# Patient Record
Sex: FEMALE | Race: WHITE | NOT HISPANIC OR LATINO | Employment: UNEMPLOYED | URBAN - NONMETROPOLITAN AREA
[De-identification: names, ages, dates, MRNs, and addresses within clinical notes are randomized per-mention and may not be internally consistent; named-entity substitution may affect disease eponyms.]

---

## 2017-04-12 ENCOUNTER — OFFICE VISIT - GICH (OUTPATIENT)
Dept: INTERNAL MEDICINE | Facility: OTHER | Age: 42
End: 2017-04-12

## 2017-04-12 ENCOUNTER — HISTORY (OUTPATIENT)
Dept: INTERNAL MEDICINE | Facility: OTHER | Age: 42
End: 2017-04-12

## 2017-04-12 ENCOUNTER — AMBULATORY - GICH (OUTPATIENT)
Dept: LAB | Facility: OTHER | Age: 42
End: 2017-04-12

## 2017-04-12 DIAGNOSIS — R53.83 OTHER FATIGUE: ICD-10-CM

## 2017-04-12 DIAGNOSIS — E55.9 VITAMIN D DEFICIENCY: ICD-10-CM

## 2017-04-12 DIAGNOSIS — R09.81 NASAL CONGESTION: ICD-10-CM

## 2017-04-12 DIAGNOSIS — E04.1 NONTOXIC SINGLE THYROID NODULE: ICD-10-CM

## 2017-04-12 LAB
ERYTHROCYTE [DISTWIDTH] IN BLOOD BY AUTOMATED COUNT: 12.1 % (ref 11.5–15.5)
HCT VFR BLD AUTO: 38.5 % (ref 33–51)
HEMOGLOBIN: 12.9 G/DL (ref 12–16)
MCH RBC QN AUTO: 30.8 PG (ref 26–34)
MCHC RBC AUTO-ENTMCNC: 33.6 G/DL (ref 32–36)
MCV RBC AUTO: 92 FL (ref 80–100)
PLATELET # BLD AUTO: 261 THOU/CU MM (ref 140–440)
PMV BLD: 8.8 FL (ref 6.5–11)
RED BLOOD COUNT - HISTORICAL: 4.19 MIL/CU MM (ref 4–5.2)
TSH - HISTORICAL: 1.6 UIU/ML (ref 0.34–5.6)
VITAMIN D TOTAL - HISTORICAL: 58.7 NG/ML
WHITE BLOOD COUNT - HISTORICAL: 10.4 THOU/CU MM (ref 4.5–11)

## 2017-04-12 ASSESSMENT — ANXIETY QUESTIONNAIRES
5. BEING SO RESTLESS THAT IT IS HARD TO SIT STILL: NOT AT ALL
4. TROUBLE RELAXING: NOT AT ALL
2. NOT BEING ABLE TO STOP OR CONTROL WORRYING: NOT AT ALL
3. WORRYING TOO MUCH ABOUT DIFFERENT THINGS: SEVERAL DAYS
7. FEELING AFRAID AS IF SOMETHING AWFUL MIGHT HAPPEN: NOT AT ALL
GAD7 TOTAL SCORE: 5
1. FEELING NERVOUS, ANXIOUS, OR ON EDGE: MORE THAN HALF THE DAYS
6. BECOMING EASILY ANNOYED OR IRRITABLE: MORE THAN HALF THE DAYS

## 2017-04-12 ASSESSMENT — PATIENT HEALTH QUESTIONNAIRE - PHQ9: SUM OF ALL RESPONSES TO PHQ QUESTIONS 1-9: 3

## 2017-04-19 ENCOUNTER — HOSPITAL ENCOUNTER (OUTPATIENT)
Dept: RADIOLOGY | Facility: OTHER | Age: 42
End: 2017-04-19
Attending: INTERNAL MEDICINE

## 2017-04-19 ENCOUNTER — AMBULATORY - GICH (OUTPATIENT)
Dept: LAB | Facility: OTHER | Age: 42
End: 2017-04-19

## 2017-04-19 ENCOUNTER — AMBULATORY - GICH (OUTPATIENT)
Dept: FAMILY MEDICINE | Facility: OTHER | Age: 42
End: 2017-04-19

## 2017-04-19 DIAGNOSIS — E04.1 NONTOXIC SINGLE THYROID NODULE: ICD-10-CM

## 2017-04-19 DIAGNOSIS — O20.9 HEMORRHAGE IN EARLY PREGNANCY: ICD-10-CM

## 2017-04-19 LAB
HCG BETA QUANT,PREGNANCY - HISTORICAL: ABNORMAL MIU/ML
PROGESTERONE: 15.48 NG/ML

## 2017-04-21 ENCOUNTER — OFFICE VISIT - GICH (OUTPATIENT)
Dept: FAMILY MEDICINE | Facility: OTHER | Age: 42
End: 2017-04-21

## 2017-04-21 ENCOUNTER — AMBULATORY - GICH (OUTPATIENT)
Dept: FAMILY MEDICINE | Facility: OTHER | Age: 42
End: 2017-04-21

## 2017-04-21 ENCOUNTER — HOSPITAL ENCOUNTER (OUTPATIENT)
Dept: RADIOLOGY | Facility: OTHER | Age: 42
End: 2017-04-21
Attending: FAMILY MEDICINE

## 2017-04-21 DIAGNOSIS — O20.9 HEMORRHAGE IN EARLY PREGNANCY: ICD-10-CM

## 2017-04-21 LAB
HCG BETA QUANT,PREGNANCY - HISTORICAL: ABNORMAL MIU/ML
PROGESTERONE: 9.9 NG/ML

## 2017-04-24 ENCOUNTER — AMBULATORY - GICH (OUTPATIENT)
Dept: FAMILY MEDICINE | Facility: OTHER | Age: 42
End: 2017-04-24

## 2017-04-24 ENCOUNTER — AMBULATORY - GICH (OUTPATIENT)
Dept: LAB | Facility: OTHER | Age: 42
End: 2017-04-24

## 2017-04-24 DIAGNOSIS — O20.9 HEMORRHAGE IN EARLY PREGNANCY: ICD-10-CM

## 2017-04-24 LAB — HCG BETA QUANT,PREGNANCY - HISTORICAL: ABNORMAL MIU/ML

## 2017-04-26 ENCOUNTER — HISTORY (OUTPATIENT)
Dept: FAMILY MEDICINE | Facility: OTHER | Age: 42
End: 2017-04-26

## 2017-04-26 ENCOUNTER — HOSPITAL ENCOUNTER (OUTPATIENT)
Dept: RADIOLOGY | Facility: OTHER | Age: 42
End: 2017-04-26
Attending: FAMILY MEDICINE

## 2017-04-26 ENCOUNTER — OFFICE VISIT - GICH (OUTPATIENT)
Dept: FAMILY MEDICINE | Facility: OTHER | Age: 42
End: 2017-04-26

## 2017-04-26 ENCOUNTER — AMBULATORY - GICH (OUTPATIENT)
Dept: LAB | Facility: OTHER | Age: 42
End: 2017-04-26

## 2017-04-26 ENCOUNTER — AMBULATORY - GICH (OUTPATIENT)
Dept: FAMILY MEDICINE | Facility: OTHER | Age: 42
End: 2017-04-26

## 2017-04-26 DIAGNOSIS — O20.9 HEMORRHAGE IN EARLY PREGNANCY: ICD-10-CM

## 2017-04-26 LAB — HCG BETA QUANT,PREGNANCY - HISTORICAL: ABNORMAL MIU/ML

## 2017-05-02 ENCOUNTER — COMMUNICATION - GICH (OUTPATIENT)
Dept: FAMILY MEDICINE | Facility: OTHER | Age: 42
End: 2017-05-02

## 2017-05-02 DIAGNOSIS — O20.9 HEMORRHAGE IN EARLY PREGNANCY: ICD-10-CM

## 2017-05-03 ENCOUNTER — AMBULATORY - GICH (OUTPATIENT)
Dept: LAB | Facility: OTHER | Age: 42
End: 2017-05-03

## 2017-05-03 ENCOUNTER — HISTORY (OUTPATIENT)
Dept: OBGYN | Facility: OTHER | Age: 42
End: 2017-05-03

## 2017-05-03 ENCOUNTER — HOSPITAL ENCOUNTER (OUTPATIENT)
Dept: RADIOLOGY | Facility: OTHER | Age: 42
End: 2017-05-03
Attending: FAMILY MEDICINE

## 2017-05-03 ENCOUNTER — AMBULATORY - GICH (OUTPATIENT)
Dept: FAMILY MEDICINE | Facility: OTHER | Age: 42
End: 2017-05-03

## 2017-05-03 ENCOUNTER — OFFICE VISIT - GICH (OUTPATIENT)
Dept: OBGYN | Facility: OTHER | Age: 42
End: 2017-05-03

## 2017-05-03 DIAGNOSIS — O20.9 HEMORRHAGE IN EARLY PREGNANCY: ICD-10-CM

## 2017-05-03 DIAGNOSIS — O03.4 INCOMPLETE SPONTANEOUS ABORTION WITHOUT COMPLICATION: ICD-10-CM

## 2017-05-03 LAB — HCG BETA QUANT,PREGNANCY - HISTORICAL: ABNORMAL MIU/ML

## 2017-05-04 ENCOUNTER — HOSPITAL ENCOUNTER (OUTPATIENT)
Dept: SURGERY | Facility: OTHER | Age: 42
Discharge: HOME | End: 2017-05-04
Attending: OBSTETRICS & GYNECOLOGY | Admitting: OBSTETRICS & GYNECOLOGY

## 2017-05-04 ENCOUNTER — SURGERY (OUTPATIENT)
Dept: SURGERY | Facility: OTHER | Age: 42
End: 2017-05-04

## 2017-05-04 DIAGNOSIS — Z98.890 OTHER SPECIFIED POSTPROCEDURAL STATES: ICD-10-CM

## 2017-05-17 ENCOUNTER — OFFICE VISIT - GICH (OUTPATIENT)
Dept: OBGYN | Facility: OTHER | Age: 42
End: 2017-05-17

## 2017-05-17 ENCOUNTER — HISTORY (OUTPATIENT)
Dept: OBGYN | Facility: OTHER | Age: 42
End: 2017-05-17

## 2017-05-17 DIAGNOSIS — Z48.89 ENCOUNTER FOR OTHER SPECIFIED SURGICAL AFTERCARE (CODE): ICD-10-CM

## 2017-06-13 ENCOUNTER — AMBULATORY - GICH (OUTPATIENT)
Dept: INTERNAL MEDICINE | Facility: OTHER | Age: 42
End: 2017-06-13

## 2017-07-26 ENCOUNTER — HISTORY (OUTPATIENT)
Dept: INTERNAL MEDICINE | Facility: OTHER | Age: 42
End: 2017-07-26

## 2017-07-26 ENCOUNTER — OFFICE VISIT - GICH (OUTPATIENT)
Dept: INTERNAL MEDICINE | Facility: OTHER | Age: 42
End: 2017-07-26

## 2017-07-26 DIAGNOSIS — E04.1 NONTOXIC SINGLE THYROID NODULE: ICD-10-CM

## 2017-07-26 DIAGNOSIS — R41.840 ATTENTION AND CONCENTRATION DEFICIT: ICD-10-CM

## 2017-07-26 DIAGNOSIS — F43.20 ADJUSTMENT DISORDER: ICD-10-CM

## 2017-07-26 ASSESSMENT — ANXIETY QUESTIONNAIRES
2. NOT BEING ABLE TO STOP OR CONTROL WORRYING: SEVERAL DAYS
GAD7 TOTAL SCORE: 10
4. TROUBLE RELAXING: SEVERAL DAYS
7. FEELING AFRAID AS IF SOMETHING AWFUL MIGHT HAPPEN: SEVERAL DAYS
6. BECOMING EASILY ANNOYED OR IRRITABLE: MORE THAN HALF THE DAYS
5. BEING SO RESTLESS THAT IT IS HARD TO SIT STILL: SEVERAL DAYS
3. WORRYING TOO MUCH ABOUT DIFFERENT THINGS: SEVERAL DAYS
1. FEELING NERVOUS, ANXIOUS, OR ON EDGE: NEARLY EVERY DAY

## 2017-07-26 ASSESSMENT — PATIENT HEALTH QUESTIONNAIRE - PHQ9: SUM OF ALL RESPONSES TO PHQ QUESTIONS 1-9: 9

## 2017-08-16 ENCOUNTER — AMBULATORY - GICH (OUTPATIENT)
Dept: INTERNAL MEDICINE | Facility: OTHER | Age: 42
End: 2017-08-16

## 2017-08-24 ENCOUNTER — AMBULATORY - GICH (OUTPATIENT)
Dept: ORTHOPEDICS | Facility: OTHER | Age: 42
End: 2017-08-24

## 2017-08-24 DIAGNOSIS — M79.631 PAIN OF RIGHT FOREARM: ICD-10-CM

## 2017-08-24 DIAGNOSIS — M25.521 PAIN IN RIGHT ELBOW: ICD-10-CM

## 2017-08-28 ENCOUNTER — OFFICE VISIT - GICH (OUTPATIENT)
Dept: ORTHOPEDICS | Facility: OTHER | Age: 42
End: 2017-08-28

## 2017-08-28 ENCOUNTER — HOSPITAL ENCOUNTER (OUTPATIENT)
Dept: RADIOLOGY | Facility: OTHER | Age: 42
End: 2017-08-28
Attending: ORTHOPAEDIC SURGERY

## 2017-08-28 ENCOUNTER — HISTORY (OUTPATIENT)
Dept: ORTHOPEDICS | Facility: OTHER | Age: 42
End: 2017-08-28

## 2017-08-28 DIAGNOSIS — M77.01 MEDIAL EPICONDYLITIS OF RIGHT ELBOW: ICD-10-CM

## 2017-08-28 DIAGNOSIS — M79.631 PAIN OF RIGHT FOREARM: ICD-10-CM

## 2017-08-28 DIAGNOSIS — G56.23 LESIONS OF BOTH ULNAR NERVES: ICD-10-CM

## 2017-08-28 DIAGNOSIS — M25.521 PAIN IN RIGHT ELBOW: ICD-10-CM

## 2017-08-28 DIAGNOSIS — M77.8 OTHER ENTHESOPATHIES, NOT ELSEWHERE CLASSIFIED: ICD-10-CM

## 2017-08-30 ENCOUNTER — AMBULATORY - GICH (OUTPATIENT)
Dept: INTERNAL MEDICINE | Facility: OTHER | Age: 42
End: 2017-08-30

## 2017-08-30 ENCOUNTER — HOSPITAL ENCOUNTER (OUTPATIENT)
Dept: PHYSICAL THERAPY | Facility: OTHER | Age: 42
Setting detail: THERAPIES SERIES
End: 2017-08-30
Attending: ORTHOPAEDIC SURGERY

## 2017-08-30 ENCOUNTER — HOSPITAL ENCOUNTER (OUTPATIENT)
Dept: RADIOLOGY | Facility: OTHER | Age: 42
End: 2017-08-30
Attending: ORTHOPAEDIC SURGERY

## 2017-08-30 DIAGNOSIS — M77.01 MEDIAL EPICONDYLITIS OF RIGHT ELBOW: ICD-10-CM

## 2017-08-30 DIAGNOSIS — G56.23 LESIONS OF BOTH ULNAR NERVES: ICD-10-CM

## 2017-08-30 DIAGNOSIS — M77.8 OTHER ENTHESOPATHIES, NOT ELSEWHERE CLASSIFIED: ICD-10-CM

## 2017-09-06 ENCOUNTER — HOSPITAL ENCOUNTER (OUTPATIENT)
Dept: PHYSICAL THERAPY | Facility: OTHER | Age: 42
Setting detail: THERAPIES SERIES
End: 2017-09-06
Attending: ORTHOPAEDIC SURGERY

## 2017-09-07 ENCOUNTER — HOSPITAL ENCOUNTER (OUTPATIENT)
Dept: PHYSICAL THERAPY | Facility: OTHER | Age: 42
Setting detail: THERAPIES SERIES
End: 2017-09-07
Attending: ORTHOPAEDIC SURGERY

## 2017-09-12 ENCOUNTER — HOSPITAL ENCOUNTER (OUTPATIENT)
Dept: PHYSICAL THERAPY | Facility: OTHER | Age: 42
Setting detail: THERAPIES SERIES
End: 2017-09-12
Attending: ORTHOPAEDIC SURGERY

## 2017-09-14 ENCOUNTER — HOSPITAL ENCOUNTER (OUTPATIENT)
Dept: PHYSICAL THERAPY | Facility: OTHER | Age: 42
Setting detail: THERAPIES SERIES
End: 2017-09-14
Attending: ORTHOPAEDIC SURGERY

## 2017-09-19 ENCOUNTER — HOSPITAL ENCOUNTER (OUTPATIENT)
Dept: PHYSICAL THERAPY | Facility: OTHER | Age: 42
Setting detail: THERAPIES SERIES
End: 2017-09-19
Attending: ORTHOPAEDIC SURGERY

## 2017-09-21 ENCOUNTER — HOSPITAL ENCOUNTER (OUTPATIENT)
Dept: PHYSICAL THERAPY | Facility: OTHER | Age: 42
Setting detail: THERAPIES SERIES
End: 2017-09-21
Attending: ORTHOPAEDIC SURGERY

## 2017-09-26 ENCOUNTER — HOSPITAL ENCOUNTER (OUTPATIENT)
Dept: PHYSICAL THERAPY | Facility: OTHER | Age: 42
Setting detail: THERAPIES SERIES
End: 2017-09-26
Attending: ORTHOPAEDIC SURGERY

## 2017-09-28 ENCOUNTER — HOSPITAL ENCOUNTER (OUTPATIENT)
Dept: PHYSICAL THERAPY | Facility: OTHER | Age: 42
Setting detail: THERAPIES SERIES
End: 2017-09-28
Attending: ORTHOPAEDIC SURGERY

## 2017-10-03 ENCOUNTER — HOSPITAL ENCOUNTER (OUTPATIENT)
Dept: PHYSICAL THERAPY | Facility: OTHER | Age: 42
Setting detail: THERAPIES SERIES
End: 2017-10-03
Attending: ORTHOPAEDIC SURGERY

## 2017-10-05 ENCOUNTER — HOSPITAL ENCOUNTER (OUTPATIENT)
Dept: PHYSICAL THERAPY | Facility: OTHER | Age: 42
Setting detail: THERAPIES SERIES
End: 2017-10-05
Attending: ORTHOPAEDIC SURGERY

## 2017-10-10 ENCOUNTER — COMMUNICATION - GICH (OUTPATIENT)
Dept: FAMILY MEDICINE | Facility: OTHER | Age: 42
End: 2017-10-10

## 2017-10-10 ENCOUNTER — HOSPITAL ENCOUNTER (OUTPATIENT)
Dept: PHYSICAL THERAPY | Facility: OTHER | Age: 42
Setting detail: THERAPIES SERIES
End: 2017-10-10
Attending: ORTHOPAEDIC SURGERY

## 2017-10-10 ENCOUNTER — AMBULATORY - GICH (OUTPATIENT)
Dept: INTERNAL MEDICINE | Facility: OTHER | Age: 42
End: 2017-10-10

## 2017-10-10 ENCOUNTER — COMMUNICATION - GICH (OUTPATIENT)
Dept: INTERNAL MEDICINE | Facility: OTHER | Age: 42
End: 2017-10-10

## 2017-10-11 ENCOUNTER — HISTORY (OUTPATIENT)
Dept: FAMILY MEDICINE | Facility: OTHER | Age: 42
End: 2017-10-11

## 2017-10-11 ENCOUNTER — OFFICE VISIT - GICH (OUTPATIENT)
Dept: FAMILY MEDICINE | Facility: OTHER | Age: 42
End: 2017-10-11

## 2017-10-11 DIAGNOSIS — N91.2 AMENORRHEA: ICD-10-CM

## 2017-10-11 DIAGNOSIS — J30.89 OTHER ALLERGIC RHINITIS: ICD-10-CM

## 2017-10-11 DIAGNOSIS — F41.8 OTHER SPECIFIED ANXIETY DISORDERS: ICD-10-CM

## 2017-10-11 DIAGNOSIS — M77.8 OTHER ENTHESOPATHIES, NOT ELSEWHERE CLASSIFIED: ICD-10-CM

## 2017-10-11 DIAGNOSIS — M26.609 TEMPOROMANDIBULAR JOINT DISORDER: ICD-10-CM

## 2017-10-11 LAB
ESTRADIOL SERPL-MCNC: 249 PG/ML
FSH - HISTORICAL: 1.9 MIU/ML
PROLACTIN - HISTORICAL: 10.4 NG/ML
T4 FREE SERPL-MCNC: 0.9 NG/DL (ref 0.58–1.64)
TSH - HISTORICAL: 3.75 UIU/ML (ref 0.34–5.6)

## 2017-10-11 ASSESSMENT — ANXIETY QUESTIONNAIRES
2. NOT BEING ABLE TO STOP OR CONTROL WORRYING: SEVERAL DAYS
5. BEING SO RESTLESS THAT IT IS HARD TO SIT STILL: MORE THAN HALF THE DAYS
7. FEELING AFRAID AS IF SOMETHING AWFUL MIGHT HAPPEN: SEVERAL DAYS
3. WORRYING TOO MUCH ABOUT DIFFERENT THINGS: NEARLY EVERY DAY
1. FEELING NERVOUS, ANXIOUS, OR ON EDGE: NEARLY EVERY DAY
6. BECOMING EASILY ANNOYED OR IRRITABLE: MORE THAN HALF THE DAYS
4. TROUBLE RELAXING: SEVERAL DAYS
GAD7 TOTAL SCORE: 13

## 2017-10-11 ASSESSMENT — PATIENT HEALTH QUESTIONNAIRE - PHQ9: SUM OF ALL RESPONSES TO PHQ QUESTIONS 1-9: 8

## 2017-10-12 ENCOUNTER — HOSPITAL ENCOUNTER (OUTPATIENT)
Dept: PHYSICAL THERAPY | Facility: OTHER | Age: 42
Setting detail: THERAPIES SERIES
End: 2017-10-12
Attending: ORTHOPAEDIC SURGERY

## 2017-10-15 ENCOUNTER — HISTORY (OUTPATIENT)
Dept: FAMILY MEDICINE | Facility: OTHER | Age: 42
End: 2017-10-15

## 2017-10-17 ENCOUNTER — HOSPITAL ENCOUNTER (OUTPATIENT)
Dept: PHYSICAL THERAPY | Facility: OTHER | Age: 42
Setting detail: THERAPIES SERIES
End: 2017-10-17
Attending: ORTHOPAEDIC SURGERY

## 2017-10-18 ENCOUNTER — AMBULATORY - GICH (OUTPATIENT)
Dept: SCHEDULING | Facility: OTHER | Age: 42
End: 2017-10-18

## 2017-10-19 ENCOUNTER — HOSPITAL ENCOUNTER (OUTPATIENT)
Dept: PHYSICAL THERAPY | Facility: OTHER | Age: 42
Setting detail: THERAPIES SERIES
End: 2017-10-19
Attending: ORTHOPAEDIC SURGERY

## 2017-10-23 ENCOUNTER — HISTORY (OUTPATIENT)
Dept: ORTHOPEDICS | Facility: OTHER | Age: 42
End: 2017-10-23

## 2017-10-23 ENCOUNTER — AMBULATORY - GICH (OUTPATIENT)
Dept: ORTHOPEDICS | Facility: OTHER | Age: 42
End: 2017-10-23

## 2017-10-23 ENCOUNTER — COMMUNICATION - GICH (OUTPATIENT)
Dept: FAMILY MEDICINE | Facility: OTHER | Age: 42
End: 2017-10-23

## 2017-10-23 ENCOUNTER — OFFICE VISIT - GICH (OUTPATIENT)
Dept: ORTHOPEDICS | Facility: OTHER | Age: 42
End: 2017-10-23

## 2017-10-23 DIAGNOSIS — G56.23 LESIONS OF BOTH ULNAR NERVES: ICD-10-CM

## 2017-10-23 DIAGNOSIS — M77.01 MEDIAL EPICONDYLITIS OF RIGHT ELBOW: ICD-10-CM

## 2017-10-24 ENCOUNTER — HOSPITAL ENCOUNTER (OUTPATIENT)
Dept: PHYSICAL THERAPY | Facility: OTHER | Age: 42
Setting detail: THERAPIES SERIES
End: 2017-10-24
Attending: ORTHOPAEDIC SURGERY

## 2017-10-25 ENCOUNTER — HISTORY (OUTPATIENT)
Dept: INTERNAL MEDICINE | Facility: OTHER | Age: 42
End: 2017-10-25

## 2017-10-25 ENCOUNTER — OFFICE VISIT - GICH (OUTPATIENT)
Dept: INTERNAL MEDICINE | Facility: OTHER | Age: 42
End: 2017-10-25

## 2017-10-25 DIAGNOSIS — Z71.89 OTHER SPECIFIED COUNSELING: Chronic | ICD-10-CM

## 2017-10-25 DIAGNOSIS — N96 RECURRENT PREGNANCY LOSS (CODE): ICD-10-CM

## 2017-10-25 DIAGNOSIS — Z80.3 FAMILY HISTORY OF MALIGNANT NEOPLASM OF BREAST: ICD-10-CM

## 2017-10-25 DIAGNOSIS — M25.50 PAIN IN JOINT: ICD-10-CM

## 2017-10-25 DIAGNOSIS — L30.1 DYSHIDROSIS: ICD-10-CM

## 2017-10-25 DIAGNOSIS — R09.81 NASAL CONGESTION: ICD-10-CM

## 2017-10-25 DIAGNOSIS — Z82.49 FAMILY HISTORY OF ISCHEMIC HEART DISEASE AND OTHER DISEASES OF THE CIRCULATORY SYSTEM: ICD-10-CM

## 2017-10-25 LAB
C-REACTIVE PROTEIN - HISTORICAL: <1 MG/DL
ERYTHROCYTE [SEDIMENTATION RATE] IN BLOOD: 9 MM/HR

## 2017-10-27 ENCOUNTER — HOSPITAL ENCOUNTER (OUTPATIENT)
Dept: PHYSICAL THERAPY | Facility: OTHER | Age: 42
Setting detail: THERAPIES SERIES
End: 2017-10-27
Attending: ORTHOPAEDIC SURGERY

## 2017-10-27 LAB — ANA INTERPRETATION: NEGATIVE

## 2017-11-01 ENCOUNTER — HOSPITAL ENCOUNTER (OUTPATIENT)
Dept: PHYSICAL THERAPY | Facility: OTHER | Age: 42
Setting detail: THERAPIES SERIES
End: 2017-11-01
Attending: ORTHOPAEDIC SURGERY

## 2017-11-08 ENCOUNTER — HISTORY (OUTPATIENT)
Dept: FAMILY MEDICINE | Facility: OTHER | Age: 42
End: 2017-11-08

## 2017-11-08 ENCOUNTER — OFFICE VISIT - GICH (OUTPATIENT)
Dept: FAMILY MEDICINE | Facility: OTHER | Age: 42
End: 2017-11-08

## 2017-11-08 ENCOUNTER — HOSPITAL ENCOUNTER (OUTPATIENT)
Dept: PHYSICAL THERAPY | Facility: OTHER | Age: 42
Setting detail: THERAPIES SERIES
End: 2017-11-08
Attending: ORTHOPAEDIC SURGERY

## 2017-11-08 DIAGNOSIS — F41.9 ANXIETY DISORDER: ICD-10-CM

## 2017-11-08 DIAGNOSIS — N91.2 AMENORRHEA: ICD-10-CM

## 2017-11-13 ENCOUNTER — AMBULATORY - GICH (OUTPATIENT)
Dept: FAMILY MEDICINE | Facility: OTHER | Age: 42
End: 2017-11-13

## 2017-11-13 ENCOUNTER — HOSPITAL ENCOUNTER (OUTPATIENT)
Dept: PHYSICAL THERAPY | Facility: OTHER | Age: 42
End: 2017-11-13
Attending: FAMILY MEDICINE

## 2017-11-14 ENCOUNTER — COMMUNICATION - GICH (OUTPATIENT)
Dept: PHYSICAL THERAPY | Facility: OTHER | Age: 42
End: 2017-11-14

## 2017-11-14 ENCOUNTER — HOSPITAL ENCOUNTER (OUTPATIENT)
Dept: PHYSICAL THERAPY | Facility: OTHER | Age: 42
Setting detail: THERAPIES SERIES
End: 2017-11-14
Attending: ORTHOPAEDIC SURGERY

## 2017-11-14 ENCOUNTER — AMBULATORY - GICH (OUTPATIENT)
Dept: ORTHOPEDICS | Facility: OTHER | Age: 42
End: 2017-11-14

## 2017-11-14 DIAGNOSIS — M25.511 PAIN IN RIGHT SHOULDER: ICD-10-CM

## 2017-11-14 DIAGNOSIS — M79.601 PAIN OF RIGHT ARM: ICD-10-CM

## 2017-11-15 ENCOUNTER — HOSPITAL ENCOUNTER (OUTPATIENT)
Dept: PHYSICAL THERAPY | Facility: OTHER | Age: 42
Setting detail: THERAPIES SERIES
End: 2017-11-15
Attending: ORTHOPAEDIC SURGERY

## 2017-11-15 DIAGNOSIS — M79.601 PAIN OF RIGHT ARM: ICD-10-CM

## 2017-11-15 DIAGNOSIS — M25.511 PAIN IN RIGHT SHOULDER: ICD-10-CM

## 2017-11-21 ENCOUNTER — HOSPITAL ENCOUNTER (OUTPATIENT)
Dept: PHYSICAL THERAPY | Facility: OTHER | Age: 42
Setting detail: THERAPIES SERIES
End: 2017-11-21
Attending: ORTHOPAEDIC SURGERY

## 2017-11-24 ENCOUNTER — HOSPITAL ENCOUNTER (OUTPATIENT)
Dept: PHYSICAL THERAPY | Facility: OTHER | Age: 42
Setting detail: THERAPIES SERIES
End: 2017-11-24
Attending: ORTHOPAEDIC SURGERY

## 2017-12-13 ENCOUNTER — HOSPITAL ENCOUNTER (OUTPATIENT)
Dept: PHYSICAL THERAPY | Facility: OTHER | Age: 42
Setting detail: THERAPIES SERIES
End: 2017-12-13
Attending: ORTHOPAEDIC SURGERY

## 2017-12-20 ENCOUNTER — HOSPITAL ENCOUNTER (OUTPATIENT)
Dept: PHYSICAL THERAPY | Facility: OTHER | Age: 42
Setting detail: THERAPIES SERIES
End: 2017-12-20
Attending: ORTHOPAEDIC SURGERY

## 2017-12-27 NOTE — PROGRESS NOTES
Patient Information     Patient Name MRN Sex Gauri Snell [id number] Female 1975      Progress Notes by Fabiola Manriquez OT at 2017 11:33 AM     Author:  Fabiola Manriquez OT Service:  (none) Author Type:  OT- Occupational Therapist     Filed:  2017 12:26 PM Date of Service:  2017 11:33 AM Status:  Signed     :  Fabiola Manriquez OT (OT- Occupational Therapist)            [facility]  Outpatient OT - Daily Note          Date of Service:2017           Visit #:  3  Date of referral: 2017   Patient Name: Gauri Hollis  YOB: 1975   Referring MD/Provider: Dr Rivera  Diagnosis: right elbow pain  Treatment Diagnosis:pain, weakness, decline in self cares   Insurance:  [employer]  Start of Care Date: 2017   Certification periods:  From:   2017  To: 10/30/2017      Subjective    Gauri state her ar is acutely sore from the IASTM,       Pain Ratin = Severe Pain, (Miserable, Gnawing, Fierce, Piercing) / Location:  Medial side of left elbow.     Objective    Today's Intervention:    Continuous ultrasound using 5 cm sound head at 1.0 Mhz, 1.0 w/cm2 to medial elbow and distal biceps.  (location) to decrease pain and/or increase soft tissue extensibility. 8 minutes application, 5 minutes prep/clean-up/rationale. Patient was instructed to advise OT if a dull ache or uncomfortable heat were felt.     STM to local lesions, biceps, and and medial elbow.       Held today:    High volt electrical stimulation (PPR1- 300PV unit), intensity 48 (monitored and controlled by patient), negative rectangle treatment electrode over medial elbow, round positive electrode on dorsum of wrist. 20 minute treatment time, to increase circulation, decrease pain and edema.     Moist hot pack to RUE elbow distally to fingertips, during e-stim, 6 layers + hot pack cover, to prepare soft tissue for stretch. 13 minutes application with skin check at 8  minutes. Additional towel provided for patient to utilize if heat became too high.     Following heat and e-stim, PROM with prolonged stretch to RUE:     Manual Therapy to help improve tissue mobility, improve circulation and to decrease tissue tension thru the use of IASTM with the Graston Technique    GT4, GT6: sweep, fan through biceps, triceps and forearm  GT^ Brush and strum , GT 2 swivel local lesions  GtT6: frame around epicondyle   GT6, brush & strum; GT6 scoop off supracondylar ridge  GT4 sweep though pronator jerald (parallel and perpendicular to fibers  GT4 swivel interosseous membrane   GT4 [id number], fan, scoop & strum distal triceps   GT6 J stroke along muscle bellies     ** patient was informed of potential treatment responses produced by IASTM. S/he was informed to instruct provider with any discomfort or pain.     Prolong stretch to wrist extensors, flexors, supinator and pronator.     Iontophoresis using dexamethasone, 80 mA dose via Ionto  Active stat delivery method. Medication applied to media epicondyle, to decrease inflammation and/or pain. Patient was informed of normal reactions to the iontophoresis treatment, and was instructed to remove the patch after 4 hours. Total iontophoresis set-up/rationale = 8 minutes.       Home Exercise Program: phae one of medial epicondyle protocol.     Assessment    Therapist Assessment/Response to Intervention:   Gauri is still having significant   tightness  In her biceps and supinator. There are areas of dense tissue in these areas as well        Goals:  Problem list includes: pain with weight bearing  Though marilee DAWSON with external rotation     Patient s goal: to be able to work with out pain.      Functional short term goals (established in collaboration with the patient, to be met in 8 weeks):  1. Patient will report the ability to wash her hair with pain levels < 3/10  2. Patient will report the ability to utilize her mouse while using her computer with  pian levels <3/10  3. Patient will report the ability to perform keyboarding for up to one hour with no pain         Functional long terms goal to be met in 12 weeks:   A. Patient will be able to at PLOF   B. Patient will be able to perform self care tasks at PLOF  Plan  Plan:  Consider ultrasound    Student has been instructed in and demonstrates skills necessary to carry out above stated treatment plan: Yes    Thank you for your referral to [facility].  Please call with any questions, concerns or comments.  [phone]    Fabioal Manriquez OTR/L  Occupational Therapist

## 2017-12-27 NOTE — PROGRESS NOTES
"Patient Information     Patient Name MRN Gauri Nagy [id number] Female 1975      Progress Notes by Serene Perez MD at 10/11/2017  1:15 PM     Author:  Serene Perez MD Service:  (none) Author Type:  Physician     Filed:  10/15/2017  7:37 PM Encounter Date:  10/11/2017 Status:  Signed     :  Serene Perez MD (Physician)            SUBJECTIVE:    Gauri Hollis is a 42 y.o. female who presents for evaluation of chest heaviness symptoms.  Nursing Notes:   Ruby Iniguez  10/11/2017  1:30 PM  Signed  Patient is here for concerns of not feeling well. States last few week has been having heavy chest congestion and sore throat. Patient also complaining of possible acid reflux, or anxiety.  Rubybrennan [place] LPN .............10/11/2017  1:19 PM     HPI  Gauri Hollis is a 42 y.o. female in for evaluation of chest heaviness symptoms.  Onset about a month ago.  When she lays down, it is worse.  Some burning, more off to the right - this can come on at random times.  Can be with/without stress.  Can be with driving in a car, doesn't impair exercise.  Doesn't wake her up but can stop her from going to sleep. Some palpitations.  She's had stress, anxiety and depression symptoms for years.  She's treated this with exercise, diet, supplements, relaxation.  She does now feel ready to take medication for her symptoms.      For a few years she has been more congested in general - nasal and ears.  High pitched sounds hurt her ears.    Has had Nasacort in the past.  Used loratadine the past several months.      Allergies      Allergen   Reactions     Cats (Fur, Dander, Saliva)  Other - Describe In Comment Field     congestion      Chlorhexidine Towelette  Itching     No rash.  Also felt \"irritable\" after using wipes.    ,   Current Outpatient Prescriptions     Medication  Sig     Cholecalciferol, Vitamin D3, (VITAMIN D-3) 5,000 unit tab Take  by mouth once daily.     " Choline Bitartrate 100 % powd As directed.     omega-3 fatty acids-vitamin E (FISH OIL) 1,000 mg cap Take  by mouth.     PNV34-iron,carbonyl-FA-DSS-dha 30 mg iron-1 mg -50 mg-260 mg cap Take  by mouth.     Saccharomyces boulardii (PROBIOTIC, S.BOULARDII,) 250 mg capsule Take 1 capsule by mouth once daily.     No current facility-administered medications for this visit.      Medications have been reviewed by me and are current to the best of my knowledge and ability.  and   Past Medical History:     Diagnosis  Date     Bicipital tendonitis at the elbow 2017     Cubital tunnel syndrome, bilateral 2017     Elbow dislocation      Family history of breast cancer      Hx of pregnancy      - PPH, retained placenta, transfusion      POSTPARTUM HEMORRHAGE 2011     Pregnancy, supervision, high-risk      Right medial epicondylitis 2017       REVIEW OF SYSTEMS:  Review of Systems   Constitutional: Positive for malaise/fatigue. Negative for weight loss.   HENT: Positive for congestion.    Cardiovascular: Positive for palpitations.   Gastrointestinal: Positive for heartburn.   Musculoskeletal: Positive for joint pain and myalgias.   Psychiatric/Behavioral: Positive for depression. The patient is nervous/anxious.     no period since her miscarriage last spring - d&c in May.  No breast leakage, but more tender.   Her stomach hasn't felt quite normal since having taken ibuprofen.  Has taken some probiotics.      Working with occupational therapy for right elbow,     OBJECTIVE:  /80  Pulse 72  Temp 98  F (36.7  C) (Tympanic)  LMP 2017    EXAM:   Physical Exam   Constitutional: She is well-developed, well-nourished, and in no distress.   HENT:   Right Ear: External ear normal.   Left Ear: External ear normal.   Bilateral nasal congestion with clear drainage.  Jaw snapping on left side with mouth opening.   Cardiovascular: Normal rate and regular rhythm.    Pulmonary/Chest: Breath sounds  normal. She has no wheezes.   Psychiatric: Memory normal. She is not agitated. She exhibits a depressed mood. She exhibits ordered thought content.   Nursing note and vitals reviewed.      DORIAN-7 ANXIETY SCREENING 7/26/2017 10/11/2017   DORIAN date (doc flow) 7/26/2017 10/11/2017   Nervous, anxious 3 3   Cannot stop worrying 1 1   Worry about different things 1 3   Cannot relax 1 1   Feeling restless 1 2   Easily annoyed/irritated 2 2   Afraid of awful event 1 1   Score 10 13   Severity moderate anxiety moderate anxiety   Some recent data might be hidden       PHQ Depression Screening 7/26/2017 10/11/2017   Date of PHQ exam (doc flow) 7/26/2017 10/11/2017   1. Lack of interest/pleasure 2 - More than half the days 1 - Several days   2. Feeling down/depressed 1 - Several days 1 - Several days   PHQ-2 TOTAL SCORE 3 2   3. Trouble sleeping 2 - More than half the days 1 - Several days   4. Decreased energy 1 - Several days 1 - Several days   5. Appetite change 1 - Several days 1 - Several days   6. Feelings of failure 1 - Several days 1 - Several days   7. Trouble concentrating 1 - Several days 1 - Several days   8. Activity level 0 - Not at all 1 - Several days   9. Hurting yourself 0 - Not at all 0 - Not at all   PHQ-9 TOTAL SCORE 9 8   PHQ-9 Severity Level mild mild   Functional Impairment somewhat difficult very difficult   Some recent data might be hidden       Results for orders placed or performed in visit on 10/11/17      PROLACTIN      Result  Value Ref Range    PROLACTIN 10.40 ng/mL   FSH      Result  Value Ref Range    FSH 1.9 mIU/mL   ESTRADIOL      Result  Value Ref Range    ESTRADIOL  pg/mL   TSH      Result  Value Ref Range    TSH 3.75 0.34 - 5.60 uIU/mL   T4,FREE      Result  Value Ref Range    T4,FREE 0.90 0.58 - 1.64 ng/dL       ASSESSMENT/PLAN:    ICD-10-CM    1. Non-seasonal allergic rhinitis, unspecified chronicity, unspecified trigger J30.89 fluticasone (50 mcg per actuation) nasal solution  (FLONASE)   2. Amenorrhea N91.2 PROLACTIN      FSH      ESTRADIOL      TSH      T4,FREE      PROLACTIN      FSH      ESTRADIOL      TSH      T4,FREE      medroxyPROGESTERone (PROVERA) 10 mg tablet   3. Anxiety with limited-symptom attacks F41.8 escitalopram oxalate (LEXAPRO) 10 mg tablet   4. TMJ (temporomandibular joint syndrome) M26.609    5. Bicipital tendonitis at the elbow M77.8         Plan:  1.  Pt with chronic congestion symptoms.  Discussed the use of nasal steroids.  She had thought these were intended for a few days, prn - discussed longer term use for symptom control.  Long term use side effects reviewed.  2.  I have personally reviewed the labs listed above.  3.  Amenorrhea may be due to anovulation.  Asherman's syndrome is also possible, though pt does not have pelvic pain.  Discussed and will proceed with progesterone challenge - prescription for provera 10 mg a day for 10 days.  If no period following this, would recommend OBGYN consultation.  4. Pt would like to start on anti-anxiety medication.  She agrees to start on low dose lexapro, 5 mg a day.  Medication side effects discussed - if any worsening of symptoms she should stop medication and call for follow up.  Anticipate need to adjust medication dose at follow up visit.  Follow up in a month.    Total length of today's visit with more than 50% spent in counseling related to anxiety and anxiety related physical symptoms was 55 minutes.  Serene Perez MD

## 2017-12-27 NOTE — PROGRESS NOTES
Patient Information     Patient Name MRN Gauri Nagy [id number] Female 1975      Progress Notes by Fabiola Manriquez OT at 2017  8:01 AM     Author:  Fabiola Manriquez OT  Service:  (none) Author Type:  OT- Occupational Therapist     Filed:  2017 10:10 AM  Date of Service:  2017  8:01 AM Status:  Addendum     :  Fabiola Manriquez OT (OT- Occupational Therapist)        Related Notes: Original Note by Fabiola Manriquez OT (OT- Occupational Therapist) filed at 2017 10:08 AM            [facility]  Outpatient OT - Recertification           Date of Service; 2017           Visit #:  17  Date of referral: 2017   Patient Name: Gauri Hollis  YOB: 1975   Referring MD/Provider: Dr Rivera  Diagnosis: right elbow pain  Treatment Diagnosis:pain, weakness, decline in self cares   Insurance:  [employer]  Start of Care Date: 2017   Current Certification periods:  2017 to 2018  Previous certification periods: From:   2017  To: 10/30/2017      Subjective    Gauri reports the pain in the triceps insertion is less. She is able ot perform full elbow extension with mild to moderate resistance and no pain. She still has pian in the medial epicondyle when ringing a wash cloth and grasping the steering wheel. Her ergo assessment at work is set for .  Patient Specific Functional and Pain Scales (PSFS):   10/12/2017                        Clinician Instructions: Complete after the history and before the exam.                         Initial Assessment: We want to know what 3 activities in your life you are unable to perform, or are having the most difficulty performing, as a result of your chief problem. Please list and score at least 3 activities that you are unable to perform, or having the most difficulty performing, because of your chief problem.   Patient Specific Activity Scoring Scheme  (score one number for each activity):   Activity Score (0-10)  0= Unable to perform activity  10= Able to perform activity at same level as before injury or problem   1. Using mouse 5/10   2. keyboarding  5/10   3. Adjusting patients 6/10   4. Blow drying hair 5/10   Totals:  21/30 = 52.5 % ability which relates to 47.5% impairment                         Patient verbally states that they understand that the information they have provided above is current and complete to the best of their knowledge.                         Patient Specific Functional Scale Modifier Scale Conversion: (patient's modifier that correlates with pt's score on PSFS): 6-CK (40% Impaired).     G codes and Modifier taken from patient completing the PSFS:   Current Primary G Code and Modifier:                         Per the Patient's intake and/or assessment the Primary G Code is: Self Care .                        The Patient's Impairment, Limitation or Restriction Modifier would be best described as: CK - 40% - 60% Impairment.   Goal Primary G Code and Modifier:                         The Patient's G Code Goal would be: Self Care                         The Patient's Impairment, Limitation or Restriction Modifier goal would be best described as: CI - 1% - 20% Impairment.          Pain Rating:     3/10 up to 5/10      Objective    Today's Intervention:       IFC electrical stimulation ([place]berry unit), intensity 48 (monitored and controlled by therapist), round positive electrodes placed forearm , negative rectangle electrodes placed triceps insertion and biceps. 20 minute treatment time, to increase circulation, decrease pain and edema, 3 minutes setup/rationale.     Moist hot pack to RUE elbow distally to fingertips, during e-stim, 6 layers + hot pack cover, to prepare soft tissue for stretch. 13 minutes application with skin check at 8 minutes. Additional towel provided for patient to utilize if heat became too high.      Following heat and e-stim, PROM with prolonged stretch to RUE:     Manual Therapy to help improve tissue mobility, improve circulation and to decrease tissue tension thru the use of IASTM with the Graston Technique    GT4, GT6: sweep, fan through biceps, triceps and forearm  GT^ Brush and strum , GT 2 swivel local lesions  GtT6: frame around epicondyle   GT6, brush & strum; GT6 scoop off supracondylar ridge  GT4 sweep though pronator jerald (parallel and perpendicular to fibers  GT4 swivel interosseous membrane   GT4 [id number], fan, scoop & strum distal triceps   GT6 J stroke along muscle bellies   GT6 strum distal triceps tendon.       ** patient was informed of potential treatment responses produced by IASTM. S/he was informed to instruct provider with any discomfort or pain.     Prolong stretch to wrist extensors, flexors, supinator and pronator.        Change to ultrasound to medial epicondyle.   Pulsed ultrasound 20%,gel, via 2 cm sound head, 1.0 Mhz, .7 w/cm2  to right triceps insertion (location), to decrease pain, edema, inflammation,  and enhance soft tissue repair. 8 minutes application, 5 minutes prep/clean-up/rationale.      Home Exercise Program: phase one of medial epicondyle protocol. 9/28/2017 ulnar nerve glides.       Assessment    Therapist Assessment/Response to Intervention:  Less tightness in the forearm towards the elbow. Pian is slowing improving, however continued work and use slow process.         Goals:  Problem list includes: pain with weight bearing  Though RUE, pian with external rotation     Patient s goal: to be able to work with out pain.      Functional short term goals (established in collaboration with the patient, to be met in 8 weeks):  1. 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 Patient will report the ability to utilize her mouse while using her computer with pian levels  <3/10 10/12/2017 ranges for 3-5  3. Patient will report the ability to perform keyboarding for up to one hour with no pain 10/12/2017 still needs to take breaks. Case load has been lighter so not typing as much.         Functional long terms goal to be met in 12 weeks:   A. Patient will be able to at PLOF  10/12/2017 progressing  B. Patient will be able to perform self care tasks at OF 10/12/2017 progressing  Plan  Plan:  Monitor and assess current plan.     Student has been instructed in and demonstrates skills necessary to carry out above stated treatment plan: Yes    Thank you for your referral to [facility].  Please call with any questions, concerns or comments.  [phone]    Fabiola Manriquez OTR/L  Occupational Therapist

## 2017-12-27 NOTE — PROGRESS NOTES
Patient Information     Patient Name MRN Gauri Nagy [id number] Female 1975      Progress Notes by Fabiola Manriquez OT at 2017  4:14 PM     Author:  Fabiola Manriquez OT Service:  (none) Author Type:  OT- Occupational Therapist     Filed:  2017  8:59 AM Date of Service:  2017  4:14 PM Status:  Signed     :  Fabiola Manriquez OT (OT- Occupational Therapist)            [facility]  Outpatient OT - Daily Note          Date of Service:2017           Visit #:  4  Date of referral: 2017   Patient Name: Gauri Hollis  YOB: 1975   Referring MD/Provider: Dr Rivera  Diagnosis: right elbow pain  Treatment Diagnosis:pain, weakness, decline in self cares   Insurance:  [employer]  Start of Care Date: 2017   Certification periods:  From:   2017  To: 10/30/2017      Subjective    No significant changes. She feels better ad more relaxed after OT sessions however once she returns to work and begins performing manual manipulations her entire RUE becomes sore.       Pain Ratin = Severe Pain, (Miserable, Gnawing, Fierce, Piercing) / Location:  Medial side of left elbow.     Objective    Today's Intervention:     High volt electrical stimulation (PPR1- 300PV unit), intensity 48 (monitored and controlled by patient), negative rectangle treatment electrode over medial elbow, round positive electrode on dorsum of wrist. 20 minute treatment time, to increase circulation, decrease pain and edema.     Moist hot pack to RUE elbow distally to fingertips, during e-stim, 6 layers + hot pack cover, to prepare soft tissue for stretch. 13 minutes application with skin check at 8 minutes. Additional towel provided for patient to utilize if heat became too high.     Following heat and e-stim, PROM with prolonged stretch to RUE:     Manual Therapy to help improve tissue mobility, improve circulation and to decrease tissue tension thru the  use of IASTM with the Graston Technique    GT4, GT6: sweep, fan through biceps, triceps and forearm  GT^ Brush and strum , GT 2 swivel local lesions  GtT6: frame around epicondyle   GT6, brush & strum; GT6 scoop off supracondylar ridge  GT4 sweep though pronator jerald (parallel and perpendicular to fibers  GT4 swivel interosseous membrane   GT4 [id number], fan, scoop & strum distal triceps   GT6 J stroke along muscle bellies     ** patient was informed of potential treatment responses produced by IASTM. S/he was informed to instruct provider with any discomfort or pain.     Prolong stretch to wrist extensors, flexors, supinator and pronator.     Iontophoresis using dexamethasone, 80 mA dose via Ionto  Active stat delivery method. Medication applied to media epicondyle, to decrease inflammation and/or pain. Patient was informed of normal reactions to the iontophoresis treatment, and was instructed to remove the patch after 4 hours. Total iontophoresis set-up/rationale = 8 minutes.       Home Exercise Program: phase one of medial epicondyle protocol.     Assessment    Therapist Assessment/Response to Intervention: Gauri is very tight in her biceps and forearm flexors. She is point tender on the medial epicondyle.     Goals:  Problem list includes: pain with weight bearing  Though marilee DAWSON with external rotation     Patient s goal: to be able to work with out pain.      Functional short term goals (established in collaboration with the patient, to be met in 8 weeks):  1. Patient will report the ability to wash her hair with pain levels < 3/10  2. Patient will report the ability to utilize her mouse while using her computer with pian levels <3/10  3. Patient will report the ability to perform keyboarding for up to one hour with no pain         Functional long terms goal to be met in 12 weeks:   A. Patient will be able to at PLOF   B. Patient will be able to perform self care tasks at PLOF  Plan  Plan:  Consider  ultrasound    Student has been instructed in and demonstrates skills necessary to carry out above stated treatment plan: Yes    Thank you for your referral to [facility].  Please call with any questions, concerns or comments.  [phone]    Fabiola Manriquez OTR/L  Occupational Therapist

## 2017-12-27 NOTE — PROGRESS NOTES
Patient Information     Patient Name MRN Gauri Nagy [id number] Female 1975      Progress Notes by Fabiola Manriquez OT at 2017  3:25 PM     Author:  Fabiola Manriquez OT Service:  (none) Author Type:  OT- Occupational Therapist     Filed:  2017  4:46 PM Date of Service:  2017  3:25 PM Status:  Signed     :  Fabiola Manriquez OT (OT- Occupational Therapist)            [facility]  Outpatient OT - Daily Note          Date of Service:2017           Visit #:  2  Date of referral: 2017   Patient Name: Gauri Hollis  YOB: 1975   Referring MD/Provider: Dr Rivera  Diagnosis: right elbow pain  Treatment Diagnosis:pain, weakness, decline in self cares   Insurance:  [employer]  Start of Care Date: 2017   Certification periods:  From:   2017  To: 10/30/2017      Subjective    Gauri reported her arm felt less tight after last session.     Pain Ratin = Severe Pain, (Miserable, Gnawing, Fierce, Piercing) / Location:  Medial side of left elbow.     Objective    Today's Intervention:      High volt electrical stimulation (PPR1- 300PV unit), intensity 48 (monitored and controlled by patient), negative rectangle treatment electrode over medial elbow, round positive electrode on dorsum of wrist. 20 minute treatment time, to increase circulation, decrease pain and edema.     Moist hot pack to RUE elbow distally to fingertips, during e-stim, 6 layers + hot pack cover, to prepare soft tissue for stretch. 13 minutes application with skin check at 8 minutes. Additional towel provided for patient to utilize if heat became too high.     Following heat and e-stim, PROM with prolonged stretch to RUE:     Manual Therapy to help improve tissue mobility, improve circulation and to decrease tissue tension thru the use of IASTM with the Graston Technique    GT4, GT6: sweep, fan through biceps, triceps and forearm  GT^ Brush and strum ,  GT 2 swivel local lesions  GtT6: frame around epicondyle   GT6, brush & strum; GT6 scoop off supracondylar ridge  GT4 sweep though pronator jerald (parallel and perpendicular to fibers  GT4 swivel interosseous membrane   GT4 [id number], fan, scoop & strum distal triceps   GT6 J stroke along muscle bellies     ** patient was informed of potential treatment responses produced by IASTM. S/he was informed to instruct provider with any discomfort or pain.     Prolong stretch to wrist extensors, flexors, supinator and pronator.     Iontophoresis using dexamethasone, 80 mA dose via Ionto  Active stat delivery method. Medication applied to media epicondyle, to decrease inflammation and/or pain. Patient was informed of normal reactions to the iontophoresis treatment, and was instructed to remove the patch after 4 hours. Total iontophoresis set-up/rationale = 8 minutes.       Home Exercise Program: phae one of medial epicondyle protocol.     Assessment    Therapist Assessment/Response to Intervention:  signficant areas of dense tissue in the pronator. There is mild swelling in the medial side of the elbow.         Goals:  Problem list includes: pain with weight bearing  Though RUE, pian with external rotation     Patient s goal: to be able to work with out pain.      Functional short term goals (established in collaboration with the patient, to be met in 8 weeks):  1. Patient will report the ability to wash her hair with pain levels < 3/10  2. Patient will report the ability to utilize her mouse while using her computer with pian levels <3/10  3. Patient will report the ability to perform keyboarding for up to one hour with no pain         Functional long terms goal to be met in 12 weeks:   A. Patient will be able to at PLOF   B. Patient will be able to perform self care tasks at PLOF  Plan  Plan:  Consider ultrasound    Student has been instructed in and demonstrates skills necessary to carry out above stated treatment plan:  Yes    Thank you for your referral to [facility].  Please call with any questions, concerns or comments.  [phone]    Fabiola Manriquez OTR/L  Occupational Therapist

## 2017-12-27 NOTE — PROGRESS NOTES
Patient Information     Patient Name MRN Gauri Nagy [id number] Female 1975      Progress Notes by Fabiola Manriquez OT at 2017  3:05 PM     Author:  Fabiola Manriquez OT Service:  (none) Author Type:  OT- Occupational Therapist     Filed:  2017  3:08 PM Date of Service:  2017  3:05 PM Status:  Signed     :  Fabiola Manriquez OT (OT- Occupational Therapist)            [facility]  Outpatient OT - Daily Note          Date of Service:9/15/2017           Visit #:  5  Date of referral: 2017   Patient Name: Gauri Hollis  YOB: 1975   Referring MD/Provider: Dr Rievra  Diagnosis: right elbow pain  Treatment Diagnosis:pain, weakness, decline in self cares   Insurance:  [employer]  Start of Care Date: 2017   Certification periods:  From:   2017  To: 10/30/2017      Subjective     reports her arm feels less tight. Was able to participate in cycle class use her arms just to stabilize.      Pain Ratin = Severe Pain, (Miserable, Gnawing, Fierce, Piercing) / Location:  Medial side of left elbow.     Objective    Today's Intervention:     High volt electrical stimulation (PPR1- 300PV unit), intensity 48 (monitored and controlled by patient), negative rectangle treatment electrode over medial elbow, round positive electrode on dorsum of wrist. 20 minute treatment time, to increase circulation, decrease pain and edema.     Moist hot pack to RUE elbow distally to fingertips, during e-stim, 6 layers + hot pack cover, to prepare soft tissue for stretch. 13 minutes application with skin check at 8 minutes. Additional towel provided for patient to utilize if heat became too high.     Following heat and e-stim, PROM with prolonged stretch to RUE:     Manual Therapy to help improve tissue mobility, improve circulation and to decrease tissue tension thru the use of IASTM with the Graston Technique    GT4, GT6: sweep, fan through  biceps, triceps and forearm  GT^ Brush and strum , GT 2 swivel local lesions  GtT6: frame around epicondyle   GT6, brush & strum; GT6 scoop off supracondylar ridge  GT4 sweep though pronator jerald (parallel and perpendicular to fibers  GT4 swivel interosseous membrane   GT4 [id number], fan, scoop & strum distal triceps   GT6 J stroke along muscle bellies     ** patient was informed of potential treatment responses produced by IASTM. S/he was informed to instruct provider with any discomfort or pain.     Prolong stretch to wrist extensors, flexors, supinator and pronator.     Iontophoresis using dexamethasone, 80 mA dose via Ionto  Active stat delivery method. Medication applied to media epicondyle, to decrease inflammation and/or pain. Patient was informed of normal reactions to the iontophoresis treatment, and was instructed to remove the patch after 4 hours. Total iontophoresis set-up/rationale = 8 minutes.       Home Exercise Program: phase one of medial epicondyle protocol.     Assessment    Therapist Assessment/Response to Intervention:Decrease tightness in the biceps and forearm extensors. Decrease areas of dense tissue around the epicondyle and in the supinator.     Goals:  Problem list includes: pain with weight bearing  Though marilee DAWSON with external rotation     Patient s goal: to be able to work with out pain.      Functional short term goals (established in collaboration with the patient, to be met in 8 weeks):  1. Patient will report the ability to wash her hair with pain levels < 3/10  2. Patient will report the ability to utilize her mouse while using her computer with pian levels <3/10  3. Patient will report the ability to perform keyboarding for up to one hour with no pain         Functional long terms goal to be met in 12 weeks:   A. Patient will be able to at PLOF   B. Patient will be able to perform self care tasks at PLOF  Plan  Plan:  Consider ultrasound    Student has been instructed in and  demonstrates skills necessary to carry out above stated treatment plan: Yes    Thank you for your referral to [facility].  Please call with any questions, concerns or comments.  [phone]    Fabiola Manriquez OTR/L  Occupational Therapist

## 2017-12-27 NOTE — PROGRESS NOTES
Patient Information     Patient Name MRN Gauri Nagy [id number] Female 1975      Progress Notes by Fabiola Manriquez OT at 10/19/2017 12:05 PM     Author:  Fabiola Manriquez OT Service:  (none) Author Type:  OT- Occupational Therapist     Filed:  10/19/2017 12:17 PM Date of Service:  10/19/2017 12:05 PM Status:  Signed     :  Fabiola Manriquez OT (OT- Occupational Therapist)            [facility]  Outpatient OT - Daily Note          Date of Service; 10/19/2017           Visit #:  12  Date of referral: 2017   Patient Name: Gauri Hollis  YOB: 1975   Referring MD/Provider: Dr Rivera  Diagnosis: right elbow pain  Treatment Diagnosis:pain, weakness, decline in self cares   Insurance:  [employer]  Start of Care Date: 2017   Certification periods:  From:   2017  To: 10/30/2017      Subjective    Gauri  States the EMG is negative. She feels like her next step will be to work on her neck with PT. She still feels her ar is a lot better then initially, however still tight in the forearm.     Pain Rating:     3/10 up to 5/10      Objective    Today's Intervention:     High volt electrical stimulation (PPR1- 300PV unit), intensity 48 (monitored and controlled by patient), negative rectangle treatment electrode over medial elbow, round positive electrode on dorsum of wrist. 20 minute treatment time, to increase circulation, decrease pain and edema.     Moist hot pack to RUE elbow distally to fingertips, during e-stim, 6 layers + hot pack cover, to prepare soft tissue for stretch. 13 minutes application with skin check at 8 minutes. Additional towel provided for patient to utilize if heat became too high.     Following heat and e-stim, PROM with prolonged stretch to RUE:     Manual Therapy to help improve tissue mobility, improve circulation and to decrease tissue tension thru the use of IASTM with the Graston Technique    GT4, GT6: sweep,  fan through biceps, triceps and forearm  GT^ Brush and strum , GT 2 swivel local lesions  GtT6: frame around epicondyle   GT6, brush & strum; GT6 scoop off supracondylar ridge  GT4 sweep though pronator jerald (parallel and perpendicular to fibers  GT4 swivel interosseous membrane   GT4 [id number], fan, scoop & strum distal triceps   GT6 J stroke along muscle bellies   GT6 strum distal triceps tendon.       ** patient was informed of potential treatment responses produced by IAS. S/he was informed to instruct provider with any discomfort or pain.     Prolong stretch to wrist extensors, flexors, supinator and pronator.        Change to ultrasound to medial epicondyle.   Pulsed ultrasound 20%, using 10% Ketoprofen gel, via 2 cm sound head, 1.0 Mhz, .7 w/cm2  to right medial elbow (location), to decrease pain, edema, inflammation,  and enhance soft tissue repair. 8 minutes application, 5 minutes prep/clean-up/rationale.      Home Exercise Program: phase one of medial epicondyle protocol. 9/28/2017 ulnar nerve glides.       Assessment    Therapist Assessment/Response to Intervention:  Tightness present in the pronator and around the  medial epicondyle. Decrease point tenderness at the olecranon bursas.        Goals:  Problem list includes: pain with weight bearing  Though RUEmarilee with external rotation     Patient s goal: to be able to work with out pain.      Functional short term goals (established in collaboration with the patient, to be met in 8 weeks):  1. 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 Patient will report the ability to utilize her mouse while using her computer with pian levels <3/10 10/12/2017 ranges for 3-5  3. Patient will report the ability to perform keyboarding for up to one hour with no pain 10/12/2017 still needs to take breaks. Case load has been lighter so not typing as much.          Functional long terms goal to be met in 12 weeks:   A. Patient will be able to at PLOF  10/12/2017 progressing  B. Patient will be able to perform self care tasks at OF 10/12/2017 progressing  Plan  Plan:  Monitor and assess current plan.     Student has been instructed in and demonstrates skills necessary to carry out above stated treatment plan: Yes    Thank you for your referral to [facility].  Please call with any questions, concerns or comments.  [phone]    Fabiola Manriquez OTR/L  Occupational Therapist

## 2017-12-27 NOTE — PROGRESS NOTES
"Patient Information     Patient Name MRN Gauri Nagy [id number] Female 1975      Progress Notes by Serene Perez MD at 2017  1:45 PM     Author:  Serene Perez MD Service:  (none) Author Type:  Physician     Filed:  2017  6:56 AM Encounter Date:  2017 Status:  Signed     :  Serene Perez MD (Physician)            SUBJECTIVE:    Gauri Hollis is a 42 y.o. female who presents for medication follow up.  Nursing Notes:   Elizabeth Malave  2017  2:04 PM  Signed  Patient presents to the clinic today for a follow up from her last visit.    Elizabeth Frieda VASQUEZN.................. 2017 1:56 PM        HPI  Gauri Hollis is a 42 y.o. female presents for medication follow up  - treatment of anxiety.  At her last visit, she was started on Lexapro 5mg/day.  This time with taking it she hasn't had nausea or other initial side effects.  She has felt quite a bit better, less anxious, less on edge. Her able to think and focus.  Sleep is pretty good, but still has once a week when she doesn't sleep as well.    Completed progesterone challenge and she did have a withdrawal bleed.  She is wondering what the next steps are as far as managing amenorrhea about the next steps would be if she wanted to be pregnant. She is to  3 para 1021 with history of SAB x2.    Allergies      Allergen   Reactions     Cats (Fur, Dander, Saliva)  Other - Describe In Comment Field     congestion      Chlorhexidine Towelette  Itching     No rash.  Also felt \"irritable\" after using wipes.    ,   Current Outpatient Prescriptions     Medication  Sig     Cholecalciferol, Vitamin D3, (VITAMIN D-3) 5,000 unit tab Take  by mouth once daily.     Choline Bitartrate 100 % powd As directed.     escitalopram oxalate (LEXAPRO) 10 mg tablet Take 0.5 tablets by mouth once daily.     fluticasone (50 mcg per actuation) nasal solution (FLONASE) Inhale 1 Spray into both nostrils " "once daily.     omega-3 fatty acids-vitamin E (FISH OIL) 1,000 mg cap Take  by mouth.     PNV34-iron,carbonyl-FA-DSS-dha 30 mg iron-1 mg -50 mg-260 mg cap Take  by mouth.     Saccharomyces boulardii (PROBIOTIC, S.BOULARDII,) 250 mg capsule Take 1 capsule by mouth once daily.     triamcinolone (ARISTOCORT; KENALOG) 0.1 % cream Apply  topically to affected area(s) 3 times daily.     No current facility-administered medications for this visit.      Medications have been reviewed by me and are current to the best of my knowledge and ability.  and   Past Medical History:     Diagnosis  Date     Bicipital tendonitis at the elbow 2017     Cubital tunnel syndrome, bilateral 2017     Elbow dislocation     left; age 18      Family history of breast cancer      Hx of pregnancy      - PPH, retained placenta, transfusion      Postpartum hemorrhage 2011     Right medial epicondylitis 2017     Vitamin D deficiency 2015       REVIEW OF SYSTEMS:  Review of Systems   Constitutional: Negative.    Gastrointestinal: Negative.    Genitourinary: Negative.    Musculoskeletal: Positive for myalgias and neck pain.       OBJECTIVE:  /74  Pulse 68  Ht 1.676 m (5' 6\")  Wt 81.2 kg (179 lb)  LMP 10/22/2017  Breastfeeding? No  BMI 28.89 kg/m2    EXAM:   Physical Exam   Constitutional: She is well-developed, well-nourished, and in no distress.   Nursing note and vitals reviewed.    DORIAN-7 ANXIETY SCREENING 2017 10/11/2017   DORIAN date (doc flow) 2017 10/11/2017   Nervous, anxious 3 3   Cannot stop worrying 1 1   Worry about different things 1 3   Cannot relax 1 1   Feeling restless 1 2   Easily annoyed/irritated 2 2   Afraid of awful event 1 1   Score 10 13   Severity moderate anxiety moderate anxiety   Some recent data might be hidden       PHQ Depression Screening 10/25/2017 2017   Date of PHQ exam (doc flow) 10/25/2017 2017   1. Lack of interest/pleasure 0 - Not at all 0 - Not at all "   2. Feeling down/depressed 0 - Not at all 0 - Not at all   PHQ-2 TOTAL SCORE 0 0   3. Trouble sleeping - -   4. Decreased energy - -   5. Appetite change - -   6. Feelings of failure - -   7. Trouble concentrating - -   8. Activity level - -   9. Hurting yourself - -   PHQ-9 TOTAL SCORE - -   PHQ-9 Severity Level - -   Functional Impairment - -   Some recent data might be hidden       Results for orders placed or performed in visit on 10/25/17      SEDIMENTATION RATE      Result  Value Ref Range    SEDIMENTATION RATE        9 <21 mm/hr   C-REACTIVE PROTEIN      Result  Value Ref Range    C-REACTIVE PROTEIN <1.000 <1.000 mg/dL   AG TEST      Result  Value Ref Range    ANTINUCLEAR ANTIBODY (AG) Negative Negative     TSH (uIU/mL)    Date Value   10/11/2017 3.75         ASSESSMENT/PLAN:    ICD-10-CM    1. Anxiety disorder, unspecified type F41.9    2. Amenorrhea N91.2         Plan:  1.  Reviewed past anxiety testing and her symptoms from today. She's had a moderate degree of improvement. Discussion made that she will stay on this low dose of Lexapro at this time.  2. Her amenorrhea post D&C responded to progesterone challenge. Therefore, more likely to be due to anovulation then 2 other uterine cause. She and her  are not quite certain yet on attempting another pregnancy. We discussed factors related to her age and that if they're considering pregnancy, if she does not have a period through this month fertility intervention would be recommended. She's continuing her vitamins/folic acid.  She is to let me know either way but end of this month if she has had a period. Follow-up will be pending this    Serene Perez MD

## 2017-12-27 NOTE — PROGRESS NOTES
Patient Information     Patient Name MRN Gauri Nagy [id number] Female 1975      Progress Notes by Fabiola Manriquez OT at 2017  4:06 PM     Author:  Fabiola Manriquez OT Service:  (none) Author Type:  OT- Occupational Therapist     Filed:  2017  5:20 PM Date of Service:  2017  4:06 PM Status:  Signed     :  Fabiola Manriquez OT (OT- Occupational Therapist)            [facility]  Outpatient OT - Daily Note          Date of Service:2017           Visit #:  6  Date of referral: 2017   Patient Name: Gauri Hollis  YOB: 1975   Referring MD/Provider: Dr Rivera  Diagnosis: right elbow pain  Treatment Diagnosis:pain, weakness, decline in self cares   Insurance:  [employer]  Start of Care Date: 2017   Certification periods:  From:   2017  To: 10/30/2017      Subjective     reports her arm feels less tight. Was able to participate in cycle class use her arms just to stabilize.      Pain Ratin = Severe Pain, (Miserable, Gnawing, Fierce, Piercing) / Location:  Medial side of left elbow.     Objective    Today's Intervention:     High volt electrical stimulation (PPR1- 300PV unit), intensity 48 (monitored and controlled by patient), negative rectangle treatment electrode over medial elbow, round positive electrode on dorsum of wrist. 20 minute treatment time, to increase circulation, decrease pain and edema.     Moist hot pack to RUE elbow distally to fingertips, during e-stim, 6 layers + hot pack cover, to prepare soft tissue for stretch. 13 minutes application with skin check at 8 minutes. Additional towel provided for patient to utilize if heat became too high.     Following heat and e-stim, PROM with prolonged stretch to RUE:     Manual Therapy to help improve tissue mobility, improve circulation and to decrease tissue tension thru the use of IASTM with the Graston Technique    GT4, GT6: sweep, fan through  biceps, triceps and forearm  GT^ Brush and strum , GT 2 swivel local lesions  GtT6: frame around epicondyle   GT6, brush & strum; GT6 scoop off supracondylar ridge  GT4 sweep though pronator jerald (parallel and perpendicular to fibers  GT4 swivel interosseous membrane   GT4 [id number], fan, scoop & strum distal triceps   GT6 J stroke along muscle bellies     ** patient was informed of potential treatment responses produced by IASTM. S/he was informed to instruct provider with any discomfort or pain.     Prolong stretch to wrist extensors, flexors, supinator and pronator.     Iontophoresis using dexamethasone, 80 mA dose via Ionto  Active stat delivery method. Medication applied to media epicondyle, to decrease inflammation and/or pain. Patient was informed of normal reactions to the iontophoresis treatment, and was instructed to remove the patch after 4 hours. Total iontophoresis set-up/rationale = 8 minutes.       Home Exercise Program: phase one of medial epicondyle protocol.     Assessment    Therapist Assessment/Response to Intervention: muscles are less tight. Biceps still have significant lev of dense tissue. Pain in medial epicondyle is lessening. Reports improved ROM in elbow after session.       Goals:  Problem list includes: pain with weight bearing  Though marilee DAWSON with external rotation     Patient s goal: to be able to work with out pain.      Functional short term goals (established in collaboration with the patient, to be met in 8 weeks):  1. Patient will report the ability to wash her hair with pain levels < 3/10  2. Patient will report the ability to utilize her mouse while using her computer with pian levels <3/10  3. Patient will report the ability to perform keyboarding for up to one hour with no pain         Functional long terms goal to be met in 12 weeks:   A. Patient will be able to at PLOF   B. Patient will be able to perform self care tasks at PLOF  Plan  Plan:  Consider  ultrasound    Student has been instructed in and demonstrates skills necessary to carry out above stated treatment plan: Yes    Thank you for your referral to [facility].  Please call with any questions, concerns or comments.  [phone]    Fabiola Manriquez OTR/L  Occupational Therapist

## 2017-12-27 NOTE — PROGRESS NOTES
Patient Information     Patient Name MRN Gauri Nagy [id number] Female 1975      Progress Notes by Huong Kang at 2017  9:13 AM     Author:  Huong Kang Service:  (none) Author Type:  (none)     Filed:  2017  9:13 AM Date of Service:  2017  9:13 AM Status:  Signed     :  Huong Kang            Falls Risk Criteria:    Age 65 and older or under age 4        Sensory deficits    Poor vision    Use of ambulatory aides    Impaired judgment    Unable to walk independently    Meets High Risk criteria for falls:  no

## 2017-12-27 NOTE — PROGRESS NOTES
Patient Information     Patient Name MRN Gauri Nagy [id number] Female 1975      Progress Notes by Patricia Zavala DO at 2017  1:40 PM     Author:  Patricia Zavala DO Service:  (none) Author Type:  PHYS- Osteopathic     Filed:  2017  6:35 AM Encounter Date:  2017 Status:  Signed     :  Patricia Zavala DO (PHYS- Osteopathic)            Chief Complaint     Patient presents with       Follow Up      Thyroid US 17        Subjective:   Ms. Hollis is a 41 y.o. female  seen for follow-up of her thyroid ultrasound from April.  She was noted to have nodularity of the goiter.  She had normal thyroid hormones.  She returns today to discuss follow up.  We had a long discussion today about this and discussed various options including referral to endocrinology, repeat US in 6-12 months or FNA.  I suspect that she has multinodular goiter however malignancy is also possible.  We discussed the pros and cons of FNA and the difficulty of obtaining an adequate sample especially with her largest nodule at 1.2 cm.    She is also tearful today as earlier this year she has a spontaneous .  She has had this prior and her and her  were very excited but nervous about being pregnant again.  Since the loss of pregnancy she has been very tearful, irritable and sad at home.  She is struggling with her work and has noted significant depression.  Her PHQ scores have worsened significantly.  She has never been on medication for depression.      PHQ Depression Screening 5/3/2017 2017   Date of PHQ exam (doc flow) 5/3/2017 2017   1. Lack of interest/pleasure 0 - Not at all 2 - More than half the days   2. Feeling down/depressed 0 - Not at all 1 - Several days   PHQ-2 TOTAL SCORE 0 3   3. Trouble sleeping - 2 - More than half the days   4. Decreased energy - 1 - Several days   5. Appetite change - 1 - Several days   6. Feelings of failure - 1 - Several days   7. Trouble concentrating  "- 1 - Several days   8. Activity level - 0 - Not at all   9. Hurting yourself - 0 - Not at all   PHQ-9 TOTAL SCORE - 9   PHQ-9 Severity Level - mild   Functional Impairment - somewhat difficult   Some recent data might be hidden         She has also had concern about inattention.  This seems to be significantly worse over the past couple months.  She is concerned about ADHD.      She  reports that she has never smoked. She has never used smokeless tobacco.    Past medical history reviewed as below:     Past Medical History:     Diagnosis  Date     Elbow dislocation      Family history of breast cancer      Hx of pregnancy      - PPH, retained placenta, transfusion      POSTPARTUM HEMORRHAGE 2011     Pregnancy, supervision, high-risk    .      ROS:   Pertinent ROS was performed and was negative, including for fevers, chills, chest pain, lower extremity edema. No other concerns, with exception of HPI above.      Objective:    /78  Pulse 68  Temp 97.6  F (36.4  C) (Tympanic)  Resp 18  Ht 1.689 m (5' 6.5\")  Wt 79.8 kg (176 lb)  Breastfeeding? No  BMI 27.98 kg/m2  GEN: Vitals reviewed.  Patient is tearful. Cooperative with exam.  HEENT: Normocephalic atraumatic.  Pupils equally round.  No scleral icterus, no conjunctival erythema. Oropharynx with no erythema or exudates. Dentition adequate.  NECK: Supple; slight nodularity to her thyroid noted.  Minimal enlargement..  No neck, cervical LAD.  Mild tenderness to palpation along the sternocleidomastoid muscles.  SKIN: Warm and dry to touch.  No rash on face, arms and legs.  EXT: No clubbing or cyanosis.  No peripheral edema.  PSYCH: Mood is very depressed today.  Patient is tearful throughout visit.     Assessment/Plan:   1. Thyroid nodule  - at this time we will plan to follow up in 3 months and at that time consider rechecking ultrasound if she is feeling anxious regarding the nodules. We will plan for tsh every year and ultrasound every 1-2 " years until stability shown.    2. Grief reaction  - we discussed several options including setting up with a counselor, trial of medication, short medical leave from work in order to properly grieve.  She will look into getting an appointment with psychology.  She would like to wait on meds and will think about a leave.  She will drop off paperwork if short term disability or FMLA are needed.    3. Inattention  - at this time we discussed the chance of her having ADHD and the possibility of neuropsych testing however she was informed that I suspect this may be related to her grief and would be hesitant to send her for testing within 6 months of her loss.  If she continues to have symptoms in 6-12 months I would refer for testing at that time.      Return in about 3 months (around 10/26/2017).    A total of 30 minutes spent with in face-to-face consultation of this patient with greater than 50% spent in counseling and care coordination of above listed medical problems including diagnosis, treatment options with emphasis on risks and benefits of each, prognosis and importance of compliance for each.     ANGELLA AMAYA DO   7/26/2017 5:43 PM    This document was prepared using voice generated softwear. While every attempt was made for accuracy, grammatical errors may exist.

## 2017-12-27 NOTE — PROGRESS NOTES
Patient Information     Patient Name MRN Sex Gauri Snell [id number] Female 1975      Progress Notes by Fabiola Manriquez OT at 10/24/2017 11:18 AM     Author:  Fabiola Manriquez OT Service:  (none) Author Type:  OT- Occupational Therapist     Filed:  10/24/2017 12:56 PM Date of Service:  10/24/2017 11:18 AM Status:  Signed     :  Fabiola Manriquez OT (OT- Occupational Therapist)            [facility]  Outpatient OT - Daily Note          Date of Service; 10/24/2017           Visit #:  [id number]  Date of referral: 2017   Patient Name: Gauri Hollis  YOB: 1975   Referring MD/Provider: Dr Rivera  Diagnosis: right elbow pain  Treatment Diagnosis:pain, weakness, decline in self cares   Insurance:  [employer]  Start of Care Date: 2017   Certification periods:  From:   2017  To: 10/30/2017      Subjective    Gauri met with ORtho regarding the EMG. He did assess her shoulder as well and said it was fine. She bentley have some.     Pain Rating:     3/10 up to 5/10      Objective    Today's Intervention:     High volt electrical stimulation (PPR1- 300PV unit), intensity 48 (monitored and controlled by patient), negative rectangle treatment electrode over medial elbow, round positive electrode on dorsum of wrist. 20 minute treatment time, to increase circulation, decrease pain and edema.     Moist hot pack to RUE elbow distally to fingertips, during e-stim, 6 layers + hot pack cover, to prepare soft tissue for stretch. 13 minutes application with skin check at 8 minutes. Additional towel provided for patient to utilize if heat became too high.     Following heat and e-stim, PROM with prolonged stretch to RUE:     Manual Therapy to help improve tissue mobility, improve circulation and to decrease tissue tension thru the use of IASTM with the Graston Technique    GT4, GT6: sweep, fan through biceps, triceps and forearm  GT^ Brush and strum , GT 2 swivel  local lesions  GtT6: frame around epicondyle   GT6, brush & strum; GT6 scoop off supracondylar ridge  GT4 sweep though pronator jerald (parallel and perpendicular to fibers  GT4 swivel interosseous membrane   GT4 [id number], fan, scoop & strum distal triceps   GT6 J stroke along muscle bellies   GT6 strum distal triceps tendon.       ** patient was informed of potential treatment responses produced by IASTM. S/he was informed to instruct provider with any discomfort or pain.     Prolong stretch to wrist extensors, flexors, supinator and pronator.        Change to ultrasound to medial epicondyle.   Pulsed ultrasound 20%, using 10% Ketoprofen gel, via 2 cm sound head, 1.0 Mhz, .7 w/cm2  to right medial elbow (location), to decrease pain, edema, inflammation,  and enhance soft tissue repair. 8 minutes application, 5 minutes prep/clean-up/rationale.      Home Exercise Program: phase one of medial epicondyle protocol. 9/28/2017 ulnar nerve glides.       Assessment    Therapist Assessment/Response to Intervention: pian in resolving slowly. She continues to have irritation with work that is slowing progress.       Goals:  Problem list includes: pain with weight bearing  Though marilee DAWSON with external rotation     Patient s goal: to be able to work with out pain.      Functional short term goals (established in collaboration with the patient, to be met in 8 weeks):  1. 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 Patient will report the ability to utilize her mouse while using her computer with pian levels <3/10 10/12/2017 ranges for 3-5  3. Patient will report the ability to perform keyboarding for up to one hour with no pain 10/12/2017 still needs to take breaks. Case load has been lighter so not typing as much.         Functional long terms goal to be met in 12 weeks:   A. Patient will be able to at PLOF  10/12/2017  progressing  B. Patient will be able to perform self care tasks at OF 10/12/2017 progressing  Plan  Plan:  Monitor and assess current plan.     Student has been instructed in and demonstrates skills necessary to carry out above stated treatment plan: Yes    Thank you for your referral to [facility].  Please call with any questions, concerns or comments.  [phone]    Fabiola Manriquez OTR/L  Occupational Therapist

## 2017-12-28 NOTE — PROGRESS NOTES
Patient Information     Patient Name Gauri Carey [id number] Female 1975      Progress Notes by Patricia Zavala DO at 10/25/2017  1:00 PM     Author:  Patricia Zavala DO Service:  (none) Author Type:  PHYS- Osteopathic     Filed:  10/25/2017  3:00 PM Encounter Date:  10/25/2017 Status:  Signed     :  Patricia Zavala DO (PHYS- Osteopathic)            Chief Complaint    Patient presents with      [facility]     Follow Up       HPI: Ms. Hollis is a 42 y.o. female who presents today to [facility].  She overall is feeling good.      She does have several specific concerns today.  She has been having some sinus congestion.  She has been using Flonase.  She has previously used antihistamines however is not recently.  She does have some allergies off and on.  She denies any acute symptoms.    She has been having some joint pain.  It does seem to migrate.  She does have it in her hands along with her knees.  She does have significant muscle aches through her back off and on.  She has previously been checked for Lyme and it was negative a couple years back.  She is not currently on any medications and tries to avoid anti-inflammatory over-the-counter meds as much as possible.    She does have a rash on her fingers that is somewhat pruritic, vesicular and mildly erythematous.  She also has some peeling skin on her feet in between her toes bilaterally.  She has not tried any topical agents on this.    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  When I saw her last she was having significant depression.  She was started on Lexapro by her prior practitioner.  She denies any side effects from this medication.  She does find that it has helped.    She does have a family history of breast cancer.  Her mom was diagnosed with breast cancer at age 53 and  at age 55.  Patient did have a mammogram at age 38.  This was negative.  She did have her first menstrual period at age 15.  She had her  first baby at age 36.  She has never had a breast biopsy.  She drinks minimal alcohol and is a nonsmoker.    She also has a family history of coronary disease.  Her father first had a myocardial infarction at age 42.  Her lipids were last checked in  with an LDL of 121.  She has never been on medication for this.    She is due for her tetanus shot.  She is hesitant to obtain this due to concerns regarding the vaccine.  We did discuss this in depth today.  She was encouraged to get her tetanus with her pertussis.  She was also encouraged to get her flu shot which she will do with work.    History is discussed on 10/25/2017 with patient and reviewed in previous available records by myself.  It is current to the best of my knowledge as below.    Past Medical History:     Diagnosis  Date     Bicipital tendonitis at the elbow 2017     Cubital tunnel syndrome, bilateral 2017     Elbow dislocation     left; age 18      Family history of breast cancer      Hx of pregnancy      - PPH, retained placenta, transfusion      Postpartum hemorrhage 2011     Right medial epicondylitis 2017     Vitamin D deficiency 2015        Past Surgical History:      Procedure  Laterality Date     DILATION AND CURETTAGE  10/2011    for retained placenta       DILATION AND CURETTAGE  2015    for missed AB       DILATION AND CURETTAGE  2017    SAB       WISDOM TEETH EXTRACTION           Current Outpatient Prescriptions     Medication  Sig     Cholecalciferol, Vitamin D3, (VITAMIN D-3) 5,000 unit tab Take  by mouth once daily.     Choline Bitartrate 100 % powd As directed.     escitalopram oxalate (LEXAPRO) 10 mg tablet Take 0.5 tablets by mouth once daily.     fluticasone (50 mcg per actuation) nasal solution (FLONASE) Inhale 1 Spray into both nostrils once daily.     omega-3 fatty acids-vitamin E (FISH OIL) 1,000 mg cap Take  by mouth.     PNV34-iron,carbonyl-FA-DSS-dha 30 mg iron-1 mg -50 mg-260 mg cap  "Take  by mouth.     Saccharomyces boulardii (PROBIOTIC, S.BOULARDII,) 250 mg capsule Take 1 capsule by mouth once daily.     triamcinolone (ARISTOCORT; KENALOG) 0.1 % cream Apply  topically to affected area(s) 3 times daily.     No current facility-administered medications for this visit.      Medications have been reviewed by me and are current to the best of my knowledge and ability.       Allergies      Allergen   Reactions     Cats (Fur, Dander, Saliva)  Other - Describe In Comment Field     congestion      Chlorhexidine Towelette  Itching     No rash.  Also felt \"irritable\" after using wipes.         Family History       Problem   Relation Age of Onset     Cancer-breast  Mother 53     Diabetes type II  Mother      Heart Disease  Father 42     MI; history of rheumatic fever and asd       Diabetes type II  Father      Thyroid Disease  Sister      hypothyroidism       Depression  Brother        Family Status     Relation  Status     Son Alive     Mother  at age 55     Father  at age 70     Sister Alive     Brother Alive         Social History     Social History        Marital status:       Spouse name: Zan     Number of children:  1     Years of education:  18+     Occupational History       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[employer]     Social History Main Topics         Smoking status:   Never Smoker     Smokeless tobacco:   Never Used     Alcohol use   No      Comment: 3 per week      Drug use:   No     Sexual activity:   Yes     Partners:  Male     Birth control/ protection:  Rhythm      Comment: NFP      Other Topics  Concern      Service No     Blood Transfusions Yes     Stress Concern Yes     Social History Narrative     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[employer];  Zan; Son Morteza 10/2011          ROS  GEN: -fevers/-chills/-night sweats/+wt change 10 pound weight gain since last spring   NEURO: -headaches/-vision changes  EARS: -hearing changes/-tinnitus  NOSE: " "-drainage/-congestion  MOUTH/THROAT: - sore throat/-dysphagia/-sores  LUNGS: -sob/-cough  CARDIOVASCULAR: -cp/-palpitations  GI: -pain/+diarrhea alternating with constipation/-bloody stools  : -dysuria/-hematuria  ENDOCRINE: -skin or hair changes  HEMATOLOGIC/LYMPHATIC: -swollen nodes/-easy bruising  SKIN: -rashes/-lesions  MSK/RHEUM: +joint pain/-swelling  NEURO: -weakness/-parasthesias  PSYCH: -anhedonia/+depression/+anxiety       EXAM:   /82  Pulse 72  Temp 97.9  F (36.6  C) (Tympanic)  Resp 16  Ht 1.676 m (5' 6\")  Wt 81.3 kg (179 lb 3.2 oz)  LMP 02/01/2017  Breastfeeding? No  BMI 28.92 kg/m2  Estimated body mass index is 28.92 kg/(m^2) as calculated from the following:    Height as of this encounter: 1.676 m (5' 6\").    Weight as of this encounter: 81.3 kg (179 lb 3.2 oz).   GEN: Vitals reviewed.  Healthy appearing. Patient is in no acute distress. Cooperative with exam.  HEENT: Normocephalic atraumatic.  Normal external eye, conjunctiva, lids, cornea with no scleral icterus or conjunctival erythema. Pupils equally round. Oropharynx with no erythema or exudates. Dentition adequate.    NECK: Supple; no thyromegaly or masses noted.  No cervical or supraclavicular lymphadenopathy.  CV: Heart regular in rate and rhythm with no murmur.  Radial and posterior tibial pulses palpable.  LUNGS: Lungs clear to auscultation bilaterally.  Chest rise equal bilaterally.  No accessory muscle use.  ABD:  Soft, nondistended.  SKIN: Warm and dry to touch.  No rash on face, arms and legs.  Area of vesicular rash noted on her lateral first digit with mild surrounding erythema.  Skin in between the toes on her left foot are noted to have sloughing.  No abnormality on the dorsal or plantar side of the foot.  EXT: No clubbing or cyanosis.  No peripheral edema.  NEURO: Alert and oriented to person, place, and time.  Cranial nerves II-XII grossly intact with no focal or lateralizing deficits.  Muscle tone normal.  Gait " normal. No tremor. Sensation intact to light touch.  MSK: ROM of upper and lower ext symmetric and full.  PSYCH: Mood is good. Affect appropriate. Speech fluent. Answers questions appropriately and thought process normal.       ASSESSMENT AND PLAN:    1. Sinus congestion  - conservative measures with OTC antihistamine as below  - Return/call as needed for follow-up should any new symptoms develop, for worsening of current symptoms or if symptoms do not resolve with above plan.    2. Arthralgia, unspecified joint  - most likely her joint pain is secondary to osteoarthritis versus other.  We will however pursue basic body testing to be sure there is no obvious underlining autoimmune disorder.  - SEDIMENTATION RATE  - C-REACTIVE PROTEIN  - AG TEST    3. Eczema, dyshidrotic  - rash appears to be dyshidrotic eczema.  Topical steroid provided  - triamcinolone (ARISTOCORT; KENALOG) 0.1 % cream; Apply  topically to affected area(s) 3 times daily.  Dispense: 1 Tube; Refill: 0    4. Family history of breast cancer  - she was encouraged to start breast cancer screening.  We discussed the benefit of Tomosynthesis versus 2d mammogram.  She will let me know for referral is needed to have this done outside of the clinic.  Her calculated risk in the next 5 years is 1.1 percent and lifetime risk is 19.3%.  It may be reasonable to continue to monitor and consider MRI of the breast along with mammogram.    5. History of recurrent miscarriages  - we did discuss this in depth today.  She overall feels that she has improved overall in her mood.  She plans to continue to discuss further children with her .  She will call if she has any questions or concerns.  At this time I do not necessarily think there is any indication for antiphospholipid workup however we will continue to monitor symptoms for    6. Family history of heart disease  - given her family history of early cardiac disease I do think it is important for her to  continue to work on diet and exercise.  We will need to check lipids with her next fasting labs.    7. Vaccine counseling  - she was strongly encouraged to get her tetanus shot at this time.  We did discuss this in depth and she was counseled on the pros and cons of vaccination.        Return in about 1 year (around 10/25/2018) for Annual Exam.    A total of 41 minutes spent with in face-to-face consultation of this patient with greater than 50% spent in history taking, counseling and care coordination of above listed medical problems including diagnosis, treatment options with emphasis on risks and benefits of each, prognosis and importance of compliance for each.     Patient Instructions   For your seasonal allergies, please try one of the following:  - Loratadine (Claritin) 10mg daily for 3-4 weeks and then as needed (least sedating, least effective)  - Fexofenadine (Allegra) 12 hour 60mg twice daily for 3-4 weeks and then as needed   - Cetirizine (Zyrtec) 10mg daily for 3-4 weeks and then as needed (most sedating,most effective)    Please note that these can take up to 3-4 weeks to see a difference.          ANGELLA AMAYA DO   10/25/2017 2:42 PM    This document was prepared using voice generated softwear. While every attempt was made for accuracy, grammatical errors may exist.

## 2017-12-28 NOTE — TELEPHONE ENCOUNTER
Patient Information     Patient Name MRN Gauri Nagy [id number] Female 1975      Telephone Encounter by Elizabeth Malave at 10/10/2017  4:22 PM     Author:  Elizabeth Malave Service:  (none) Author Type:  (none)     Filed:  10/10/2017  4:23 PM Encounter Date:  10/10/2017 Status:  Signed     :  Elizabeth Malave            Pt offered appointment for tomorrow afternoon she accepted. Patient will come in at 1:15.    Elizabeth Malave ....................  10/10/2017   4:23 PM

## 2017-12-28 NOTE — PATIENT INSTRUCTIONS
Patient Information     Patient Name MRN Gauri Nagy [id number] Female 1975      Patient Instructions by Serene Perez MD at 10/11/2017  1:15 PM     Author:  Serene Perez MD  Service:  (none) Author Type:  Physician     Filed:  10/11/2017  1:57 PM  Encounter Date:  10/11/2017 Status:  Addendum     :  Serene Perez MD (Physician)        Related Notes: Original Note by Serene Perez MD (Physician) filed at 10/11/2017  1:42 PM            Labs for evaluation of amenorrhea.  If this is normal, then consider a progesterone challenge.        Nasal congestion - restart nasal steroid spray.

## 2017-12-28 NOTE — PROGRESS NOTES
Patient Information     Patient Name MRN Gauri Nagy [id number] Female 1975      Progress Notes by Fabiola Manriquez OT at 2017  4:14 PM     Author:  Fabiola Manriquez OT Service:  (none) Author Type:  OT- Occupational Therapist     Filed:  2017  5:23 PM Date of Service:  2017  4:14 PM Status:  Signed     :  Fabiola Manriquez OT (OT- Occupational Therapist)            [facility]  Outpatient OT - Daily Note          Date of Service:2017           Visit #:  6  Date of referral: 2017   Patient Name: Gauri Hollis  YOB: 1975   Referring MD/Provider: Dr Rivera  Diagnosis: right elbow pain  Treatment Diagnosis:pain, weakness, decline in self cares   Insurance:  [employer]  Start of Care Date: 2017   Certification periods:  From:   2017  To: 10/30/2017      Subjective    Gauri reports her arm feel less stiff and painful .   Pain Ratin = Severe Pain, (Miserable, Gnawing, Fierce, Piercing) / Location:  Medial side of left elbow.     Objective    Today's Intervention:     High volt electrical stimulation (PPR1- 300PV unit), intensity 48 (monitored and controlled by patient), negative rectangle treatment electrode over medial elbow, round positive electrode on dorsum of wrist. 20 minute treatment time, to increase circulation, decrease pain and edema.     Moist hot pack to RUE elbow distally to fingertips, during e-stim, 6 layers + hot pack cover, to prepare soft tissue for stretch. 13 minutes application with skin check at 8 minutes. Additional towel provided for patient to utilize if heat became too high.     Following heat and e-stim, PROM with prolonged stretch to RUE:     Manual Therapy to help improve tissue mobility, improve circulation and to decrease tissue tension thru the use of IASTM with the Graston Technique    GT4, GT6: sweep, fan through biceps, triceps and forearm  GT^ Brush and strum , GT 2  swivel local lesions  GtT6: frame around epicondyle   GT6, brush & strum; GT6 scoop off supracondylar ridge  GT4 sweep though pronator jerald (parallel and perpendicular to fibers  GT4 swivel interosseous membrane   GT4 [id number], fan, scoop & strum distal triceps   GT6 J stroke along muscle bellies   GT6 strum distal triceps tendon.       ** patient was informed of potential treatment responses produced by IASTM. S/he was informed to instruct provider with any discomfort or pain.     Prolong stretch to wrist extensors, flexors, supinator and pronator.     Iontophoresis using dexamethasone, 80 mA dose via Ionto  Active stat delivery method. Medication applied to medial epicondyl and distal tricesp tendon.  to decrease inflammation and/or pain. Patient was informed of normal reactions to the iontophoresis treatment, and was instructed to remove the patch after 4 hours. Total iontophoresis set-up/rationale = 8 minutes.       Home Exercise Program: phase one of medial epicondyle protocol.     Assessment    Therapist Assessment/Response to Intervention:  Soft tissues pain seems to be subsiding.  There daniele still areas of dense tissue and tightness in the forearm and upper arm. Pain is isolating to the olecranon bursae and medial epicondyle.      Goals:  Problem list includes: pain with weight bearing  Though marilee DAWSON with external rotation     Patient s goal: to be able to work with out pain.      Functional short term goals (established in collaboration with the patient, to be met in 8 weeks):  1. Patient will report the ability to wash her hair with pain levels < 3/10  2. Patient will report the ability to utilize her mouse while using her computer with pian levels <3/10  3. Patient will report the ability to perform keyboarding for up to one hour with no pain         Functional long terms goal to be met in 12 weeks:   A. Patient will be able to at PLOF   B. Patient will be able to perform self care tasks at  PLOF  Plan  Plan:  Consider ultrasound    Student has been instructed in and demonstrates skills necessary to carry out above stated treatment plan: Yes    Thank you for your referral to [facility].  Please call with any questions, concerns or comments.  [phone]    Fabiola Manriquez OTR/L  Occupational Therapist

## 2017-12-28 NOTE — PROGRESS NOTES
Patient Information     Patient Name MRGauri Pittman [id number] Female 1975      Progress Notes by Huong Kang at 2017  9:13 AM     Author:  Huong Kang Service:  (none) Author Type:  (none)     Filed:  2017  9:13 AM Date of Service:  2017  9:13 AM Status:  Signed     :  Huong Kang            RECOVERY TIME  You may experience numbness and/or relief of your pain for up to 4-6 hours after the injection.  Your usual symptoms may return the night of the procedure and may possible be more severe than usual a day or two following.  Please keep track of your pain over the next several days and report how long the relief lasts to the doctor who referred you for this procedure.    The beneficial effects of the steroids usually require 2 to 3 days to take effect, buy may take as long as 5 to 7 days.  If there is no change in the pain, then investigation can be focused on other possible sources of your pain.  In either case, the information is useful to the doctor who referred you for this procedure.    POSSIBLE SIDE EFFECTS  Facial flushing (redness), occasional low grade fevers of 99.5F or less, hiccups, insomnia, headaches, increased heart rate, abdominal cramping, and/or a bloating feeling are side effects of the steroid medications and will go away 3 to 4 days after the injection.    Diabetic Patients  The steroids you have received may significantly increase your blood sugar levels.  Monitor your blood sugar level closely (4-6 times per day) for a period of 4 days or until your blood sugar level normalizes.  If your blood sugar level elevates significantly or you experience confusion, dizziness, sweating, please notify our primary physician and make him/her aware that you have received steroids.

## 2017-12-28 NOTE — PROGRESS NOTES
Patient Information     Patient Name MRN Gauri Nagy [id number] Female 1975      Progress Notes by Fabiola Manriquez OT at 10/27/2017 11:22 AM     Author:  Fabiola Manriquez OT Service:  (none) Author Type:  OT- Occupational Therapist     Filed:  10/27/2017 12:45 PM Date of Service:  10/27/2017 11:22 AM Status:  Signed     :  Fabiola Manriquez OT (OT- Occupational Therapist)            [facility]  Outpatient OT - Daily Note          Date of Service; 10/27/2017           Visit #:  14  Date of referral: 2017   Patient Name: Gauri Hollis  YOB: 1975   Referring MD/Provider: Dr Rivera  Diagnosis: right elbow pain  Treatment Diagnosis:pain, weakness, decline in self cares   Insurance:  [employer]  Start of Care Date: 2017   Certification periods:  From:   2017  To: 10/30/2017      Subjective    Gauri reports improvement in her arm though slow. The more patients she needs to adjust with the more irritated and painful her arm becomes. She is dictating more to decrease the repetition of keyboarding. Her egro has not been scheduled yet.     Pain Rating:     3/10 up to 5/10      Objective    Today's Intervention:     High volt electrical stimulation (PPR1- 300PV unit), intensity 48 (monitored and controlled by patient), negative rectangle treatment electrode over medial elbow, round positive electrode on dorsum of wrist. 20 minute treatment time, to increase circulation, decrease pain and edema.     Moist hot pack to RUE elbow distally to fingertips, during e-stim, 6 layers + hot pack cover, to prepare soft tissue for stretch. 13 minutes application with skin check at 8 minutes. Additional towel provided for patient to utilize if heat became too high.     Following heat and e-stim, PROM with prolonged stretch to RUE:     Manual Therapy to help improve tissue mobility, improve circulation and to decrease tissue tension thru the use of  IASTM with the Graston Technique    GT4, GT6: sweep, fan through biceps, triceps and forearm  GT^ Brush and strum , GT 2 swivel local lesions  GtT6: frame around epicondyle   GT6, brush & strum; GT6 scoop off supracondylar ridge  GT4 sweep though pronator jerald (parallel and perpendicular to fibers  GT4 swivel interosseous membrane   GT4 [id number], fan, scoop & strum distal triceps   GT6 J stroke along muscle bellies   GT6 strum distal triceps tendon.       ** patient was informed of potential treatment responses produced by IASTM. S/he was informed to instruct provider with any discomfort or pain.     Prolong stretch to wrist extensors, flexors, supinator and pronator.        Change to ultrasound to medial epicondyle.   Pulsed ultrasound 20%, using 10% Ketoprofen gel, via 2 cm sound head, 1.0 Mhz, .7 w/cm2  to right medial elbow (location), to decrease pain, edema, inflammation,  and enhance soft tissue repair. 8 minutes application, 5 minutes prep/clean-up/rationale.      Home Exercise Program: phase one of medial epicondyle protocol. 9/28/2017 ulnar nerve glides.       Assessment    Therapist Assessment/Response to Intervention:  Increase tightness and areas of dense tissue in the forearm about 6 Cm proximal to the wrist.     Goals:  Problem list includes: pain with weight bearing  Though marilee DAWSON with external rotation     Patient s goal: to be able to work with out pain.      Functional short term goals (established in collaboration with the patient, to be met in 8 weeks):  1. 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 Patient will report the ability to utilize her mouse while using her computer with pian levels <3/10 10/12/2017 ranges for 3-5  3. Patient will report the ability to perform keyboarding for up to one hour with no pain 10/12/2017 still needs to take breaks. Case load has been lighter so not typing  as much.         Functional long terms goal to be met in 12 weeks:   A. Patient will be able to at PLOF  10/12/2017 progressing  B. Patient will be able to perform self care tasks at OF 10/12/2017 progressing  Plan  Plan:  Monitor and assess current plan.     Student has been instructed in and demonstrates skills necessary to carry out above stated treatment plan: Yes    Thank you for your referral to [facility].  Please call with any questions, concerns or comments.  [phone]    Fabiola Manriquez OTR/L  Occupational Therapist

## 2017-12-28 NOTE — PROGRESS NOTES
Patient Information     Patient Name MRN Sex Gauri Snell [id number] Female 1975      Progress Notes by Chucky Rivera DO at 2017  7:45 AM     Author:  Chucky Rivera DO Service:  (none) Author Type:  Physician     Filed:  2017  8:36 AM Encounter Date:  2017 Status:  Signed     :  Chucky Rivera DO (Physician)            Gauri Hollis was seen in consultation for a chief complaint of pain into the right elbow and forearm.    CHIEF COMPLAINT: Gauri Hollis is a 41 y.o.  female  Chief Complaint     Patient presents with       Consult      right elbow forearm         HISTORY OF PRESENTING INJURY   History of presenting injury, patient comes in today with an ongoing complaint of right elbow and forearm discomfort. It seems have been worsening and now she is getting some ulnar and median nerve irritation into her hand. 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 The positioning for doing her manipulations and also for charting seems to have flared this up. It hasn't gotten any better and it has only worsened. She is tried ibuprofen and acupuncture and it does give her some relief. Pain does radiate into the bicipital insertion and then radiates down the arm. It does seem to be worse as I noted above with patient care and for charting.  Description of pain:  mild aching, cramping, discomfort, increased pain with activity and numbness   Radiation of pain: yes  Pain course: gradually worsening  Worse with: bending or flexing affected area, extending affected area, turning or rotating affected area and supinating affected area  Improved by: OTC meds, rest and ice  History of injection: No  Any PT: No    PAST MEDICAL HISTORY:  Past Medical History:     Diagnosis  Date     Elbow dislocation      Family history of breast cancer      Hx of pregnancy      - PPH, retained placenta,  "transfusion      POSTPARTUM HEMORRHAGE 11/16/2011     Pregnancy, supervision, high-risk        PAST SURGICAL HISTORY:  Past Surgical History:      Procedure  Laterality Date     DILATION AND CURETTAGE  10/2011    for retained placenta       DILATION AND CURETTAGE  4/1/2015    for missed AB       WISDOM TEETH EXTRACTION         ORTHOPEDIC FRACTURES AND BROKEN BONES:  As above.    ALLERGIES:  Allergies      Allergen   Reactions     Cats (Fur, Dander, Saliva)  Other - Describe In Comment Field     congestion      Chlorhexidine Towelette  Itching     No rash.  Also felt \"irritable\" after using wipes.        CURRENT MEDICATIONS:  Current Outpatient Prescriptions       Medication  Sig Dispense Refill     Cholecalciferol, Vitamin D3, (VITAMIN D-3) 5,000 unit tab Take  by mouth once daily.  0     Choline Bitartrate 100 % powd As directed.  0     omega-3 fatty acids-vitamin E (FISH OIL) 1,000 mg cap Take  by mouth.  0     PNV34-iron,carbonyl-FA-DSS-dha 30 mg iron-1 mg -50 mg-260 mg cap Take  by mouth.  0     Saccharomyces boulardii (PROBIOTIC, S.BOULARDII,) 250 mg capsule Take 1 capsule by mouth once daily.  0     triamcinolone, 55 mcg each actuation, nasal (NASACORT AQ) 55 mcg nasal spray Inhale 2 Sprays into both nostrils each time if needed for Rhinitis. 16.5 g 0     No current facility-administered medications for this visit.      Medications have been reviewed by me and are current to the best of my knowledge and ability.      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  Alcohol use:  Yes  Tobacco use: Smoker: no  Are you or have you used illicit drugs:  no    FAMILY HISTORY:  Family History       Problem   Relation Age of Onset     Cancer-breast  Mother 53     Diabetes  Mother      Heart Disease  Father      MI       Diabetes  Father      Thyroid Disease  Sister      hypothyroidism         REVIEW OF SYSTEMS:  The review of systems as documented in the HPI " "and on the intake questionnaire, completed by the patient on 8/28/2017 have been reviewed by myself and the pertinent positives and negatives addressed.  The remainder of the complete review of systems was non-contributory.    PHYSICAL EXAM:   /94  Pulse 60  Ht 1.689 m (5' 6.5\")  Wt 79.8 kg (176 lb)  BMI 27.98 kg/m2 Body mass index is 27.98 kg/(m^2).    General Appearance: Pleasant female in good appearance, mood and affect.  Alert and orientated times three ( time, date and location).    Skin: Intact  Sensation is Abnormal, there is some decreased sensation into the ulnar nerve distribution it does seem to affect both sides right greater than left. With minimal changes into the median nerve on the right.    Elbow:  Effusion: no  Motion: full  Tinel's test positive  positive tenderness at the medial epicondyle  Provacative testing positive    Shoulder:  Motion: Normal    Hand:  Thenar wasting: no  Hypothenar wasting: no  Sensation: Abnormal, into the ulnar nerve more than the median nerve. Right is worse than the left.  Radial and ulnar blood flow:  Normal    Eyes: Pupils are round.    Ears: Hearing: Intact    Heart: Good capillary refill and her hands radial pulses are regular.    Lungs: Clear.     Radiographic images from 9/28 where independently reviewed and discussed with the patient.      Xray:     X-rays demonstrate very mild degenerative changes, there does appear to be a fluid collection about the elbow. No fractures noted.    IMPRESSION:  Right medial epicondylitis.  Right ulnar neuropathy possible cubital tunnel versus a higher lesion with very mild left symptoms.  Mild right carpal tunnel syndrome.  Mild degenerative changes about the right elbow with fluid collection.  Right distal bicipital tendinitis.    PLAN:  Risks, benefits, conservative, surgical and alternatives to treatment where discussed and the patient would like to proceed with further workup.  In light of her work this does seem to " have flared it up and could be causing her more difficulties is important that she does file a report.  I will order an MR arthrogram of her elbow.  I will order an EMG of bilateral upper extremities.  I will order occupational therapy for the right elbow and forearm.  She will follow-up after above.  Questions and concerns were answered.    Chucky Rivera D.O.  Orthopaedic Surgeon    [address] [address]  Phone [phone] (KNEE)  Fax [phone]    This document was created using computer generated templates and voice activated software.    8:26 AM 8/28/2017

## 2017-12-28 NOTE — PROGRESS NOTES
Patient Information     Patient Name MRN Gauri Nagy [id number] Female 1975      Progress Notes by Fabiola Manriquez OT at 2017 10:48 AM     Author:  Fabiola Manriquez OT Service:  (none) Author Type:  OT- Occupational Therapist     Filed:  2017  1:21 PM Date of Service:  2017 10:48 AM Status:  Signed     :  Fabiola Manriquez OT (OT- Occupational Therapist)            [facility]  Outpatient OT - Daily Note          Date of Service; 2017           Visit #:  15  Date of referral: 2017   Patient Name: Gauri Hollis  YOB: 1975   Referring MD/Provider: Dr Rivera  Diagnosis: right elbow pain  Treatment Diagnosis:pain, weakness, decline in self cares   Insurance:  [employer]  Start of Care Date: 2017   Certification periods:  From:   2017  To: 10/30/2017      Subjective    Gauri reports increase tightness in the flexor muscle in the forearm.   Pain Rating:     3/10 up to 5/10      Objective    Today's Intervention:     High volt electrical stimulation (PPR1- 300PV unit), intensity 48 (monitored and controlled by patient), negative rectangle treatment electrode over medial elbow, round positive electrode on dorsum of wrist. 20 minute treatment time, to increase circulation, decrease pain and edema.     Moist hot pack to RUE elbow distally to fingertips, during e-stim, 6 layers + hot pack cover, to prepare soft tissue for stretch. 13 minutes application with skin check at 8 minutes. Additional towel provided for patient to utilize if heat became too high.     Following heat and e-stim, PROM with prolonged stretch to RUE:     Manual Therapy to help improve tissue mobility, improve circulation and to decrease tissue tension thru the use of IASTM with the Graston Technique    GT4, GT6: sweep, fan through biceps, triceps and forearm  GT^ Brush and strum , GT 2 swivel local lesions  GtT6: frame around epicondyle   GT6,  brush & strum; GT6 scoop off supracondylar ridge  GT4 sweep though pronator jerald (parallel and perpendicular to fibers  GT4 swivel interosseous membrane   GT4 [id number], fan, scoop & strum distal triceps   GT6 J stroke along muscle bellies   GT6 strum distal triceps tendon.       ** patient was informed of potential treatment responses produced by IASTM. S/he was informed to instruct provider with any discomfort or pain.     Prolong stretch to wrist extensors, flexors, supinator and pronator.        Change to ultrasound to medial epicondyle.   Pulsed ultrasound 20%,gel, via 2 cm sound head, 1.0 Mhz, .7 w/cm2  to right medial elbow (location), to decrease pain, edema, inflammation,  and enhance soft tissue repair. 8 minutes application, 5 minutes prep/clean-up/rationale.      Home Exercise Program: phase one of medial epicondyle protocol. 9/28/2017 ulnar nerve glides.       Assessment    Therapist Assessment/Response to Intervention:  Increase tightness in the biceps today as well as the forearm flexors.   Goals:  Problem list includes: pain with weight bearing  Though RUEjuan luisan with external rotation     Patient s goal: to be able to work with out pain.      Functional short term goals (established in collaboration with the patient, to be met in 8 weeks):  1. 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 Patient will report the ability to utilize her mouse while using her computer with pian levels <3/10 10/12/2017 ranges for 3-5  3. Patient will report the ability to perform keyboarding for up to one hour with no pain 10/12/2017 still needs to take breaks. Case load has been lighter so not typing as much.         Functional long terms goal to be met in 12 weeks:   A. Patient will be able to at PLOF  10/12/2017 progressing  B. Patient will be able to perform self care tasks at PLOF 10/12/2017 progressing  Plan  Plan:   Monitor and assess current plan.     Student has been instructed in and demonstrates skills necessary to carry out above stated treatment plan: Yes    Thank you for your referral to [facility].  Please call with any questions, concerns or comments.  [phone]    Fabiola Manriquez OTR/L  Occupational Therapist

## 2017-12-28 NOTE — TELEPHONE ENCOUNTER
Patient Information     Patient Name MRN Gauri Nagy [id number] Female 1975      Telephone Encounter by Stefanie Johnson at 10/10/2017  2:33 PM     Author:  Stefanie Johnson Service:  (none) Author Type:  (none)     Filed:  10/10/2017  2:34 PM Encounter Date:  10/10/2017 Status:  Signed     :  Stefanie Johnson            Called patient and opened phone note, in regards to patients concerns.  Stefanie Johnson LPN............................ 10/10/2017 2:34 PM

## 2017-12-28 NOTE — PROGRESS NOTES
Patient Information     Patient Name MRN Sex Gauri Snell [id number] Female 1975      Progress Notes by Fabiola Manriquez OT at 2017  1:07 PM     Author:  Fabiola Manriquez OT Service:  (none) Author Type:  OT- Occupational Therapist     Filed:  2017  1:12 PM Date of Service:  2017  1:07 PM Status:  Signed     :  Fabiola Manriqeuz OT (OT- Occupational Therapist)            [facility]  Outpatient OT - Daily Note          Date of Service:2017           Visit #:  7  Date of referral: 2017   Patient Name: Gauri Hollis  YOB: 1975   Referring MD/Provider: Dr Rivera  Diagnosis: right elbow pain  Treatment Diagnosis:pain, weakness, decline in self cares   Insurance:  [employer]  Start of Care Date: 2017   Certification periods:  From:   2017  To: 10/30/2017      Subjective    Gauri reports ulnar nerve pain when bending her elbow. She feels the soft tissue is feeling better.       Pain Rating:     3/10 up to 5/10      Objective    Today's Intervention:     High volt electrical stimulation (PPR1- 300PV unit), intensity 48 (monitored and controlled by patient), negative rectangle treatment electrode over medial elbow, round positive electrode on dorsum of wrist. 20 minute treatment time, to increase circulation, decrease pain and edema.     Moist hot pack to RUE elbow distally to fingertips, during e-stim, 6 layers + hot pack cover, to prepare soft tissue for stretch. 13 minutes application with skin check at 8 minutes. Additional towel provided for patient to utilize if heat became too high.     Following heat and e-stim, PROM with prolonged stretch to RUE:     Manual Therapy to help improve tissue mobility, improve circulation and to decrease tissue tension thru the use of IASTM with the Graston Technique    GT4, GT6: sweep, fan through biceps, triceps and forearm  GT^ Brush and strum , GT 2 swivel local lesions  GtT6:  frame around epicondyle   GT6, brush & strum; GT6 scoop off supracondylar ridge  GT4 sweep though pronator jerald (parallel and perpendicular to fibers  GT4 swivel interosseous membrane   GT4 [id number], fan, scoop & strum distal triceps   GT6 J stroke along muscle bellies   GT6 strum distal triceps tendon.       ** patient was informed of potential treatment responses produced by IASTM. S/he was informed to instruct provider with any discomfort or pain.     Prolong stretch to wrist extensors, flexors, supinator and pronator.     Iontophoresis using dexamethasone, 80 mA dose via Ionto  Active stat delivery method. Medication applied to medial epicondyl and distal tricesp tendon.  to decrease inflammation and/or pain. Patient was informed of normal reactions to the iontophoresis treatment, and was instructed to remove the patch after 4 hours. Total iontophoresis set-up/rationale = 8 minutes.     Educated in ulnar nerve glides with handouts provided.     Home Exercise Program: phase one of medial epicondyle protocol. 9/28/2017 ulnar nerve glides.       Assessment    Therapist Assessment/Response to Intervention: soft tissue pain resolving. ulnar nerve pain continues to be problematic.       Goals:  Problem list includes: pain with weight bearing  Though RUE pian with external rotation     Patient s goal: to be able to work with out pain.      Functional short term goals (established in collaboration with the patient, to be met in 8 weeks):  1. Patient will report the ability to wash her hair with pain levels < 3/10  2. Patient will report the ability to utilize her mouse while using her computer with pian levels <3/10  3. Patient will report the ability to perform keyboarding for up to one hour with no pain         Functional long terms goal to be met in 12 weeks:   A. Patient will be able to at PLOF   B. Patient will be able to perform self care tasks at PLOF  Plan  Plan:  Consider ultrasound    Student has been  instructed in and demonstrates skills necessary to carry out above stated treatment plan: Yes    Thank you for your referral to [facility].  Please call with any questions, concerns or comments.  [phone]    Fabiola Manriquez OTR/L  Occupational Therapist

## 2017-12-28 NOTE — PROGRESS NOTES
Patient Information     Patient Name MRN Sex Gauri Snell [id number] Female 1975      Progress Notes by Huong Kang at 2017  9:13 AM     Author:  Huong Kang Service:  (none) Author Type:  (none)     Filed:  2017  9:13 AM Date of Service:  2017  9:13 AM Status:  Signed     :  Huong Kang            [place] Protocol    A. Pre-procedure verification complete yes  1-relevant information / documentation available, reviewed and properly matched to the patient; 2-consent accurate and complete, 3-equipment and supplies available    B. Site marking complete Yes  Site marked if not in continuous attendance with patient    C. TIME OUT completed yes  Time Out was conducted just prior to starting procedure to verify the eight required elements: 1-patient identity, 2-consent accurate and complete, 3-position, 4-correct side/site marked (if applicable), 5-procedure, 6-relevant images / results properly labeled and displayed (if applicable), 7-antibiotics / irrigation fluids (if applicable), 8-safety precautions.

## 2017-12-28 NOTE — PROGRESS NOTES
Patient Information     Patient Name MRN Gauri Nagy [id number] Female 1975      Progress Notes by Fabiola Manriquez OT at 10/3/2017  5:18 PM     Author:  Fabiola Manriquez OT  Service:  (none) Author Type:  OT- Occupational Therapist     Filed:  10/4/2017  3:01 PM  Date of Service:  10/3/2017  5:18 PM Status:  Addendum     :  Fabiola Manriquez OT (OT- Occupational Therapist)        Related Notes: Original Note by Fabiola Manriquez OT (OT- Occupational Therapist) filed at 10/3/2017  5:19 PM            [facility]  Outpatient OT - Daily Note          Date of Service; 10/3/2017           Visit #:  8  Date of referral: 2017   Patient Name: Gauri Hollis  YOB: 1975   Referring MD/Provider: Dr Rivera  Diagnosis: right elbow pain  Treatment Diagnosis:pain, weakness, decline in self cares   Insurance:  [employer]  Start of Care Date: 2017   Certification periods:  From:   2017  To: 10/30/2017      Subjective    Gauri reports her arm is feeling a little better. She had been given a form to request an ergonomic assessment at work along with a handout on proper work station set up. She stated she made some changes to her work station following the handout. She reports she identified some other areas that need to be changed as well.     Pain Rating:     3/10 up to 5/10      Objective    Today's Intervention:     High volt electrical stimulation (PPR1- 300PV unit), intensity 48 (monitored and controlled by patient), negative rectangle treatment electrode over medial elbow, round positive electrode on dorsum of wrist. 20 minute treatment time, to increase circulation, decrease pain and edema.     Moist hot pack to RUE elbow distally to fingertips, during e-stim, 6 layers + hot pack cover, to prepare soft tissue for stretch. 13 minutes application with skin check at 8 minutes. Additional towel provided for patient to utilize if heat  became too high.     Following heat and e-stim, PROM with prolonged stretch to RUE:     Manual Therapy to help improve tissue mobility, improve circulation and to decrease tissue tension thru the use of IASTM with the Graston Technique    GT4, GT6: sweep, fan through biceps, triceps and forearm  GT^ Brush and strum , GT 2 swivel local lesions  GtT6: frame around epicondyle   GT6, brush & strum; GT6 scoop off supracondylar ridge  GT4 sweep though pronator jerald (parallel and perpendicular to fibers  GT4 swivel interosseous membrane   GT4 [id number], fan, scoop & strum distal triceps   GT6 J stroke along muscle bellies   GT6 strum distal triceps tendon.       ** patient was informed of potential treatment responses produced by IASTM. S/he was informed to instruct provider with any discomfort or pain.     Prolong stretch to wrist extensors, flexors, supinator and pronator.     Iontophoresis using dexamethasone, 80 mA dose via Ionto  Active stat delivery method. Medication applied to medial epicondyl and distal tricesp tendon.  to decrease inflammation and/or pain. Patient was informed of normal reactions to the iontophoresis treatment, and was instructed to remove the patch after 4 hours. Total iontophoresis set-up/rationale = 8 minutes.         Home Exercise Program: phase one of medial epicondyle protocol. 9/28/2017 ulnar nerve glides.       Assessment    Therapist Assessment/Response to Intervention: soft tissue pain resolving. ulnar nerve pain continues to be problematic.       Goals:  Problem list includes: pain with weight bearing  Though RUE, pian with external rotation     Patient s goal: to be able to work with out pain.      Functional short term goals (established in collaboration with the patient, to be met in 8 weeks):  1. Patient will report the ability to wash her hair with pain levels < 3/10  2. Patient will report the ability to utilize her mouse while using her computer with pian levels <3/10  3.  Patient will report the ability to perform keyboarding for up to one hour with no pain         Functional long terms goal to be met in 12 weeks:   A. Patient will be able to at PLOF   B. Patient will be able to perform self care tasks at PLOF  Plan  Plan:  Consider ultrasound    Student has been instructed in and demonstrates skills necessary to carry out above stated treatment plan: Yes    Thank you for your referral to [facility].  Please call with any questions, concerns or comments.  [phone]    Fabiola Manriquez OTR/L  Occupational Therapist

## 2017-12-28 NOTE — PROGRESS NOTES
Patient Information     Patient Name MRN Gauri Nagy [id number] Female 1975      Progress Notes by Cee Oliva, PT at 2017 10:17 AM     Author:  Cee Oliav, PT Service:  (none) Author Type:  PT- Physical Therapist     Filed:  2017  1:19 PM Date of Service:  2017 10:17 AM Status:  Signed     :  Cee Oliva PT (PT- Physical Therapist)            [facility]  Outpatient PT - Daily Note        Date of Service: 2017     Number of Visits: 2/    Patient Name: Gauri Hollis   YOB: 1975   Referring MD/Provider: Chucky Rivera DO  Medical and Treatment Diagnosis:   Pain in shoulder region, right [M25.511]       Arm pain, right [M79.601]             PT Treatment Diagnosis: right shoulder and neck pain, right UE pain, right elbow dysfunction (epicondylitis, cubital tunnel syndrome, olecranon bursitis), postural dysfunction, facet restrictions, rib restrictions, myofascial pain/restrictions  Insurance: Work Comp  Start of Service: 11/15/17  Certification Dates: Start of Service: 11/15/17                     Medicare/[place] Re-Cert Due: 18 NOTE: authorization received for evaluation plus 5 additional PT sessions for a total of 6 with emphasis on HEP        Subjective    Notes that she is doing better. Notes that what we did the first day was really helpful. Notes that she has had 2 days off of work so that helps as well. States that she noticed in her exercise class that she was able to do more things without discomfort such as side planks and downward dog stretching.    Pain Ratin = Mild Pain, (Bothersome, Annoying, Irritating, Nagging) and  3 = Mild Pain, (Bothersome, Annoying, Irritating, Nagging) / Location:  Right neck, UE pain.        Objective  Items left blank indicate that the test was inappropriate or not meaningful at the time of visit and therefore not performed.      Postural  "loading:  General Listening: right anterior shoulder  Head:  Restricted right to right and left to right  Shoulders: restricted right  Pelvis:  Restricted right to right   Lower Cervical Joint Mobility Testing:    Flex/Rot Right Flex/Rot Left Ext/Rot Right Ext/Rot Left Sidebend Right Sidebend Left Extension   C2-3                 C3-4                 C4-5 x   x           C5-6 x   x           C6-7 x   x           C7-T1             x      Upper Cervical Joint Mobility Testing:                        Neg with screening     THORACIC FACET RESTRICTIONS:    ERS right  ERS left Neutral (SB right/Rot left) Neutral (SB  left/Rot right) FRS right FRS left   C7-T1         x     T1-2               T2-3               T3-4         x     T4-5           x   T5-6               T6-7               T7-8               T8-9               T9-10               T10-11               T11-12               T12-L1               (FRS: Flexed, rotated and sidebent. ERS: Extended, rotated and sidebent.)     Structural Rib Dysfunctions:                        Superior Subluxed 1st Rib: right                        Laterally flexed 2nd Rib: right    Myofascial tightness noted in the Clavipectoral Fascia fascia in particular with tightness as well in the middle and deep anterior cervical fascia as well as the scalenes. Tightness at the CT junction with referred pain to a \"frontal headache\" with palpation here. Decreased inferior loading of the right thorax. Decreased compressive loading of the right shoulder/upper arm with referred pain to the face as well as down the right UE noted.         Today's Intervention:    MFR  Thoracic inlet, sternal release  MFR Clavipectoral Fascia   Viscoelastic release right shoulder  MFR middle and deep anterior cervical fascia, scalenes  MFR CT junction supine  Supine extension mobs C7T1  MFR arm pull    Sidelying trunk rotation <, at and > 90 degrees hip flexion  sidelying shoulder circles  Supine QL stretch with " ipsilateral osbaldo braden  Scalene self stretch  Clavipectoral Fascia stretch door frame    Home Exercise Program:  Snow angels  Anterior cervical fascia self stretch  Scapular retraction  Sidelying trunk rotation <, at and > 90 degrees hip flexion  sidelying shoulder circles  Supine QL stretch with ipsilateral snow sampson  Scalene self stretch  Clavipectoral Fascia stretch door frame  Assessment    Therapist Assessment / Clinical Impression:  Gauri Hollis is referred to PT due to right shoulder and neck pain. She has had difficulty with more distal right UE complaints including epicondylitis, olecranon bursitis and cubital tunnel issues. Neck and shoulder issues have been underlying and continue to cause limits in functional activities such as computer work and manipulating while at work and outside activities such as driving, sleep and meal prep. Complaints are not unexpected due to regional interdependence of the neck and upper thoracic/brachial plexus region with her more distal issues. She has not been able to manage her complaints with exercise, chiropractic work, OT. Clinically she presents with right shoulder and neck pain, right UE pain, right elbow dysfunction (epicondylitis, cubital tunnel syndrome, olecranon bursitis), postural dysfunction, facet restrictions, rib restrictions, myofascial pain/restrictions. Primary restrictions today noted to be the right anterior cervical and shoulder region with significant myofascial restriction as well as joint and rib dysfunctions.        Clinical Impression:                         Cervical and right shoulder pain with hx of epicondylitis, olecranon bursitis, cubital tunnel restrictions                        Facet Dysfunction:                        Postural Dysfunction:                        Myofascial:                        Neural restriction     Direct PT services are indicated to address the above noted impairments and to promote self management.        G  codes and Modifier taken from patient completing the PSFS: 11/15/2017  Initial Primary G Code and Modifier:                         Per the Patient's intake and/or assessment the Primary G Code is: Handling Objects .                        The Patient's Impairment, Limitation or Restriction Modifier would be best described as: CL - 60% - 80% Impairment.   Goal Primary G Code and Modifier:                         The Patient's G Code Goal would be: Handling Objects                         The Patient's Impairment, Limitation or Restriction Modifier goal would be best described as: CJ - 20% - 40% Impairment.      Goals:  Patient goal (time reference required): to be able to do those daily activities at home and at work without nagging pain (2 months)   .     Long term goal: Patient is to self-manage symptoms and return to prior function in 8 weeks.       Functional goals:   Patient is to be independent in their Home Exercise Program in 4 weeks.  Patient is to tolerate walking with improved arm swing and pelvic rotation right with gait in 6 weeks.  Patient is report the ability to sleep 6 hours without awakening due to pain in 8 weeks.  Patient is to display and maintain normal joint mobility/symmetry in the cervicothoracic spine and ribs to allow to perform cervical assessments and manipulation techniques with minimal increase in discomfort in 8 weeks.  Patient is to demonstrate ability to work at her computer with good posture and body mechanics for 30 minute intervals without symptoms in shoulder and neck in 8 weeks.  Patient is to report ability to drive 3 hours with rest break without increased symptoms  in 8 weeks.  Patient to display improved postural loading to allow for symmetrical weight bearing to promote more normal use of right shoulder and scapula in 8 weeks.         Response to Intervention:  Very tight initially in the right anterior chest region. Improved with MFR. Occasional need for verbal  and manual cues with exercises for correct technique. Has to be careful to avoid substitutions. Noted improvement after first session with carryover into her regular activities.       Plan    Original plan of care as below modified to 6 visits total over 2 months per work comp authorization.    Treatment Plan / Targeted Outcomes:     Frequency:   16 visits     Duration of Treatment: 2 months    Planned Interventions:  May include any of the following:  Home Exercise Program development  Therapeutic Exercise (ROM & Strengthening)  Manual Therapy  Neuromuscular Re-education  Ultrasound  Electrical Stimulation  Therapeutic Activities  Gait Training  Posture and Body Mechanics Education         Plan for next visit:  Manual therapy, MET, assess right ribcage, exercise    Student or PTA has been instructed in and demonstrates skills necessary to carry out above stated treatment plan: Yes    Thank you for your referral to [facility].  Please call with any questions, concerns or comments.  [phone]

## 2017-12-28 NOTE — TELEPHONE ENCOUNTER
Patient Information     Patient Name MRN Gauri Nagy [id number] Female 1975      Telephone Encounter by Fabiola Manriquez OT at 2017 11:48 AM     Author:  Fabiola Manriquez OT Service:  (none) Author Type:  OT- Occupational Therapist     Filed:  2017 11:56 AM Encounter Date:  2017 Status:  Signed     :  Fabiola Manriquez OT (OT- Occupational Therapist)            Gauri has improved in her arm pain. She is no longer numbness and tingling in the ulnar nerve pathway and hr olecranon bursitis and  Medial epicondylitis has improved. She is still having problems more proximally into the shoulder and neck which I think is affecting her arm. I think she would benefit form seeing a manual physical therapist to address these issues. If you are in agree ment with her seeing a physical therapist for her neck and shoulder region, would you please send an order for physical therapist, specifically, Cee RAYMOND. Thank you.

## 2017-12-28 NOTE — PROGRESS NOTES
Patient Information     Patient Name MRN Gauri Nagy [id number] Female 1975      Progress Notes by Fabiola Manriquez OT at 10/5/2017  1:09 PM     Author:  Fabiola Manriquez OT Service:  (none) Author Type:  OT- Occupational Therapist     Filed:  10/5/2017  1:14 PM Date of Service:  10/5/2017  1:09 PM Status:  Signed     :  Fabiola Manriquez OT (OT- Occupational Therapist)            [facility]  Outpatient OT - Daily Note          Date of Service; 10/5/2017           Visit #:  8  Date of referral: 2017   Patient Name: Gauri Hollis  YOB: 1975   Referring MD/Provider: Dr Rivera  Diagnosis: right elbow pain  Treatment Diagnosis:pain, weakness, decline in self cares   Insurance:  [employer]  Start of Care Date: 2017   Certification periods:  From:   2017  To: 10/30/2017      Subjective    Gauri reports her forearm flexors are tight. She reported a tense pain that lasted into the afternoon after last session. She has not done the nerve glides since.  She reports the olecranon bursitis is feeling a little better. She is still getting nerve pain into her little finger      Pain Rating:     3/10 up to 5/10      Objective    Today's Intervention:     High volt electrical stimulation (PPR1- 300PV unit), intensity 48 (monitored and controlled by patient), negative rectangle treatment electrode over medial elbow, round positive electrode on dorsum of wrist. 20 minute treatment time, to increase circulation, decrease pain and edema.     Moist hot pack to RUE elbow distally to fingertips, during e-stim, 6 layers + hot pack cover, to prepare soft tissue for stretch. 13 minutes application with skin check at 8 minutes. Additional towel provided for patient to utilize if heat became too high.     Following heat and e-stim, PROM with prolonged stretch to RUE:     Manual Therapy to help improve tissue mobility, improve circulation and to decrease  tissue tension thru the use of IASTM with the Graston Technique    GT4, GT6: sweep, fan through biceps, triceps and forearm  GT^ Brush and strum , GT 2 swivel local lesions  GtT6: frame around epicondyle   GT6, brush & strum; GT6 scoop off supracondylar ridge  GT4 sweep though pronator jerald (parallel and perpendicular to fibers  GT4 swivel interosseous membrane   GT4 [id number], fan, scoop & strum distal triceps   GT6 J stroke along muscle bellies   GT6 strum distal triceps tendon.       ** patient was informed of potential treatment responses produced by IASTM. S/he was informed to instruct provider with any discomfort or pain.     Prolong stretch to wrist extensors, flexors, supinator and pronator.     Iontophoresis using dexamethasone, 80 mA dose via Ionto  Active stat delivery method. Medication applied to medial epicondyl and distal tricesp tendon.  to decrease inflammation and/or pain. Patient was informed of normal reactions to the iontophoresis treatment, and was instructed to remove the patch after 4 hours. Total iontophoresis set-up/rationale = 8 minutes.       Change to ultrasound to medial epicondyle.   Pulsed ultrasound 20%, using 2 cm sound head, 1.0 Mhz, .7 w/cm2  to medial epicondyle (location), to decrease pain, edema, and enhance soft tissue repair. 8 minutes application, 5 minutes prep/clean-up/rationale.      Home Exercise Program: phase one of medial epicondyle protocol. 9/28/2017 ulnar nerve glides.       Assessment    Therapist Assessment/Response to Intervention:     Goals:  Problem list includes: pain with weight bearing  Though marilee DAWSON with external rotation     Patient s goal: to be able to work with out pain.      Functional short term goals (established in collaboration with the patient, to be met in 8 weeks):  1. Patient will report the ability to wash her hair with pain levels < 3/10  2. Patient will report the ability to utilize her mouse while using her computer with pian levels  <3/10  3. Patient will report the ability to perform keyboarding for up to one hour with no pain         Functional long terms goal to be met in 12 weeks:   A. Patient will be able to at PLOF   B. Patient will be able to perform self care tasks at PLOF  Plan  Plan:  Consider ultrasound    Student has been instructed in and demonstrates skills necessary to carry out above stated treatment plan: Yes    Thank you for your referral to [facility].  Please call with any questions, concerns or comments.  [phone]    Fabiola Manriquez OTR/L  Occupational Therapist

## 2017-12-28 NOTE — TELEPHONE ENCOUNTER
Patient Information     Patient Name MRN Gauri Nagy [id number] Female 1975      Telephone Encounter by Serene Perez MD at 10/23/2017  5:05 PM     Author:  Serene Perez MD Service:  (none) Author Type:  Physician     Filed:  10/23/2017  5:06 PM Encounter Date:  10/23/2017 Status:  Signed     :  Serene Perez MD (Physician)            Pt was sent [address] message with EMG results.  Serene Perez MD

## 2017-12-28 NOTE — TELEPHONE ENCOUNTER
Patient Information     Patient Name MRN Gauri Nagy [id number] Female 1975      Telephone Encounter by Serene Macario at 10/10/2017  2:33 PM     Author:  Serene Macario Service:  (none) Author Type:  (none)     Filed:  10/10/2017  2:36 PM Encounter Date:  10/10/2017 Status:  Signed     :  Serene Macario            Please call patient for work in with Dr. Perez, for throat pain and chest congestion. Please call [phone] if not reached at cell.

## 2017-12-28 NOTE — PROGRESS NOTES
Patient Information     Patient Name MRN Sex Gauri Snell [id number] Female 1975      Progress Notes by Chucky Rivera DO at 10/23/2017  8:15 AM     Author:  Chucky Rivera DO Service:  (none) Author Type:  Physician     Filed:  10/23/2017  9:10 AM Encounter Date:  10/23/2017 Status:  Signed     :  Chucky Rivera DO (Physician)            PROGRESS NOTE    SUBJECTIVE:  Gauri Hollis is here for evaluation in regards to her right shoulder, right elbow and right wrist. She states that with the occupational therapy she is making some very good gains. She is having a lot less discomfort. She did undergo her MRI and also her EMG. She feels with better positioning utilizing a different keyboard and she is now looking at different mouses as well as getting the best table at appropriate height that this will also make a difference for her. 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 She does work on her own therapy as well as that described through OT.    OBJECTIVE:  /76  Pulse 72  LMP 2017 Comment: has not had since miscarage in may There is no height or weight on file to calculate BMI.    General Appearance: Pleasant female in good appearance, mood and affect.  Alert and orientated times three ( time, date and location).    Skin: Intact    Elbow:  Effusion: no  Motion: full  Tinel's test negative  Less tenderness at the medial epicondyle.  Provacative testing positive, but has improved dramatically since I last saw her.  Pain into the forearm has decreased since I last saw her.    Shoulder:  Motion: Normal    Hand:  Thenar wasting: no  Hypothenar wasting: no  Sensation: This has normalized since I last saw her. Slight irritation ulnar nerve right elbow.  Radial and ulnar blood flow:  Normal    Eyes: Pupils are round.    Ears: Hearing: Intact    Heart: Good capillary refill and  her hands radial pulses are regular.    Lungs: Clear.     OT notes:    Notes reviewed with the patient showing that she is making good progress.    EMG:    EMG was independent reviewed with the patient showing that the nerves are functioning appropriately. Please see report for full details.    Radiographic images from 8/28, 8/30 where independently reviewed and discussed with the patient.      Xray:     X-rays demonstrate very mild degenerative changes, there does appear to be a fluid collection about the elbow. No fractures noted.    PROCEDURE: XR ELBOW 3 VIEWS RIGHT  HISTORY: Right elbow pain.  COMPARISON: None.  TECHNIQUE: 3 views of the right elbow were obtained.  FINDINGS: No fracture or dislocation is identified. The joint spaces are preserved.  IMPRESSION: No acute fracture.  Electronically Signed By: Stefany Taylor M.D. on 8/28/2017 9:10 AM    MRA:    MR ARTHROGRAM ELBOW RIGHT  HISTORY: 41 yearsFemale patient reports medial and anterior elbow pain extending into the forearm with numbness in the fourth and fifth digits. Patient has had similar symptoms on and off for 2 years duration but has worsened over the past 6 months. Right elbow tendonitis  TECHNIQUE: After menstruation of intra-articular contrast, axial T1 fat sac, axial T2 fat sac, coronal T1 fat-sat, coronal STIR, coronal gradient echo, sagittal T2 fat-sat and sagittal T1 fat-sat imaging of the right elbow was performed.  COMPARISON: None  FINDINGS: There is fluid superficial to the triceps tendon at its insertion on the ulna. The fluid does not contain contrast.  Contrast containing fluid is present within the joint. There is no bone marrow edema to suggest contusion or fracture. Joint spaces are congruent. Articular surfaces are smooth.  The biceps and brachialis tendons are intact. The common flexor and extensor tendons are intact. The triceps is intact.  No cartilage defects are evident.  IMPRESSION: Olecranon bursal fluid collections  suggestive of olecranon bursitis. Exams otherwise unremarkable.  Electronically Signed By: Lalo Iraheta M.D. on 8/30/2017 3:00 PM    IMPRESSION:  Right medial epicondylitis.  Right ulnar nerve irritation with negative EMG.  Mild right carpal tunnel syndrome-EMG negative  Mild degenerative changes about the right elbow with fluid collection.  Right distal bicipital tendinitis.  Right olecranon bursitis.    PLAN:  Risks, benefits, conservative, surgical and alternatives to treatment where discussed and the patient would like to continuation of occupational therapy.  It is important that she has appropriately-ergonomic workstation to help protect her area  continue with occupational therapy.  She will follow-up as needed.  Questions and concerns were answered.    Chucky Rivera D.O.  Orthopaedic Surgeon    [facility] [address] [address]  Phone [phone] (KNEE)  Fax [phone]    This document was created using computer generated templates and voice activated software.    9:05 AM 10/23/2017

## 2017-12-28 NOTE — TELEPHONE ENCOUNTER
Patient Information     Patient Name MRN Gauri Nagy [id number] Female 1975      Telephone Encounter by Stefanie Johnson at 10/10/2017  2:34 PM     Author:  Stefanie Johnson Service:  (none) Author Type:  (none)     Filed:  10/10/2017  2:38 PM Encounter Date:  10/10/2017 Status:  Signed     :  Stefanie Johnson            Spoke with patient in regards to my chart message, about seeking a sooner appointment with . Patient has an appointment scheduled for 10/25/17, I nformed patient that Dr. Zavala will be out of the office until 10/23/17 and asked patient about seeing a different physician. Transferred patient to scheduling to attempt to make a sooner appointment with a different provider.  Stefanie Johnson LPN............................ 10/10/2017 2:36 PM

## 2017-12-28 NOTE — PROGRESS NOTES
Patient Information     Patient Name MRN Gauri Nagy [id number] Female 1975      Progress Notes by Fabiola Manriquez OT at 10/10/2017 10:39 AM     Author:  Fabiola Manriquez OT Service:  (none) Author Type:  OT- Occupational Therapist     Filed:  10/11/2017 10:43 AM Date of Service:  10/10/2017 10:39 AM Status:  Signed     :  Fabiola Manriquez OT (OT- Occupational Therapist)            [facility]  Outpatient OT - Daily Note          Date of Service; 10/10/2017           Visit #:  9  Date of referral: 2017   Patient Name: Gauri Hollis  YOB: 1975   Referring MD/Provider: Dr Rivera  Diagnosis: right elbow pain  Treatment Diagnosis:pain, weakness, decline in self cares   Insurance:  [employer]  Start of Care Date: 2017   Certification periods:  From:   2017  To: 10/30/2017      Subjective    Gauri reports the olecranon bursitis is feeling better. Her forearm muscle are tight again. She is having a hard time tolerating the nerve glides. She feels intense pull and then aching later. She reports it feel the same way on the non-affected left side also.       Pain Rating:     3/10 up to 5/10      Objective    Today's Intervention:     High volt electrical stimulation (PPR1- 300PV unit), intensity 48 (monitored and controlled by patient), negative rectangle treatment electrode over medial elbow, round positive electrode on dorsum of wrist. 20 minute treatment time, to increase circulation, decrease pain and edema.     Moist hot pack to RUE elbow distally to fingertips, during e-stim, 6 layers + hot pack cover, to prepare soft tissue for stretch. 13 minutes application with skin check at 8 minutes. Additional towel provided for patient to utilize if heat became too high.     Following heat and e-stim, PROM with prolonged stretch to RUE:     Manual Therapy to help improve tissue mobility, improve circulation and to decrease tissue tension  thru the use of IASTM with the Graston Technique    GT4, GT6: sweep, fan through biceps, triceps and forearm  GT^ Brush and strum , GT 2 swivel local lesions  GtT6: frame around epicondyle   GT6, brush & strum; GT6 scoop off supracondylar ridge  GT4 sweep though pronator jerald (parallel and perpendicular to fibers  GT4 swivel interosseous membrane   GT4 [id number], fan, scoop & strum distal triceps   GT6 J stroke along muscle bellies   GT6 strum distal triceps tendon.       ** patient was informed of potential treatment responses produced by IASTM. S/he was informed to instruct provider with any discomfort or pain.     Prolong stretch to wrist extensors, flexors, supinator and pronator.        Change to ultrasound to medial epicondyle.   Pulsed ultrasound 20%, using 10% Ketoprofen gel, via 2 cm sound head, 1.0 Mhz, .7 w/cm2  to right medial elbow (location), to decrease pain, edema, inflammation,  and enhance soft tissue repair. 8 minutes application, 5 minutes prep/clean-up/rationale.      Home Exercise Program: phase one of medial epicondyle protocol. 9/28/2017 ulnar nerve glides.       Assessment    Therapist Assessment/Response to Intervention: overall forearm tightness. No significant areas of dense tissue notes.     Goals:  Problem list includes: pain with weight bearing  Though marilee DAWSON with external rotation     Patient s goal: to be able to work with out pain.      Functional short term goals (established in collaboration with the patient, to be met in 8 weeks):  1. Patient will report the ability to wash her hair with pain levels < 3/10  2. Patient will report the ability to utilize her mouse while using her computer with pian levels <3/10  3. Patient will report the ability to perform keyboarding for up to one hour with no pain         Functional long terms goal to be met in 12 weeks:   A. Patient will be able to at PLOF   B. Patient will be able to perform self care tasks at PLOF  Plan  Plan:  Consider  ultrasound    Student has been instructed in and demonstrates skills necessary to carry out above stated treatment plan: Yes    Thank you for your referral to [facility] & [facility].  Please call with any questions, concerns or comments.  [phone]    Fabiola Manriquez OTR/L  Occupational Therapist

## 2017-12-28 NOTE — TELEPHONE ENCOUNTER
Patient Information     Patient Name MRN Gauri Nagy [id number] Female 1975      Telephone Encounter by Elizabeth Malave at 10/10/2017  2:38 PM     Author:  Elizabeth Malave  Service:  (none) Author Type:  (none)     Filed:  10/10/2017  4:21 PM  Encounter Date:  10/10/2017 Status:  Addendum     :  Elizabeth Malave        Related Notes: Original Note by Elizabeth Malave filed at 10/10/2017  2:39 PM            No opening until 10/13. Should patient wait until then, or can she be worked in sooner?    Elizabeth Malave ....................  10/10/2017   2:39 PM

## 2017-12-28 NOTE — TELEPHONE ENCOUNTER
Patient Information     Patient Name MRN Gauri Nagy [id number] Female 1975      Telephone Encounter by Serene Perez MD at 10/10/2017  4:16 PM     Author:  Serene Perez MD Service:  (none) Author Type:  Physician     Filed:  10/10/2017  4:16 PM Encounter Date:  10/10/2017 Status:  Signed     :  Serene Perez MD (Physician)            Can she come tomorrow afternoon?  Serene Perez MD

## 2017-12-28 NOTE — PROGRESS NOTES
Patient Information     Patient Name MRN Sex Gauri Snell [id number] Female 1975      Progress Notes by Fabiola Manriquez OT at 10/17/2017 11:15 AM     Author:  Fabiola Manriquez OT Service:  (none) Author Type:  OT- Occupational Therapist     Filed:  10/17/2017 12:28 PM Date of Service:  10/17/2017 11:15 AM Status:  Signed     :  Fabiola Manriquez OT (OT- Occupational Therapist)            [facility]  Outpatient OT - Daily Note          Date of Service; 10/17/2017           Visit #:  11  Date of referral: 2017   Patient Name: Gauri Hollis  YOB: 1975   Referring MD/Provider: Dr Rivera  Diagnosis: right elbow pain  Treatment Diagnosis:pain, weakness, decline in self cares   Insurance:  [employer]  Start of Care Date: 2017   Certification periods:  From:   2017  To: 10/30/2017      Subjective    Gauri      Pain Rating:     3/10 up to 5/10      Objective    Today's Intervention:     High volt electrical stimulation (PPR1- 300PV unit), intensity 48 (monitored and controlled by patient), negative rectangle treatment electrode over medial elbow, round positive electrode on dorsum of wrist. 20 minute treatment time, to increase circulation, decrease pain and edema.     Moist hot pack to RUE elbow distally to fingertips, during e-stim, 6 layers + hot pack cover, to prepare soft tissue for stretch. 13 minutes application with skin check at 8 minutes. Additional towel provided for patient to utilize if heat became too high.     Following heat and e-stim, PROM with prolonged stretch to RUE:     Manual Therapy to help improve tissue mobility, improve circulation and to decrease tissue tension thru the use of IASTM with the Graston Technique    GT4, GT6: sweep, fan through biceps, triceps and forearm  GT^ Brush and strum , GT 2 swivel local lesions  GtT6: frame around epicondyle   GT6, brush & strum; GT6 scoop off supracondylar ridge  GT4 sweep  though pronator jerald (parallel and perpendicular to fibers  GT4 swivel interosseous membrane   GT4 [id number], fan, scoop & strum distal triceps   GT6 J stroke along muscle bellies   GT6 strum distal triceps tendon.       ** patient was informed of potential treatment responses produced by IAS. S/he was informed to instruct provider with any discomfort or pain.     Prolong stretch to wrist extensors, flexors, supinator and pronator.        Change to ultrasound to medial epicondyle.   Pulsed ultrasound 20%, using 10% Ketoprofen gel, via 2 cm sound head, 1.0 Mhz, .7 w/cm2  to right medial elbow (location), to decrease pain, edema, inflammation,  and enhance soft tissue repair. 8 minutes application, 5 minutes prep/clean-up/rationale.      Home Exercise Program: phase one of medial epicondyle protocol. 9/28/2017 ulnar nerve glides.       Assessment    Therapist Assessment/Response to Intervention: Gauri has less soft tissue pain and tightness in the forearm. The olocranon bursitis is less painful. She still feels a heaviness in her arm and tingling into fingers 4-5.      Goals:  Problem list includes: pain with weight bearing  Though RUE, pian with external rotation     Patient s goal: to be able to work with out pain.      Functional short term goals (established in collaboration with the patient, to be met in 8 weeks):  1. 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 Patient will report the ability to utilize her mouse while using her computer with pian levels <3/10 10/12/2017 ranges for 3-5  3. Patient will report the ability to perform keyboarding for up to one hour with no pain 10/12/2017 still needs to take breaks. Case load has been lighter so not typing as much.         Functional long terms goal to be met in 12 weeks:   A. Patient will be able to at PLOF  10/12/2017 progressing  B. Patient will be able to  perform self care tasks at [facility] 10/12/2017 progressing  Plan  Plan:  Monitor and assess current plan.     Student has been instructed in and demonstrates skills necessary to carry out above stated treatment plan: Yes    Thank you for your referral to [facility] & [facility].  Please call with any questions, concerns or comments.  [phone]    Fabiola Manriquez OTR/L  Occupational Therapist

## 2017-12-28 NOTE — PROGRESS NOTES
Patient Information     Patient Name MRN Gauri Nagy [id number] Female 1975      Progress Notes by Fabiola Manriquez OT at 2017  9:49 AM     Author:  Fabiola Manriquez OT Service:  (none) Author Type:  OT- Occupational Therapist     Filed:  2017 11:45 AM Date of Service:  2017  9:49 AM Status:  Signed     :  Fabiola Manriquez OT (OT- Occupational Therapist)            [facility]  Outpatient OT - Daily Note          Date of Service; 2017           Visit #:  18  Date of referral: 2017   Patient Name: Gauri Hollis  YOB: 1975   Referring MD/Provider: Dr Rivera  Diagnosis: right elbow pain  Treatment Diagnosis:pain, weakness, decline in self cares   Insurance:  [employer]  Start of Care Date: 2017   Current Certification periods:  2017 to 2018  Previous certification periods: From:   2017  To: 10/30/2017      Subjective     Gauri had her ergo assessment done yesterday. She is using a smaller keyboard an is having her chair and work station changed to a more ergonomic position. She is feeling like the distal arm pain and discomfort is improving and is thinking that she may have some myofascial restrictions in the shoulder and neck region.       Pain Rating:     3/10 up to 5/10      Objective    Today's Intervention:       IFC electrical stimulation (Prospect unit), intensity 48 (monitored and controlled by therapist), round positive electrodes placed forearm , negative rectangle electrodes placed triceps insertion and medial epicondyle. 20 minute treatment time, to increase circulation, decrease pain and edema, 3 minutes setup/rationale.     Moist hot pack to RUE elbow distally to fingertips, during e-stim, 6 layers + hot pack cover, to prepare soft tissue for stretch. 13 minutes application with skin check at 8 minutes. Additional towel provided for patient to utilize if heat became too high.      Following heat and e-stim, PROM with prolonged stretch to RUE:     Manual Therapy to help improve tissue mobility, improve circulation and to decrease tissue tension thru the use of IASTM with the Graston Technique    GT4, GT6: sweep, fan through biceps, triceps and forearm  GT^ Brush and strum , GT 2 swivel local lesions  GtT6: frame around epicondyle   GT6, brush & strum; GT6 scoop off supracondylar ridge  GT4 sweep though pronator jerald (parallel and perpendicular to fibers  GT4 swivel interosseous membrane   GT4 [id number], fan, scoop & strum distal triceps   GT6 J stroke along muscle bellies   GT6 strum distal triceps tendon.       ** patient was informed of potential treatment responses produced by IASTM. S/he was informed to instruct provider with any discomfort or pain.     Prolong stretch to wrist extensors, flexors, supinator and pronator.        Change to ultrasound to medial epicondyle.   Pulsed ultrasound 20%,gel, via 2 cm sound head, 1.0 Mhz, .7 w/cm2  to right triceps insertion (location), to decrease pain, edema, inflammation,  and enhance soft tissue repair. 8 minutes application, 5 minutes prep/clean-up/rationale.    Applied Rock tape to medial epicondyle to off load tissue.     Home Exercise Program: phase one of medial epicondyle protocol. 9/28/2017 ulnar nerve glides.       Assessment    Therapist Assessment/Response to Intervention:  Numbness and tingling on ulnar side of the hand has improved. Her triceps area is less painful. The media epicondyle is still tight and irritable. I think an evaluation by a physical therapist to look at her shoulder and neck region for myofacial issues would be a good idea.         Goals:  Problem list includes: pain with weight bearing  Though marilee DAWSON with external rotation     Patient s goal: to be able to work with out pain.      Functional short term goals (established in collaboration with the patient, to be met in 8 weeks):  1. Patient will report the  ability to wash her hair with pain levels < 3/10 10/12/2017 is easier to do with pain levels < 3/10 most of the time  2. Patient will report the ability to utilize her mouse while using her computer with pian levels <3/10 10/12/2017 ranges for 3-5  3. Patient will report the ability to perform keyboarding for up to one hour with no pain 10/12/2017 still needs to take breaks. Case load has been lighter so not typing as much.         Functional long terms goal to be met in 12 weeks:   A. Patient will be able to at PLOF  10/12/2017 progressing  B. Patient will be able to perform self care tasks at [facility] 10/12/2017 progressing  Plan  Plan:  Monitor and assess current plan.     Student has been instructed in and demonstrates skills necessary to carry out above stated treatment plan: Yes    Thank you for your referral to [facility].  Please call with any questions, concerns or comments.  [phone]    Fabiola Manriquez OTR/L  Occupational Therapist

## 2017-12-28 NOTE — PROGRESS NOTES
Patient Information     Patient Name MRN Gauri Nagy [id number] Female 1975      Progress Notes by Fabiola Manriquez OT at 10/12/2017 11:23 AM     Author:  Fabiola Manriquez OT Service:  (none) Author Type:  OT- Occupational Therapist     Filed:  10/12/2017 12:56 PM Date of Service:  10/12/2017 11:23 AM Status:  Signed     :  Fabiola Manriquez OT (OT- Occupational Therapist)            [facility]  Outpatient OT - Progress Note          Date of Service; 10/12/2017           Visit #:  10  Date of referral: 2017   Patient Name: Gauri Hollis  YOB: 1975   Referring MD/Provider: Dr Rivera  Diagnosis: right elbow pain  Treatment Diagnosis:pain, weakness, decline in self cares   Insurance:  [employer]  Start of Care Date: 2017   Certification periods:  From:   2017  To: 10/30/2017      Subjective    Gauri states her triceps and olecranon bursitis is feeling better. hse is able to extend her elbow fully with less difficulty and pian. She is still having difficulty performing the ulnar nerve glides. she is filling out the  [employer] assessment request form for work.     Pain Rating:     3/10 up to 5/10        Patient Specific Functional and Pain Scales (PSFS):   10/12/2017   Clinician Instructions: Complete after the history and before the exam.    Initial Assessment: We want to know what 3 activities in your life you are unable to perform, or are having the most difficulty performing, as a result of your chief problem. Please list and score at least 3 activities that you are unable to perform, or having the most difficulty performing, because of your chief problem.   Patient Specific Activity Scoring Scheme (score one number for each activity):   Activity Score (0-10)  0= Unable to perform activity  10= Able to perform activity at same level as before injury or problem   1. Using mouse 5/10   2. keyboarding  5/10   3. Adjusting patients  6/10   4. Blow drying hair 5/10   Totals:  21/30 = 52.5 % ability which relates to 47.5% impairment    Patient verbally states that they understand that the information they have provided above is current and complete to the best of their knowledge.    Patient Specific Functional Scale Modifier Scale Conversion: (patient's modifier that correlates with pt's score on PSFS): 6-CK (40% Impaired).    G codes and Modifier taken from patient completing the PSFS:   Current Primary G Code and Modifier:    Per the Patient's intake and/or assessment the Primary G Code is: Self Care .   The Patient's Impairment, Limitation or Restriction Modifier would be best described as: CK - 40% - 60% Impairment.   Goal Primary G Code and Modifier:    The Patient's G Code Goal would be: Self Care    The Patient's Impairment, Limitation or Restriction Modifier goal would be best described as: CI - 1% - 20% Impairment.        Objective    Today's Intervention:     High volt electrical stimulation (PPR1- 300PV unit), intensity 48 (monitored and controlled by patient), negative rectangle treatment electrode over medial elbow, round positive electrode on dorsum of wrist. 20 minute treatment time, to increase circulation, decrease pain and edema.     Moist hot pack to RUE elbow distally to fingertips, during e-stim, 6 layers + hot pack cover, to prepare soft tissue for stretch. 13 minutes application with skin check at 8 minutes. Additional towel provided for patient to utilize if heat became too high.     Following heat and e-stim, PROM with prolonged stretch to RUE:     Manual Therapy to help improve tissue mobility, improve circulation and to decrease tissue tension thru the use of IASTM with the Graston Technique    GT4, GT6: sweep, fan through biceps, triceps and forearm  GT^ Brush and strum , GT 2 swivel local lesions  GtT6: frame around epicondyle   GT6, brush & strum; GT6 scoop off supracondylar ridge  GT4 sweep though pronator  jerald (parallel and perpendicular to fibers  GT4 swivel interosseous membrane   GT4 [id number], fan, scoop & strum distal triceps   GT6 J stroke along muscle bellies   GT6 strum distal triceps tendon.       ** patient was informed of potential treatment responses produced by IASTM. S/he was informed to instruct provider with any discomfort or pain.     Prolong stretch to wrist extensors, flexors, supinator and pronator.        Change to ultrasound to medial epicondyle.   Pulsed ultrasound 20%, using 10% Ketoprofen gel, via 2 cm sound head, 1.0 Mhz, .7 w/cm2  to right medial elbow (location), to decrease pain, edema, inflammation,  and enhance soft tissue repair. 8 minutes application, 5 minutes prep/clean-up/rationale.      Home Exercise Program: phase one of medial epicondyle protocol. 9/28/2017 ulnar nerve glides.       Assessment    Therapist Assessment/Response to Intervention: overall forearm tightness. No significant areas of dense tissue notes.     Goals:  Problem list includes: pain with weight bearing  Though marilee DAWSON with external rotation     Patient s goal: to be able to work with out pain.      Functional short term goals (established in collaboration with the patient, to be met in 8 weeks):  1. 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 Patient will report the ability to utilize her mouse while using her computer with pian levels <3/10 10/12/2017 ranges for 3-5  3. Patient will report the ability to perform keyboarding for up to one hour with no pain 10/12/2017 still needs to take breaks. Case load has been lighter so not typing as much.         Functional long terms goal to be met in 12 weeks:   A. Patient will be able to at PLOF  10/12/2017 progressing  B. Patient will be able to perform self care tasks at PLOF 10/12/2017 progressing  Plan  Plan:  Monitor and assess current plan.     Student has been  instructed in and demonstrates skills necessary to carry out above stated treatment plan: Yes    Thank you for your referral to [facility].  Please call with any questions, concerns or comments.  [phone]    Fabiola Marniquez OTR/L  Occupational Therapist

## 2017-12-28 NOTE — PROGRESS NOTES
Patient Information     Patient Name MRN Gauri Nagy [id number] Female 1975      Progress Notes by Fabiola Manriquez OT at 2017 12:44 PM     Author:  Fabiola Manriquez OT Service:  (none) Author Type:  OT- Occupational Therapist     Filed:  2017  2:19 PM Date of Service:  2017 12:44 PM Status:  Signed     :  Fabiola Manriquez OT (OT- Occupational Therapist)            [facility]  Outpatient OT - Daily Note          Date of Service:2017           Visit #:  6  Date of referral: 2017   Patient Name: Gauri Hollis  YOB: 1975   Referring MD/Provider: Dr Rivera  Diagnosis: right elbow pain  Treatment Diagnosis:pain, weakness, decline in self cares   Insurance:  [employer]  Start of Care Date: 2017   Certification periods:  From:   2017  To: 10/30/2017      Subjective    Gauri reports her arm feel less stiff and painful .   Pain Ratin = Severe Pain, (Miserable, Gnawing, Fierce, Piercing) / Location:  Medial side of left elbow.     Objective    Today's Intervention:     High volt electrical stimulation (PPR1- 300PV unit), intensity 48 (monitored and controlled by patient), negative rectangle treatment electrode over medial elbow, round positive electrode on dorsum of wrist. 20 minute treatment time, to increase circulation, decrease pain and edema.     Moist hot pack to RUE elbow distally to fingertips, during e-stim, 6 layers + hot pack cover, to prepare soft tissue for stretch. 13 minutes application with skin check at 8 minutes. Additional towel provided for patient to utilize if heat became too high.     Following heat and e-stim, PROM with prolonged stretch to RUE:     Manual Therapy to help improve tissue mobility, improve circulation and to decrease tissue tension thru the use of IASTM with the Graston Technique    GT4, GT6: sweep, fan through biceps, triceps and forearm  GT^ Brush and strum , GT 2  swivel local lesions  GtT6: frame around epicondyle   GT6, brush & strum; GT6 scoop off supracondylar ridge  GT4 sweep though pronator jerald (parallel and perpendicular to fibers  GT4 swivel interosseous membrane   GT4 [id number], fan, scoop & strum distal triceps   GT6 J stroke along muscle bellies   GT6 strum distal triceps tendon.       ** patient was informed of potential treatment responses produced by IASTM. S/he was informed to instruct provider with any discomfort or pain.     Prolong stretch to wrist extensors, flexors, supinator and pronator.     Iontophoresis using dexamethasone, 80 mA dose via Ionto  Active stat delivery method. Medication applied to media epicondyle, to decrease inflammation and/or pain. Patient was informed of normal reactions to the iontophoresis treatment, and was instructed to remove the patch after 4 hours. Total iontophoresis set-up/rationale = 8 minutes.       Home Exercise Program: phase one of medial epicondyle protocol.     Assessment    Therapist Assessment/Response to Intervention: muscles are less tight. Biceps still have significant lev of dense tissue. Pain in medial epicondyle is lessening. Reports improved ROM in elbow after session.       Goals:  Problem list includes: pain with weight bearing  Though RUmarilee CROW with external rotation     Patient s goal: to be able to work with out pain.      Functional short term goals (established in collaboration with the patient, to be met in 8 weeks):  1. Patient will report the ability to wash her hair with pain levels < 3/10  2. Patient will report the ability to utilize her mouse while using her computer with pian levels <3/10  3. Patient will report the ability to perform keyboarding for up to one hour with no pain         Functional long terms goal to be met in 12 weeks:   A. Patient will be able to at PLOF   B. Patient will be able to perform self care tasks at PLOF  Plan  Plan:  Consider ultrasound    Student has been  instructed in and demonstrates skills necessary to carry out above stated treatment plan: Yes    Thank you for your referral to [facility].  Please call with any questions, concerns or comments.  [phone]    Fabiola Manriquez OTR/L  Occupational Therapist

## 2017-12-29 NOTE — PATIENT INSTRUCTIONS
Patient Information     Patient Name MRN Gauri Nagy [id number] Female 1975      Patient Instructions by Patricia Zavala DO at 10/25/2017  1:00 PM     Author:  Patricia Zavala DO Service:  (none) Author Type:  PHYS- Osteopathic     Filed:  10/25/2017  1:33 PM Encounter Date:  10/25/2017 Status:  Signed     :  Patricia Zavala DO (PHYS- Osteopathic)            For your seasonal allergies, please try one of the following:  - Loratadine (Claritin) 10mg daily for 3-4 weeks and then as needed (least sedating, least effective)  - Fexofenadine (Allegra) 12 hour 60mg twice daily for 3-4 weeks and then as needed   - Cetirizine (Zyrtec) 10mg daily for 3-4 weeks and then as needed (most sedating,most effective)    Please note that these can take up to 3-4 weeks to see a difference.

## 2017-12-30 NOTE — NURSING NOTE
Patient Information     Patient Name MRN Gauri Nagy [id number] Female 1975      Nursing Note by Elizabeth Malave at 2017  7:45 AM     Author:  Elizabeth Malave Service:  (none) Author Type:  (none)     Filed:  2017  7:49 AM Encounter Date:  2017 Status:  Signed     :  Elizabeth Malave            Patient presents to the clinic today for a consult on her right elbow forearm.    Elizabeth Malave ....................  2017   7:47 AM

## 2017-12-30 NOTE — NURSING NOTE
Patient Information     Patient Name MRN Gauri Nagy [id number] Female 1975      Nursing Note by Callie Kay at 10/25/2017  1:00 PM     Author:  Callie Kay Service:  (none) Author Type:  (none)     Filed:  10/25/2017  1:12 PM Encounter Date:  10/25/2017 Status:  Signed     :  Callie Kay            Patient states is here to establish care, and following up. Also would like to discuss rash on finger, and possible athlete's foot. Declines flu shot and tetanus booster at this time.  Callie Kay LPN...................10/25/2017   1:12 PM

## 2017-12-30 NOTE — NURSING NOTE
Patient Information     Patient Name MRN Sex Gauri Snell [id number] Female 1975      Nursing Note by Gosselin, Norma J at 10/23/2017  8:15 AM     Author:  Gosselin, Norma J  Service:  (none) Author Type:  (none)     Filed:  10/23/2017  8:28 AM  Encounter Date:  10/23/2017 Status:  Addendum     :  Gosselin, Norma J        Related Notes: Original Note by Gosselin, Norma J filed at 10/23/2017  8:28 AM            Follow up MRI/EMG Right Elbow.    Norma J Gosselin LPN....................  10/23/2017   8:20 AM

## 2017-12-30 NOTE — INITIAL ASSESSMENTS
Patient Information     Patient Name MRN Gauri Nagy [id number] Female 1975      Initial Assessments by Fabiola Manriquez OT at 2017 12:30 PM     Author:  Fabiola Manriquez OT Service:  (none) Author Type:  OT- Occupational Therapist     Filed:  2017 12:47 PM Date of Service:  2017 12:30 PM Status:  Signed     :  Fabiola Manriquez OT (OT- Occupational Therapist)              [facility]  Outpatient OT   Upper Extremity Initial Evaluation      Date of Service:  2017 Visit #:  1  Date of referral: 2017   Patient Name: Gauri Hollis  YOB: 1975   Referring MD/Provider: Dr Rivera  Diagnosis: right elbow pain  Treatment Diagnosis:pain, weakness, decline in self cares   Insurance:  [employer]  Start of Care Date: 2017   Certification periods:  From:   2017  To: 10/30/2017      Subjective:        Summary of injury/illnes/exacerbation:  Gauri is a 41 year old female who has been having elbow pain off and on for a year or so. She works as a chiropractor and currently her elbowvis limitting her ability to use the computer mouse for charting, perform certain mobilizations, and typing on the keyboard. She also has pain with washing her hair. The pain radiates into the forearm moving along the flexor carpi radialis.  She has tried acupuncture and ibuprofen with minimal relief.       Pain Ratin = Severe Pain, (Miserable, Gnawing, Fierce, Piercing) / Location:  Medial side of left elbow.   Level of Functional Ability prior to above: independent in self cares and home management tasks.   Current functional limitations due to above:  Precautions:  NA  Cognition:  Oriented to Person, Place, and Time.     Were cultural / age or other special adaptations needed? No      Patient is a vulnerable adult: No      Patient is aware of diagnosis: Yes      Risks and benefits explained: Yes  PMH:   Past Medical History:      Diagnosis  Date     Bicipital tendonitis at the elbow 2017     Cubital tunnel syndrome, bilateral 2017     Elbow dislocation      Family history of breast cancer      Hx of pregnancy      - PPH, retained placenta, transfusion      POSTPARTUM HEMORRHAGE 2011     Pregnancy, supervision, high-risk      Right medial epicondylitis 2017     Imaging: FINDINGS: There is fluid superficial to the triceps tendon at its insertion on the ulna. The fluid does not contain contrast.     Contrast containing fluid is present within the joint. There is no bone marrow edema to suggest contusion or fracture. Joint spaces are congruent. Articular surfaces are smooth.     The biceps and brachialis tendons are intact. The common flexor and extensor tendons are intact. The triceps is intact.     No cartilage defects are evident.     IMPRESSION: Olecranon bursal fluid collections suggestive of olecranon bursitis. Exams otherwise unremarkable.     Electronically Signed By: Lalo Iraheta M.D. on 2017 3:    Prior level of function: Fall Risk Screening:  No risk factors identified        Patient Specific Functional and Pain Scales (PSFS):   2017   Clinician Instructions: Complete after the history and before the exam.    Initial Assessment: We want to know what 3 activities in your life you are unable to perform, or are having the most difficulty performing, as a result of your chief problem. Please list and score at least 3 activities that you are unable to perform, or having the most difficulty performing, because of your chief problem.   Patient Specific Activity Scoring Scheme (score one number for each activity):   Activity Score (0-10)  0= Unable to perform activity  10= Able to perform activity at same level as before injury or problem   1. Using mouse 3/10   2. keyboarding 3/10   3. adjusting 5/10   4. Blow drying hair 3/10   Totals:  1440 = 35 % ability which relates to 65% impairment     Patient verbally states that they understand that the information they have provided above is current and complete to the best of their knowledge.    Patient Specific Functional Scale Modifier Scale Conversion: (patient's modifier that correlates with pt's score on PSFS): 4-CL (60% Impaired).    G codes and Modifier taken from patient completing the PSFS:   Initial Primary G Code and Modifier:    Per the Patient's intake and/or assessment the Primary G Code is: Self Care .   The Patient's Impairment, Limitation or Restriction Modifier would be best described as: CL - 79% - 60% Impairment.   Goal Primary G Code and Modifier:    The Patient's G Code Goal would be: Self Care    The Patient's Impairment, Limitation or Restriction Modifier goal would be best described as: CI - 1% - 20% Impairment.       bjective:    Hand Dominance:  Right    ROM Norms Right Left Strength Right Norms Left Norms   Elbow Extension 0    65 74.1 59 66.3   Elbow Flexion 140   Lateral pinch 13 16.6 14 16.0   Supination 90   3 point pinch  11 17.5 10.5 17.1   Pronation 90   2 point pinch 9 11.6 6 11.9   Wrist Flexion 80          Wrist Extension 70          Radial Deviation 20           Ulnar Deviation 30          P = pain with testing  (Therapist use only: dynamometer #   , pinch gauge #    )  **measurements in degrees                  Special Tests:  Medial Epicondylitis: positive  Lateral Epicondylitis: + / - / NT  Finkelstein s: + / - / NT  Cubital tunnel: Elbow flexion test: negotive  resistive middle finger test: flexion: negative   Extension: negative  Resistive pronation: negative   -supination: negative.                   Circumferential measurements of edema:  Description of location measured:   Results of measurements: Right: 6 1/2 cm_ Left: 5 7/10cm       Other findings:  Reports numbness in ulnar nerve distribution.     Assessment:    Signs and symptoms are consistent with: medial epicondylitis, olecranon bursitis.      Problem list includes: pain with weight bearing  Though marilee DAWSON with external rotation    Patient s goal: to be able to work with out pain.     Functional short term goals (established in collaboration with the patient, to be met in 8 weeks):  1. Patient will report the ability to wash her hair with pain levels < 3/10  2. Patient will report the ability to utilize her mouse while using her computer with pian levels <3/10  3. Patient will report the ability to perform keyboarding for up to one hour with no pain       Functional long terms goal to be met in 12 weeks:   A. Patient will be able to at PLOF   B. Patient will be able to perform self care tasks at OF.     Rehab potential: Good for stated goals.    Risk, benefits, and alternatives were discussed with patient. Patient agrees with the plan of care.    Today s treatment included: evaluation, Manual Therapy to help improve tissue mobility, improve circulation and to decrease tissue tension thru the use of IASTM with the Graston Technique    GT4, GT6: sweep, fan through biceps, triceps and forearm  GT^ Brush and strum , GT 2 swivel local lesions  GtT6: frame around epicondyle   GT6, brush & strum; GT6 scoop off supracondylar ridge  GT4 sweep though pronator jerald (parallel and perpendicular to fibers  GT4 swivel interosseous membrane   GT4 [id number], fan, scoop & strum distal triceps   GT6 J stroke along muscle bellies     ** patient was informed of potential treatment responses produced by IASTM. S/he was informed to instruct provider with any discomfort or pain.     Iontophoresis using dexamethasone, 80 mA dose via Ionto activepatch method. Medication applied to medial epicondyle, to decrease inflammation and/or pain. Patient was informed of normal reactions to the iontophoresis treatment, and was instructed to remove the patch after 4 hours. Total iontophoresis set-up/rationale = 8 minutes.     Response to intervention: less tender to touch at medial  epicondyle.     Plan:    Treatment Plan:    Home programming  Therapeutic exercise   Self care/ home management   Manual therapy   Therapeutic activities   NMR   Ultrasound   Phonophoresis (10% Ketoprofen)   Iontophoresis (.2% Dexamethasone)   Superficial modalities prn   Electrical stimulation, including NMES   Orthotic management   Cognitive skills development   Standardized testing   ROM hand measurements  Other    Frequency/Duration: up to 16 visits in 8 weeks as needed    Plan: assess current treatment.     Student has been instructed in and demonstrates skills necessary to carry out above stated treatment plan.     Thank you for your referral to [facility]. Please call with any questions, concerns or comments [phone].      Fabiola Manriquez OTR/L  Occupational Therapist

## 2017-12-30 NOTE — NURSING NOTE
Patient Information     Patient Name MRN Gauri Nagy [id number] Female 1975      Nursing Note by Ruby Iniguez at 10/11/2017  1:15 PM     Author:  Ruby Iniguez Service:  (none) Author Type:  (none)     Filed:  10/11/2017  1:30 PM Encounter Date:  10/11/2017 Status:  Signed     :  Ruby Iniguez            Patient is here for concerns of not feeling well. States last few week has been having heavy chest congestion and sore throat. Patient also complaining of possible acid reflux, or anxiety.  Ruby Iniguez LPN .............10/11/2017  1:19 PM

## 2017-12-30 NOTE — NURSING NOTE
Patient Information     Patient Name MRN Gauri Nagy [id number] Female 1975      Nursing Note by Elizabeth Malave at 2017  1:45 PM     Author:  Elizabeth Malave Service:  (none) Author Type:  (none)     Filed:  2017  2:04 PM Encounter Date:  2017 Status:  Signed     :  Elizabeth Malave            Patient presents to the clinic today for a follow up from her last visit.    Elizabeth Malave LPN.................. 2017 1:56 PM

## 2017-12-30 NOTE — INITIAL ASSESSMENTS
Patient Information     Patient Name MRN Gauri Nagy [id number] Female 1975      Initial Assessments by Cee Oliva, PT at 11/15/2017 10:06 AM     Author:  Cee Oliva PT Service:  (none) Author Type:  PT- Physical Therapist     Filed:  11/15/2017  3:21 PM Date of Service:  11/15/2017 10:06 AM Status:  Signed     :  Cee Oliva PT (PT- Physical Therapist)            [facility]  Outpatient PT - Initial Evaluation      Date of Service: 11/15/2017     Patient Name: Gauri Hollis   YOB: 1975   Referring MD/Provider: Chucky Rivera DO  Medical and Treatment Diagnosis:   Pain in shoulder region, right [M25.511]       Arm pain, right [M79.601]           PT Treatment Diagnosis: right shoulder and neck pain, right UE pain, right elbow dysfunction (epicondylitis, cubital tunnel syndrome, olecranon bursitis), postural dysfunction, facet restrictions, rib restrictions, myofascial pain/restrictions  Insurance: Work Comp  Start of Service: 11/15/17  Certification Dates: Start of Service: 11/15/17   Medicare/MA Re-Cert Due: 18      Preferred Name: Gauri    Living Situations:  Independent in Living Situation     Preadmission Functional Mobility: independent  Precautions:    Cognition:  Oriented to Person, Place, and Time.     Were cultural / age or other special adaptations needed? No      Patient is a vulnerable adult: No      Patient is aware of diagnosis: Yes      Risks and benefits explained: Yes    Subjective    History of Injury: Notes that she has been having a gradual worsening problem with her right UE. Notes that she started with medial epicondylitis 2 summer ago. Notes this resolved. Notes that she started to golf the next summer and had problems with pain and swelling from the mid arm down down to the hand. Notes that for the last 4-6 months she has had a lot of pain and strain from the right shoulder front and back, anterior chest,  and in the front of the chest which accompanies the elbow problem. Notes the elbow really flared and this is where she started her intervention as it seemed to be most significant. Notes this has calmed down with OT: ES, US, graston, stretching and nerve glides. Now she is noticing more and more problems with vague upper back, neck, chest and shoulder issues. These are really aggravated by her work tasks. Notes that she is still having a lot of bicep strain distally and then pulling up into the shoulder.     Notes that she is just not able to manage these complaints and felt that some physical therapy for manual work was the next step. Notes she gets adjusted regularly and always has CT junction tightness.  She continues with OT treatments but feels she is about ready to wrap this up. Notes she is also working out regularly with pilates and yoga and also stretching. Notes that she is careful at work. Notes that she has had PT in the past with MFR and positional release and this was very helpful so she is hopeful that it will again be helpful.    Notes her issues at work are as follows: computer and mouse use (in process of modifying her work station and also uses dictation as much as she is able), examining someone's neck supine and adjusting/manipulating with the push and pull forces. At home she notes difficulty with the following: gripping to squeeze washcloths (forearm pain and dull pain in the shoulder), driving to the [place] and back, chopping vegetables for a meal, sleep habits.       Current Symptoms:  ?   Decreased Motion no but it hurts to look over my shoulder  Weakness  Due to pain  Instability no  Swelling no  Tingling/Numbness no  Pain Ratin = Moderate Pain, (Aggravating, Grueling, Upsetting, Frustrating) / Location:  Neck and shoulder. Ranges from a 2 to 5/10        Aggravation Factors: manipulating at work, computer work, chopping vegetables, driving long distances       Easing Factors:  yoga, stretching, OT, chiropractic       Subjective rating of current functional limitations:    Patient Specific Functional and Pain Scales (PSFS):    Clinician Instructions: Complete after the history and before the exam.    Initial Assessment: We want to know what 3 activities in your life you are unable to perform, or are having the most difficulty performing, as a result of your chief problem. Please list and score at least 3 activities that you are unable to perform, or having the most difficulty performing, because of your chief problem.   Patient Specific Activity Scoring Scheme (score one number for each activity):   Activity Score (0-10)  0= Unable to perform activity  10= Able to perform activity at same level as before injury or problem   1. Chopping vegetables for meal prep 5/10   2. Gripping steering wheel 4/10   3. Work: pushing/pulling and computer 3/10   Totals:  12/30 = 40 % ability which relates to 60% impairment    Patient verbally states that they understand that the information they have provided above is current and complete to the best of their knowledge.    Patient Specific Functional Scale Modifier Scale Conversion: (patient's modifier that correlates with pt's score on PSFS): 4-CL (60% Impaired).          Functional Activity No Difficulty Mild Difficulty Mod. 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    Previous Injury / Surgery:     No specific injury. No past injury to right side    Previous Treatment:    Pain Meds / Anti-inflammatory Meds    ? occasional  ibuprofen  Chiropractic  OT    Diagnostics:       EMG:  Normal with no nerve damage found--refer to scanned report   MRI arthrogram  FINDINGS: There is fluid superficial to the triceps tendon at its insertion on the ulna. The fluid does not contain contrast.     Contrast containing fluid is present within the joint. There is no bone marrow edema to suggest contusion or fracture. Joint spaces are congruent. Articular surfaces are smooth.     The biceps and brachialis tendons are intact. The common flexor and extensor tendons are intact. The triceps is intact.     No cartilage defects are evident.     IMPRESSION: Olecranon bursal fluid collections suggestive of olecranon bursitis. Exams otherwise unremarkable.     Electronically Signed By: Lalo Iraheta M.D. on 2017 3:00 PM    Current Medications:   ? Reviewed (see chart)    Drug Allergies:  ? Reviewed (see chart)  ?   Latex Allergy:    Significant PMHX:    Past Medical History:     Diagnosis  Date     Bicipital tendonitis at the elbow 2017     Cubital tunnel syndrome, bilateral 2017     Elbow dislocation     left; age 18      Family history of breast cancer      Hx of pregnancy      - PPH, retained placenta, transfusion      Postpartum hemorrhage 2011     Right medial epicondylitis 2017     Vitamin D deficiency 2015     Past Surgical History:      Procedure  Laterality Date     DILATION AND CURETTAGE  10/2011    for retained placenta       DILATION AND CURETTAGE  2015    for missed AB       DILATION AND CURETTAGE  2017    SAB       WISDOM TEETH EXTRACTION         Patient Goal: to be able to do those daily activities at home and at work without nagging pain    Other:     Objective    Items left blank indicate that the test was inappropriate or not meaningful at the time of evaluation and therefore not performed.    Fall Risk Screening:  No risk factors identified       Posture/Structural:   Hand Dominance:    _____Right   Head and Neck Position: flattening of the cervical spine. Slight lateral flexion right   Shoulders/scapulae: forward and protracted. Elevated right with increased hypertonicity of the right upper trap   Thoracic spine: CT kyphosis with flattening below   Scoliosis:    Lumbar lordosis: increased   [place]:  PSIS:  Weight bearing: decreased right      Gait: decreased right armswing, decreased weight right--mild    Postural loading:  General Listening: right anterior shoulder  Head:  Restricted right to right and left to right  Shoulders: restricted right  Pelvis:  Restricted right to right        Range of Motion: cervical and right shoulder ROM WNL. Pain at end range rotation of the cervical spine.       Special tests:  Neg active vertebral artery test  Special tests for cervical/thoracic inlet:  Median Nerve : tightness right >left  Prydeinig: restricted bilateral    Thoracic special tests:  Shoulder abduction/hip hike: limited right lengthening  Ribcage mobility:see below  Supine ribcage translation: limited right to left  Latissimus Dorsi:  right        Lower Cervical Joint Mobility Testing:   Flex/Rot Right Flex/Rot Left Ext/Rot Right Ext/Rot Left Sidebend Right Sidebend Left Extension   C2-3          C3-4          C4-5 x  x       C5-6 x  x       C6-7 x  x       C7-T1       x     Upper Cervical Joint Mobility Testing:   Neg with screening    THORACIC FACET RESTRICTIONS:   ERS right  ERS left Neutral (SB right/Rot left) Neutral (SB  left/Rot right) FRS right FRS left   C7-T1     x    T1-2         T2-3         T3-4     x    T4-5      x   T5-6         T6-7         T7-8         T8-9         T9-10         T10-11         T11-12         T12-L1         (FRS: Flexed, rotated and sidebent. ERS: Extended, rotated and sidebent.)   Ribcage Screen:   Breaths from the upper ribcage, excessive use of cervical musculature     Inhalation Key Rib: 2nd right     Exhalation Key Rib: 4th right    Structural Rib  Dysfunctions:   Superior Subluxed 1st Rib: right   Laterally flexed 2nd Rib: right          Palpation: limited sternal mobility in all planes of clock but particularly restricted inferior and left. Noted to have significant tightness in the Clavipectoral Fascia right more than left. Restriction in the middle and deep anterior cervical fascias right more than left with limited hyoid mobility as well. Tightness in the scalenes bilaterally was well as the SCMs. Noted to have right sided hypertonicity in the levator scapula with restriction at the dorsal scapular nerve. Noted to have right sided hypertonicity and tenderness in the upper trap. Limited arm pull right noted with fascial tension in the right shoulder and arm to the elbow. Noted to have pain into the right lateral rib cage with arm pull as well. Restricted depression of the right coracoid with manual shoulder abduction; pt notes distal UE complaints with this as well. Suspect restriction in the brachial plexus but not able to assess until scalene, CT junction and rib restrictions are cleared.  Limited scapular mobility right noted, particularly retraction/depression. Elevated arm test positive right.    Today's Intervention:    Completed evaluation and discussed findings  PSFS  MFR Clavipectoral Fascia, sternal release  MFR arm pull right  Inferior mobilization of the coracoid with passive shoulder ROM/stretch  Initiated HEP as below    Home Exercise Program:   Snow angels  Anterior cervical fascia self stretch  Scapular retraction    Increase water intake    Assessment    Therapist Assessment / Clinical Impression:  Gauri Hollis is referred to PT due to right shoulder and neck pain. She has had difficulty with more distal right UE complaints including epicondylitis, olecranon bursitis and cubital tunnel issues. Neck and shoulder issues have been underlying and continue to cause limits in functional activities such as computer work and manipulating while  at work and outside activities such as driving, sleep and meal prep. Complaints are not unexpected due to regional interdependence of the neck and upper thoracic/brachial plexus region with her more distal issues. She has not been able to manage her complaints with exercise, chiropractic work, OT. Clinically she presents with right shoulder and neck pain, right UE pain, right elbow dysfunction (epicondylitis, cubital tunnel syndrome, olecranon bursitis), postural dysfunction, facet restrictions, rib restrictions, myofascial pain/restrictions. Primary restrictions today noted to be the right anterior cervical and shoulder region with significant myofascial restriction as well as joint and rib dysfunctions.      Clinical Impression:    Cervical and right shoulder pain with hx of epicondylitis, olecranon bursitis, cubital tunnel restrictions   Facet Dysfunction:   Postural Dysfunction:   Myofascial:   Neural restriction    Direct PT services are indicated to address the above noted impairments and to promote self management.      Functional Impairment(s): Decreased Activity Tolerance:  work: computer and mouse work, manipulation, assessing cervical spine     Prior Function:   Limited becoming slowly worse in last 6 months  Impaired ADL's:  meal prep, driving long distances     Prior Function:   Limited becoming worse in last 6 months  Sleeping Tolerance:  wakes up due to pain     Prior Function:  Limited becoming slowly worse in last 6 months    Physical Impairment(s):       MUSCULOSKELETAL:  Loss of Motion, Muscle Tightness, Pain, Postural Problems, Weakness and facet, rib, myofascial dysfunction    G codes and Modifier taken from patient completing the PSFS: 11/15/2017  Initial Primary G Code and Modifier:    Per the Patient's intake and/or assessment the Primary G Code is: Handling Objects .   The Patient's Impairment, Limitation or Restriction Modifier would be best described as: CL - 60% - 80% Impairment.    Goal Primary G Code and Modifier:    The Patient's G Code Goal would be: Handling Objects    The Patient's Impairment, Limitation or Restriction Modifier goal would be best described as: CJ - 20% - 40% Impairment.     Goals:  Patient goal (time reference required): to be able to do those daily activities at home and at work without nagging pain (2 months)   .    Long term goal: Patient is to self-manage symptoms and return to prior function in 8 weeks.      Functional goals:   Patient is to be independent in their Home Exercise Program in 4 weeks.  Patient is to tolerate walking with improved arm swing and pelvic rotation right with gait in 6 weeks.  Patient is report the ability to sleep 6 hours without awakening due to pain in 8 weeks.  Patient is to display and maintain normal joint mobility/symmetry in the cervicothoracic spine and ribs to allow to perform cervical assessments and manipulation techniques with minimal increase in discomfort in 8 weeks.  Patient is to demonstrate ability to work at her computer with good posture and body mechanics for 30 minute intervals without symptoms in shoulder and neck in 8 weeks.  Patient is to report ability to drive 3 hours with rest break without increased symptoms  in 8 weeks.  Patient to display improved postural loading to allow for symmetrical weight bearing to promote more normal use of right shoulder and scapula in 8 weeks.    Patient participated in goal selection and understand(s) the plan of care: Yes   Patient Potential for Achieving Desired Outcome:  Good    Response to Intervention:  Improved mobility at the coracoid. Good understanding of HEP       Plan    Treatment Plan / Targeted Outcomes:     Frequency:   16 visits     Duration of Treatment: 2 months (pending  authorization)    Planned Interventions:  May include any of the following:  Home Exercise Program development  Therapeutic Exercise (ROM & Strengthening)  Manual Therapy  Neuromuscular  Re-education  Ultrasound  Electrical Stimulation  Therapeutic Activities  Gait Training  Posture and Body Mechanics Education    Plan for next visit:  Manual therapy, MET, exercise    Student or PTA has been instructed in and demonstrates skills necessary to carry out above stated treatment plan: Yes    Thank you for your referral to [facility].  Please call with any questions, concerns or comments.  [phone]    The signature, of the referring medical provider, on this document indicates certification of the above prescribed plan of care and is medically necessary.

## 2017-12-30 NOTE — DISCHARGE SUMMARY
Patient Information     Patient Name MRN Gauri Nagy [id number] Female 1975      Discharge Summaries by Fabiola Manriquez OT at 2017 10:20 AM     Author:  Fabiola Manriquez OT Service:  (none) Author Type:  OT- Occupational Therapist     Filed:  2017  2:33 PM Date of Service:  2017 10:20 AM Status:  Signed     :  Fabiola Manriquez OT (OT- Occupational Therapist)            [facility]  Outpatient OT - Discharge Note:             Date of Service; 2017           Visit #:  [id number]  Date of referral: 2017   Patient Name: Gauri Hollis  YOB: 1975   Referring MD/Provider: Dr Rivera  Diagnosis: right elbow pain  Treatment Diagnosis:pain, weakness, decline in self cares   Insurance:  [employer]  Start of Care Date: 2017   Current Certification periods:  2017 to 2018  Previous certification periods: From:   2017  To: 10/30/2017      Subjective     Gauri reports she she still feels pain at the medial epicondyle with prolong gripping like driving the car for three hours and hanging onto the steering wheel or cutting up vegetables. She also has pain when squeezing out a wash rag and perform ing manipulation. She states the pian is better by being less intense. The pian does not go away and getting annoying.       Pain Rating:     3/10 up to 5/10      Patient Specific Functional and Pain Scales (PSFS):   2017   Clinician Instructions: Complete after the history and before the exam.    Initial Assessment: We want to know what 3 activities in your life you are unable to perform, or are having the most difficulty performing, as a result of your chief problem. Please list and score at least 3 activities that you are unable to perform, or having the most difficulty performing, because of your chief problem.   Patient Specific Activity Scoring Scheme (score one number for each activity):   Activity Score  (0-10)  0= Unable to perform activity  10= Able to perform activity at same level as before injury or problem   1. Using mouse 7/10   2. keyboarding 7/10   3. adjusting 7/10   4. Blow drying hair 10/10   Totals:  31/40 = 77.5 % ability which relates to 22.5% impairment    Patient verbally states that they understand that the information they have provided above is current and complete to the best of their knowledge.    Patient Specific Functional Scale Modifier Scale Conversion: (patient's modifier that correlates with pt's score on PSFS): 8-CJ (20% Impaired).    G codes and Modifier taken from patient completing the PSFS:    Goal Primary G Code and Modifier:    The Patient's G Code Goal would be: Self Care    The Patient's Impairment, Limitation or Restriction Modifier goal would be best described as: CI - 1% - 20% Impairment.   Discharge Primary G Code and Modifier:    The Patient's status upon Discharge is Self Care    The Patient's [id number], Limitation or Restriction Modifier would be best described as CJ - 20% - 40% Impairment.       Objective    Strength  Right  Norms  Left  Norms       75 70.4 60 62.3   Lateral pinch  13 16.1 15 15.8   3 point pinch  10 17.0 10.5 16.6   2 point pinch  9 11.5 6 11.1   **ROM measurements in degrees, strength measurements pounds   **items left blank not tested today    ** all measurements have increased.       Today's Intervention:       IFC electrical stimulation ([place] unit), intensity 48 (monitored and controlled by therapist), round positive electrodes placed forearm , negative rectangle electrodes placed triceps insertion and medial epicondyle. 20 minute treatment time, to increase circulation, decrease pain and edema, 3 minutes setup/rationale.     Moist hot pack to RUE elbow distally to fingertips, during e-stim, 6 layers + hot pack cover, to prepare soft tissue for stretch. 13 minutes application with skin check at 8 minutes. Additional towel  provided for patient to utilize if heat became too high.     Following heat and e-stim, PROM with prolonged stretch to RUE:     Manual Therapy to help improve tissue mobility, improve circulation and to decrease tissue tension thru the use of IASTM with the Graston Technique    GT4, GT6: sweep, fan through biceps, triceps and forearm  GT^ Brush and strum , GT 2 swivel local lesions  GtT6: frame around epicondyle   GT6, brush & strum; GT6 scoop off supracondylar ridge  GT4 sweep though pronator jerald (parallel and perpendicular to fibers  GT4 swivel interosseous membrane   GT4 [id number], fan, scoop & strum distal triceps   GT6 J stroke along muscle bellies   GT6 strum distal triceps tendon.       ** patient was informed of potential treatment responses produced by IASTM. S/he was informed to instruct provider with any discomfort or pain.     Prolong stretch to wrist extensors, flexors, supinator and pronator.          Pulsed ultrasound 20%,gel, via 2 cm sound head, 1.0 Mhz, .7 w/cm2  to right triceps insertion (location), to decrease pain, edema, inflammation,  and enhance soft tissue repair. 8 minutes application, 5 minutes prep/clean-up/rationale.      Home Exercise Program: phase one of medial epicondyle protocol. 9/28/2017 ulnar nerve glides.       Assessment    Therapist Assessment/Response to Intervention: Overall, Gauri is having less pain in her forearm and wrist the numbness I the ulnar nerve pattern has diminished. The olecranon bursitis has resolves. She still experiences pain in the medial epicondyle with gripping. She also has tightness in the flexor compartment of her forearm. She continues to have up into there anterior shoulder and neck follow the myofascial patterns.  She would benefit for Physical Therapy to address the tissue tightness and possible thoracic outlet or first rib syndrome.  Gauri is compliant in performing her HEP and is perusing additional strengthening to facilitate decrease and/or  return of symptoms.        Goals:  Problem list includes: pain with weight bearing  Though marilee DAWSON with external rotation     Patient s goal: to be able to work with out pain. 11/21/2017 improving.      Functional short term goals (established in collaboration with the patient, to be met in 8 weeks):  1. Patient will report the ability to wash her hair with pain levels < 3/10 10/12/2017 is easier to do with pain levels < 3/10 most of the time 11/21/2017   2. Patient will report the ability to utilize her mouse while using her computer with pian levels <3/10 10/12/2017 ranges for 3-5 11/21/2017 she is reporting decrease pain overall. She   3. Patient will report the ability to perform keyboarding for up to one hour with no pain 10/12/2017 still needs to take breaks. Case load has been lighter so not typing as much. 11/21/2017 she is now using a shorter keyboard with is helping to relief stress on the medial epicondyle. Progress made        Functional long terms goal to be met in 12 weeks:   A. Patient will be able to at PLOF  10/12/2017 progressing 11/21/2017 improved.   B. Patient will be able to perform self care tasks at PLOF 10/12/2017 progressing 11/21/2017 improved   Plan  Plan:   Patient will be D/C for skilled OT services. Recommend continued Physical therapy to address the further symptoms.     Student has been instructed in and demonstrates skills necessary to carry out above stated treatment plan: Yes    Thank you for your referral to [facility].  Please call with any questions, concerns or comments.  [phone]    Fabiola Manriquez OTR/L  Occupational Therapist

## 2017-12-30 NOTE — NURSING NOTE
Patient Information     Patient Name MRN Sex Gauri Snell [id number] Female 1975      Nursing Note by Stefany Huddleston at 2017  1:40 PM     Author:  Stefany Huddleston Service:  (none) Author Type:  (none)     Filed:  2017  1:51 PM Encounter Date:  2017 Status:  Signed     :  Stefany Huddleston            Patient presents to clinic for follow up with Thyroid [facility] 17.    Stefany Huddleston LPN..................2017  1:42 PM

## 2018-01-04 NOTE — H&P
"Patient Information     Patient Name MRN Sex Gauri Snell [id number] Female 1975      H&P by Vance Ward MD at 5/3/2017  3:45 PM     Author:  Vance Ward MD Service:  (none) Author Type:  Physician     Filed:  5/3/2017  4:58 PM Encounter Date:  5/3/2017 Status:  Signed     :  Vance Ward MD (Physician)            ---------------- PREOPERATIVE EXAM ------------------  5/3/2017    SUBJECTIVE:  Gauri Hollis is a 41 y.o. female here for a fetal demise and requests a D&C..    Date of Surgery: 17  Type of Surgery: D&C  Surgeon: Dr Ward  Hospital:  [facility]    HPI:  Gauri is a 40 y/o  who has undergone sequential pelvic u/s which show a fetal demise at 6 weeks.  She admits some cramping & spotting but nothing heavy.  We discussed options and she elects to undergo a D&C as soon as possible.  We reviewed the risks, benefits and alternatives and all her questions were answered.  A signed consent was obtained.      Fever/Chills or other infectious symptoms in past month:  (NO)   >10lb weight loss in past two months:  (NO)     Health Care Directive/Code status: No  Hx of blood transfusions:   (YES)   History of VRE/MRSA:  (NO) Date: n/a    Preoperative Evaluation: Obstructive Sleep Apnea screening    S: Snore -  Do you snore loudly? (louder than talking or loud enough to be heard through closed doors)(YES)  T: Tired - Do you often feel tired, fatigued, or sleepy during the daytime?(NO)  O: Observed - Has anyone ever observed you stop breathing during your sleep?(NO)  P: Pressure - Do you have or are you being treated for high blood pressure?(NO)  B: BMI - BMI greater than 35kg/m2?(NO)  A: Age - Age over 50 years old?(NO)  N: Neck - Neck circumference greater than 40 cm?(NO)  G: Gender - Gender: Male?(NO)    Total number of \"YES\" responses:  1    Scoring: Low risk of RADHA 0-2  At Risk of RADHA: >3 High Risk of RADHA: 5-8        Patient Active Problem List      Diagnosis Date " Noted     Bleeding in early pregnancy 2017     Chronic lumbosacral pain 2015     Vitamin D deficiency 2015     Family history of breast cancer 2013     TMJ (temporomandibular joint syndrome) 2012       Past Medical History:     Diagnosis  Date     Elbow dislocation      Family history of breast cancer      Hx of pregnancy      - PPH, retained placenta, transfusion      POSTPARTUM HEMORRHAGE 2011     Pregnancy, supervision, high-risk        Past Surgical History:      Procedure  Laterality Date     DILATION AND CURETTAGE  10/2011    for retained placenta       DILATION AND CURETTAGE  2015    for missed AB       WISDOM TEETH EXTRACTION         Current Outpatient Prescriptions       Medication  Sig Dispense Refill     Cholecalciferol, Vitamin D3, (VITAMIN D-3) 5,000 unit tab Take  by mouth once daily.  0     Choline Bitartrate 100 % powd As directed.  0     omega-3 fatty acids-vitamin E (FISH OIL) 1,000 mg cap Take  by mouth.  0     PNV34-iron,carbonyl-FA-DSS-dha 30 mg iron-1 mg -50 mg-260 mg cap Take  by mouth.  0     Saccharomyces boulardii (PROBIOTIC, S.BOULARDII,) 250 mg capsule Take 1 capsule by mouth once daily.  0     triamcinolone 0.5% (ARISTOCORT) 0.5 % cream Apply  topically to affected area(s) 3 times daily. 15 g 0     triamcinolone, 55 mcg each actuation, nasal (NASACORT AQ) 55 mcg nasal spray Inhale 2 Sprays into both nostrils each time if needed for Rhinitis. 16.5 g 0     No current facility-administered medications for this visit.      Medications have been reviewed by me and are current to the best of my knowledge and ability.    Recent use of: no recent use of aspirin (ASA), NSAIDS or steroids    Allergies:  Allergies      Allergen   Reactions     Cats (Fur, Dander, Saliva)  Other - Describe In Comment Field     congestion    Latex allergy  no    Family History       Problem   Relation Age of Onset     Cancer-breast  Mother 53     Diabetes  Mother       Heart Disease  Father      MI       Diabetes  Father      Thyroid Disease  Sister      hypothyroidism         Denies family hx of bleeding tendencies, anesthesia complications, or other problems with surgery.    Social History       Substance Use Topics         Smoking status:   Never Smoker     Smokeless tobacco:   Never Used     Alcohol use   0.0 oz/week     0 Standard drinks or equivalent per week        Comment: 3 per week        ROS:    Surgical:  patient denies previous complications from prior surgeries including but not limited to prolonged bleeding, anesthesia complications, dysrhythmias, surgical wound infections, or prolonged hospital stay.       -------------------------------------------------------------    PHYSICAL EXAM:  /70  Pulse 72    EXAM:  General Appearance: Pleasant, alert, appropriate appearance for age. No acute distress  Head Exam: Normal. Normocephalic, atraumatic.  Eyes: PERRL, EOMI  Ears: Normal TM's bilaterally. Normal auditory canals and external ears.   OroPharynx: Dental hygiene adequate. Normal buccal mucosa. Normal pharynx.  Neck: Supple, no masses or nodes, no lymphadenopathy.  No thyromegaly.  Lungs: Normal chest wall and respirations. Clear to auscultation, no wheezes or crackles.  Cardiovascular: Regular rate and rhythm. S1, S2, no murmurs.  Gastrointestinal: Soft, nontender, no abnormal masses or organomegaly. BS normal   Musculoskeletal: No edema.  Skin: no concerning or new rashes.  Neurologic Exam: CN 2-12 grossly intact.  Normal gait.  Symmetric DTRs, normal gross motor movement, tone, and coordination. No tremor.  Psychiatric Exam: Alert and oriented, appropriate affect.      EKG:  not indicated  ---------------------------------------------------------------  Results for orders placed or performed in visit on 05/03/17      HCG BETA QUANT,PREGNANCY      Result  Value Ref Range    HCG BETA QUANT,PREGNANCY GIH 76816.0 (H) <0.6 mIU/mL       ASSESSEMENT AND  PLAN:    Gauri was seen today for miscarriage.    Diagnoses and all orders for this visit:    Bleeding in early pregnancy  -     AMB CONSULT TO OB-GYN        PRE OP RECOMMENDATIONS:  Patient is on chronic pain medications (NO);   Patient is on antiplatlet/anticoagulation (NO)  Other medications that need adjustment perioperatively (NO)    Other:  Patient was advised to call our office and the surgical services with any change in condition or new symptoms if they were to develop between today and their surgical date.  Especially any cardiopulmonary symptoms or symptoms concerning for an infection.    Proceed with D&C as planned.

## 2018-01-04 NOTE — H&P
"Patient Information     Patient Name MRN Sex Gauri Snell [id number] Female 1975      H&P (View-Only) by Vance Ward MD at 5/3/2017  3:45 PM     Author:  Vance Ward MD Service:  (none) Author Type:  Physician     Filed:  5/3/2017  4:58 PM Date of Service:  5/3/2017  3:45 PM Status:  Signed     :  Vance Ward MD (Physician)            ---------------- PREOPERATIVE EXAM ------------------  5/3/2017    SUBJECTIVE:  Gauri Hollis is a 41 y.o. female here for a fetal demise and requests a D&C..    Date of Surgery: 17  Type of Surgery: D&C  Surgeon: Dr Ward  Hospital:  [facility]    HPI:  Gauri is a 42 y/o  who has undergone sequential pelvic u/s which show a fetal demise at 6 weeks.  She admits some cramping & spotting but nothing heavy.  We discussed options and she elects to undergo a D&C as soon as possible.  We reviewed the risks, benefits and alternatives and all her questions were answered.  A signed consent was obtained.      Fever/Chills or other infectious symptoms in past month:  (NO)   >10lb weight loss in past two months:  (NO)     Health Care Directive/Code status: No  Hx of blood transfusions:   (YES)   History of VRE/MRSA:  (NO) Date: n/a    Preoperative Evaluation: Obstructive Sleep Apnea screening    S: Snore -  Do you snore loudly? (louder than talking or loud enough to be heard through closed doors)(YES)  T: Tired - Do you often feel tired, fatigued, or sleepy during the daytime?(NO)  O: Observed - Has anyone ever observed you stop breathing during your sleep?(NO)  P: Pressure - Do you have or are you being treated for high blood pressure?(NO)  B: BMI - BMI greater than 35kg/m2?(NO)  A: Age - Age over 50 years old?(NO)  N: Neck - Neck circumference greater than 40 cm?(NO)  G: Gender - Gender: Male?(NO)    Total number of \"YES\" responses:  1    Scoring: Low risk of RADHA 0-2  At Risk of RADHA: >3 High Risk of RADHA: 5-8        Patient Active Problem List   "    Diagnosis Date Noted     Bleeding in early pregnancy 2017     Chronic lumbosacral pain 2015     Vitamin D deficiency 2015     Family history of breast cancer 2013     TMJ (temporomandibular joint syndrome) 2012       Past Medical History:     Diagnosis  Date     Elbow dislocation      Family history of breast cancer      Hx of pregnancy      - PPH, retained placenta, transfusion      POSTPARTUM HEMORRHAGE 2011     Pregnancy, supervision, high-risk        Past Surgical History:      Procedure  Laterality Date     DILATION AND CURETTAGE  10/2011    for retained placenta       DILATION AND CURETTAGE  2015    for missed AB       WISDOM TEETH EXTRACTION         Current Outpatient Prescriptions       Medication  Sig Dispense Refill     Cholecalciferol, Vitamin D3, (VITAMIN D-3) 5,000 unit tab Take  by mouth once daily.  0     Choline Bitartrate 100 % powd As directed.  0     omega-3 fatty acids-vitamin E (FISH OIL) 1,000 mg cap Take  by mouth.  0     PNV34-iron,carbonyl-FA-DSS-dha 30 mg iron-1 mg -50 mg-260 mg cap Take  by mouth.  0     Saccharomyces boulardii (PROBIOTIC, S.BOULARDII,) 250 mg capsule Take 1 capsule by mouth once daily.  0     triamcinolone 0.5% (ARISTOCORT) 0.5 % cream Apply  topically to affected area(s) 3 times daily. 15 g 0     triamcinolone, 55 mcg each actuation, nasal (NASACORT AQ) 55 mcg nasal spray Inhale 2 Sprays into both nostrils each time if needed for Rhinitis. 16.5 g 0     No current facility-administered medications for this visit.      Medications have been reviewed by me and are current to the best of my knowledge and ability.    Recent use of: no recent use of aspirin (ASA), NSAIDS or steroids    Allergies:  Allergies      Allergen   Reactions     Cats (Fur, Dander, Saliva)  Other - Describe In Comment Field     congestion    Latex allergy  no    Family History       Problem   Relation Age of Onset     Cancer-breast  Mother 53      Diabetes  Mother      Heart Disease  Father      MI       Diabetes  Father      Thyroid Disease  Sister      hypothyroidism         Denies family hx of bleeding tendencies, anesthesia complications, or other problems with surgery.    Social History       Substance Use Topics         Smoking status:   Never Smoker     Smokeless tobacco:   Never Used     Alcohol use   0.0 oz/week     0 Standard drinks or equivalent per week        Comment: 3 per week        ROS:    Surgical:  patient denies previous complications from prior surgeries including but not limited to prolonged bleeding, anesthesia complications, dysrhythmias, surgical wound infections, or prolonged hospital stay.       -------------------------------------------------------------    PHYSICAL EXAM:  /70  Pulse 72    EXAM:  General Appearance: Pleasant, alert, appropriate appearance for age. No acute distress  Head Exam: Normal. Normocephalic, atraumatic.  Eyes: PERRL, EOMI  Ears: Normal TM's bilaterally. Normal auditory canals and external ears.   OroPharynx: Dental hygiene adequate. Normal buccal mucosa. Normal pharynx.  Neck: Supple, no masses or nodes, no lymphadenopathy.  No thyromegaly.  Lungs: Normal chest wall and respirations. Clear to auscultation, no wheezes or crackles.  Cardiovascular: Regular rate and rhythm. S1, S2, no murmurs.  Gastrointestinal: Soft, nontender, no abnormal masses or organomegaly. BS normal   Musculoskeletal: No edema.  Skin: no concerning or new rashes.  Neurologic Exam: CN 2-12 grossly intact.  Normal gait.  Symmetric DTRs, normal gross motor movement, tone, and coordination. No tremor.  Psychiatric Exam: Alert and oriented, appropriate affect.      EKG:  not indicated  ---------------------------------------------------------------  Results for orders placed or performed in visit on 05/03/17      HCG BETA QUANT,PREGNANCY      Result  Value Ref Range    HCG BETA QUANT,PREGNANCY [facility] 21049.0 (H) <0.6 mIU/mL        ASSESSEMENT AND PLAN:    Gauri was seen today for miscarriage.    Diagnoses and all orders for this visit:    Bleeding in early pregnancy  -     AMB CONSULT TO OB-GYN        PRE OP RECOMMENDATIONS:  Patient is on chronic pain medications (NO);   Patient is on antiplatlet/anticoagulation (NO)  Other medications that need adjustment perioperatively (NO)    Other:  Patient was advised to call our office and the surgical services with any change in condition or new symptoms if they were to develop between today and their surgical date.  Especially any cardiopulmonary symptoms or symptoms concerning for an infection.    Proceed with D&C as planned.

## 2018-01-04 NOTE — TELEPHONE ENCOUNTER
Patient Information     Patient Name MRN Sex Gauri Snell [id number] Female 1975      Telephone Encounter by eSrene Perez MD at 5/3/2017  9:26 AM     Author:  Serene Perez MD Service:  (none) Author Type:  Physician     Filed:  5/3/2017  9:26 AM Encounter Date:  2017 Status:  Signed     :  Serene Perez MD (Physician)            Orders placed for lab, ultrasound and OBGYN consultation.  Pt is aware.  Serene Perez MD

## 2018-01-04 NOTE — PROCEDURES
Patient Information     Patient Name MRN Gauri Nagy [id number] Female 1975      Procedures by Vance Ward MD at 2017 12:29 PM     Author:  Vance Ward MD Service:  (none) Author Type:  Physician     Filed:  2017 12:30 PM Date of Service:  2017 12:29 PM Status:  Signed     :  Vance Ward MD (Physician)            OPERATIVE NOTE     PREOPERATIVE DIAGNOSIS:     Missed      POSTOPERATIVE DIAGNOSIS:   Same    PROCEDURE PERFORMED:  Dilatation and curettage.     SURGEON:  Vance Ward MD    Circulator: Batsheva Dolan RN  Pre Op Nurse: Gudelia Marrero RN  Phase II Nurse: Gudelia Marrero RN  Scrub: Jyaleen Freed  Scrub Set Up: Irena Hinojosa    ANESTHESIA:  IV Sedation    ESTIMATED BLOOD LOSS:    <20 ml    COMPLICATIONS:  None.    FINDINGS:    Os closed.  Uterus sounded to 9 cm    INDICATIONS:  The patient presented with a history of a first trimester pregnancy with no cardiac activity.  Repeat u/s confirmed a non-viable pregnancy.  Discussion was held regarding management options.  After reviewing risks, benefits, and alternatives, she elected to proceed with the above procedure.    PROCEDURE:  The patient was taken to the operating room where anesthesia was administered.  She was placed in dorsal lithotomy and prepped and draped in the usual fashion.  A speculum was placed in the vagina and a single-toothed tenaculum placed on the anterior lip of the cervix.  The uterus sound to 9 cm after the cervix was serially dilated.   An 8 mm curved suction curette was placed and the cavity evacuated.  Sharp curettage was then carried out.  A gritty texture was appreciated in all quadrants.  Tissue was submitted to pathology.    All instruments were removed and hemostasis confirmed.  The patient was returned to supine, recalled from anesthesia and transferred to recovery in stable condition.  The patient will be contacted with the pathology report once it is  available.  A prescription for pain was given to the patient prior to discharge.  All counts were correct at the conclusion of the case.  A routine postoperative course is anticipated.    Vance Ward MD  5/4/2017 12:29 PM

## 2018-01-04 NOTE — NURSING NOTE
Patient Information     Patient Name MRN Gauri Nagy [id number] Female 1975      Nursing Note by Dereck Mccartney LPN at 5/3/2017  3:45 PM     Author:  Dereck Mccartney LPN Service:  (none) Author Type:  NURS- Licensed Practical Nurse     Filed:  5/3/2017  4:58 PM Encounter Date:  5/3/2017 Status:  Signed     :  Dereck Mccartney LPN (NURS- Licensed Practical Nurse)            Patient presents to the clinic for a miscarriage. Patient would like to discuss a possible D&C tomorrow.  Dereck Mccartney LPN ..............5/3/2017 3:36 PM

## 2018-01-04 NOTE — PROGRESS NOTES
Patient Information     Patient Name MRN Gauri Nagy [id number] Female 1975      Progress Notes by Patricia Zavala DO at 2017  1:30 PM     Author:  Patricia Zavala DO Service:  (none) Author Type:  PHYS- Osteopathic     Filed:  2017  2:25 PM Encounter Date:  2017 Status:  Signed     :  Patricia Zavala DO (PHYS- Osteopathic)            Chief Complaint     Patient presents with       Establish Care      with Dr. Zavala        Subjective:   Ms. Hollis is a 41 y.o. female  seen for the acute concern today of increased fatigue.  She does report that this is been going on for last several months.  She does have a history of vitamin D deficiency.  She also has a family history of thyroid disease.  She does report today that she is pregnant and believes she has approximately 3 weeks along.    She is concerned about her fatigue and she did have a miscarriage prior to this pregnancy and she wants to be sure there is no underlying cause of this.  She has been taking a prenatal vitamin and also a probiotic blend.  We discussed these in depth today.  She is currently taking 5000 international units of vitamin D3 along with 500 in her prenatal vitamin.    She does continue to have sinus congestion.  We did discuss in depth the options for this including allergy or ENT.  At this time I would not recommend aggressive treatment given her pregnancy.  The probiotic she has been taking is reportedly good for allergies and sinuses.  She has taken some Zyrtec and Allegra off and on and does feel like it helps occasionally.  She does have Nasacort however has not been using this more recently.    She  reports that she has never smoked. She has never used smokeless tobacco.    Past medical history reviewed as below:     Past Medical History:     Diagnosis  Date     Elbow dislocation      Family history of breast cancer      Hx of pregnancy      - PPH, retained placenta, transfusion       "POSTPARTUM HEMORRHAGE 11/16/2011     Pregnancy, supervision, high-risk    .      ROS:   Pertinent ROS was performed and was negative, including for fevers, chills, chest pain, shortness of breath, lower extremity edema, changes in bowel or bladder.  She has been having some vague symptoms of myalgias. No other concerns, with exception of HPI above.      Objective:    /72  Pulse 72  Temp 98.3  F (36.8  C) (Tympanic)  Resp 18  Ht 1.683 m (5' 6.25\")  Wt 76.8 kg (169 lb 4 oz)  Breastfeeding? No  BMI 27.11 kg/m2  GEN: Vitals reviewed.  Patient is in no acute distress. Cooperative with exam.  HEENT: Normocephalic atraumatic.  EACs clear bilat, TM gray with normal landmarks and however slight more fluid noted on the right, no erythema. Pupils equally round.  No scleral icterus, no conjunctival erythema. Oropharynx with no erythema or exudates. Dentition adequate.  NECK: Supple; no thyromegaly, a right-sided possible left thyroid nodule is noted.  No neck, cervical LAD.  Submandibular LAD not detected.  SKIN: Warm and dry to touch.  No rash on face, arms and legs.  EXT: No clubbing or cyanosis.  No peripheral edema.     Assessment/Plan:   1. Fatigue, unspecified type  - we did discuss today that the most likely etiology of her fatigue is her new pregnancy.  We will check thyroid given the possible nodule found on exam; we will also check blood counts as these have not been done in a couple years.  She is to call she has any worsening of her symptoms overall.  - TSH; Future  - CBC W PLT NO DIFF; Future    2. Vitamin D deficiency  - recheck today given her vague symptoms and history of deficiency  - VITAMIN D 25 (DEFICIENCY); Future    3. Thyroid nodule  - we will order ultrasound of the thyroid at this time to assess whether an actual nodules present.  We will obtain hormones today.  - US THYROID/PARATHYROID; Future    4. Sinus congestion  - she is to continue with antihistamine at this time.  She will call me " if at any point she wants referral to ENT or allergy.      Return if symptoms worsen or fail to improve.    ANGELLA AMAYA DO   4/12/2017 2:16 PM    This document was prepared using voice generated softwear. While every attempt was made for accuracy, grammatical errors may exist.

## 2018-01-04 NOTE — PROGRESS NOTES
Patient Information     Patient Name MRN Gauri Nagy [id number] Female 1975      Progress Notes by Ronda Grey R.T. ([facility]) at 2017 12:20 PM     Author:  R[facility]T. [facility] Service:  (none) Author Type:  [employer] - Registered Radiologic Technologist     Filed:  2017 12:20 PM Date of Service:  2017 12:20 PM Status:  Signed     :  Ronda Grey R.T. ([facility]) ([employer] - Registered Radiologic Technologist)            Falls Risk Criteria:    Age 65 and older or under age 4        Sensory deficits    Poor vision    Use of ambulatory aides    Impaired judgment    Unable to walk independently    Meets High Risk criteria for falls:  no

## 2018-01-04 NOTE — NURSING NOTE
Patient Information     Patient Name MRN Sex Gauri Snell [id number] Female 1975      Nursing Note by Stefany Huddleston at 2017  1:30 PM     Author:  Stefany Huddleston Service:  (none) Author Type:  (none)     Filed:  2017  1:39 PM Encounter Date:  2017 Status:  Signed     :  Stefany Huddleston            Patient presents to clinic for establish care appointment with Patricia Zavala DO.  Also, she is experiencing ongoing sinus and ear pressure with congestion.    Stefany Huddleston LPN..................2017  1:32 PM

## 2018-01-04 NOTE — OR ANESTHESIA
Patient Information     Patient Name MRN Sex Gauri Snell [id number] Female 1975      OR Anesthesia by Chun Ro DO at 2017 11:36 AM     Author:  Chun Ro DO Service:  (none) Author Type:  PHYS- Anesthesiologist     Filed:  2017 11:36 AM Date of Service:  2017 11:36 AM Status:  Signed     :  Chun Ro DO (PHYS- Anesthesiologist)                                                           ANESTHESIA ASSESSMENT    Date: 17 Time: 11:36 AM      Patient:  Gauri Hollis    Procedure(s) (LRB):  DILATION AND CURETTAGE (N/A)    Past Medical History:     Diagnosis  Date     Elbow dislocation      Family history of breast cancer      Hx of pregnancy      - PPH, retained placenta, transfusion      POSTPARTUM HEMORRHAGE 2011     Pregnancy, supervision, high-risk        Past Surgical History:      Procedure  Laterality Date     DILATION AND CURETTAGE  10/2011    for retained placenta       DILATION AND CURETTAGE  2015    for missed AB       WISDOM TEETH EXTRACTION         Family History       Problem   Relation Age of Onset     Cancer-breast  Mother 53     Diabetes  Mother      Heart Disease  Father      MI       Diabetes  Father      Thyroid Disease  Sister      hypothyroidism         Patient Active Problem List     Diagnosis  Code     TMJ (temporomandibular joint syndrome) M26.609     Family history of breast cancer Z80.3     Vitamin D deficiency E55.9     Chronic lumbosacral pain M54.5, G89.29     Bleeding in early pregnancy O20.9       Prescriptions Prior to Admission       Medication  Sig Dispense Refill     Cholecalciferol, Vitamin D3, (VITAMIN D-3) 5,000 unit tab Take  by mouth once daily.  0     Choline Bitartrate 100 % powd As directed.  0     omega-3 fatty acids-vitamin E (FISH OIL) 1,000 mg cap Take  by mouth.  0     PNV34-iron,carbonyl-FA-DSS-dha 30 mg iron-1 mg -50 mg-260 mg cap Take  by mouth.  0     Saccharomyces boulardii (PROBIOTIC,  CHRISTI,) 250 mg capsule Take 1 capsule by mouth once daily.  0     triamcinolone 0.5% (ARISTOCORT) 0.5 % cream Apply  topically to affected area(s) 3 times daily. 15 g 0     triamcinolone, 55 mcg each actuation, nasal (NASACORT AQ) 55 mcg nasal spray Inhale 2 Sprays into both nostrils each time if needed for Rhinitis. 16.5 g 0       Allergies:  Allergies      Allergen   Reactions     Cats (Fur, Dander, Saliva)  Other - Describe In Comment Field     congestion      Chlorhexidine Towelette  Itching       Review of Systems:  GERD: No  Chest pain: No  Shortness of breath: No  Recent fever: No  Poor exercise tolerance: No  Bleeding tendency: No  Pregnant: No  Anesthesia Complications: None      History    Smoking Status      Never Smoker   Smokeless Tobacco      Never Used     Social History     Social History        Marital status:       Spouse name: Zan     Number of children:  1     Years of education:  18+     Occupational History       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 No There is no height or weight on file to calculate BMI. There is no height or weight on file to calculate BSA.  Dental Condition: Excellent     Mallampati Score (Airway): II  Cardiovascular: Normal  Pulmonary: Normal  Other: N/A    Recent Labs in [facility]:    Recent Labs       05/03/17   1440   HCGBETAQUANT  23232.0 H             Assessment/Plan:  ASA Class: I  Risk of dental injury discussed: Yes  NPO  status confirmed: Yes  Anesthetic Plan: MAC  Risk/Benefit/Alt discussed: Yes  Questions answered: Yes  Emergency Case?: No  Labs/ECG/Radiology Reviewed?: Yes      H&P Reviewed.  Patient Examined.      Provider Electronic Signature:  Chun Ro, DO

## 2018-01-04 NOTE — PROGRESS NOTES
Patient Information     Patient Name MRN Gauri Nagy [id number] Female 1975      Progress Notes by Serene Perez MD at 2017  5:07 PM     Author:  Serene Perez MD Service:  (none) Author Type:  Physician     Filed:  2017  5:07 PM Date of Service:  2017  5:07 PM Status:  Signed     :  Serene Perez MD (Physician)            Results given during clinic visit.  Serene Perez MD ....................  2017   5:07 PM

## 2018-01-04 NOTE — PROGRESS NOTES
Patient Information     Patient Name MRN Sex Gauri Snell [id number] Female 1975      Progress Notes by Serene Perez MD at 2017  9:24 AM     Author:  Serene Perez MD Service:  (none) Author Type:  Physician     Filed:  2017  9:26 AM Encounter Date:  2017 Status:  Signed     :  Serene Perez MD (Physician)            Pt with early pregnancy, about 5 weeks.  Bleeding and cramping this weekend.  Labs ordered with follow up on Friday.  O Rh Positive  Serene Perez MD

## 2018-01-04 NOTE — NURSING NOTE
Patient Information     Patient Name MRN Gauri Nagy [id number] Female 1975      Nursing Note by Juliane Sams at 2017  9:45 AM     Author:  Juliane Sams Service:  (none) Author Type:  (none)     Filed:  2017  8:19 AM Encounter Date:  2017 Status:  Signed     :  Juliane Sams            Patient here today for a follow up  Juliane Sams LPN..............................2017  8:13 AM

## 2018-01-04 NOTE — PROGRESS NOTES
Patient Information     Patient Name MRN Gauri Nagy [id number] Female 1975      Progress Notes by Serene Perez MD at 2017  9:45 AM     Author:  Serene Perez MD Service:  (none) Author Type:  Physician     Filed:  2017  5:50 PM Encounter Date:  2017 Status:  Signed     :  Serene Perez MD (Physician)            SUBJECTIVE:    Gauri Hollis is a 41 y.o. female who presents for evaluation of first trimester bleeding.  Nursing Notes:   Juliane Sams  2017  8:19 AM  Signed  Patient here today for a follow up  Juliane Sams LPN..............................2017  8:13 AM       HPI  Gauri Hollis is a 41 y.o. female in for follow up of first trimester bleeding. She is about 5 weeks along.  Over this past weekend, cramping and bleeding started.  With bleeding, this is like a lighter period, but with some clots.  2 days ago had labs done:  Results for orders placed or performed in visit on 17      HCG BETA QUANT,PREGNANCY      Result  Value Ref Range    HCG BETA QUANT,PREGNANCY GIH 61042.3 (H) <0.6 mIU/mL   PROGESTERONE      Result  Value Ref Range    PROGESTERONE 15.48 ng/mL     On  she vomited which seemed different from morning sickness and she had diarrhea this past weekend.  Her nausea is better now.    Her cramping kept her up two nights ago. Continues to have cramping that ranges from mild to pretty intense.   Took some naproxen then, this helped quite a bit.    Her blood type is O Rh Positive.  She is , history of first trimester miscarriage with D&C.      Allergies      Allergen   Reactions     Cats (Fur, Dander, Saliva)  Other - Describe In Comment Field     congestion    ,   Current Outpatient Prescriptions     Medication  Sig     Cholecalciferol, Vitamin D3, (VITAMIN D-3) 5,000 unit tab Take  by mouth once daily.     Choline Bitartrate 100 % powd As directed.     omega-3 fatty acids-vitamin E (FISH OIL) 1,000  mg cap Take  by mouth.     PNV34-iron,carbonyl-FA-DSS-dha 30 mg iron-1 mg -50 mg-260 mg cap Take  by mouth.     Saccharomyces boulardii (PROBIOTIC, S.BOULARDII,) 250 mg capsule Take 1 capsule by mouth once daily.     triamcinolone 0.5% (ARISTOCORT) 0.5 % cream Apply  topically to affected area(s) 3 times daily.     triamcinolone, 55 mcg each actuation, nasal (NASACORT AQ) 55 mcg nasal spray Inhale 2 Sprays into both nostrils each time if needed for Rhinitis.     No current facility-administered medications for this visit.      Medications have been reviewed by me and are current to the best of my knowledge and ability.  and   Past Medical History:     Diagnosis  Date     Elbow dislocation      Family history of breast cancer      Hx of pregnancy      - PPH, retained placenta, transfusion      POSTPARTUM HEMORRHAGE 2011     Pregnancy, supervision, high-risk        REVIEW OF SYSTEMS:  ROS    OBJECTIVE:  /60  Pulse 80  Wt 76.8 kg (169 lb 5 oz)  BMI 27.12 kg/m2    EXAM:   Physical Exam   Nursing note and vitals reviewed.    Results for orders placed or performed in visit on 17      HCG BETA QUANT,PREGNANCY      Result  Value Ref Range    HCG BETA QUANT,PREGNANCY GIH 69322.6 (H) <0.6 mIU/mL   PROGESTERONE      Result  Value Ref Range    PROGESTERONE 9.90 ng/mL         ASSESSMENT/PLAN:    ICD-10-CM    1. Bleeding in early pregnancy O20.9 HCG BETA QUANT,PREGNANCY      PROGESTERONE      HCG BETA QUANT,PREGNANCY      PROGESTERONE        Plan:  1.  With slowly increasing HCG, falling progesterone and bleeding, differential diagnosis includes threatened miscarriage, ectopic pregnancy.  Pt is scheduled to have and ultrasound later tonight and follow up will be pending these results.  Serene Perez MD

## 2018-01-04 NOTE — OR POSTOP
Patient Information     Patient Name MRN Gauri Nagy [id number] Female 1975      OR PostOp by Gudelia Marrero RN at 2017  2:17 PM     Author:  Gudelia Marrero RN Service:  (none) Author Type:  NURS- Registered Nurse     Filed:  2017  2:18 PM Date of Service:  2017  2:17 PM Status:  Signed     :  Gudelia Marrero RN (NURS- Registered Nurse)            Discharge Note    Data:  Gauri Hollis has been discharged home at 1415 via ambulatory accompanied by Registered Nurse.      Action:  Written discharge/follow-up instructions were provided to patient. Prescriptions : None.  Belongings sent with patient. Medications from home sent with patient/family: Not Applicable  Equipment none .     Response:  Patient verbalized understanding of discharge instructions, reason for discharge, and necessary follow-up appointments.

## 2018-01-04 NOTE — NURSING NOTE
Patient Information     Patient Name MRN Gauri Nagy [id number] Female 1975      Nursing Note by Juliane Sams at 2017  2:45 PM     Author:  Juliane Sams Service:  (none) Author Type:  (none)     Filed:  2017  4:55 PM Encounter Date:  2017 Status:  Signed     :  Juliane Sams            Patient here for follow up lab/ultrasound  Juliane Sams LPN..............................2017  4:01 PM

## 2018-01-04 NOTE — PROGRESS NOTES
Patient Information     Patient Name MRN Gauri Nagy [id number] Female 1975      Progress Notes by Vance Ward MD at 5/3/2017  3:45 PM     Author:  Vance Ward MD Service:  (none) Author Type:  Physician     Filed:  5/3/2017  4:58 PM Encounter Date:  5/3/2017 Status:  Signed     :  Vance Ward MD (Physician)            See H&P

## 2018-01-04 NOTE — OR ANESTHESIA
Patient Information     Patient Name MRN Sex Gauri Fraga [id number] Female 1975      OR Anesthesia by Chun Ro DO at 2017 12:56 PM     Author:  Chun Ro DO Service:  (none) Author Type:  PHYS- Anesthesiologist     Filed:  2017 12:57 PM Date of Service:  2017 12:56 PM Status:  Signed     :  Chun Ro DO (PHYS- Anesthesiologist)            Anesthesia Post Operative Care Note    Name: Gauri Hollis  MRN:   [id number]  :    1975       Procedure Done:  See Surgeon Note        Anesthesia Technique  Anesthetic Type:  MAC     Oral Trauma:  No    Intraoperative Course   Hemodynamics:  Stable    Ventilation Normal:  Yes Lung Sounds:  Normal      PACU Course    Airway Status:  Extubated     Nondepolarizer Used:       Reversed: N/A   Hemodynamics:  Stable      Hydration: Euvolemic   Temperature:  36.1 - 38.3      Mental Status:  Awake, alert, follows commands   Pain Management:  Adequate   Regional Block:  No   Anesthesia Complications:  None      Vital Signs:  Temp: 98.8  F (37.1  C)  Pulse: 71  BP: 120/80  Resp: 18  SpO2: 97 %                       Active Lines:  Patient Lines/Drains/Airways Status    Active Line     Name: Placement date: Placement time: Site: Days:    PERIPHERAL VAD Right Forearm 22 17   1150   Forearm   less than 1                Intake & Output:  Date  17 07 - 17 0659(Not Admitted)    17 07 - 17 0659      Shift  1824-7589 7660-3287 3208-1051 24 Hour Total 1572-4957 4032-0707 5546-8807 24 Hour Total   I  N  T  A  K  E   Shift Total           O  U  T  P  U  T   Urine     50   50      + I/O +   Straight Cath Urine     50   50    Shift Total     50   50   NET      -50   -50   Weight (kg)                  Labs:  No results for input(s): EJ1JWRBNHWD, LJO0GWXJCPPX, PHARTERIAL, EVO0VNGPDYSS, C7VXSXVUPFNL in the last 24 hours.    No results for input(s): MAGNESIUM in the last 24 hours.    No results for input(s):  GLUCOSEMETER in the last 720 hours.        Chun Ro DO ....................  5/4/2017   12:56 PM

## 2018-01-04 NOTE — OR NURSING
Patient Information     Patient Name MRN Gauri Nagy [id number] Female 1975      OR Nursing by Batsheva Dolan RN at 2017 12:21 PM     Author:  Batsheva Dolan RN Service:  (none) Author Type:  NURS- Registered Nurse     Filed:  2017 12:21 PM Date of Service:  2017 12:21 PM Status:  Signed     :  Batsheva Dolan RN (NURS- Registered Nurse)            Hands off report received from Gudelia Santana RN before transfer to Operating Room.

## 2018-01-05 NOTE — PROGRESS NOTES
"Patient Information     Patient Name MRN Gauri Nagy [id number] Female 1975      Progress Notes by Vance Ward MD at 2017  3:15 PM     Author:  Vance Ward MD Service:  (none) Author Type:  Physician     Filed:  2017  4:01 PM Encounter Date:  2017 Status:  Signed     :  Vance Ward MD (Physician)            Gauri Hollis a post-operative check after a D&C.  She is 2 week(s) status postop.  She reports doing well and denies fever, pain, or significant bleeding.     OBJECTIVE: /68  Pulse 64  Temp 99  F (37.2  C) (Tympanic)   Ht 1.689 m (5' 6.5\")  Wt 79.4 kg (175 lb)  Breastfeeding? Unknown  BMI 27.82 kg/m2        HEMOGLOBIN                (g/dL)    Date Value   2017 12.9        ASSESSMENT :  No postop issues.  Discussed possible next pregnancy & vitamin use.  Doing well.  Restrictions reviewed.  Will advance to normal activity.    Plan:  Follow up prn.    Vance Ward MD  3:59 PM 2017         "

## 2018-01-05 NOTE — NURSING NOTE
Patient Information     Patient Name MRN Sex Gauri Snell [id number] Female 1975      Nursing Note by Dereck Mccartney LPN at 2017  3:15 PM     Author:  Dereck Mccartney LPN Service:  (none) Author Type:  NURS- Licensed Practical Nurse     Filed:  2017  3:26 PM Encounter Date:  2017 Status:  Signed     :  Dereck Mccartney LPN (NURS- Licensed Practical Nurse)            Patient presents to the clinic for 2 week post op D&C.  Dereck Mccartney LPN ..............2017 3:26 PM

## 2018-01-05 NOTE — NURSING NOTE
Patient Information     Patient Name MRN Gauri Nagy [id number] Female 1975      Nursing Note by Nancy Sams RN at 2017  3:15 PM     Author:  Nancy Sams RN Service:  (none) Author Type:  (none)     Filed:  2017  3:50 PM Encounter Date:  2017 Status:  Signed     :  Nancy Sams RN (NURS- Registered Nurse)            Patient is here for routine 2 week post op follow up. Denies concerns at this time.  Nancy Sams RN............. 2017 3:31 PM

## 2018-01-12 ENCOUNTER — OFFICE VISIT - GICH (OUTPATIENT)
Dept: ORTHOPEDICS | Facility: OTHER | Age: 43
End: 2018-01-12

## 2018-01-12 ENCOUNTER — HISTORY (OUTPATIENT)
Dept: ORTHOPEDICS | Facility: OTHER | Age: 43
End: 2018-01-12

## 2018-01-12 DIAGNOSIS — M77.01 MEDIAL EPICONDYLITIS OF RIGHT ELBOW: ICD-10-CM

## 2018-01-12 DIAGNOSIS — M79.631 PAIN OF RIGHT FOREARM: ICD-10-CM

## 2018-01-12 DIAGNOSIS — M77.8 OTHER ENTHESOPATHIES, NOT ELSEWHERE CLASSIFIED: ICD-10-CM

## 2018-01-24 ENCOUNTER — HOSPITAL ENCOUNTER (OUTPATIENT)
Dept: PHYSICAL THERAPY | Facility: OTHER | Age: 43
Setting detail: THERAPIES SERIES
End: 2018-01-24
Attending: ORTHOPAEDIC SURGERY

## 2018-01-24 DIAGNOSIS — M79.631 PAIN OF RIGHT FOREARM: ICD-10-CM

## 2018-01-24 DIAGNOSIS — M77.8 OTHER ENTHESOPATHIES, NOT ELSEWHERE CLASSIFIED: ICD-10-CM

## 2018-01-24 DIAGNOSIS — M77.01 MEDIAL EPICONDYLITIS OF RIGHT ELBOW: ICD-10-CM

## 2018-01-26 ENCOUNTER — DOCUMENTATION ONLY (OUTPATIENT)
Dept: FAMILY MEDICINE | Facility: OTHER | Age: 43
End: 2018-01-26

## 2018-01-26 PROBLEM — F41.9 ANXIETY DISORDER: Status: ACTIVE | Noted: 2017-11-08

## 2018-01-26 RX ORDER — CHLORAL HYDRATE 500 MG
1 CAPSULE ORAL DAILY
COMMUNITY
Start: 2016-04-05 | End: 2020-08-25

## 2018-01-26 RX ORDER — FLUTICASONE PROPIONATE 50 MCG
1 SPRAY, SUSPENSION (ML) NASAL DAILY
COMMUNITY
Start: 2017-10-11 | End: 2018-12-12

## 2018-01-26 RX ORDER — ESCITALOPRAM OXALATE 10 MG/1
10 TABLET ORAL DAILY
COMMUNITY
Start: 2017-10-11 | End: 2018-11-23

## 2018-01-26 RX ORDER — TRIAMCINOLONE ACETONIDE 1 MG/G
CREAM TOPICAL
COMMUNITY
Start: 2017-10-25 | End: 2018-06-14

## 2018-01-26 RX ORDER — SACCHAROMYCES BOULARDII 250 MG
1 CAPSULE ORAL DAILY
COMMUNITY
Start: 2017-04-12 | End: 2020-08-25

## 2018-01-27 VITALS
BODY MASS INDEX: 27.62 KG/M2 | WEIGHT: 176 LBS | SYSTOLIC BLOOD PRESSURE: 130 MMHG | DIASTOLIC BLOOD PRESSURE: 94 MMHG | HEART RATE: 60 BPM | HEIGHT: 67 IN

## 2018-01-27 VITALS
WEIGHT: 169.25 LBS | BODY MASS INDEX: 27.2 KG/M2 | HEIGHT: 66 IN | SYSTOLIC BLOOD PRESSURE: 100 MMHG | TEMPERATURE: 98.3 F | HEART RATE: 72 BPM | RESPIRATION RATE: 18 BRPM | DIASTOLIC BLOOD PRESSURE: 60 MMHG | DIASTOLIC BLOOD PRESSURE: 70 MMHG | HEART RATE: 80 BPM | SYSTOLIC BLOOD PRESSURE: 110 MMHG | DIASTOLIC BLOOD PRESSURE: 72 MMHG | SYSTOLIC BLOOD PRESSURE: 120 MMHG | WEIGHT: 169.31 LBS | HEART RATE: 72 BPM | BODY MASS INDEX: 27.12 KG/M2

## 2018-01-27 VITALS
TEMPERATURE: 97.9 F | DIASTOLIC BLOOD PRESSURE: 82 MMHG | HEART RATE: 72 BPM | HEART RATE: 72 BPM | DIASTOLIC BLOOD PRESSURE: 76 MMHG | BODY MASS INDEX: 28.8 KG/M2 | RESPIRATION RATE: 16 BRPM | SYSTOLIC BLOOD PRESSURE: 132 MMHG | WEIGHT: 179.2 LBS | SYSTOLIC BLOOD PRESSURE: 108 MMHG | HEIGHT: 66 IN

## 2018-01-27 VITALS
DIASTOLIC BLOOD PRESSURE: 74 MMHG | HEART RATE: 68 BPM | BODY MASS INDEX: 28.77 KG/M2 | SYSTOLIC BLOOD PRESSURE: 108 MMHG | WEIGHT: 169.31 LBS | BODY MASS INDEX: 27.12 KG/M2 | DIASTOLIC BLOOD PRESSURE: 60 MMHG | WEIGHT: 179 LBS | SYSTOLIC BLOOD PRESSURE: 110 MMHG | HEIGHT: 66 IN | HEART RATE: 66 BPM

## 2018-01-27 VITALS
BODY MASS INDEX: 27.62 KG/M2 | DIASTOLIC BLOOD PRESSURE: 78 MMHG | HEART RATE: 68 BPM | SYSTOLIC BLOOD PRESSURE: 114 MMHG | WEIGHT: 176 LBS | RESPIRATION RATE: 18 BRPM | TEMPERATURE: 97.6 F | HEIGHT: 67 IN

## 2018-01-27 VITALS
TEMPERATURE: 99 F | HEART RATE: 64 BPM | BODY MASS INDEX: 27.47 KG/M2 | HEART RATE: 72 BPM | SYSTOLIC BLOOD PRESSURE: 122 MMHG | DIASTOLIC BLOOD PRESSURE: 68 MMHG | WEIGHT: 175 LBS | HEIGHT: 67 IN | DIASTOLIC BLOOD PRESSURE: 80 MMHG | SYSTOLIC BLOOD PRESSURE: 106 MMHG | TEMPERATURE: 98 F

## 2018-01-31 ENCOUNTER — HOSPITAL ENCOUNTER (OUTPATIENT)
Dept: PHYSICAL THERAPY | Facility: OTHER | Age: 43
Setting detail: THERAPIES SERIES
End: 2018-01-31
Attending: ORTHOPAEDIC SURGERY

## 2018-01-31 ASSESSMENT — ANXIETY QUESTIONNAIRES
GAD7 TOTAL SCORE: 10
GAD7 TOTAL SCORE: 5
GAD7 TOTAL SCORE: 13

## 2018-01-31 ASSESSMENT — PATIENT HEALTH QUESTIONNAIRE - PHQ9
SUM OF ALL RESPONSES TO PHQ QUESTIONS 1-9: 8
SUM OF ALL RESPONSES TO PHQ QUESTIONS 1-9: 3
SUM OF ALL RESPONSES TO PHQ QUESTIONS 1-9: 9

## 2018-02-03 ENCOUNTER — HEALTH MAINTENANCE LETTER (OUTPATIENT)
Age: 43
End: 2018-02-03

## 2018-02-07 ENCOUNTER — HOSPITAL ENCOUNTER (OUTPATIENT)
Dept: PHYSICAL THERAPY | Facility: OTHER | Age: 43
Setting detail: THERAPIES SERIES
End: 2018-02-07
Attending: ORTHOPAEDIC SURGERY

## 2018-02-07 PROCEDURE — 97140 MANUAL THERAPY 1/> REGIONS: CPT | Mod: GO | Performed by: OCCUPATIONAL THERAPIST

## 2018-02-07 PROCEDURE — 97032 APPL MODALITY 1+ESTIM EA 15: CPT | Mod: GO | Performed by: OCCUPATIONAL THERAPIST

## 2018-02-07 PROCEDURE — 97110 THERAPEUTIC EXERCISES: CPT | Mod: GO | Performed by: OCCUPATIONAL THERAPIST

## 2018-02-08 ENCOUNTER — DOCUMENTATION ONLY (OUTPATIENT)
Dept: FAMILY MEDICINE | Facility: OTHER | Age: 43
End: 2018-02-08

## 2018-02-09 ENCOUNTER — HOSPITAL ENCOUNTER (OUTPATIENT)
Dept: PHYSICAL THERAPY | Facility: OTHER | Age: 43
Setting detail: THERAPIES SERIES
End: 2018-02-09
Attending: ORTHOPAEDIC SURGERY

## 2018-02-09 VITALS
WEIGHT: 179 LBS | DIASTOLIC BLOOD PRESSURE: 78 MMHG | HEART RATE: 64 BPM | SYSTOLIC BLOOD PRESSURE: 110 MMHG | BODY MASS INDEX: 28.89 KG/M2

## 2018-02-09 PROCEDURE — 97032 APPL MODALITY 1+ESTIM EA 15: CPT | Mod: GO | Performed by: OCCUPATIONAL THERAPIST

## 2018-02-09 PROCEDURE — 97140 MANUAL THERAPY 1/> REGIONS: CPT | Mod: GO | Performed by: OCCUPATIONAL THERAPIST

## 2018-02-09 PROCEDURE — 97110 THERAPEUTIC EXERCISES: CPT | Mod: GO | Performed by: OCCUPATIONAL THERAPIST

## 2018-02-12 NOTE — PROGRESS NOTES
Patient Information     Patient Name MRN Gauri Nagy [id number] Female 1975      Progress Notes by Cee Oliva, PT at 2017 11:22 AM     Author:  Cee Oliva, PT Service:  (none) Author Type:  PT- Physical Therapist     Filed:  2017 11:23 AM Date of Service:  2017 11:22 AM Status:  Signed     :  Cee Oliva PT (PT- Physical Therapist)            [facility]  Outpatient PT - No Show        Date : 2017     Patient Name: Gauri Hollis   YOB: 1975   Referring MD/Provider: Chucky Rivera DO  Medical and Treatment Diagnosis:   Pain in shoulder region, right [M25.511]       Arm pain, right [M79.601]               PT Treatment Diagnosis: right shoulder and neck pain, right UE pain, right elbow dysfunction (epicondylitis, cubital tunnel syndrome, olecranon bursitis), postural dysfunction, facet restrictions, rib restrictions, myofascial pain/restrictions  Insurance: Work Comp  Start of Service: 11/15/17  Certification Dates: Start of Service: 11/15/17                     Medicare/[place] Re-Cert Due: 18        Patient failed to attend PT appt today.

## 2018-02-12 NOTE — PROGRESS NOTES
Patient Information     Patient Name MRN Gauri Nagy [id number] Female 1975      Progress Notes by Cee Oliva, PT at 2017 11:25 AM     Author:  Cee Oliva PT Service:  (none) Author Type:  PT- Physical Therapist     Filed:  2017  1:53 PM Date of Service:  2017 11:25 AM Status:  Signed     :  Cee Oliva PT (PT- Physical Therapist)            [facility]  Outpatient PT - Daily Note        Date of Service: 2017     Number of Visits: 4/    Patient Name: Gauri Hollis   YOB: 1975   Referring MD/Provider: Chucky Rivera DO  Medical and Treatment Diagnosis:   Pain in shoulder region, right [M25.511]       Arm pain, right [M79.601]             PT Treatment Diagnosis: right shoulder and neck pain, right UE pain, right elbow dysfunction (epicondylitis, cubital tunnel syndrome, olecranon bursitis), postural dysfunction, facet restrictions, rib restrictions, myofascial pain/restrictions  Insurance: Work Comp  Start of Service: 11/15/17  Certification Dates: Start of Service: 11/15/17                     Medicare/[place] Re-Cert Due: 18 NOTE: authorization received for evaluation plus 5 additional PT sessions for a total of 6 with emphasis on HEP        Subjective    Notes that she has been doing pretty good. Notes that overall, the work we are doing has been helpful. Notes that the sidelying trunk rotation is really helpful. Front of the neck seems to be getting her attention more than anything. Some forearm complaints have been noted in her exercise class.    Pain Ratin = Mild Pain, (Bothersome, Annoying, Irritating, Nagging) / Location:  Right neck, UE pain.        Objective  Items left blank indicate that the test was inappropriate or not meaningful at the time of visit and therefore not performed.      Postural loading:  General Listening: right anterior shoulder/cervical  Head:  Restricted  right to right and left to right  Shoulders: restricted right  Pelvis:  Restricted right to right   Lower Cervical Joint Mobility Testing:    Flex/Rot Right Flex/Rot Left Ext/Rot Right Ext/Rot Left Sidebend Right Sidebend Left Extension   C2-3                 C3-4                 C4-5 x   x           C5-6 x   x           C6-7 x   x           C7-T1             x      Upper Cervical Joint Mobility Testing:                        Neg with screening     THORACIC FACET RESTRICTIONS:    ERS right  ERS left Neutral (SB right/Rot left) Neutral (SB  left/Rot right) FRS right FRS left   C7-T1         x     T1-2               T2-3               T3-4         x     T4-5           x   T5-6               T6-7               T7-8               T8-9               T9-10               T10-11               T11-12               T12-L1               (FRS: Flexed, rotated and sidebent. ERS: Extended, rotated and sidebent.)     Structural Rib Dysfunctions:                        Superior Subluxed 1st Rib: right                        Laterally flexed 2nd Rib: right  Less myofascial tightness noted in the Clavipectoral Fascia fascia. Continued tightness in the middle and deep anterior cervical fascia as well as the scalenes and SCMs right more than left. Noted to have restriction at the hyoid right with decreased lateral loading but less than last session. Tightness in the infra hyoid fascia as well as the median raphe noted.  Improved  inferior loading of the right thorax. Decreased compressive loading of the right shoulder/upper arm slight increase in myofascial tightness noted.       Today's Intervention:    MFR  Thoracic inlet, sternal release  MFR Clavipectoral Fascia   Viscoelastic release right shoulder  MFR middle and deep anterior cervical fascia, scalenes  N-M stacking to hyoid  MFR hyoid<>middle anterior cervical fascia, hyoid<>median raphe  MFR CT junction supine  MFR arm pull    sidelying trunk rotation  Sidelying shoulder  circles  Sidelying spiral fascia stretch    Home Exercise Program:  Snow angels  Anterior cervical fascia self stretch  Scapular retraction  Sidelying trunk rotation <, at and > 90 degrees hip flexion  sidelying shoulder circles  Supine QL stretch with ipsilateral snow sampson  Scalene self stretch  Clavipectoral Fascia stretch door frame  Supine neck extension stretch over edge of mat table, adding rotation and side bend to isolate stretch  Sidelying spiral fascia stretch  Assessment    Therapist Assessment / Clinical Impression:  Gauri Hollis is referred to PT due to right shoulder and neck pain. She has had difficulty with more distal right UE complaints including epicondylitis, olecranon bursitis and cubital tunnel issues. Neck and shoulder issues have been underlying and continue to cause limits in functional activities such as computer work and manipulating while at work and outside activities such as driving, sleep and meal prep. Complaints are not unexpected due to regional interdependence of the neck and upper thoracic/brachial plexus region with her more distal issues. She has not been able to manage her complaints with exercise, chiropractic work, OT. Clinically she presents with right shoulder and neck pain, right UE pain, right elbow dysfunction (epicondylitis, cubital tunnel syndrome, olecranon bursitis), postural dysfunction, facet restrictions, rib restrictions, myofascial pain/restrictions. Primary restrictions today noted to be the right anterior cervical and shoulder region with significant myofascial restriction as well as joint and rib dysfunctions.        Clinical Impression:                         Cervical and right shoulder pain with hx of epicondylitis, olecranon bursitis, cubital tunnel restrictions                        Facet Dysfunction:                        Postural Dysfunction:                        Myofascial:                        Neural restriction     Direct PT services are  indicated to address the above noted impairments and to promote self management.        G codes and Modifier taken from patient completing the PSFS: 11/15/2017  Initial Primary G Code and Modifier:                         Per the Patient's intake and/or assessment the Primary G Code is: Handling Objects .                        The Patient's Impairment, Limitation or Restriction Modifier would be best described as: CL - 60% - 80% Impairment.   Goal Primary G Code and Modifier:                         The Patient's G Code Goal would be: Handling Objects                         The Patient's Impairment, Limitation or Restriction Modifier goal would be best described as: CJ - 20% - 40% Impairment.      Goals:  Patient goal (time reference required): to be able to do those daily activities at home and at work without nagging pain (2 months)   .     Long term goal: Patient is to self-manage symptoms and return to prior function in 8 weeks.       Functional goals:   Patient is to be independent in their Home Exercise Program in 4 weeks.  Patient is to tolerate walking with improved arm swing and pelvic rotation right with gait in 6 weeks.  Patient is report the ability to sleep 6 hours without awakening due to pain in 8 weeks.  Patient is to display and maintain normal joint mobility/symmetry in the cervicothoracic spine and ribs to allow to perform cervical assessments and manipulation techniques with minimal increase in discomfort in 8 weeks.  Patient is to demonstrate ability to work at her computer with good posture and body mechanics for 30 minute intervals without symptoms in shoulder and neck in 8 weeks.  Patient is to report ability to drive 3 hours with rest break without increased symptoms  in 8 weeks.  Patient to display improved postural loading to allow for symmetrical weight bearing to promote more normal use of right shoulder and scapula in 8 weeks.         Response to Intervention:  Multiple  right sided upper thoracic, UE and neck complaints. Less fascial tightness in the chest wall  Noted but still very tight in the cervical region.. Working regularly at exercise.       Plan    Original plan of care as below modified to 6 visits total over 2 months per work comp authorization.    Treatment Plan / Targeted Outcomes:     Frequency:   16 visits     Duration of Treatment: 2 months    Planned Interventions:  May include any of the following:  Home Exercise Program development  Therapeutic Exercise (ROM & Strengthening)  Manual Therapy  Neuromuscular Re-education  Ultrasound  Electrical Stimulation  Therapeutic Activities  Gait Training  Posture and Body Mechanics Education         Plan for next visit:  Manual therapy, MET, assess right ribcage, exercise    Student or PTA has been instructed in and demonstrates skills necessary to carry out above stated treatment plan: Yes    Thank you for your referral to [facility].  Please call with any questions, concerns or comments.  [phone]

## 2018-02-12 NOTE — PROGRESS NOTES
Patient Information     Patient Name MRN Gauri Nagy [id number] Female 1975      Progress Notes by Cee Oliva, PT at 2017 10:47 AM     Author:  Cee Oliva, PT Service:  (none) Author Type:  PT- Physical Therapist     Filed:  2017  1:38 PM Date of Service:  2017 10:47 AM Status:  Signed     :  Cee Oliva PT (PT- Physical Therapist)            [facility]  Outpatient PT - Daily Note        Date of Service: 2017     Number of Visits: 3    Patient Name: Gauri Hollis   YOB: 1975   Referring MD/Provider: Chucky Rivera DO  Medical and Treatment Diagnosis:   Pain in shoulder region, right [M25.511]       Arm pain, right [M79.601]             PT Treatment Diagnosis: right shoulder and neck pain, right UE pain, right elbow dysfunction (epicondylitis, cubital tunnel syndrome, olecranon bursitis), postural dysfunction, facet restrictions, rib restrictions, myofascial pain/restrictions  Insurance: Work Comp  Start of Service: 11/15/17  Certification Dates: Start of Service: 11/15/17                     Medicare/MA Re-Cert Due: 18 NOTE: authorization received for evaluation plus 5 additional PT sessions for a total of 6 with emphasis on HEP        Subjective    Notes that she is doing okay but states that as she missed her appt last week, she feels more tight all over. Notes that she received a new desk chair at work about a week ago but they have not raised her desk height yet. Notes that the stretches are going well. Notes that her left AC area is aggravated from stretching. Notes that this week, she is getting tighter through the forearm, ulnar nerve area and noticed some discomfort in the triceps area again. States that she is no longer receiving OT services so this may be part of the increased complaints.    Pain Rating:     3 = Mild Pain, (Bothersome, Annoying, Irritating, Nagging) / Location:   Right neck, UE pain.        Objective  Items left blank indicate that the test was inappropriate or not meaningful at the time of visit and therefore not performed.      Postural loading:  General Listening: right anterior shoulder  Head:  Restricted right to right and left to right  Shoulders: restricted right  Pelvis:  Restricted right to right   Lower Cervical Joint Mobility Testing:    Flex/Rot Right Flex/Rot Left Ext/Rot Right Ext/Rot Left Sidebend Right Sidebend Left Extension   C2-3                 C3-4                 C4-5 x   x           C5-6 x   x           C6-7 x   x           C7-T1             x      Upper Cervical Joint Mobility Testing:                        Neg with screening     THORACIC FACET RESTRICTIONS:    ERS right  ERS left Neutral (SB right/Rot left) Neutral (SB  left/Rot right) FRS right FRS left   C7-T1         x     T1-2               T2-3               T3-4         x     T4-5           x   T5-6               T6-7               T7-8               T8-9               T9-10               T10-11               T11-12               T12-L1               (FRS: Flexed, rotated and sidebent. ERS: Extended, rotated and sidebent.)     Structural Rib Dysfunctions:                        Superior Subluxed 1st Rib: right                        Laterally flexed 2nd Rib: right    Myofascial tightness noted in the Clavipectoral Fascia fascia in particular with tightness as well in the middle and deep anterior cervical fascia as well as the scalenes and SCMs right more than left. Noted to have restriction at the hyoid right with decreased lateral loading. Tightness in the infra hyoid fascia as well as the median raphe noted.  Decreased inferior loading of the right thorax. Decreased compressive loading of the right shoulder/upper arm slight increase in myofascial tightness noted.       Today's Intervention:      Patient brings in her list of exercises she is working on. These were reviewed with pt and are  appropriate. List is as follows in addition to her current PT program and her current OT program:   Spin class 2 days per week   Functional interval training class 1-2 days per week   Pilates/yoga class 1 day per week   Home/work stretching 2-3 X daily    Chest: doorway, foam roller, snow angels    Trunk: lateral bend, extension over ball, foam roller release to back, gluts, cat/cow, child's pose, neck ROM, LS,UT/SCM and scalene stretching    LE: standing quad/hip flexor stretch, piriformis sitting,  Supine hamstring/calf      MFR  Thoracic inlet, sternal release  MFR Clavipectoral Fascia   Viscoelastic release right shoulder  MFR middle and deep anterior cervical fascia, scalenes  N-M stacking to hyoid  MFR hyoid<>middle anterior cervical fascia, hyoid<>median raphe  MFR CT junction supine  MFR arm pull    Supine neck extension stretch over edge of mat table, adding rotation and side bend to isolate stretch    Home Exercise Program:  Snow angels  Anterior cervical fascia self stretch  Scapular retraction  Sidelying trunk rotation <, at and > 90 degrees hip flexion  sidelying shoulder circles  Supine QL stretch with ipsilateral snow sampson  Scalene self stretch  Clavipectoral Fascia stretch door frame  Supine neck extension stretch over edge of mat table, adding rotation and side bend to isolate stretch  Assessment    Therapist Assessment / Clinical Impression:  Gauri Hollis is referred to PT due to right shoulder and neck pain. She has had difficulty with more distal right UE complaints including epicondylitis, olecranon bursitis and cubital tunnel issues. Neck and shoulder issues have been underlying and continue to cause limits in functional activities such as computer work and manipulating while at work and outside activities such as driving, sleep and meal prep. Complaints are not unexpected due to regional interdependence of the neck and upper thoracic/brachial plexus region with her more distal issues. She  has not been able to manage her complaints with exercise, chiropractic work, OT. Clinically she presents with right shoulder and neck pain, right UE pain, right elbow dysfunction (epicondylitis, cubital tunnel syndrome, olecranon bursitis), postural dysfunction, facet restrictions, rib restrictions, myofascial pain/restrictions. Primary restrictions today noted to be the right anterior cervical and shoulder region with significant myofascial restriction as well as joint and rib dysfunctions.        Clinical Impression:                         Cervical and right shoulder pain with hx of epicondylitis, olecranon bursitis, cubital tunnel restrictions                        Facet Dysfunction:                        Postural Dysfunction:                        Myofascial:                        Neural restriction     Direct PT services are indicated to address the above noted impairments and to promote self management.        G codes and Modifier taken from patient completing the PSFS: 11/15/2017  Initial Primary G Code and Modifier:                         Per the Patient's intake and/or assessment the Primary G Code is: Handling Objects .                        The Patient's Impairment, Limitation or Restriction Modifier would be best described as: CL - 60% - 80% Impairment.   Goal Primary G Code and Modifier:                         The Patient's G Code Goal would be: Handling Objects                         The Patient's Impairment, Limitation or Restriction Modifier goal would be best described as: CJ - 20% - 40% Impairment.      Goals:  Patient goal (time reference required): to be able to do those daily activities at home and at work without nagging pain (2 months)   .     Long term goal: Patient is to self-manage symptoms and return to prior function in 8 weeks.       Functional goals:   Patient is to be independent in their Home Exercise Program in 4 weeks.  Patient is to tolerate walking with improved  arm swing and pelvic rotation right with gait in 6 weeks.  Patient is report the ability to sleep 6 hours without awakening due to pain in 8 weeks.  Patient is to display and maintain normal joint mobility/symmetry in the cervicothoracic spine and ribs to allow to perform cervical assessments and manipulation techniques with minimal increase in discomfort in 8 weeks.  Patient is to demonstrate ability to work at her computer with good posture and body mechanics for 30 minute intervals without symptoms in shoulder and neck in 8 weeks.  Patient is to report ability to drive 3 hours with rest break without increased symptoms  in 8 weeks.  Patient to display improved postural loading to allow for symmetrical weight bearing to promote more normal use of right shoulder and scapula in 8 weeks.         Response to Intervention:  Multiple right sided upper thoracic, UE and neck complaints. Mildly increased fascial tightness noted. Working regularly at exercise with review of full home program today..       Plan    Original plan of care as below modified to 6 visits total over 2 months per work comp authorization.    Treatment Plan / Targeted Outcomes:     Frequency:   16 visits     Duration of Treatment: 2 months    Planned Interventions:  May include any of the following:  Home Exercise Program development  Therapeutic Exercise (ROM & Strengthening)  Manual Therapy  Neuromuscular Re-education  Ultrasound  Electrical Stimulation  Therapeutic Activities  Gait Training  Posture and Body Mechanics Education         Plan for next visit:  Manual therapy, MET, assess right ribcage, exercise    Student or PTA has been instructed in and demonstrates skills necessary to carry out above stated treatment plan: Yes    Thank you for your referral to [facility].  Please call with any questions, concerns or comments.  [phone]

## 2018-02-12 NOTE — NURSING NOTE
Patient Information     Patient Name MRN Gauri Nagy [id number] Female 1975      Nursing Note by Tereza Kendrick at 2018  9:15 AM     Author:  Tereza Kendrick Service:  (none) Author Type:  (none)     Filed:  2018  9:21 AM Encounter Date:  2018 Status:  Signed     :  Tereza Kendrick            Patient is here for a follow up on her work comp right elbow and right wrist pain.  Tereza Kendrick LPN .......2018 9:19 AM

## 2018-02-12 NOTE — PROGRESS NOTES
Patient Information     Patient Name MRN Sex Gauri Snell [id number] Female 1975      Progress Notes by Chucky Rivera DO at 2018  9:15 AM     Author:  Chucky Rivera DO Service:  (none) Author Type:  Physician     Filed:  2018 10:32 AM Encounter Date:  2018 Status:  Signed     :  Chucky Rivera DO (Physician)            PROGRESS NOTE    SUBJECTIVE:  Gauri Hollis is here for recheck in regards to patient is had increasing discomfort over the past couple weeks. She had been on for a while as well as she hasn't been able to get the therapy. She has had an illness over the  break and that may have added into part of this. She has pain into the pectoral region shoulder region and forearm. The previous PT and OT had made a difference for her. And she was making progress but she had a downturn.    OBJECTIVE:  /78 (Cuff Site: Right Arm, Position: Sitting, Cuff Size: Adult Regular)  Pulse 64  Wt 81.2 kg (179 lb)  BMI 28.89 kg/m2 Body mass index is 28.89 kg/(m^2).    General Appearance: Pleasant female in good appearance, mood and affect.  Alert and orientated times three ( time, date and location).    Skin: Intact    Elbow:  Effusion: no  Motion: full  Tinel's test negative  Less tenderness at the medial epicondyle.  Provacative testing positive, but has improved dramatically since I last saw her.  Pain into the forearm has decreased since I last saw her.    Shoulder:  Motion: Normal    Hand:  Thenar wasting: no  Hypothenar wasting: no  Sensation: This has normalized since I last saw her. Slight irritation ulnar nerve right elbow.  Radial and ulnar blood flow:  Normal    Eyes: Pupils are round.    Ears: Hearing: Intact    Heart: Good capillary refill and her hands radial pulses are regular.    Lungs: Clear.     OT notes:    Notes reviewed with the patient showing that she is making good progress.    EMG:    EMG was independent reviewed with  the patient showing that the nerves are functioning appropriately. Please see report for full details.    Radiographic images from 8/28, 8/30 where independently reviewed and discussed with the patient.      Xray:     X-rays demonstrate very mild degenerative changes, there does appear to be a fluid collection about the elbow. No fractures noted.    PROCEDURE: XR ELBOW 3 VIEWS RIGHT  HISTORY: Right elbow pain.  COMPARISON: None.  TECHNIQUE: 3 views of the right elbow were obtained.  FINDINGS: No fracture or dislocation is identified. The joint spaces are preserved.  IMPRESSION: No acute fracture.  Electronically Signed By: Stefany Taylor M.D. on 8/28/2017 9:10 AM    MRA:    MR ARTHROGRAM ELBOW RIGHT  HISTORY: 41 yearsFemale patient reports medial and anterior elbow pain extending into the forearm with numbness in the fourth and fifth digits. Patient has had similar symptoms on and off for 2 years duration but has worsened over the past 6 months. Right elbow tendonitis  TECHNIQUE: After menstruation of intra-articular contrast, axial T1 fat sac, axial T2 fat sac, coronal T1 fat-sat, coronal STIR, coronal gradient echo, sagittal T2 fat-sat and sagittal T1 fat-sat imaging of the right elbow was performed.  COMPARISON: None  FINDINGS: There is fluid superficial to the triceps tendon at its insertion on the ulna. The fluid does not contain contrast.  Contrast containing fluid is present within the joint. There is no bone marrow edema to suggest contusion or fracture. Joint spaces are congruent. Articular surfaces are smooth.  The biceps and brachialis tendons are intact. The common flexor and extensor tendons are intact. The triceps is intact.  No cartilage defects are evident.  IMPRESSION: Olecranon bursal fluid collections suggestive of olecranon bursitis. Exams otherwise unremarkable.  Electronically Signed By: Lalo Iraheta M.D. on 8/30/2017 3:00 PM    IMPRESSION:  Right medial epicondylitis.  Right ulnar  nerve irritation with negative EMG.  Mild right carpal tunnel syndrome-EMG negative  Mild degenerative changes about the right elbow with fluid collection.  Right distal bicipital tendinitis.  Right olecranon bursitis.    PLAN:  Risks, benefits, conservative, surgical and alternatives to treatment where discussed and the patient would like to continuation of occupational therapy and add physical therapy. I feel that 6 weeks of physical therapy would be of benefit as well as 2 weeks of continued occupational therapy and this is related to the fact that she's had a flare.  It is important that she has appropriately-ergonomic workstation to help protect her.  Continue with therapy  She will follow-up as needed.  Questions and concerns were answered.  Work comp papers filled out.    Chucky Rivera D.O.  Orthopaedic Surgeon    [facility]  [address] [place] [id number]  Phone [phone] (KNEE)  Fax [phone]    This document was created using computer generated templates and voice activated software.    10:29 AM 1/12/2018

## 2018-02-13 ENCOUNTER — OFFICE VISIT (OUTPATIENT)
Dept: FAMILY MEDICINE | Facility: OTHER | Age: 43
End: 2018-02-13
Attending: FAMILY MEDICINE
Payer: COMMERCIAL

## 2018-02-13 VITALS
BODY MASS INDEX: 28.45 KG/M2 | DIASTOLIC BLOOD PRESSURE: 80 MMHG | WEIGHT: 177 LBS | TEMPERATURE: 99.1 F | HEIGHT: 66 IN | SYSTOLIC BLOOD PRESSURE: 104 MMHG | HEART RATE: 76 BPM

## 2018-02-13 DIAGNOSIS — J11.1 INFLUENZA-LIKE ILLNESS: Primary | ICD-10-CM

## 2018-02-13 DIAGNOSIS — R07.0 THROAT PAIN: ICD-10-CM

## 2018-02-13 LAB
DEPRECATED S PYO AG THROAT QL EIA: NORMAL
SPECIMEN SOURCE: NORMAL

## 2018-02-13 PROCEDURE — 99213 OFFICE O/P EST LOW 20 MIN: CPT | Performed by: FAMILY MEDICINE

## 2018-02-13 PROCEDURE — 87880 STREP A ASSAY W/OPTIC: CPT | Performed by: FAMILY MEDICINE

## 2018-02-13 ASSESSMENT — PAIN SCALES - GENERAL: PAINLEVEL: SEVERE PAIN (6)

## 2018-02-13 NOTE — ADDENDUM NOTE
Patient Information     Patient Name MRN Sex Gauri Snell [id number] Female 1975      Addendum Note by Chucky Patino DO at 2018 10:40 AM     Author:  Chucky Patino DO Service:  (none) Author Type:  Physician     Filed:  2018 10:40 AM Encounter Date:  2018 Status:  Signed     :  Chucky Patino DO (Physician)       Addended by: CHUCKY PATINO on: 2018 10:40 AM        Modules accepted: Orders

## 2018-02-13 NOTE — PROGRESS NOTES
Patient Information     Patient Name MRN Sex Gauri Snell [id number] Female 1975      Progress Notes by Fabiola Manriquez OT at 2018 10:38 AM     Author:  Fabiola Manriquez OT Service:  (none) Author Type:  OT- Occupational Therapist     Filed:  2018 11:42 AM Date of Service:  2018 10:38 AM Status:  Signed     :  Fabiola Manriquez OT (OT- Occupational Therapist)            [facility]  Outpatient OT - Daily Note    Date of Service:  2018             Visit #:  3  Approved for eval plus 6 visits  Date of referral: 2018   Patient Name: Gauri Hollis  YOB: 1975   Referring MD/Provider: Dr. Rivera  Diagnosis: bicipital tendonitis, pian in right forearm, Rt. Medial epicondyle  Treatment Diagnosis:pian, weakness,   Insurance:  Work Comp  Start of Care Date: 2018   Certification periods:  From:   2018  To: 2018    Subjective    Reports feeling more achiness deep in the elbow joint.  Biceps felt better after last session more median nerve symptoms.       Pain Ratin-5 / Location: Medial elbow an forearm flexors.       Objective    Today's Intervention:    High volt electrical stimulation (PPR1- 300PV unit), intensity 48 (monitored and controlled by patient), negative rectangle treatment electrode over medial epicondyle, round positive electrode on forearm 20 minute treatment time, to increase circulation, decrease pain and edema.      Moist hot pack to RUE elbow distally to fingertips, during e-stim, 6 layers + hot pack cover, to prepare soft tissue for stretch. 13 minutes application with skin check at 8 minutes. Additional towel provided for patient to utilize if heat became too high.      Manual Therapy to help improve tissue mobility, improve circulation and to decrease tissue tension thru the use of IASTM with the Graston Technique     GT4, GT6: sweep, fan through biceps, triceps and wrist flexors  GT^ Brush  and strum , GT 2 swivel local lesions  GtT6: frame around epicondyle   GT6, brush & strum; GT6 scoop off supracondylar ridge  GT4 sweep though pronator jerald (parallel and perpendicular to fibers  GT4 swivel interosseous membrane   GT4 [id number], fan, scoop & strum distal triceps   GT6 J stroke along muscle bellies      ** patient was informed of potential treatment responses produced by IASTM. S/he was informed to instruct provider with any discomfort or pain.         Following heat and e-stim, PROM with prolonged stretch to RUE: prolong stretch to wrist flexors,          Home Exercise Program: discuss at each visit.     Assessment    Therapist Assessment/Response to Intervention:  Areas of dense tissue    around the medial epicondyle and along the medial side of the of the biceps.     Goals:  Patient s goal: to be able to work pain free     Functional short term goals (established in collaboration with the patient, to be met in 8 weeks):  1. patient will report the ability to squeeze out a sponge in her dominate right hand with pain levels , 3/10  2. Patient will report the ability to hold a plank position for up to 30 seconds with minimal discomfort.   3. Patient will report the ability to use keyboard for up to 30 min with minimal discomfort.   4. Patient will have reduction of median nerve symptoms in 4/7 mornings     Functional long terms goal to be met in 12 weeks:   1. Patient will have minimal discomfort with work and home management tasks.      Plan  Plan:  Continue with current treatment.     Student has been instructed in and demonstrates skills necessary to carry out above stated treatment plan: Yes    Thank you for your referral to [facility].  Please call with any questions, concerns or comments.  [phone]    Fabiola Manriquez OTR/L  Occupational Therapist

## 2018-02-13 NOTE — PROGRESS NOTES
Patient Information     Patient Name MRN Sex Gauri Snell [id number] Female 1975      Progress Notes by Fabiola Manriquez OT at 2018  1:33 PM     Author:  Fabiola Manriquez OT Service:  (none) Author Type:  OT- Occupational Therapist     Filed:  2018  2:11 PM Date of Service:  2018  1:33 PM Status:  Signed     :  Fabiola Manriquez OT (OT- Occupational Therapist)            [facility]  Outpatient OT - Daily Note    Date of Service:  2018            Visit #:  2  Approved for eval plus 6 visits  Date of referral: 2018   Patient Name: Gauri Hollis  YOB: 1975   Referring MD/Provider: Dr. Rivera  Diagnosis: bicipital tendonitis, pian in right forearm, Rt. Medial epicondyle  Treatment Diagnosis:pian, weakness,   Insurance:  Work Comp  Start of Care Date: 2018   Certification periods:  From:   2018  To: 2018      Subjective    Gauri states her biceps felt less tense after last session. she reports pain in the elbow joint and medial epicondyle area      Pain Ratin-3 / Location:  Right elbow, triceps and biceps       Objective    Today's Intervention:    High volt electrical stimulation (PPR1- 300PV unit), intensity 48 (monitored and controlled by patient), negative rectangle treatment electrode over medial epicondyle, round positive electrode on forearm 20 minute treatment time, to increase circulation, decrease pain and edema.      Moist hot pack to RUE elbow distally to fingertips, during e-stim, 6 layers + hot pack cover, to prepare soft tissue for stretch. 13 minutes application with skin check at 8 minutes. Additional towel provided for patient to utilize if heat became too high.      Manual Therapy to help improve tissue mobility, improve circulation and to decrease tissue tension thru the use of IASTM with the Graston Technique     GT4, GT6: sweep, fan through biceps, triceps and wrist flexors  GT^  Brush and strum , GT 2 swivel local lesions  GtT6: frame around epicondyle   GT6, brush & strum; GT6 scoop off supracondylar ridge  GT4 sweep though pronator jerald (parallel and perpendicular to fibers  GT4 swivel interosseous membrane   GT4 GT56 Sweep, fan, scoop & strum distal triceps   GT6 J stroke along muscle bellies      ** patient was informed of potential treatment responses produced by IASTM. S/he was informed to instruct provider with any discomfort or pain.         Following heat and e-stim, PROM with prolonged stretch to RUE: prolong stretch to wrist flexors,          Home Exercise Program: discussed at each visit    Assessment    Therapist Assessment/Response to Intervention:  Areas of dense tissue are less in the biceps though still significant.        Goals:  Patient s goal: to be able to work pain free     Functional short term goals (established in collaboration with the patient, to be met in 8 weeks):  1. patient will report the ability to squeeze out a sponge in her dominate right hand with pain levels , 3/10  2. Patient will report the ability to hold a plank position for up to 30 seconds with minimal discomfort.   3. Patient will report the ability to use keyboard for up to 30 min with minimal discomfort.   4. Patient will have reduction of median nerve symptoms in 4/7 mornings     Functional long terms goal to be met in 12 weeks:   1. Patient will have minimal discomfort with work and home management tasks.      Plan  Plan:  Continue with current treatment.     Student has been instructed in and demonstrates skills necessary to carry out above stated treatment plan: Yes    Thank you for your referral to [facility].  Please call with any questions, concerns or comments.  [phone]    Fabiola Manriquez OTR/L  Occupational Therapist

## 2018-02-13 NOTE — PROGRESS NOTES
"Patient Information     Patient Name MRN Gauri Nagy [id number] Female 1975      Progress Notes by Fabiola Manriquez OT at 2018  8:17 AM     Author:  Fabiola Manriquez OT Service:  (none) Author Type:  OT- Occupational Therapist     Filed:  2018  8:54 AM Date of Service:  2018  8:17 AM Status:  Signed     :  Fabiola Manriquez OT (OT- Occupational Therapist)            [facility]  Outpatient OT - Daily Note    Date of Service:  2018             Visit #:  4  Approved for eval plus 6 visits  Date of referral: 2018   Patient Name: Gauri Hollis  YOB: 1975   Referring MD/Provider: Dr. Rivera  Diagnosis: bicipital tendonitis, pian in right forearm, Rt. Medial epicondyle  Treatment Diagnosis:pian, weakness,   Insurance:  Work Comp  Start of Care Date: 2018   Certification periods:  From:   2018  To: 2018    Subjective    Reports \"feels pretty good with only one day in between and no work.\"        Pain Ratin-5 / Location: Medial elbow an forearm flexors.       Objective    Today's Intervention:    High volt electrical stimulation (PPR1- 300PV unit), intensity 48 (monitored and controlled by patient), negative rectangle treatment electrode over medial epicondyle, round positive electrode on forearm 20 minute treatment time, to increase circulation, decrease pain and edema.      Moist hot pack to RUE elbow distally to fingertips, during e-stim, 6 layers + hot pack cover, to prepare soft tissue for stretch. 13 minutes application with skin check at 8 minutes. Additional towel provided for patient to utilize if heat became too high.      Manual Therapy to help improve tissue mobility, improve circulation and to decrease tissue tension thru the use of IASTM with the Graston Technique     GT4, GT6: sweep, fan through biceps, triceps and wrist flexors  GT^ Brush and strum , GT 2 swivel local lesions  GtT6: frame " around epicondyle   GT6, brush & strum; GT6 scoop off supracondylar ridge  GT4 sweep though pronator jerald (parallel and perpendicular to fibers  GT4 swivel interosseous membrane   GT4 [id number], fan, scoop & strum distal triceps   GT6 J stroke along muscle bellies      ** patient was informed of potential treatment responses produced by IASTM. S/he was informed to instruct provider with any discomfort or pain.         Following heat and e-stim, PROM with prolonged stretch to RUE: prolong stretch to wrist flexors,          Home Exercise Program: discuss at each visit.     Assessment    Therapist Assessment/Response to Intervention:   Significantly less dense tissue in the medial elbow and forearm.   Goals:  Patient s goal: to be able to work pain free     Functional short term goals (established in collaboration with the patient, to be met in 8 weeks):  1. patient will report the ability to squeeze out a sponge in her dominate right hand with pain levels , 3/10  2. Patient will report the ability to hold a plank position for up to 30 seconds with minimal discomfort.   3. Patient will report the ability to use keyboard for up to 30 min with minimal discomfort.   4. Patient will have reduction of median nerve symptoms in 4/7 mornings     Functional long terms goal to be met in 12 weeks:   1. Patient will have minimal discomfort with work and home management tasks.      Plan  Plan:  Continue with current treatment.     Student has been instructed in and demonstrates skills necessary to carry out above stated treatment plan: Yes    Thank you for your referral to [facility].  Please call with any questions, concerns or comments.  [phone]    Fabiola Manriquez OTR/L  Occupational Therapist

## 2018-02-13 NOTE — PROGRESS NOTES
Patient Information     Patient Name MRN Sex Gauri Snell [id number] Female 1975      Progress Notes by Cee Oliva, PT at 2018 12:24 PM     Author:  Cee Oliva, PT Service:  (none) Author Type:  PT- Physical Therapist     Filed:  2018  3:07 PM Date of Service:  2018 12:24 PM Status:  Signed     :  Cee Oliva PT (PT- Physical Therapist)            [facility]  Outpatient PT - Re-certification        Date of Service: 2018     Number of Visits: 5/6    Patient Name: Gauri Hollis   YOB: 1975   Referring MD/Provider: Chucky Rivera DO  Medical and Treatment Diagnosis:   Pain in shoulder region, right [M25.511]       Arm pain, right [M79.601]             PT Treatment Diagnosis: right shoulder and neck pain, right UE pain, right elbow dysfunction (epicondylitis, cubital tunnel syndrome, olecranon bursitis), postural dysfunction, facet restrictions, rib restrictions, myofascial pain/restrictions  Insurance: Work Comp  Start of Service: 11/15/17  Certification Dates: Start of Service: 11/15/17                     Medicare/[place] Re-Cert Due: 18  Re-certification Dates: 2018 to 2017 NOTE: authorization received for evaluation plus 5 additional PT sessions for a total of 6 with emphasis on HEP  2018 orders received and noted from Dr. Rivera for continued PT for 2X/week for 14 additional sessions. Will submit for further authorization. It is noted, however that there are scheduling difficulties with patient and therapist schedules.       Subjective    Gauri Hollis has been seen for 5 PT sessions to date for right shoulder and neck pain, right UE pain. She was last seen on 17. She since has seen Dr. Rivera with new orders as noted. She notes that with the holidays and then a vacation, she was unable to come in. Notes that she has been doing okay but feels like she has regressed some. She is  not as bad as at her eval, however. Notes that she had a week off from work last week and did better then. Came back to work yesterday and already feels more tightness in the arm. Exercises are helpful. Notes that with her arm [place], the most tightness is below 90 to 45 degrees. Notes pulling in through the lateral neck. Has more pain in the forearm with things like wringing out a washcloth. Wakes up with median nerve symptoms again which had resolved in December.     Pain Ratin = Mild Pain, (Bothersome, Annoying, Irritating, Nagging) / Location:  Right neck, UE pain.    Subjective rating of current functional limitations:     Patient Specific Functional and Pain Scales (PSFS):                         Clinician Instructions: Complete after the history and before the exam.                         Initial Assessment: We want to know what 3 activities in your life you are unable to perform, or are having the most difficulty performing, as a result of your chief problem. Please list and score at least 3 activities that you are unable to perform, or having the most difficulty performing, because of your chief problem.   Patient Specific Activity Scoring Scheme (score one number for each activity):   Activity  Initial Score (0-10)  0= Unable to perform activity  10= Able to perform activity at same level as before injury or problem 2018    1. Chopping vegetables for meal prep 5/10 9/10   2. Gripping steering wheel 4/10 10/10   3. Work: pushing/pulling and computer 3/10 6/10   Totals:  1230 = 40 % ability which relates to 60% impairment 25/30 =83% ability which relates to 17% impairment                         Patient verbally states that they understand that the information they have provided above is current and complete to the best of their knowledge.                         Patient Specific Functional Scale Modifier Scale Conversion: (patient's modifier that correlates with pt's score on PSFS): (10%  Impaired).              Functional Activity No Difficulty Mild Difficulty Mod. Difficulty Severe Difficulty   Sleeping  x X more median nerve numbness when I wake up. This had gone away       Walking x         Lifting/Carrying   x      Looking over her shoulder  x       Sitting/Driving    1 Hour x x       Work Activities   x x            Objective  Items left blank indicate that the test was inappropriate or not meaningful at the time of visit and therefore not performed.      Postural loading:  General Listening: right anterior shoulder/cervical  Head:  Restricted right to right and left to right  Shoulders: restricted right  Pelvis:  Restricted right to right       Lower Cervical Joint Mobility Testing:    Flex/Rot Right Flex/Rot Left Ext/Rot Right Ext/Rot Left Sidebend Right Sidebend Left Extension   C2-3                 C3-4                 C4-5 x   x           C5-6 x   x           C6-7 x   x           C7-T1             x      Upper Cervical Joint Mobility Testing:                        Neg with screening     THORACIC FACET RESTRICTIONS:    ERS right  ERS left Neutral (SB right/Rot left) Neutral (SB  left/Rot right) FRS right FRS left   C7-T1         x     T1-2               T2-3               T3-4         x     T4-5           x   T5-6               T6-7               T7-8               T8-9               T9-10               T10-11               T11-12               T12-L1               (FRS: Flexed, rotated and sidebent. ERS: Extended, rotated and sidebent.)     Structural Rib Dysfunctions:                        Superior Subluxed 1st Rib: right mild                        Laterally flexed 2nd Rib: right mild       Less myofascial tightness noted in the Clavipectoral Fascia fascia. Continued tightness in the middle and deep anterior cervical fascia as well as the scalenes and SCMs right more than left. Noted to have restriction at the hyoid right with decreased lateral loading. Tightness in the infra hyoid  fascia as well as the median raphe noted.  Improved  inferior loading of the right thorax. Decreased compressive loading of the right shoulder/upper arm slight increase in myofascial tightness in an inferior direction noted.   mild tightness noted with loading/compression of the right ribcage. Restriction noted in the brachial plexus, superior, inferior and retro to the clavicle but mild. Positive upper limb tension signs for median nerve.     Today's Intervention:    Reassessment  PSFS  MFR  Thoracic inlet, sternal release  MFR Clavipectoral Fascia   Viscoelastic release right shoulder  MFR middle and deep anterior cervical fascia, scalenes  MFR CT junction supine  MFR arm pull  MFR right ribcage    Continue current HEP. Will update at next session.    Home Exercise Program:  Snow angels  Anterior cervical fascia self stretch  Scapular retraction  Sidelying trunk rotation <, at and > 90 degrees hip flexion  sidelying shoulder circles  Supine QL stretch with ipsilateral snow sampson  Scalene self stretch  Clavipectoral Fascia stretch door frame  Supine neck extension stretch over edge of mat table, adding rotation and side bend to isolate stretch  Sidelying spiral fascia stretch    Assessment    Therapist Assessment / Clinical Impression:  Gauri Hollis has been seen 5 times in PT due to right shoulder and neck pain. She has had difficulty with more distal right UE complaints including epicondylitis, olecranon bursitis and cubital tunnel issues and has once again started OT services. She was doing well earlier in her episode of care but has now returned after 4 weeks without PT with some increased complaints noted. Neck and shoulder issues have been underlying and continue to cause limits in functional activities such as computer work and manipulating while at work while outside activities such as driving, sleep and meal prep are improved. Complaints are not unexpected due to regional interdependence of the neck and  upper thoracic/brachial plexus region with her more distal issues. She has been better able to manage her complaints with stretching. Clinically she presents with right shoulder and neck pain, right UE pain, right elbow dysfunction (epicondylitis, cubital tunnel syndrome, olecranon bursitis), postural dysfunction, facet restrictions, rib restrictions, myofascial pain/restrictions. Primary restrictions remains the right anterior cervical and shoulder region with  myofascial restriction and joint and rib dysfunctions.        Clinical Impression:                         Cervical and right shoulder pain with hx of epicondylitis, olecranon bursitis, cubital tunnel restrictions                        Facet Dysfunction:                        Postural Dysfunction:                        Myofascial:                        Neural restriction     Direct PT services are indicated to address the above noted impairments and to promote self management.        G codes and Modifier taken from patient completing the PSFS: 1/24/2018   Current Primary G Code and Modifier:                         Per the Patient's intake and/or assessment the Primary G Code is: Handling Objects .                        The Patient's Impairment, Limitation or Restriction Modifier would be best described as: CI 1% - 19% Impairment.   Goal Primary G Code and Modifier:                         The Patient's G Code Goal would be: Handling Objects                         The Patient's Impairment, Limitation or Restriction Modifier goal would be best described as: CI 1% - 19% Impairment. Would expect patient to improve within this impairment range.     Goals: 1/24/2018 All goals in progress and remain appropriate at this time unless noted below.   Patient goal (time reference required): to be able to do those daily activities at home and at work without nagging pain (2 months). 1/24/2018 improved at home but still difficulties at work   .     Long  term goal: Patient is to self-manage symptoms and return to prior function in 8 weeks.       Functional goals:   Patient is to be independent in their Home Exercise Program in 4 weeks. 1/24/2018 Goal met.   Patient is to tolerate walking with improved arm swing and pelvic rotation right with gait in 6 weeks.  Patient is report the ability to sleep 6 hours without awakening due to pain in 8 weeks. 1/24/2018 Goal met.   Patient is to display and maintain normal joint mobility/symmetry in the cervicothoracic spine and ribs to allow to perform cervical assessments and manipulation techniques with minimal increase in discomfort in 8 weeks.  Patient is to demonstrate ability to work at her computer with good posture and body mechanics for 30 minute intervals without symptoms in shoulder and neck in 8 weeks.  Patient is to report ability to drive 3 hours with rest break without increased symptoms  in 8 weeks.  Patient to display improved postural loading to allow for symmetrical weight bearing to promote more normal use of right shoulder and scapula in 8 weeks.         Response to Intervention: slightly more complaints after last being seen 4 weeks ago. Myofascial tightness, brachial plexus restrictions and rib/facet dysfunction contributing. Home exercise helpful.    Plan    Updated plan of care as below per physician recommendation. Due to continued functional limitations and work and the above noted clinical impairments, continued PT is indicated, tapering frequency as appropriate.    Treatment Plan / Targeted Outcomes:     Frequency:   14 additional visits     Duration of Treatment: 2 to 3 months (duration extended due to scheduling difficulties related to patient and therapist work schedules)    Planned Interventions:  May include any of the following:  Home Exercise Program development  Therapeutic Exercise (ROM & Strengthening)  Manual Therapy  Neuromuscular Re-education  Ultrasound  Electrical  Stimulation  Therapeutic Activities  Gait Training  Posture and Body Mechanics Education         Plan for next visit:  Manual therapy, MET, assess right ribcage, exercise    Student or PTA has been instructed in and demonstrates skills necessary to carry out above stated treatment plan: Yes    Thank you for your referral to [facility].  Please call with any questions, concerns or comments.  [phone]

## 2018-02-13 NOTE — MR AVS SNAPSHOT
After Visit Summary   2/13/2018    Gauri Hollis    MRN: [id number]           Patient Information     Date Of Birth          1975        Visit Information        Provider Department      2/13/2018 9:30 AM Serene Vogel MD [facility]        Today's Diagnoses     Influenza-like illness    -  1    Throat pain           Follow-ups after your visit        Your next 10 appointments already scheduled     Feb 14, 2018 10:45 AM CST   Treatment with Fabiola Manriquez, OT   [facility] ([facility])    [address] [address]   [phone]            Feb 14, 2018 11:30 AM CST   Treatment with Cee D Kinnunen, PT   [facility] ([facility])    [address] [address]   [phone]            Feb 21, 2018 11:00 AM CST   Treatment with Cee D Kinnunen, PT   [facility] ([facility])    [address] [address]   [phone]            Feb 27, 2018 11:30 AM CST   Treatment with Cee D Kinnunen, PT   [facility] ([facility])    [address] [address]   [phone]            Feb 28, 2018 11:15 AM CST   Treatment with Fabiola Manriquez, OT   [facility] ([facility])    [address] [address]   [phone]            Mar 01, 2018 11:30 AM CST   Treatment with Cee D Kinnunen, PT   [facility] ([facility])    [address] [address]   [phone]            Mar 06, 2018 11:30 AM CST   Treatment with Cee D Kinnunen, PT   [facility] ([facility])    [address] [address]   [phone]            Mar 14, 2018 11:00 AM CDT   Treatment  "with Cee Oliva, PT   [facility] ([facility])    [address] [facility] [address] [phone]              Who to contact     If you have questions or need follow up information about today's clinic visit or your schedule please contact [facility] directly at [phone].  Normal or non-critical lab and imaging results will be communicated to you by MyRefershart, letter or phone within 4 business days after the clinic has received the results. If you do not hear from us within 7 days, please contact the clinic through MyRefershart or phone. If you have a critical or abnormal lab result, we will notify you by phone as soon as possible.  Submit refill requests through [employer] or call your pharmacy and they will forward the refill request to us. Please allow 3 business days for your refill to be completed.          Additional Information About Your Visit        MyRefersharWhisper Information     [employer] lets you send messages to your doctor, view your test results, renew your prescriptions, schedule appointments and more. To sign up, go to [email or web address] . Click on \"Log in\" on the left side of the screen, which will take you to the Welcome page. Then click on \"Sign up Now\" on the right side of the page.     You will be asked to enter the access code listed below, as well as some personal information. Please follow the directions to create your username and password.     Your access code is: [email or web address]  Expires: 2018  4:00 PM     Your access code will  in 90 days. If you need help or a new code, please call your [facility] clinic or [phone].        Care EveryWhere ID     This is your Care EveryWhere ID. This could be used by other organizations to access your [facility] medical records  [id number]        Your Vitals Were     Pulse Temperature Height Last Period Breastfeeding? BMI (Body Mass Index)    76 99.1  F (37.3  C) (Tympanic) " "5' 6\" (1.676 m) 01/22/2018 No 28.57 kg/m2       Blood Pressure from Last 3 Encounters:   02/13/18 104/80   01/12/18 110/78   11/08/17 108/74    Weight from Last 3 Encounters:   02/13/18 177 lb (80.3 kg)   01/12/18 179 lb (81.2 kg)   11/08/17 179 lb (81.2 kg)              We Performed the Following     Strep, Rapid Screen        Primary Care Provider Office Phone # Fax #    Patricia Zavala [phone]       [address]  [place] [address]        Equal Access to Services     [facility] : Hadii aad matthew hadasho Soyassineali, waaxda luqadaha, qaybta kaalmada adedavidyada, good whitaker . So [facility] [phone].    ATENCIÓN: Si habla español, tiene a woody disposición servicios gratuitos de asistencia lingüística. Llame al [phone].    We comply with applicable federal civil rights laws and [place] laws. We do not discriminate on the basis of race, color, national origin, age, disability, sex, sexual orientation, or gender identity.            Thank you!     Thank you for choosing [facility] AND [facility]  for your care. Our goal is always to provide you with excellent care. Hearing back from our patients is one way we can continue to improve our services. Please take a few minutes to complete the written survey that you may receive in the mail after your visit with us. Thank you!             Your Updated Medication List - Protect others around you: Learn how to safely use, store and throw away your medicines at www.disposemymeds.org.          This list is accurate as of 2/13/18  4:00 PM.  Always use your most recent med list.                   Brand Name Dispense Instructions for use Diagnosis    CHOLINE BITARTRATE PO      As directed.        D 5000 5000 UNITS Tabs   Generic drug:  Cholecalciferol      Take by mouth daily        DOCOSAHEXAENOIC ACID PO           escitalopram 10 MG tablet    LEXAPRO     Take 5 mg by mouth daily        fish oil-omega-3 fatty acids " 1000 MG capsule           fluticasone 50 MCG/ACT spray    FLONASE     Spray 1 spray into both nostrils daily        saccharomyces boulardii 250 MG capsule    FLORASTOR     Take 1 capsule by mouth daily        triamcinolone 0.1 % cream    KENALOG

## 2018-02-13 NOTE — INITIAL ASSESSMENTS
Patient Information     Patient Name MRN Sex Gauri Snell [id number] Female 1975      Initial Assessments by Fabiola Manriquez OT at 2018 12:28 PM     Author:  Fabiola Manriquez OT Service:  (none) Author Type:  OT- Occupational Therapist     Filed:  2018  8:59 AM Date of Service:  2018 12:28 PM Status:  Signed     :  Fabiola Manriquez OT (OT- Occupational Therapist)            [facility]  Outpatient OT   Upper Extremity Initial Evaluation      Date of Service:  2018 Visit #:  1  Approved for eval plus 6 visits  Date of referral: 2018   Patient Name: Gauri Hollis  YOB: 1975   Referring MD/Provider: Dr. Rivera  Diagnosis: bicipital tendonitis, pian in right forearm, Rt. 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 Several months ago she began having increase pain in the right elbow and forearm area. She received skilled OT and PT and had good improvement of symptoms. She than was on vacation and sick around the holidays and had a set back in pian and function. She reports pian with squeezing out a sponge, performing a plank position, and using keyboard. She has been using a more ergonomical keyboard with reduction in symptoms.     Pain Ratin/10 / Location:  Medial elbow and forearm flexors  Level of Functional Ability prior to above: independent in self cares and home management tasks.   Current functional limitations due to above:  Precautions:  na  Cognition:  Oriented to Person, Place, and Time.     Were cultural / age or other special adaptations needed? No      Patient is a vulnerable adult: No      Patient is aware of diagnosis: Yes      Risks and benefits  explained: Yes  PMH:       Past Medical History:     Diagnosis  Date     Bicipital tendonitis at the elbow 2017     Cubital tunnel syndrome, bilateral 2017     Elbow dislocation     left; age 18      Family history of breast cancer      Hx of pregnancy      - PPH, retained placenta, transfusion      Postpartum hemorrhage 2011     Right medial epicondylitis 2017     Vitamin D deficiency 2015       Fall Risk Screening:  Have you fallen 2 more more times in the past year? no  Have you fallen and had an injury in the past year? no  If the patient answers yes to either of the above questions, a Timed Up and Go (TUG) test will be performed. A referral to physical therapy will be initiated if: 1) TUG test of greater than 13.5 seconds, or 2) inability to participate in the TUG test due to needing assistance by another person for ambulation.          Patient Specific Functional and Pain Scales (PSFS):   2018   Clinician Instructions: Complete after the history and before the exam.    Initial Assessment: We want to know what 3 activities in your life you are unable to perform, or are having the most difficulty performing, as a result of your chief problem. Please list and score at least 3 activities that you are unable to perform, or having the most difficulty performing, because of your chief problem.   Patient Specific Activity Scoring Scheme (score one number for each activity):   Activity Score (0-10)  0= Unable to perform activity  10= Able to perform activity at same level as before injury or problem      1. squeezing out a sponge 5/10   2.median nerve Sx in AM 6/10   3. keybaording 4/10   Totals:  15/30 = 50 % ability which relates to 50% impairment    Patient verbally states that they understand that the information they have provided above is current and complete to the best of their knowledge.    Patient Specific Functional Scale Modifier Scale Conversion: (patient's modifier  that correlates with pt's score on PSFS): 5-CK (50% Impaired).    G codes and Modifier taken from patient completing the PSFS:   Initial Primary G Code and Modifier:    Per the Patient's intake and/or assessment the Primary G Code is: Self Care .   The Patient's Impairment, Limitation or Restriction Modifier would be best described as: CK - 40% - 60% Impairment.   Goal Primary G Code and Modifier:    The Patient's G Code Goal would be: Self Care    The Patient's Impairment, Limitation or Restriction Modifier goal would be best described as: CI - 1% - 20% Impairment.         Objective:    Hand Dominance:  right    ROM Norms Right Left Strength Right Norms Left Norms   Elbow Extension 0    69P 70.4 73 66.3   Elbow Flexion 140   Lateral pinch 13 16.1 13 15.8   Supination 90   3 point pinch  10 17.0 10 16.6   Pronation 90 90 90 2 point pinch 9 11.5 7 11.1   Wrist Flexion 80 87 90        Wrist Extension 70 70 85        Radial Deviation 20 20 21         Ulnar Deviation 30 27 36        P = pain with testing  (Therapist use only: dynamometer #   , pinch gauge #    )  **measurements in degrees          Special Tests:  Medial Epicondylitis: +   Lateral Epicondylitis: -      Assessment:    Signs and symptoms are consistent with:  Flexor stiffness     Problem list includes:  Pain stiffness    Patient s goal: to be able to work pain free    Functional short term goals (established in collaboration with the patient, to be met in 8 weeks):  1. patient will report the ability to squeeze out a sponge in her dominate right hand with pain levels , 3/10  2. Patient will report the ability to hold a plank position for up to 30 seconds with minimal discomfort.   3. Patient will report the ability to use keyboard for up to 30 min with minimal discomfort.   4. Patient will have reduction of median nerve symptoms in 4/7 mornings    Functional long terms goal to be met in 12 weeks:   1. Patient will have minimal discomfort with  work and home management tasks.       Rehab potential:  Fair  for stated goals.    Risk, benefits, and alternatives were discussed with patient. Patient agrees with the plan of care.    Today s treatment included:      Response to intervention: evaluation, High volt electrical stimulation (PPR1- 300PV unit), intensity 48 (monitored and controlled by patient), negative rectangle treatment electrode over medial epicondyle, round positive electrode on forearm 20 minute treatment time, to increase circulation, decrease pain and edema.     Moist hot pack to RUE elbow distally to fingertips, during e-stim, 6 layers + hot pack cover, to prepare soft tissue for stretch. 13 minutes application with skin check at 8 minutes. Additional towel provided for patient to utilize if heat became too high.     Manual Therapy to help improve tissue mobility, improve circulation and to decrease tissue tension thru the use of IASTM with the Graston Technique    GT4, GT6: sweep, fan through biceps, triceps and wrist flexors  GT^ Brush and strum , GT 2 swivel local lesions  GtT6: frame around epicondyle   GT6, brush & strum; GT6 scoop off supracondylar ridge  GT4 sweep though pronator jerald (parallel and perpendicular to fibers  GT4 swivel interosseous membrane   GT4 FO36Tvixt, fan, scoop & strum distal triceps   GT6 J stroke along muscle bellies     ** patient was informed of potential treatment responses produced by IASTM. S/he was informed to instruct provider with any discomfort or pain.       Following heat and e-stim, PROM with prolonged stretch to RUE: prolong stretch to wrist flexors,     Plan:    Treatment Plan:    Home programming  Therapeutic exercise   Self care/ home management   Manual therapy   Therapeutic activities   NMR   Ultrasound   Phonophoresis (10% Ketoprofen)   Iontophoresis (.2% Dexamethasone)   Superficial modalities prn   Electrical stimulation, including NMES   Orthotic management   Cognitive skills  development   Standardized testing   ROM hand measurements  Other    Frequency/Duration:  eval plus 6 visits as needed    Plan: continue with e-stim, warm pack, stretch, and  IASTM.     Student has been instructed in and demonstrates skills necessary to carry out above stated treatment plan.     Thank you for your referral to [facility]. Please call with any questions, concerns or comments [phone].      Fabiola Manriquez OTR/L  Occupational Therapist

## 2018-02-13 NOTE — PROGRESS NOTES
"  SUBJECTIVE:   Gauri Hollis is a 42 year old female who presents to clinic today for the following health issues: cough and sore throat     HPI Gauri Hollis is a 42 year old female in for eval of sore throat, right thoracic pain, cough and chest congestion.  Onset of upper back pain 8 days ago - went to chiro and her usually techniques did not help.  Chest more congested over the past 4 days.  Has been having increasing sore throat.  Feverish, chilled.  + nausea.  No diarrhea or vomiting.    She is tolerating regular food, fluids    RESPIRATORY SYMPTOMS      Duration: 4 days     Description  sore throat, cough, chills, headache, fatigue/malaise, myalgias and nausea    Severity: moderate    Accompanying signs and symptoms: thoracic pain    History (predisposing factors):  Possible work exposure    Precipitating or alleviating factors: None    Therapies tried and outcome:  Tylenol PM     Problem list and histories reviewed & adjusted, as indicated.  Additional history: none       Allergies   Allergen Reactions     Cats Other (See Comments)     congestion     Chlorhexidine Itching     No rash.  Also felt \"irritable\" after using wipes.     Current Outpatient Prescriptions   Medication     Cholecalciferol (D 5000) 5000 UNITS TABS     CHOLINE BITARTRATE PO     escitalopram (LEXAPRO) 10 MG tablet     fluticasone (FLONASE) 50 MCG/ACT spray     DOCOSAHEXAENOIC ACID PO     triamcinolone (KENALOG) 0.1 % cream     saccharomyces boulardii (FLORASTOR) 250 MG capsule     fish oil-omega-3 fatty acids 1000 MG capsule     No current facility-administered medications for this visit.          Patient Active Problem List   Diagnosis     Anxiety disorder     Past Surgical History:   Procedure Laterality Date     DILATION AND CURETTAGE      10/2011,for retained placenta     EXTRACTION(S) DENTAL      No Comments Provided     OTHER SURGICAL HISTORY      4/1/2015,205138,DILATION AND CURETTAGE,for missed AB     OTHER SURGICAL HISTORY   " "   05/2017,747416,DILATION AND CURETTAGE,SAB       Social History   Substance Use Topics     Smoking status: Never Smoker     Smokeless tobacco: Never Used     Alcohol use No      Comment: Alcoholic Drinks/day: 3 per week     Family History   Problem Relation Age of Onset     Breast Cancer Mother 53     Cancer-breast     Type 2 Diabetes Mother      Diabetes type II     HEART DISEASE Father 42     Heart Disease,MI; history of rheumatic fever and asd     Type 2 Diabetes Father      Diabetes type II     Thyroid Disease Sister      Thyroid Disease,hypothyroidism     Depression Brother      Depression           ROS:  CONSTITUTIONAL:chills and fatigue  ENT/MOUTH: hoarse voice and sore throat  RESP:cough-non productive  CV: NEGATIVE for chest pain, palpitations or peripheral edema    OBJECTIVE:     /80 (BP Location: Right arm, Patient Position: Sitting, Cuff Size: Adult Large)  Pulse 76  Temp 99.1  F (37.3  C) (Tympanic)  Ht 5' 6\" (1.676 m)  Wt 177 lb (80.3 kg)  LMP 01/22/2018  Breastfeeding? No  BMI 28.57 kg/m2  Body mass index is 28.57 kg/(m^2).  GENERAL: healthy, alert and no distress  NECK: mild tenderness.  HEENT:  Exudate on the rt  RESP: lungs clear to auscultation - no rales, rhonchi or wheezes  CV: regular rate and rhythm, normal S1 S2, no S3 or S4, no murmur, click or rub, no peripheral edema and peripheral pulses strong  MS: no gross musculoskeletal defects noted, no edema    Diagnostic Test Results:  Results for orders placed or performed in visit on 02/13/18 (from the past 24 hour(s))   Strep, Rapid Screen   Result Value Ref Range    Specimen Description Throat     Rapid Strep A Screen       Negative presumptive for Group A Beta Streptococcus       ASSESSMENT/PLAN:     1. Influenza-like illness    2. Throat pain          1.  Patient was contacted with strep test results.  She was offered prescription for Tamiflu, declined at this time.  Discussed ongoing fluids, symptomatic care.  Follow-up if " symptoms fail to improve    GAIL SUE MD  [facility]

## 2018-02-14 ENCOUNTER — HOSPITAL ENCOUNTER (OUTPATIENT)
Dept: OCCUPATIONAL THERAPY | Facility: OTHER | Age: 43
Setting detail: THERAPIES SERIES
End: 2018-02-14
Attending: OBSTETRICS & GYNECOLOGY
Payer: OTHER MISCELLANEOUS

## 2018-02-14 ENCOUNTER — HOSPITAL ENCOUNTER (OUTPATIENT)
Dept: PHYSICAL THERAPY | Facility: OTHER | Age: 43
Setting detail: THERAPIES SERIES
End: 2018-02-14
Attending: OBSTETRICS & GYNECOLOGY
Payer: OTHER MISCELLANEOUS

## 2018-02-14 PROCEDURE — 97110 THERAPEUTIC EXERCISES: CPT | Mod: GP

## 2018-02-14 PROCEDURE — 97112 NEUROMUSCULAR REEDUCATION: CPT | Mod: GP

## 2018-02-14 PROCEDURE — 97140 MANUAL THERAPY 1/> REGIONS: CPT | Mod: GP

## 2018-02-14 PROCEDURE — 97140 MANUAL THERAPY 1/> REGIONS: CPT | Mod: GO | Performed by: OCCUPATIONAL THERAPIST

## 2018-02-14 PROCEDURE — 97032 APPL MODALITY 1+ESTIM EA 15: CPT | Mod: GO | Performed by: OCCUPATIONAL THERAPIST

## 2018-02-14 PROCEDURE — 40000185 ZZHC STATISTIC PT OUTPT VISIT

## 2018-02-14 PROCEDURE — 97110 THERAPEUTIC EXERCISES: CPT | Mod: GO | Performed by: OCCUPATIONAL THERAPIST

## 2018-02-19 ENCOUNTER — OFFICE VISIT (OUTPATIENT)
Dept: FAMILY MEDICINE | Facility: OTHER | Age: 43
End: 2018-02-19
Attending: FAMILY MEDICINE
Payer: COMMERCIAL

## 2018-02-19 VITALS
SYSTOLIC BLOOD PRESSURE: 102 MMHG | HEART RATE: 86 BPM | BODY MASS INDEX: 28.08 KG/M2 | DIASTOLIC BLOOD PRESSURE: 70 MMHG | WEIGHT: 174 LBS | TEMPERATURE: 97.6 F

## 2018-02-19 DIAGNOSIS — J01.01 ACUTE RECURRENT MAXILLARY SINUSITIS: Primary | ICD-10-CM

## 2018-02-19 DIAGNOSIS — E55.9 VITAMIN D INSUFFICIENCY: ICD-10-CM

## 2018-02-19 LAB — DEPRECATED CALCIDIOL+CALCIFEROL SERPL-MC: 29.9 NG/ML

## 2018-02-19 PROCEDURE — 82306 VITAMIN D 25 HYDROXY: CPT | Performed by: FAMILY MEDICINE

## 2018-02-19 PROCEDURE — 36415 COLL VENOUS BLD VENIPUNCTURE: CPT | Performed by: FAMILY MEDICINE

## 2018-02-19 PROCEDURE — 99213 OFFICE O/P EST LOW 20 MIN: CPT | Performed by: FAMILY MEDICINE

## 2018-02-19 RX ORDER — AMOXICILLIN 875 MG
875 TABLET ORAL 2 TIMES DAILY
Qty: 20 TABLET | Refills: 0 | Status: SHIPPED | OUTPATIENT
Start: 2018-02-19 | End: 2018-06-14

## 2018-02-19 ASSESSMENT — ENCOUNTER SYMPTOMS
NAUSEA: 1
COUGH: 1
SINUS PRESSURE: 1
APPETITE CHANGE: 1
SINUS PAIN: 1
DIARRHEA: 1
CHILLS: 1
RHINORRHEA: 1
FATIGUE: 1
SORE THROAT: 1
WHEEZING: 0

## 2018-02-19 NOTE — NURSING NOTE
Patient presenting with increasing sinus congestion over the last week along with chills and body aches.  Swati Ball LPN 2/19/2018 10:13 AM

## 2018-02-19 NOTE — PROGRESS NOTES
"Nursing Notes:   Swati Ball LPN  2/19/2018 10:13 AM  Unsigned  Patient presenting with increasing sinus congestion over the last week along with chills and body aches.  Swati Ball LPN 2/19/2018 10:13 AM       SUBJECTIVE:   CC:  Gauri Hollis is a 42 year old female who presents to clinic today for the following health issues:  Nursing Notes:   Swati Ball LPN  2/19/2018 10:13 AM  Unsigned  Patient presenting with increasing sinus congestion over the last week along with chills and body aches.  Swati Ball LPN 2/19/2018 10:13 AM      HPI  Gauri Hollis is a 42 year old female in for follow up of ongoing respiratory and MARCELINA symptom.  She was seen in clinic about 6 days ago.  4 days ago has had increased sinus headache and pressure, colored drainage.  + cough - worse at night.  Taking over the counter medications without much relief.  Looser stools, vomited this morning.  + chills, myalgias - no fever back.  Has felt feverish however.    Has been able to eat/drink - just decreased appetite.     Allergies   Allergen Reactions     Cats Other (See Comments)     congestion     Chlorhexidine Itching     No rash.  Also felt \"irritable\" after using wipes.     Current Outpatient Prescriptions   Medication     amoxicillin (AMOXIL) 875 MG tablet     escitalopram (LEXAPRO) 10 MG tablet     fluticasone (FLONASE) 50 MCG/ACT spray     Cholecalciferol (D 5000) 5000 UNITS TABS     CHOLINE BITARTRATE PO     DOCOSAHEXAENOIC ACID PO     triamcinolone (KENALOG) 0.1 % cream     saccharomyces boulardii (FLORASTOR) 250 MG capsule     fish oil-omega-3 fatty acids 1000 MG capsule     No current facility-administered medications for this visit.       Patient Active Problem List    Diagnosis Date Noted     Anxiety disorder 11/08/2017     Priority: Medium       Review of Systems   Constitutional: Positive for appetite change, chills and fatigue.        Would like vitamin D level rechecked - not taking her " supplement for some time.   HENT: Positive for congestion, nosebleeds, postnasal drip, rhinorrhea, sinus pain, sinus pressure and sore throat. Negative for ear pain.    Respiratory: Positive for cough. Negative for wheezing.    Gastrointestinal: Positive for diarrhea and nausea.        OBJECTIVE:     /70 (BP Location: Right arm, Patient Position: Sitting, Cuff Size: Adult Regular)  Pulse 86  Temp 97.6  F (36.4  C) (Oral)  Wt 174 lb (78.9 kg)  LMP 01/22/2018  Breastfeeding? No  BMI 28.08 kg/m2  Body mass index is 28.08 kg/(m^2).  Physical Exam   Constitutional:   Mildly hoarse voice   HENT:   Purulent nasal drainage.  TMs normal.  Tender sinuses - maxillary.  Throat with drainage   Cardiovascular: Normal rate and regular rhythm.    Pulmonary/Chest: Breath sounds normal. She has no wheezes.   Lymphadenopathy:     She has cervical adenopathy.   Nursing note and vitals reviewed.      Diagnostic Test Results:  none     ASSESSMENT/PLAN:       1. Acute recurrent maxillary sinusitis    2. Vitamin D insufficiency          PLAN:  1.  Given symptom as complication of MARCELINA - will treat with amoxicillin 875 mg bid for 10 days.  Discussed ongoing symptomatic cares.  Follow up if not improving.  2.  Vitamin D level to be rechecked today.        GAIL SUE MD  [facility]

## 2018-02-19 NOTE — MR AVS SNAPSHOT
After Visit Summary   2/19/2018    Gauri Hollis    MRN: [id number]           Patient Information     Date Of Birth          1975        Visit Information        Provider Department      2/19/2018 10:00 AM Serene Vogel MD [facility]        Today's Diagnoses     Acute recurrent maxillary sinusitis    -  1    Vitamin D insufficiency           Follow-ups after your visit        Your next 10 appointments already scheduled     Feb 21, 2018 11:00 AM CST   Treatment with Cee D Kinnunen, PT   [facility] ([facility])    [address] [address]   [phone]            Feb 27, 2018 11:30 AM CST   Treatment with Cee D Kinnunen, PT   [facility] ([facility])    [address] [address]   [phone]            Feb 28, 2018 11:15 AM CST   Treatment with Fabiola Manriquez, OT   [facility] ([facility])    [address] [address]   [phone]            Mar 01, 2018 11:30 AM CST   Treatment with Cee D Kinnunen, PT   [facility] ([facility])    [address] [address]   [phone]            Mar 06, 2018 11:30 AM CST   Treatment with Cee D Kinnunen, PT   [facility] ([facility])    [address] [address]   [phone]            Mar 14, 2018 11:00 AM CDT   Treatment with Cee D Kinnunen, PT   [facility] ([facility])    [address] [address]   [phone]              Who to contact     If you have questions or need follow up information about today's clinic visit or your schedule please contact [facility] directly at [phone].  Normal or non-critical lab and  imaging results will be communicated to you by [employer], letter or phone within 4 business days after the clinic has received the results. If you do not hear from us within 7 days, please contact the clinic through [employer] or phone. If you have a critical or abnormal lab result, we will notify you by phone as soon as possible.  Submit refill requests through [employer] or call your pharmacy and they will forward the refill request to us. Please allow 3 business days for your refill to be completed.          Additional Information About Your Visit        [employer] Information     [employer] gives you secure access to your electronic health record. If you see a primary care provider, you can also send messages to your care team and make appointments. If you have questions, please call your primary care clinic.  If you do not have a primary care provider, please call [phone] and they will assist you.        Care EveryWhere ID     This is your Care EveryWhere ID. This could be used by other organizations to access your [place] medical records  [id number]        Your Vitals Were     Pulse Temperature Last Period Breastfeeding? BMI (Body Mass Index)       86 97.6  F (36.4  C) (Oral) 01/22/2018 No 28.08 kg/m2        Blood Pressure from Last 3 Encounters:   02/19/18 102/70   02/13/18 104/80   01/12/18 110/78    Weight from Last 3 Encounters:   02/19/18 174 lb (78.9 kg)   02/13/18 177 lb (80.3 kg)   01/12/18 179 lb (81.2 kg)              We Performed the Following     Vitamin D Total GH          Today's Medication Changes          These changes are accurate as of 2/19/18 10:52 AM.  If you have any questions, ask your nurse or doctor.               Start taking these medicines.        Dose/Directions    amoxicillin 875 MG tablet   Commonly known as:  AMOXIL   Used for:  Acute recurrent maxillary sinusitis   Started by:  Serene Vogel MD        Dose:  875 mg   Take 1 tablet (875 mg) by mouth 2 times daily    Quantity:  20 tablet   Refills:  0            Where to get your medicines      These medications were sent to [facility], - [place] - [address]  [address], [place] [address]     Phone:  [phone]     amoxicillin 875 MG tablet                Primary Care Provider Office Phone # Fax #    Patricia Zavala [phone]       [address]  [place] [address]        Equal Access to Services     [facility]: Hadii aad ku hadasho Soomaali, waaxda luqadaha, qaybta kaalmada adeegyada, waxay idiin hayaan adedavid whitaker . So [facility] [phone].    ATENCIÓN: Si habla español, tiene a woody disposición servicios gratuitos de asistencia lingüística. [facility] [phone].    We comply with applicable federal civil rights laws and [place] laws. We do not discriminate on the basis of race, color, national origin, age, disability, sex, sexual orientation, or gender identity.            Thank you!     Thank you for choosing [facility] AND [facility]  for your care. Our goal is always to provide you with excellent care. Hearing back from our patients is one way we can continue to improve our services. Please take a few minutes to complete the written survey that you may receive in the mail after your visit with us. Thank you!             Your Updated Medication List - Protect others around you: Learn how to safely use, store and throw away your medicines at www.disposemymeds.org.          This list is accurate as of 2/19/18 10:52 AM.  Always use your most recent med list.                   Brand Name Dispense Instructions for use Diagnosis    amoxicillin 875 MG tablet    AMOXIL    20 tablet    Take 1 tablet (875 mg) by mouth 2 times daily    Acute recurrent maxillary sinusitis       CHOLINE BITARTRATE PO      As directed.        D 5000 5000 UNITS Tabs   Generic drug:  Cholecalciferol      Take by mouth daily        DOCOSAHEXAENOIC ACID  PO           escitalopram 10 MG tablet    LEXAPRO     Take 5 mg by mouth daily        fish oil-omega-3 fatty acids 1000 MG capsule           fluticasone 50 MCG/ACT spray    FLONASE     Spray 1 spray into both nostrils daily        saccharomyces boulardii 250 MG capsule    FLORASTOR     Take 1 capsule by mouth daily        triamcinolone 0.1 % cream    KENALOG

## 2018-02-21 ENCOUNTER — DOCUMENTATION ONLY (OUTPATIENT)
Dept: FAMILY MEDICINE | Facility: OTHER | Age: 43
End: 2018-02-21

## 2018-02-21 ENCOUNTER — HOSPITAL ENCOUNTER (OUTPATIENT)
Dept: PHYSICAL THERAPY | Facility: OTHER | Age: 43
Setting detail: THERAPIES SERIES
End: 2018-02-21
Attending: OBSTETRICS & GYNECOLOGY
Payer: OTHER MISCELLANEOUS

## 2018-02-21 PROCEDURE — 97110 THERAPEUTIC EXERCISES: CPT | Mod: GP

## 2018-02-21 PROCEDURE — 97140 MANUAL THERAPY 1/> REGIONS: CPT | Mod: GP

## 2018-02-21 PROCEDURE — 97112 NEUROMUSCULAR REEDUCATION: CPT | Mod: GP

## 2018-02-21 PROCEDURE — 40000185 ZZHC STATISTIC PT OUTPT VISIT

## 2018-02-28 ENCOUNTER — HOSPITAL ENCOUNTER (OUTPATIENT)
Dept: OCCUPATIONAL THERAPY | Facility: OTHER | Age: 43
Setting detail: THERAPIES SERIES
End: 2018-02-28
Attending: OBSTETRICS & GYNECOLOGY
Payer: OTHER MISCELLANEOUS

## 2018-02-28 PROCEDURE — 97110 THERAPEUTIC EXERCISES: CPT | Mod: GO | Performed by: OCCUPATIONAL THERAPIST

## 2018-02-28 PROCEDURE — 97140 MANUAL THERAPY 1/> REGIONS: CPT | Mod: GO | Performed by: OCCUPATIONAL THERAPIST

## 2018-02-28 PROCEDURE — 97032 APPL MODALITY 1+ESTIM EA 15: CPT | Mod: GO | Performed by: OCCUPATIONAL THERAPIST

## 2018-03-01 ENCOUNTER — HOSPITAL ENCOUNTER (OUTPATIENT)
Dept: PHYSICAL THERAPY | Facility: OTHER | Age: 43
Setting detail: THERAPIES SERIES
End: 2018-03-01
Attending: OBSTETRICS & GYNECOLOGY
Payer: COMMERCIAL

## 2018-03-01 PROCEDURE — 97112 NEUROMUSCULAR REEDUCATION: CPT | Mod: GP

## 2018-03-01 PROCEDURE — 40000185 ZZHC STATISTIC PT OUTPT VISIT

## 2018-03-01 PROCEDURE — 97140 MANUAL THERAPY 1/> REGIONS: CPT | Mod: GP

## 2018-03-06 ENCOUNTER — HOSPITAL ENCOUNTER (OUTPATIENT)
Dept: PHYSICAL THERAPY | Facility: OTHER | Age: 43
Setting detail: THERAPIES SERIES
End: 2018-03-06
Attending: OBSTETRICS & GYNECOLOGY
Payer: COMMERCIAL

## 2018-03-06 PROCEDURE — 97140 MANUAL THERAPY 1/> REGIONS: CPT | Mod: GP

## 2018-03-06 PROCEDURE — 40000185 ZZHC STATISTIC PT OUTPT VISIT

## 2018-03-06 PROCEDURE — 97112 NEUROMUSCULAR REEDUCATION: CPT | Mod: GP

## 2018-03-16 NOTE — ADDENDUM NOTE
Encounter addended by: Cee Oliva, PT on: 3/16/2018  3:13 PM<BR>     Actions taken: Flowsheet accepted

## 2018-03-16 NOTE — ADDENDUM NOTE
Encounter addended by: Cee Oliva, PT on: 3/16/2018  3:14 PM<BR>     Actions taken: Flowsheet accepted

## 2018-04-04 ENCOUNTER — HOSPITAL ENCOUNTER (OUTPATIENT)
Dept: PHYSICAL THERAPY | Facility: OTHER | Age: 43
Setting detail: THERAPIES SERIES
End: 2018-04-04
Attending: OBSTETRICS & GYNECOLOGY
Payer: OTHER MISCELLANEOUS

## 2018-04-04 PROCEDURE — 97112 NEUROMUSCULAR REEDUCATION: CPT | Mod: GP

## 2018-04-04 PROCEDURE — 97140 MANUAL THERAPY 1/> REGIONS: CPT | Mod: GP

## 2018-04-04 PROCEDURE — 40000185 ZZHC STATISTIC PT OUTPT VISIT

## 2018-04-04 NOTE — PROGRESS NOTES
Outpatient Physical Therapy Discharge Note     Patient: Gauri Hollis  : 1975    Beginning/End Dates of Reporting Period:  18 to 2018    Referring Provider: DO Camille Carrillo Diagnosis: right shoulder and neck pain, right UE pain, right elbow dysfunction (epicondylitis, cubital tunnel syndrome, olecranon bursitis), postural dysfunction, facet restrictions, rib restrictions, myofascial pain/restrictions     Client Self Report: Notes that today will be her last PT visit as she had a letter from her doctor stating she had met maximum medical improvement. Notes that she was on vacation and notes that she did very well while gone. Since she is back at work this week, she notes that she has been having some tension headaches. Notes that with coming back to work, she has had some increased tension and soreness in her R UE, neck and anterior thoracis region. Nothing  keeps her from her job duties, however.     Objective Measurements:  Objective Measure: postural loading  Details: general listening to right  anterolateral thoracic, head, mild R/R, shoulders mild R/R , pelvis R/R proximal mild  Objective Measure: Joint mobility  Details: Minimal findings with restrictions at end ranges: FRS right T1-2, T23, FRS left C7T1  Objective Measure: myofascial  Details: Much improved overall. Restriction in the right clavipectoral fascia. Right superior thorax with inferior loading noted to be mildly restricted,. Improved arm pull and improved cervical mobiltiy.          Goals:  Goal Identifier self management   Goal Description Patient is to self-manage symptoms and return to prior function   Target Date 18   Date Met  18   Progress:     Goal Identifier gait   Goal Description Patient is to tolerate walking with improved arm swing and pelvic rotation right with gait in 6 weeks.   Target Date 18   Date Met  18   Progress:     Goal Identifier joint mobility   Goal Description  Patient is to display and maintain normal joint mobility/symmetry in the cervicothoracic spine and ribs to allow to perform cervical assessments and manipulation techniques with minimal increase in discomfort in 8 weeks.   Target Date 03/21/18   Date Met  04/04/18   Progress:     Goal Identifier posture   Goal Description Patient is to demonstrate ability to work at her computer with good posture and body mechanics for 30 minute intervals without symptoms in shoulder and neck in 8 weeks   Target Date 03/21/18   Date Met  04/04/18   Progress:     Goal Identifier driving   Goal Description Patient is to report ability to drive 3 hours with rest break without increased symptoms  in 8 weeks.   Target Date 03/21/18   Date Met      Progress: notes this is much better--has to make less stops along the way but still not able to travel the full way without a rest break.     Goal Identifier loading   Goal Description Patient to display improved postural loading to allow for symmetrical weight bearing to promote more normal use of right shoulder and scapula in 8 weeks.   Target Date 03/21/18   Date Met      Progress: Has not been consistently addressed but pt is aware of postural recommendation       Progress Toward Goals:   Progress this reporting period: much improved with much less myofascial and joint restrictions. Good compliance with stretching.           Plan:  Discharge from therapy.    Discharge:    Reason for Discharge: Patient has met most goals and should continue to improve with HEP.    Equipment Issued: none    Discharge Plan: Patient to continue home program.      Cee Oliva, PT on 4/4/2018 at 2:32 PM

## 2018-04-13 NOTE — PROGRESS NOTES
"Palliative Care Daily Progress Note     Palliative care f/u visit with family. Patient lying in bed. No distress noted. Asia (daughter) at bedside. Family still holding for father to eat again. Palliative care discussed father's current condition and options of PEG vs. Non PEG, hospice care. Daughter stated, "Dad's group home can do tube feedings should we go that route and they take hospice there." Palliative care informed family that case management would work with them to find hospice companies that come into that particular facility, should their option be hospice. Asia states, "Dad is in pain with his right hip and I know he is given morphine. He is doing good today and maybe he will eat again. "    Palliative care went over patients current lab values and prognosis. Asia wanted another Barium swallow study. It was explained to her that speech would do a bedside evaluation to see if patient is still  aspirating. Asia is very hopeful that her dad will come around and eat again. Palliative care discussed advance dementia.     12:15 pm. Palliative care spoke with Speech Therapy and stated patient failed bedside study. Speech Therapy and Palliative care discussed results with daughters Asia and Ghazala. Patient not able to perform  a dry swallow according to speech therapy. Daughters stated they will discuss this weekend about feeding tube placement. Emotional support provided. Future discharge option back to group home.                Recommendation  Continue Current medical treatment  Code status: DNR   Family will let team know  if they have decided on PEG or to go with comfort care/hospice.           Palliative Care Team will continue to follow patient to assist family with goals of care.         Thank you for the consult the opportunity to participate in Mr. Woodard's  care        Time Spent:> 50% of 90 min. in visit spent in chart review, phone discussion of goals of care with family, symptom " Patient Information     Patient Name MRN Sex Gauri Snell [id number] Female 1975      Progress Notes by Serene Perez MD at 2017  2:45 PM     Author:  Serene Perez MD Service:  (none) Author Type:  Physician     Filed:  2017  6:20 PM Encounter Date:  2017 Status:  Signed     :  Serene Perez MD (Physician)            SUBJECTIVE:    Gauri Hollis is a 41 y.o. female who presents for follow up after first trimester ultrasound  Nursing Notes:   Juliane Sams  2017  4:55 PM  Signed  Patient here for follow up lab/ultrasound  Juliane Sams LPN..............................2017  4:01 PM      HPI  Gauri Hollis is a 41 y.o. female presents with her  for follow-up after her ultrasound. This is done due to first trimester bleeding. She has continued to have some spotting and intermittent cramping. Initially, her symptoms started about 10 days ago with the heaviest bleeding being one week ago. Her ultrasound done last week showed a subchorionic hemorrhage, 6 week 2 day fetus with visualized cardiac activity. She has not had fevers, has not been lightheaded or dizzy. She's had quantitative hCGs done over the past week.  O Rh Positive    Allergies      Allergen   Reactions     Cats (Fur, Dander, Saliva)  Other - Describe In Comment Field     congestion    ,   Current Outpatient Prescriptions     Medication  Sig     Cholecalciferol, Vitamin D3, (VITAMIN D-3) 5,000 unit tab Take  by mouth once daily.     Choline Bitartrate 100 % powd As directed.     omega-3 fatty acids-vitamin E (FISH OIL) 1,000 mg cap Take  by mouth.     PNV34-iron,carbonyl-FA-DSS-dha 30 mg iron-1 mg -50 mg-260 mg cap Take  by mouth.     Saccharomyces boulardii (PROBIOTIC, S.BOULARDII,) 250 mg capsule Take 1 capsule by mouth once daily.     triamcinolone 0.5% (ARISTOCORT) 0.5 % cream Apply  topically to affected area(s) 3 times daily.     triamcinolone, 55 mcg each  actuation, nasal (NASACORT AQ) 55 mcg nasal spray Inhale 2 Sprays into both nostrils each time if needed for Rhinitis.     No current facility-administered medications for this visit.      Medications have been reviewed by me and are current to the best of my knowledge and ability.  and   Past Medical History:     Diagnosis  Date     Elbow dislocation      Family history of breast cancer      Hx of pregnancy      - PPH, retained placenta, transfusion      POSTPARTUM HEMORRHAGE 2011     Pregnancy, supervision, high-risk        REVIEW OF SYSTEMS:  ROS    OBJECTIVE:  /60  Pulse 66  Wt 76.8 kg (169 lb 5 oz)  BMI 27.12 kg/m2    EXAM:   Physical Exam   Constitutional: She is well-developed, well-nourished, and in no distress.   Nursing note and vitals reviewed.    Results for orders placed or performed in visit on 17      HCG BETA QUANT,PREGNANCY      Result  Value Ref Range    HCG BETA QUANT,PREGNANCY H 95718.1 (H) <0.6 mIU/mL     I had received a call from Dr Taylor with the patient's ultrasound results. This shows a 6 week fetus, no cardiac activity is seen.  ASSESSMENT/PLAN:    ICD-10-CM    1. Bleeding in early pregnancy O20.9         Plan:  1.  Concern is for pregnancy no longer being viable. This most likely be due to her subchorionic hemorrhage. However, her hCG level continues to rise. We discussed options including OB/GYN consultation for possible D&C, watchful waiting. She and her  will go home and talk over their options. Because of today's findings, before anything definitive is done, I would recommend a repeat ultrasound or at least a repeat quantitative hCG level. In particular, these are recommended as the hCG level had gone up and previously cardiac activity was seen. Patient will contact me with her follow-up plans.    Serene Perez MD            assessment, coordination of care and emotional support           Catalina Irvin, MSN, APRN, NP-C  Palliative  Medicine  [phone]

## 2018-05-11 NOTE — PROGRESS NOTES
Outpatient Occupational Therapy Discharge Note     Patient: Gauri Hollis  : 1975    Beginning/End Dates of Reporting Period:  2018 to 2018    Referring Provider: Dr. Rivera    Therapy Diagnosis: right elbow and forearm pain.     Client Self Report: Gauri report she has not been working much due to a sick son and vacation time so her pain is less. she is mostly achy in the elbow joint.     Objective Measurements:     Objective Measure: pain   Details: achy in elbow joint            Goals:     Goal Identifier pain   Goal Description sqeeze out sponge wiht right hand adn pain < 3/10   Target Date 18   Date Met      Progress:  Improved to a 3/10      Goal Identifier     Goal Description hold plank position  for 30 seconds with minimal discomfort   Target Date 18   Date Met      Progress: moderate discomfort     Goal Identifier     Goal Description use keyboard for 30 min or more with minimal discomfort   Target Date 18   Date Met      Progress: improved with new keyboard and ergonomic adjustments     Goal Identifier reduction in medium nerve symtoms in 4/7 mornings   Goal Description     Target Date 18   Date Met      Progress: improved with decrease in symptoms to 2-3 /7 mornings.      Progress Toward Goals:   Not assessed this period.    Plan:  Discharge from therapy.    Discharge:    Reason for Discharge: Gauri has reached her appropred number of visits. She her symptoms have decreased to be tolerable. She was not seen for a final discharge appointment.        Discharge Plan: Patient to continue home program.

## 2018-05-11 NOTE — ADDENDUM NOTE
Encounter addended by: Fabiola Manriquez OT on: 5/11/2018 11:36 AM<BR>     Actions taken: Pend clinical note, Flowsheet accepted, Sign clinical note

## 2018-06-06 DIAGNOSIS — M25.522 ELBOW PAIN, LEFT: Primary | ICD-10-CM

## 2018-06-11 ENCOUNTER — TELEPHONE (OUTPATIENT)
Dept: INTERNAL MEDICINE | Facility: OTHER | Age: 43
End: 2018-06-11

## 2018-06-11 ENCOUNTER — OFFICE VISIT (OUTPATIENT)
Dept: ORTHOPEDICS | Facility: OTHER | Age: 43
End: 2018-06-11
Attending: ORTHOPAEDIC SURGERY
Payer: OTHER MISCELLANEOUS

## 2018-06-11 ENCOUNTER — HOSPITAL ENCOUNTER (OUTPATIENT)
Dept: GENERAL RADIOLOGY | Facility: OTHER | Age: 43
Discharge: HOME | End: 2018-06-11
Attending: ORTHOPAEDIC SURGERY | Admitting: ORTHOPAEDIC SURGERY
Payer: OTHER MISCELLANEOUS

## 2018-06-11 VITALS
HEIGHT: 66 IN | BODY MASS INDEX: 28.45 KG/M2 | HEART RATE: 64 BPM | DIASTOLIC BLOOD PRESSURE: 88 MMHG | SYSTOLIC BLOOD PRESSURE: 122 MMHG | WEIGHT: 177 LBS

## 2018-06-11 DIAGNOSIS — M25.522 ELBOW PAIN, LEFT: ICD-10-CM

## 2018-06-11 DIAGNOSIS — M77.12 LATERAL EPICONDYLITIS OF LEFT ELBOW: Primary | ICD-10-CM

## 2018-06-11 PROBLEM — M77.10 EPICONDYLITIS, LATERAL (TENNIS ELBOW): Status: ACTIVE | Noted: 2018-06-11

## 2018-06-11 PROCEDURE — 99213 OFFICE O/P EST LOW 20 MIN: CPT | Performed by: ORTHOPAEDIC SURGERY

## 2018-06-11 PROCEDURE — 73080 X-RAY EXAM OF ELBOW: CPT | Mod: LT

## 2018-06-11 ASSESSMENT — PAIN SCALES - GENERAL: PAINLEVEL: MODERATE PAIN (4)

## 2018-06-11 NOTE — TELEPHONE ENCOUNTER
Unfortunately my next availability is Thursday at 420.  If she feels she needs to be seen sooner I would recommend any available provider.

## 2018-06-11 NOTE — MR AVS SNAPSHOT
"              After Visit Summary   6/11/2018    Gauri Hollis    MRN: [id number]           Patient Information     Date Of Birth          1975        Visit Information        Provider Department      6/11/2018 7:45 AM Chucky Rivera DO [facility]        Today's Diagnoses     Lateral epicondylitis of left elbow    -  1       Follow-ups after your visit        Follow-up notes from your care team     Return if symptoms worsen or fail to improve.      Who to contact     If you have questions or need follow up information about today's clinic visit or your schedule please contact [facility] AND [facility] directly at [phone].  Normal or non-critical lab and imaging results will be communicated to you by [employer], letter or phone within 4 business days after the clinic has received the results. If you do not hear from us within 7 days, please contact the clinic through [employer] or phone. If you have a critical or abnormal lab result, we will notify you by phone as soon as possible.  Submit refill requests through [employer] or call your pharmacy and they will forward the refill request to us. Please allow 3 business days for your refill to be completed.          Additional Information About Your Visit        MyChart Information     [employer] gives you secure access to your electronic health record. If you see a primary care provider, you can also send messages to your care team and make appointments. If you have questions, please call your primary care clinic.  If you do not have a primary care provider, please call [phone] and they will assist you.        Care EveryWhere ID     This is your Care EveryWhere ID. This could be used by other organizations to access your [place] medical records  [id number]        Your Vitals Were     Pulse Height BMI (Body Mass Index)             64 1.676 m (5' 6\") 28.57 kg/m2          Blood Pressure from Last 3 Encounters:   06/11/18 122/88 "   02/19/18 102/70   02/13/18 104/80    Weight from Last 3 Encounters:   06/11/18 80.3 kg (177 lb)   02/19/18 78.9 kg (174 lb)   02/13/18 80.3 kg (177 lb)              Today, you had the following     No orders found for display       Primary Care Provider Office Phone # Fax #    Patricia Zavala [phone] [address] [facility] [address]        Equal Access to Services     [facility] : Hadii aad ku hadasho Soomaali, waaxda luqadaha, qaybta kaalmada adeegyada, waxay idiin hayshannann anastasia whitaker . So [facility] [phone].    ATENCIÓN: Si habla español, tiene a woody disposición servicios gratuitos de asistencia lingüística. Llame al [phone].    We comply with applicable federal civil rights laws and [place] laws. We do not discriminate on the basis of race, color, national origin, age, disability, sex, sexual orientation, or gender identity.            Thank you!     Thank you for choosing [facility] AND [facility]  for your care. Our goal is always to provide you with excellent care. Hearing back from our patients is one way we can continue to improve our services. Please take a few minutes to complete the written survey that you may receive in the mail after your visit with us. Thank you!             Your Updated Medication List - Protect others around you: Learn how to safely use, store and throw away your medicines at www.disposemymeds.org.          This list is accurate as of 6/11/18  8:25 AM.  Always use your most recent med list.                   Brand Name Dispense Instructions for use Diagnosis    amoxicillin 875 MG tablet    AMOXIL    20 tablet    Take 1 tablet (875 mg) by mouth 2 times daily    Acute recurrent maxillary sinusitis       CHOLINE BITARTRATE PO      As directed.        D 5000 5000 units Tabs   Generic drug:  Cholecalciferol      Take by mouth daily        DOCOSAHEXAENOIC ACID PO           escitalopram 10 MG tablet    LEXAPRO     Take 5 mg by  mouth daily        fish oil-omega-3 fatty acids 1000 MG capsule           fluticasone 50 MCG/ACT spray    FLONASE     Spray 1 spray into both nostrils daily        saccharomyces boulardii 250 MG capsule    FLORASTOR     Take 1 capsule by mouth daily        triamcinolone 0.1 % cream    KENALOG

## 2018-06-11 NOTE — PROGRESS NOTES
"PROGRESS NOTE    SUBJECTIVE:  Gauri Hollis is here for evaluation in regards to pain about her left elbow.  On 5/14/2018 patient started having some increased discomfort with work.  It is in the lateral epicondylar region.  She denies any other activities at home that may have flared it up.  She has been utilizing anti-inflammatories and does go over to Occupational Therapy to do things whenever she could work it in to her schedule.  She feels like it has worsened and last night she had a very hard time sleeping at night.  She has been utilizing anti-inflammatories as well.  She previously had this happen on the right elbow and it slowly resolved over time.    REVIEW OF SYSTEMS:  The review of systems as documented in the HPI and on the intake questionnaire completed by the patient on 6/11/2018 it has been reviewed by myself and the pertinent positives and negatives addressed.  The remainder of the complete review of systems was non-contributory.    OBJECTIVE:  /88  Pulse 64  Ht 1.676 m (5' 6\")  Wt 80.3 kg (177 lb)  BMI 28.57 kg/m2 Body mass index is 28.57 kg/(m^2).    General Appearance: Pleasant 42 year old female in good appearance, mood and affect.  Alert and orientated times three ( time, date and location).    Skin: Intact but she does have multiple bumps over both her elbows no breakdown of the skin and no erythema around these bumps.    Elbow:  Effusion: no.  Motion: She hyperextends on the left elbow.  Tinel's test negative.  POSITIVE tenderness at the lateral epicondyle on the left  Provacative testing positive.    Shoulder:  Motion: Full motion.    Hand:  Sensation: Intact.  Radial and ulnar blood flow:  normal.    Eyes: Pupils are round.    Ears: Hearing: Intact.    Heart: Good capillary refill into her hands pulses are regular.    Lungs: Clear.    Radiographic images from 6/11/2018 where independently reviewed and discussed with the patient.      Xray:     X-rays demonstrate maintained " joint spaces about the left elbow.  There is no fractures or dislocations noted.  No sign of increased fluid.  Articular cartilage is congruent.  There is some increased calcifications along the medial epicondylar region.    PROCEDURE: XR ELBOW LT G/E 3 VW    HISTORY: ; Elbow pain, left.    COMPARISON: None.    TECHNIQUE: 4 views left elbow.    FINDINGS: No fracture or dislocation is identified. The joint spaces  are preserved. There is a 5 mm calcific body projecting adjacent to  the medial epicondyle.    IMPRESSION: 5 mm calcific body adjacent to the medial epicondyle  suggests hydroxyapatite deposition versus intra-articular body.    SERGIO BRUCE MD      IMPRESSION:    Left lateral epicondylitis inflamed due to work (DOI 5/14/2018).  Small calcific loose bodies medial aspect left elbow not work-related.    PLAN:  Risks, benefits, conservative, surgical and alternatives to treatment where discussed and the patient would like to proceed with conservative measures.  I would refer her to occupational therapy to begin working on treatment.  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  She would see occupational medicine for her next visits or to figure out better work restrictions and she could see Dr. Lyndon Pimentel.  Questions and concerns answered.    Chucky Rivera D.O.  Orthopaedic Surgeon    [facility]  [address] [place] [address]  Phone [phone] (KNEE)  Fax [phone]    Disclaimer:  This note consists of words and symbols derived from keyboarding, dictation, or using voice recognition software. As a result, there may be errors in the script that have gone undetected. Please consider this when interpreting information found in this note.    8:21 AM 6/11/2018

## 2018-06-11 NOTE — TELEPHONE ENCOUNTER
Patient would like to be seen this Thursday at 4:20.  Assisted in scheduling appointment and she will call with any questions or concerns.    Stefany Huddleston LPN............6/11/2018 3:30 PM

## 2018-06-11 NOTE — TELEPHONE ENCOUNTER
Contacted the patient and gave her the information below. She stated she may go to rapid clinic today to be seen.  Anabel Rivas LPN on 6/11/2018 at 11:56 AM

## 2018-06-14 ENCOUNTER — OFFICE VISIT (OUTPATIENT)
Dept: INTERNAL MEDICINE | Facility: OTHER | Age: 43
End: 2018-06-14
Attending: INTERNAL MEDICINE
Payer: COMMERCIAL

## 2018-06-14 VITALS
BODY MASS INDEX: 29.39 KG/M2 | SYSTOLIC BLOOD PRESSURE: 106 MMHG | HEART RATE: 72 BPM | DIASTOLIC BLOOD PRESSURE: 72 MMHG | WEIGHT: 182.1 LBS | TEMPERATURE: 98.4 F

## 2018-06-14 DIAGNOSIS — E04.1 THYROID NODULE: ICD-10-CM

## 2018-06-14 DIAGNOSIS — R41.89 COGNITIVE CHANGE: ICD-10-CM

## 2018-06-14 DIAGNOSIS — M25.50 MULTIPLE JOINT PAIN: ICD-10-CM

## 2018-06-14 DIAGNOSIS — R21 SKIN RASH: Primary | ICD-10-CM

## 2018-06-14 LAB
CRP SERPL-MCNC: 0 MG/L
ERYTHROCYTE [SEDIMENTATION RATE] IN BLOOD BY WESTERGREN METHOD: 7 MM/H (ref 1–15)

## 2018-06-14 PROCEDURE — 82784 ASSAY IGA/IGD/IGG/IGM EACH: CPT | Performed by: INTERNAL MEDICINE

## 2018-06-14 PROCEDURE — 83516 IMMUNOASSAY NONANTIBODY: CPT | Mod: XU | Performed by: INTERNAL MEDICINE

## 2018-06-14 PROCEDURE — 86038 ANTINUCLEAR ANTIBODIES: CPT | Performed by: INTERNAL MEDICINE

## 2018-06-14 PROCEDURE — 85652 RBC SED RATE AUTOMATED: CPT | Performed by: INTERNAL MEDICINE

## 2018-06-14 PROCEDURE — 86618 LYME DISEASE ANTIBODY: CPT | Performed by: INTERNAL MEDICINE

## 2018-06-14 PROCEDURE — 36415 COLL VENOUS BLD VENIPUNCTURE: CPT | Performed by: INTERNAL MEDICINE

## 2018-06-14 PROCEDURE — 86140 C-REACTIVE PROTEIN: CPT | Performed by: INTERNAL MEDICINE

## 2018-06-14 PROCEDURE — 99214 OFFICE O/P EST MOD 30 MIN: CPT | Performed by: INTERNAL MEDICINE

## 2018-06-14 PROCEDURE — 83516 IMMUNOASSAY NONANTIBODY: CPT | Performed by: INTERNAL MEDICINE

## 2018-06-14 RX ORDER — TRIAMCINOLONE ACETONIDE 5 MG/G
CREAM TOPICAL
Qty: 15 G | Refills: 1 | Status: SHIPPED | OUTPATIENT
Start: 2018-06-14

## 2018-06-14 ASSESSMENT — ANXIETY QUESTIONNAIRES
5. BEING SO RESTLESS THAT IT IS HARD TO SIT STILL: MORE THAN HALF THE DAYS
IF YOU CHECKED OFF ANY PROBLEMS ON THIS QUESTIONNAIRE, HOW DIFFICULT HAVE THESE PROBLEMS MADE IT FOR YOU TO DO YOUR WORK, TAKE CARE OF THINGS AT HOME, OR GET ALONG WITH OTHER PEOPLE: VERY DIFFICULT
6. BECOMING EASILY ANNOYED OR IRRITABLE: MORE THAN HALF THE DAYS
7. FEELING AFRAID AS IF SOMETHING AWFUL MIGHT HAPPEN: SEVERAL DAYS
1. FEELING NERVOUS, ANXIOUS, OR ON EDGE: MORE THAN HALF THE DAYS
2. NOT BEING ABLE TO STOP OR CONTROL WORRYING: MORE THAN HALF THE DAYS
3. WORRYING TOO MUCH ABOUT DIFFERENT THINGS: NEARLY EVERY DAY
GAD7 TOTAL SCORE: 14

## 2018-06-14 ASSESSMENT — PAIN SCALES - GENERAL: PAINLEVEL: MILD PAIN (3)

## 2018-06-14 ASSESSMENT — PATIENT HEALTH QUESTIONNAIRE - PHQ9: 5. POOR APPETITE OR OVEREATING: MORE THAN HALF THE DAYS

## 2018-06-14 NOTE — MR AVS SNAPSHOT
After Visit Summary   6/14/2018    Gauri Hollis    MRN: [id number]           Patient Information     Date Of Birth          1975        Visit Information        Provider Department      6/14/2018 4:20 PM Patricia Zavala DO [facility]        Today's Diagnoses     Skin rash    -  1    Multiple joint pain           Follow-ups after your visit        Your next 10 appointments already scheduled     Jun 20, 2018  9:00 AM CDT   Evaluation with Fabiola Manriquez OT   [facility] ([facility])    [address] [facility] [id number] [phone]              Who to contact     If you have questions or need follow up information about today's clinic visit or your schedule please contact [facility] directly at [phone].  Normal or non-critical lab and imaging results will be communicated to you by PrintToPeerhart, letter or phone within 4 business days after the clinic has received the results. If you do not hear from us within 7 days, please contact the clinic through PrintToPeerhart or phone. If you have a critical or abnormal lab result, we will notify you by phone as soon as possible.  Submit refill requests through [employer] or call your pharmacy and they will forward the refill request to us. Please allow 3 business days for your refill to be completed.          Additional Information About Your Visit        MyChart Information     [employer] gives you secure access to your electronic health record. If you see a primary care provider, you can also send messages to your care team and make appointments. If you have questions, please call your primary care clinic.  If you do not have a primary care provider, please call [phone] and they will assist you.        Care EveryWhere ID     This is your Care EveryWhere ID. This could be used by other organizations to access your [facility] medical records  [id number]         Your Vitals Were     Pulse Temperature Last Period Breastfeeding? BMI (Body Mass Index)       72 98.4  F (36.9  C) (Tympanic) 06/05/2018 (Exact Date) No 29.39 kg/m2        Blood Pressure from Last 3 Encounters:   06/14/18 106/72   06/11/18 122/88   02/19/18 102/70    Weight from Last 3 Encounters:   06/14/18 182 lb 1.6 oz (82.6 kg)   06/11/18 177 lb (80.3 kg)   02/19/18 174 lb (78.9 kg)              We Performed the Following     Anti Nuclear Louis IgG by IFA with Reflex     CRP inflammation     IgA     Lyme Disease Louis with reflex to WB Serum     Sedimentation Rate (ESR)     Tissue transglutaminase louis IgA and IgG          Today's Medication Changes          These changes are accurate as of 6/14/18  5:08 PM.  If you have any questions, ask your nurse or doctor.               Start taking these medicines.        Dose/Directions    triamcinolone 0.5 % cream   Commonly known as:  KENALOG   Used for:  Skin rash   Replaces:  triamcinolone 0.1 % cream   Started by:  Patricia Zavala DO        Apply sparingly to affected area three times daily.   Quantity:  15 g   Refills:  1         Stop taking these medicines if you haven't already. Please contact your care team if you have questions.     triamcinolone 0.1 % cream   Commonly known as:  KENALOG   Replaced by:  triamcinolone 0.5 % cream   Stopped by:  Patricia Zavala DO                Where to get your medicines      These medications were sent to [facility], - [place] - [address]  [address], [place] [address]     Phone:  [phone]     triamcinolone 0.5 % cream                Primary Care Provider Office Phone # Fax #    Patricia Zavala [phone]       [address] [facility] [address]        Equal Access to Services     [place] : Wesly gale Sopauline, waaxda luqadaha, qaybta kaalmada adeegyada, good guadarrama. [facility] [phone].    ATENCIÓN: Si  cathy wilkinson, tiene a woody disposición servicios gratuitos de asistencia lingüística. Yo morgan [phone].    We comply with applicable federal civil rights laws and [place] laws. We do not discriminate on the basis of race, color, national origin, age, disability, sex, sexual orientation, or gender identity.            Thank you!     Thank you for choosing [facility]  for your care. Our goal is always to provide you with excellent care. Hearing back from our patients is one way we can continue to improve our services. Please take a few minutes to complete the written survey that you may receive in the mail after your visit with us. Thank you!             Your Updated Medication List - Protect others around you: Learn how to safely use, store and throw away your medicines at www.disposemymeds.org.          This list is accurate as of 6/14/18  5:08 PM.  Always use your most recent med list.                   Brand Name Dispense Instructions for use Diagnosis    D 5000 5000 units Tabs   Generic drug:  Cholecalciferol      Take by mouth daily        DOCOSAHEXAENOIC ACID PO           escitalopram 10 MG tablet    LEXAPRO     Take 5 mg by mouth daily        fish oil-omega-3 fatty acids 1000 MG capsule           fluticasone 50 MCG/ACT spray    FLONASE     Spray 1 spray into both nostrils daily        saccharomyces boulardii 250 MG capsule    FLORASTOR     Take 1 capsule by mouth daily        triamcinolone 0.5 % cream    KENALOG    15 g    Apply sparingly to affected area three times daily.    Skin rash

## 2018-06-14 NOTE — PROGRESS NOTES
Chief Complaint   Patient presents with     Derm Problem     Rash on elbows bilateral with generalized joint pain          Subjective:   Ms. Hollis is a 42 year old female  seen for the acute concern today of ongoing issues with rash.  She reports that the rash started a couple months ago.  It is nonpruritic.  It is localized around the bilateral elbows.  She denies any obvious exposures.  She has tried some low strength corticosteroid topical with no improvement.  She does not necessarily feel that it has worsened.  She denies any vesicles.    She has noted over the last several months that she has had some worsening joint pain overall.  She has had recurrent episodes of medial and lateral epicondylitis.  She does feel achy overall.  She is also had some increased gassiness and bloating.  She denies any distinct change in her bowel or bladder.  She has not had any blood in the stool.  She has had decreased appetite.  She was sick with a GI illness as well as her son in February.  They then went to [facility] at the end of March but does not recall getting sick at the time.  She does not feel she has had any change in her diet overall.  She has never had any issues with gluten specifically however does have a sister who is gluten-free.    She does feel over the last several months even longer than her other symptoms that she has had decreased focus.  Her memory recall has decreased and she does feel she has had worse problems with word recall.  She denies any new medications.  She is currently on Lexapro on a daily basis.  She does take some over-the-counter vitamins.    She has had extensive autoimmune testing in the past.  This is all been negative prior.  She has never been tested for celiac disease.  She does have a history of thyroid nodules and is due for a recheck ultrasound.  She did have a TSH done recently 6 months ago that was normal.    She  reports that she has never smoked. She has never  used smokeless tobacco.    Past medical history reviewed as below:     Past Medical History:   Diagnosis Date     Anxiety disorder 2017     Bleeding in early pregnancy      Bronchitis 2012    Acute      Chronic lumbosacral pain 2015     Dislocation of ulnohumeral joint 1993    left; age 18     Epicondylitis, lateral (tennis elbow)-left 2018     Family history of malignant neoplasm of breast 2013     History of dysthymic disorder 2010    Resolved 15     Lesions of both ulnar nerves 2017     Medial epicondylitis of right elbow 2017     Medial epicondylitis, right 2017     Other enthesopathies, not elsewhere classified 2017     Other immediate postpartum hemorrhage 2011     Personal history of other medical treatment (CODE)      - PPH, retained placenta, transfusion     Vitamin D deficiency 2015   .      ROS:   Pertinent  ROS was performed and was negative, including for fevers, chills, chest pain, shortness of breath, increased lower extremity edema, changes in bowel or bladder, blood in the stool, difficulty swallowing, sores in the mouth. No other concerns, with exception of HPI above.      Objective:    /72 (BP Location: Right arm, Patient Position: Sitting, Cuff Size: Adult Regular)  Pulse 72  Temp 98.4  F (36.9  C) (Tympanic)  Wt 182 lb 1.6 oz (82.6 kg)  LMP 2018 (Exact Date)  Breastfeeding? No  BMI 29.39 kg/m2  GEN: Vitals reviewed.  Patient is in no acute distress. Cooperative with exam.  HEENT: Normocephalic atraumatic.  Pupils equally round.  No scleral icterus, no conjunctival erythema. Oropharynx with no erythema or exudates. Dentition adequate.  NECK: Supple; no thyromegaly with no obvious nodule.  No neck, cervical LAD.  Submandibular LAD not noted  CV: Heart regular in rate and rhythm with no murmur.   LUNGS: Lungs clear to auscultation bilaterally.  Chest rise equal bilaterally.  No accessory muscle  use.  SKIN: Warm and dry to touch.  No rash on face, arms and legs.  Papular rash noted on the bilateral extensor surface of the elbows.  Satellite lesions are noted along the periphery.  Lesions do appear to have a slightly vesicular appearance although no distinct blistering is noted.  No skin sloughing or excoriation is present.  Lesions are nontender to palpation.  EXT: No clubbing or cyanosis.  No peripheral edema.     Assessment/Plan:   Skin rash  -Etiology is unknown at this time.  Possibilities include psoriasis although it is not plaque psoriasis, eczema is less likely given extensor surface involvement.  Dermatitis herpetiformis is possible however her rash is nonpruritic which makes it less likely.  Contact dermatitis is also possible.  We did discuss using topical steroids to see if this helps.  We will check for celiac disease given her constitution of symptoms.  If she does continue to have issues with the rash we will plan for punch biopsy versus referral to dermatology.  - triamcinolone (KENALOG) 0.5 % cream  Dispense: 15 g; Refill: 1  - Anti Nuclear Louis IgG by IFA with Reflex  - IgA  - Tissue transglutaminase louis IgA and IgG    Multiple joint pain  -Etiology unknown.  Possibly secondary to osteoarthritis versus autoimmune disease.  Lyme is checked.  Celiac disease is possible given her family history, skin rash and bowel issues.  We will contact her when results are available.  - Anti Nuclear Louis IgG by IFA with Reflex  - Sedimentation Rate (ESR)  - CRP inflammation  - Lyme Disease Louis with reflex to WB Serum  - IgA  - Tissue transglutaminase louis IgA and IgG    Cognitive change  -Etiology is unknown at this time.  Possible causes include her Lexapro, thyroid, autoimmune illness, increased stress.  At this time we will check above labs and if negative consider additional testing with thyroid, B12.  She may benefit at some point for neurocognitive testing.  She also may benefit from decreasing or  stopping her Lexapro.  We will continue to follow.    Thyroid nodule  -Ultrasound placed for recheck.  She is to call with any questions or concerns.  - US Thyroid      - Return/call as needed for follow-up should any new symptoms develop, for worsening of current symptoms or if symptoms do not resolve with above plan.       ANGELLA AMAYA DO   6/14/2018 5:46 PM    This document was prepared using voice generated softwear. While every attempt was made for accuracy, grammatical errors may exist.

## 2018-06-14 NOTE — NURSING NOTE
Gauri presents to the clinic today for concerns of a rash bilaterally on her elbows. States that the rash has been going on for two months. Also, states that she has had achy joints. Patient tried cortisone cream with no relief.        Chano Lowry LPN 06/14/18 4:34 PM

## 2018-06-15 ASSESSMENT — ANXIETY QUESTIONNAIRES: GAD7 TOTAL SCORE: 14

## 2018-06-15 ASSESSMENT — PATIENT HEALTH QUESTIONNAIRE - PHQ9: SUM OF ALL RESPONSES TO PHQ QUESTIONS 1-9: 9

## 2018-06-18 LAB
B BURGDOR IGG+IGM SER QL: 0.06 (ref 0–0.89)
IGA SERPL-MCNC: 223 MG/DL (ref 70–380)
TTG IGA SER-ACNC: <1 U/ML
TTG IGG SER-ACNC: <1 U/ML

## 2018-06-19 LAB — ANA SER QL IF: NEGATIVE

## 2018-06-20 ENCOUNTER — HOSPITAL ENCOUNTER (OUTPATIENT)
Dept: OCCUPATIONAL THERAPY | Facility: OTHER | Age: 43
Setting detail: THERAPIES SERIES
End: 2018-06-20
Attending: ORTHOPAEDIC SURGERY
Payer: OTHER MISCELLANEOUS

## 2018-06-20 DIAGNOSIS — M77.12 LEFT LATERAL EPICONDYLITIS: Primary | ICD-10-CM

## 2018-06-20 PROCEDURE — 97165 OT EVAL LOW COMPLEX 30 MIN: CPT | Mod: GO | Performed by: OCCUPATIONAL THERAPIST

## 2018-06-20 PROCEDURE — 97033 APP MDLTY 1+IONTPHRSIS EA 15: CPT | Mod: GO | Performed by: OCCUPATIONAL THERAPIST

## 2018-06-20 PROCEDURE — 97140 MANUAL THERAPY 1/> REGIONS: CPT | Mod: GO | Performed by: OCCUPATIONAL THERAPIST

## 2018-06-20 PROCEDURE — 97035 APP MDLTY 1+ULTRASOUND EA 15: CPT | Mod: GO | Performed by: OCCUPATIONAL THERAPIST

## 2018-06-20 NOTE — ADDENDUM NOTE
Encounter addended by: Fabiola Manriquez OT on: 6/20/2018  8:13 AM<BR>     Actions taken: Episode resolved

## 2018-06-22 ENCOUNTER — HOSPITAL ENCOUNTER (OUTPATIENT)
Dept: OCCUPATIONAL THERAPY | Facility: OTHER | Age: 43
Setting detail: THERAPIES SERIES
End: 2018-06-22
Attending: ORTHOPAEDIC SURGERY
Payer: OTHER MISCELLANEOUS

## 2018-06-22 PROCEDURE — 97110 THERAPEUTIC EXERCISES: CPT | Mod: GO | Performed by: OCCUPATIONAL THERAPIST

## 2018-06-22 PROCEDURE — 97033 APP MDLTY 1+IONTPHRSIS EA 15: CPT | Mod: GO | Performed by: OCCUPATIONAL THERAPIST

## 2018-06-22 PROCEDURE — 97035 APP MDLTY 1+ULTRASOUND EA 15: CPT | Mod: GO | Performed by: OCCUPATIONAL THERAPIST

## 2018-06-22 PROCEDURE — 97140 MANUAL THERAPY 1/> REGIONS: CPT | Mod: GO | Performed by: OCCUPATIONAL THERAPIST

## 2018-06-27 ENCOUNTER — HOSPITAL ENCOUNTER (OUTPATIENT)
Dept: OCCUPATIONAL THERAPY | Facility: OTHER | Age: 43
Setting detail: THERAPIES SERIES
End: 2018-06-27
Attending: ORTHOPAEDIC SURGERY
Payer: OTHER MISCELLANEOUS

## 2018-06-27 PROCEDURE — 97033 APP MDLTY 1+IONTPHRSIS EA 15: CPT | Mod: GO | Performed by: OCCUPATIONAL THERAPIST

## 2018-06-27 PROCEDURE — 97035 APP MDLTY 1+ULTRASOUND EA 15: CPT | Mod: GO | Performed by: OCCUPATIONAL THERAPIST

## 2018-06-27 PROCEDURE — 97140 MANUAL THERAPY 1/> REGIONS: CPT | Mod: GO | Performed by: OCCUPATIONAL THERAPIST

## 2018-06-27 PROCEDURE — 97110 THERAPEUTIC EXERCISES: CPT | Mod: GO | Performed by: OCCUPATIONAL THERAPIST

## 2018-06-27 NOTE — ADDENDUM NOTE
Encounter addended by: Fabiola Manriquez OT on: 6/27/2018 11:19 AM<BR>     Actions taken: Flowsheet accepted, Charge Capture section accepted

## 2018-06-29 NOTE — PROGRESS NOTES
06/20/18 0900   General Information   Start Of Care Date 06/20/18   Referring Physician Dr. Rivera   Orders Evaluate and treat as indicated   Other Orders Keto and ionto signed and returned   Orders Date 06/11/18   Medical Diagnosis Left lateral epicondyle   Onset of Illness/Injury or Date of Surgery 05/14/18   Special Instructions work 3/4 time document 1/4 time   Surgical/Medical History Reviewed Yes   Pain   Patient currently in pain Yes   Pain location latera side of left elbow   Pain rating 3-4   Pain description Sharp;Dull;Discomfort;Pressure   Fall Risk Screen   Fall screen completed by OT   Have you fallen and had an injury in the past year? No   Is patient a fall risk? No   Cognitive Status Examination   Orientation Orientation to person, place and time   Range of Motion (ROM)   ROM Comments wrist: left: flexion 90 extension: 90  UD 34  RD 25  Right: flex: 90 ext: 89  UL: 29   RD: 29   Hand Strength   Hand Dominance Right   Planned Therapy Interventions   Planned Therapy Interventions Joint mobilization;Manual therapy;ROM;Strengthening;Stretching;Therapeutic activities   Planned Modalities Electrical stimulation;Iontophoresis;Paraffin bath;Ultrasound;Hot/cold packs   Intervention Comments Ionto at 4%  Dex: at 10%   OT Goal 1   Goal Identifier self cares   Goal Description patient will report the ability to drink with her left hand at PLOF   Target Date 07/18/18   OT Goal 2   Goal Identifier ADLs   Goal Description patient will report the ability to extend elbow after sleep with minimal to no pain. discomfort.    Target Date 07/25/18   OT Goal 3   Goal Identifier IADLs   Goal Description patient will report the ability to extend her elbow/ wrist during chiropractice procedures with pain <3/10   Target Date 08/01/18   Clinical Impression   Criteria for Skilled Therapeutic Interventions Met Yes, treatment indicated   OT Diagnosis Left lateral epicondyle   Assessment of Occupational Performance 1-3  Performance Deficits   Identified Performance Deficits work, ADLs   Clinical Decision Making (Complexity) Low complexity   Therapy Frequency 2x/week    Predicted Duration of Therapy Intervention (days/wks) 6 weeks   Risks and Benefits of Treatment have been explained. Yes   Patient, Family & other staff in agreement with plan of care Yes   Education Assessment   Barriers To Learning No Barriers   Total Evaluation Time   Total Evaluation Time 20

## 2018-06-29 NOTE — ADDENDUM NOTE
Encounter addended by: Fabiola Manriquez OT on: 6/29/2018  8:03 AM<BR>     Actions taken: Sign clinical note, Flowsheet accepted

## 2018-07-10 ENCOUNTER — HOSPITAL ENCOUNTER (OUTPATIENT)
Dept: OCCUPATIONAL THERAPY | Facility: OTHER | Age: 43
Setting detail: THERAPIES SERIES
End: 2018-07-10
Attending: ORTHOPAEDIC SURGERY
Payer: OTHER MISCELLANEOUS

## 2018-07-10 PROCEDURE — 97140 MANUAL THERAPY 1/> REGIONS: CPT | Mod: GO | Performed by: OCCUPATIONAL THERAPIST

## 2018-07-10 PROCEDURE — 97035 APP MDLTY 1+ULTRASOUND EA 15: CPT | Mod: GO | Performed by: OCCUPATIONAL THERAPIST

## 2018-07-10 PROCEDURE — 97110 THERAPEUTIC EXERCISES: CPT | Mod: GO | Performed by: OCCUPATIONAL THERAPIST

## 2018-07-17 ENCOUNTER — HOSPITAL ENCOUNTER (OUTPATIENT)
Dept: OCCUPATIONAL THERAPY | Facility: OTHER | Age: 43
Setting detail: THERAPIES SERIES
End: 2018-07-17
Attending: ORTHOPAEDIC SURGERY
Payer: OTHER MISCELLANEOUS

## 2018-07-17 PROCEDURE — 97110 THERAPEUTIC EXERCISES: CPT | Mod: GO | Performed by: OCCUPATIONAL THERAPIST

## 2018-07-17 PROCEDURE — 97035 APP MDLTY 1+ULTRASOUND EA 15: CPT | Mod: GO | Performed by: OCCUPATIONAL THERAPIST

## 2018-07-17 PROCEDURE — 97140 MANUAL THERAPY 1/> REGIONS: CPT | Mod: GO | Performed by: OCCUPATIONAL THERAPIST

## 2018-07-20 ENCOUNTER — HOSPITAL ENCOUNTER (OUTPATIENT)
Dept: OCCUPATIONAL THERAPY | Facility: OTHER | Age: 43
Setting detail: THERAPIES SERIES
End: 2018-07-20
Attending: ORTHOPAEDIC SURGERY
Payer: OTHER MISCELLANEOUS

## 2018-07-20 PROCEDURE — 97035 APP MDLTY 1+ULTRASOUND EA 15: CPT | Mod: GO | Performed by: OCCUPATIONAL THERAPIST

## 2018-07-20 PROCEDURE — 97140 MANUAL THERAPY 1/> REGIONS: CPT | Mod: GO | Performed by: OCCUPATIONAL THERAPIST

## 2018-07-20 PROCEDURE — 97033 APP MDLTY 1+IONTPHRSIS EA 15: CPT | Mod: GO | Performed by: OCCUPATIONAL THERAPIST

## 2018-07-22 ENCOUNTER — TELEPHONE (OUTPATIENT)
Dept: FAMILY MEDICINE | Facility: OTHER | Age: 43
End: 2018-07-22

## 2018-07-22 DIAGNOSIS — J01.90 ACUTE SINUSITIS WITH SYMPTOMS > 10 DAYS: Primary | ICD-10-CM

## 2018-07-22 NOTE — TELEPHONE ENCOUNTER
Having 12 days of facial pain, post nasal drip, cough and body aches.  Using nasal saline lavage, decongestants, ibuprofen without any relief.  Will send in augmentin.  To be seen if not improving.

## 2018-07-25 ENCOUNTER — HOSPITAL ENCOUNTER (OUTPATIENT)
Dept: OCCUPATIONAL THERAPY | Facility: OTHER | Age: 43
Setting detail: THERAPIES SERIES
End: 2018-07-25
Attending: ORTHOPAEDIC SURGERY
Payer: OTHER MISCELLANEOUS

## 2018-07-25 PROCEDURE — 97140 MANUAL THERAPY 1/> REGIONS: CPT | Mod: GO | Performed by: OCCUPATIONAL THERAPIST

## 2018-07-25 PROCEDURE — 97035 APP MDLTY 1+ULTRASOUND EA 15: CPT | Mod: GO | Performed by: OCCUPATIONAL THERAPIST

## 2018-07-25 PROCEDURE — 97033 APP MDLTY 1+IONTPHRSIS EA 15: CPT | Mod: GO | Performed by: OCCUPATIONAL THERAPIST

## 2018-07-25 PROCEDURE — 97110 THERAPEUTIC EXERCISES: CPT | Mod: GO | Performed by: OCCUPATIONAL THERAPIST

## 2018-07-27 ENCOUNTER — HOSPITAL ENCOUNTER (OUTPATIENT)
Dept: OCCUPATIONAL THERAPY | Facility: OTHER | Age: 43
Setting detail: THERAPIES SERIES
End: 2018-07-27
Attending: ORTHOPAEDIC SURGERY
Payer: OTHER MISCELLANEOUS

## 2018-07-27 PROCEDURE — 97033 APP MDLTY 1+IONTPHRSIS EA 15: CPT | Mod: GO | Performed by: OCCUPATIONAL THERAPIST

## 2018-07-27 PROCEDURE — 97035 APP MDLTY 1+ULTRASOUND EA 15: CPT | Mod: GO | Performed by: OCCUPATIONAL THERAPIST

## 2018-07-27 PROCEDURE — 97110 THERAPEUTIC EXERCISES: CPT | Mod: GO | Performed by: OCCUPATIONAL THERAPIST

## 2018-07-27 PROCEDURE — 97140 MANUAL THERAPY 1/> REGIONS: CPT | Mod: GO | Performed by: OCCUPATIONAL THERAPIST

## 2018-07-31 ENCOUNTER — HOSPITAL ENCOUNTER (OUTPATIENT)
Dept: OCCUPATIONAL THERAPY | Facility: OTHER | Age: 43
Setting detail: THERAPIES SERIES
End: 2018-07-31
Attending: ORTHOPAEDIC SURGERY
Payer: OTHER MISCELLANEOUS

## 2018-07-31 PROCEDURE — 97033 APP MDLTY 1+IONTPHRSIS EA 15: CPT | Mod: GO | Performed by: OCCUPATIONAL THERAPIST

## 2018-07-31 PROCEDURE — 97035 APP MDLTY 1+ULTRASOUND EA 15: CPT | Mod: GO | Performed by: OCCUPATIONAL THERAPIST

## 2018-07-31 PROCEDURE — 97140 MANUAL THERAPY 1/> REGIONS: CPT | Mod: GO | Performed by: OCCUPATIONAL THERAPIST

## 2018-07-31 PROCEDURE — 97110 THERAPEUTIC EXERCISES: CPT | Mod: GO | Performed by: OCCUPATIONAL THERAPIST

## 2018-08-02 ENCOUNTER — HOSPITAL ENCOUNTER (OUTPATIENT)
Dept: OCCUPATIONAL THERAPY | Facility: OTHER | Age: 43
Setting detail: THERAPIES SERIES
End: 2018-08-02
Attending: ORTHOPAEDIC SURGERY
Payer: OTHER MISCELLANEOUS

## 2018-08-02 PROCEDURE — 97110 THERAPEUTIC EXERCISES: CPT | Mod: GO | Performed by: OCCUPATIONAL THERAPIST

## 2018-08-02 PROCEDURE — 97035 APP MDLTY 1+ULTRASOUND EA 15: CPT | Mod: GO | Performed by: OCCUPATIONAL THERAPIST

## 2018-08-02 PROCEDURE — 97140 MANUAL THERAPY 1/> REGIONS: CPT | Mod: GO | Performed by: OCCUPATIONAL THERAPIST

## 2018-08-02 PROCEDURE — 97033 APP MDLTY 1+IONTPHRSIS EA 15: CPT | Mod: GO | Performed by: OCCUPATIONAL THERAPIST

## 2018-08-03 ENCOUNTER — OFFICE VISIT (OUTPATIENT)
Dept: FAMILY MEDICINE | Facility: OTHER | Age: 43
End: 2018-08-03
Attending: FAMILY MEDICINE
Payer: COMMERCIAL

## 2018-08-03 ENCOUNTER — HOSPITAL ENCOUNTER (OUTPATIENT)
Dept: GENERAL RADIOLOGY | Facility: OTHER | Age: 43
Discharge: HOME | End: 2018-08-03
Attending: FAMILY MEDICINE | Admitting: FAMILY MEDICINE
Payer: COMMERCIAL

## 2018-08-03 VITALS
HEIGHT: 66 IN | DIASTOLIC BLOOD PRESSURE: 80 MMHG | HEART RATE: 76 BPM | WEIGHT: 180 LBS | SYSTOLIC BLOOD PRESSURE: 120 MMHG | TEMPERATURE: 98.6 F | BODY MASS INDEX: 28.93 KG/M2

## 2018-08-03 DIAGNOSIS — R05.9 COUGH: Primary | ICD-10-CM

## 2018-08-03 DIAGNOSIS — R53.83 FATIGUE, UNSPECIFIED TYPE: ICD-10-CM

## 2018-08-03 DIAGNOSIS — J01.00 ACUTE NON-RECURRENT MAXILLARY SINUSITIS: ICD-10-CM

## 2018-08-03 DIAGNOSIS — R21 RASH: ICD-10-CM

## 2018-08-03 DIAGNOSIS — R05.9 COUGH: ICD-10-CM

## 2018-08-03 LAB
BASOPHILS # BLD AUTO: 0 10E9/L (ref 0–0.2)
BASOPHILS NFR BLD AUTO: 0.4 %
DIFFERENTIAL METHOD BLD: NORMAL
EOSINOPHIL # BLD AUTO: 0.1 10E9/L (ref 0–0.7)
EOSINOPHIL NFR BLD AUTO: 0.9 %
ERYTHROCYTE [DISTWIDTH] IN BLOOD BY AUTOMATED COUNT: 12.5 % (ref 10–15)
HCT VFR BLD AUTO: 39 % (ref 35–47)
HGB BLD-MCNC: 13 G/DL (ref 11.7–15.7)
IMM GRANULOCYTES # BLD: 0 10E9/L (ref 0–0.4)
IMM GRANULOCYTES NFR BLD: 0.3 %
LYMPHOCYTES # BLD AUTO: 1.9 10E9/L (ref 0.8–5.3)
LYMPHOCYTES NFR BLD AUTO: 27.1 %
MCH RBC QN AUTO: 30.4 PG (ref 26.5–33)
MCHC RBC AUTO-ENTMCNC: 33.3 G/DL (ref 31.5–36.5)
MCV RBC AUTO: 91 FL (ref 78–100)
MONOCYTES # BLD AUTO: 0.6 10E9/L (ref 0–1.3)
MONOCYTES NFR BLD AUTO: 8.5 %
NEUTROPHILS # BLD AUTO: 4.4 10E9/L (ref 1.6–8.3)
NEUTROPHILS NFR BLD AUTO: 62.8 %
PLATELET # BLD AUTO: 297 10E9/L (ref 150–450)
RBC # BLD AUTO: 4.28 10E12/L (ref 3.8–5.2)
TSH SERPL DL<=0.05 MIU/L-ACNC: 2.05 IU/ML (ref 0.34–5.6)
WBC # BLD AUTO: 7 10E9/L (ref 4–11)

## 2018-08-03 PROCEDURE — 86666 EHRLICHIA ANTIBODY: CPT | Performed by: FAMILY MEDICINE

## 2018-08-03 PROCEDURE — 99213 OFFICE O/P EST LOW 20 MIN: CPT | Performed by: FAMILY MEDICINE

## 2018-08-03 PROCEDURE — 86618 LYME DISEASE ANTIBODY: CPT | Performed by: FAMILY MEDICINE

## 2018-08-03 PROCEDURE — 87801 DETECT AGNT MULT DNA AMPLI: CPT | Performed by: FAMILY MEDICINE

## 2018-08-03 PROCEDURE — 36415 COLL VENOUS BLD VENIPUNCTURE: CPT | Performed by: FAMILY MEDICINE

## 2018-08-03 PROCEDURE — 85025 COMPLETE CBC W/AUTO DIFF WBC: CPT | Performed by: FAMILY MEDICINE

## 2018-08-03 PROCEDURE — 87798 DETECT AGENT NOS DNA AMP: CPT | Mod: 91 | Performed by: FAMILY MEDICINE

## 2018-08-03 PROCEDURE — 71046 X-RAY EXAM CHEST 2 VIEWS: CPT

## 2018-08-03 PROCEDURE — 84443 ASSAY THYROID STIM HORMONE: CPT | Performed by: FAMILY MEDICINE

## 2018-08-03 RX ORDER — AZITHROMYCIN 250 MG/1
TABLET, FILM COATED ORAL
Qty: 6 TABLET | Refills: 0 | Status: SHIPPED | OUTPATIENT
Start: 2018-08-03 | End: 2018-08-03

## 2018-08-03 RX ORDER — AZITHROMYCIN 250 MG/1
TABLET, FILM COATED ORAL
Qty: 6 TABLET | Refills: 0 | Status: SHIPPED | OUTPATIENT
Start: 2018-08-03 | End: 2018-10-04

## 2018-08-03 ASSESSMENT — ANXIETY QUESTIONNAIRES
1. FEELING NERVOUS, ANXIOUS, OR ON EDGE: NOT AT ALL
6. BECOMING EASILY ANNOYED OR IRRITABLE: NOT AT ALL
5. BEING SO RESTLESS THAT IT IS HARD TO SIT STILL: NOT AT ALL
GAD7 TOTAL SCORE: 0
2. NOT BEING ABLE TO STOP OR CONTROL WORRYING: NOT AT ALL
3. WORRYING TOO MUCH ABOUT DIFFERENT THINGS: NOT AT ALL
IF YOU CHECKED OFF ANY PROBLEMS ON THIS QUESTIONNAIRE, HOW DIFFICULT HAVE THESE PROBLEMS MADE IT FOR YOU TO DO YOUR WORK, TAKE CARE OF THINGS AT HOME, OR GET ALONG WITH OTHER PEOPLE: NOT DIFFICULT AT ALL
7. FEELING AFRAID AS IF SOMETHING AWFUL MIGHT HAPPEN: NOT AT ALL

## 2018-08-03 ASSESSMENT — PATIENT HEALTH QUESTIONNAIRE - PHQ9: 5. POOR APPETITE OR OVEREATING: NOT AT ALL

## 2018-08-03 NOTE — NURSING NOTE
Patient has had sinus pressure and cough for 5 weeks  . Maral Still LPN ....................8/3/2018  2:37 PM

## 2018-08-03 NOTE — MR AVS SNAPSHOT
After Visit Summary   8/3/2018    Gauri Hollis    MRN: [id number]           Patient Information     Date Of Birth          1975        Visit Information        Provider Department      8/3/2018 2:45 PM Merly Shah MD [facility]        Today's Diagnoses     Cough    -  1    Acute non-recurrent maxillary sinusitis        Fatigue, unspecified type           Follow-ups after your visit        Your next 10 appointments already scheduled     Aug 07, 2018  9:00 AM CDT   Treatment with Fabiola Manriquez OT   [facility] ([facility])    [address] [facility] [address]   [phone]            Aug 09, 2018  9:00 AM CDT   Treatment with Fabiola Manriquez OT   [email or web address]    [address] [facility] [address]   [phone]              Future tests that were ordered for you today     Open Future Orders        Priority Expected Expires Ordered    XR Chest 2 Views Routine 8/3/2018 8/3/2019 8/3/2018            Who to contact     If you have questions or need follow up information about today's clinic visit or your schedule please contact [facility] directly at [phone].  Normal or non-critical lab and imaging results will be communicated to you by Lootsiehart, letter or phone within 4 business days after the clinic has received the results. If you do not hear from us within 7 days, please contact the clinic through Lootsiehart or phone. If you have a critical or abnormal lab result, we will notify you by phone as soon as possible.  Submit refill requests through [employer] or call your pharmacy and they will forward the refill request to us. Please allow 3 business days for your refill to be completed.          Additional Information About Your Visit        LootsieharERN Information     [employer] gives you secure access to your  "electronic health record. If you see a primary care provider, you can also send messages to your care team and make appointments. If you have questions, please call your primary care clinic.  If you do not have a primary care provider, please call [phone] and they will assist you.        Care EveryWhere ID     This is your Care EveryWhere ID. This could be used by other organizations to access your [facility] medical records  [id number]        Your Vitals Were     Pulse Temperature Height Breastfeeding? BMI (Body Mass Index)       76 98.6  F (37  C) (Tympanic) 5' 6\" (1.676 m) No 29.05 kg/m2        Blood Pressure from Last 3 Encounters:   08/03/18 120/80   06/14/18 106/72   06/11/18 122/88    Weight from Last 3 Encounters:   08/03/18 180 lb (81.6 kg)   06/14/18 182 lb 1.6 oz (82.6 kg)   06/11/18 177 lb (80.3 kg)              We Performed the Following     Bordetella pert parapert DNA PCR     CBC and Differential     Thyrotropin GH          Today's Medication Changes          These changes are accurate as of 8/3/18  4:14 PM.  If you have any questions, ask your nurse or doctor.               Start taking these medicines.        Dose/Directions    azithromycin 250 MG tablet   Commonly known as:  ZITHROMAX   Used for:  Cough   Started by:  Merly Shah MD        Two tablets first day, then one tablet daily for four days.   Quantity:  6 tablet   Refills:  0         Stop taking these medicines if you haven't already. Please contact your care team if you have questions.     amoxicillin-clavulanate 875-125 MG per tablet   Commonly known as:  AUGMENTIN   Stopped by:  Merly Shah MD                Where to get your medicines      These medications were sent to [facility], - [place], [place] - [address]  [address], [place] [address]     Phone:  [phone]     azithromycin 250 MG tablet                Primary Care Provider Office Phone # Fax #    " Patricia Zavala [phone]       [address]  [facility] [address]        Equal Access to Services     [place] : Wesly castro shahla Shaffer, lenchoda joannaarnoldo, kaitta kakwesi medley, good powers karlenedavid kumar lasvetlanakrystina guaadrrama. So [facility] [phone].    ATENCIÓN: Si habla español, tiene a woody disposición servicios gratuitos de asistencia lingüística. Llame al [phone].    We comply with applicable federal civil rights laws and [place] laws. We do not discriminate on the basis of race, color, national origin, age, disability, sex, sexual orientation, or gender identity.            Thank you!     Thank you for choosing [facility]  for your care. Our goal is always to provide you with excellent care. Hearing back from our patients is one way we can continue to improve our services. Please take a few minutes to complete the written survey that you may receive in the mail after your visit with us. Thank you!             Your Updated Medication List - Protect others around you: Learn how to safely use, store and throw away your medicines at www.disposemymeds.org.          This list is accurate as of 8/3/18  4:14 PM.  Always use your most recent med list.                   Brand Name Dispense Instructions for use Diagnosis    azithromycin 250 MG tablet    ZITHROMAX    6 tablet    Two tablets first day, then one tablet daily for four days.    Cough       D 5000 5000 units Tabs   Generic drug:  Cholecalciferol      Take by mouth daily        DOCOSAHEXAENOIC ACID PO           escitalopram 10 MG tablet    LEXAPRO     Take 5 mg by mouth daily        fish oil-omega-3 fatty acids 1000 MG capsule           fluticasone 50 MCG/ACT spray    FLONASE     Spray 1 spray into both nostrils daily        saccharomyces boulardii 250 MG capsule    FLORASTOR     Take 1 capsule by mouth daily        triamcinolone 0.5 % cream    KENALOG    15 g    Apply sparingly to affected area three  times daily.    Skin rash

## 2018-08-03 NOTE — PROGRESS NOTES
SUBJECTIVE:   Nursing Notes:   Mansoor Maral M., LPN  8/3/2018  2:46 PM  Signed  Patient has had sinus pressure and cough for 5 weeks  . Maral Max LPN ....................8/3/2018  2:37 PM      Gauri Hollis is a 42 year old female who presents to clinic today for sinus symptoms for about 5 weeks.  Started with a very sore throat.  Had a fever and chills to begin with.  Missed a couple of days of work.  Has had significant congestion.  Choking on mucus in throat at night.  Tried tylenol cold and flu.  Has had pressure in her ears.  No diarrhea, but stomach has felt a little upset.  Has had chest congestion.  At times she has felt like she is going to vomit or gag from the degree of coughing she has had.  She has been wheezing.  Blowing lots out of her nose.  She has been on Augmentin for 10 days (of 14).  Her congestion is getting a little better, but still has a lot of yellow drainage.  Has had aching and chills.  Very fatigued.  However, she also hasn't been sleeping well the past couple of nights due to their cat passing away.  Saw Dr. Zvaala in mid June due to a rash on both elbows on the extensor surfaces.  Rash is not itchy.    HPI    I personally reviewed medications/allergies/history listed below:    Patient Active Problem List    Diagnosis Date Noted     Epicondylitis, lateral (tennis elbow)-left 06/11/2018     Priority: Medium     Anxiety disorder 11/08/2017     Priority: Medium     Past Medical History:   Diagnosis Date     Anxiety disorder 11/08/2017     Bleeding in early pregnancy      Bronchitis 02/14/2012    Acute      Chronic lumbosacral pain 03/03/2015     Dislocation of ulnohumeral joint 1993    left; age 18     Epicondylitis, lateral (tennis elbow)-left 6/11/2018     Family history of malignant neoplasm of breast 07/12/2013     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    Resolved 6/7/13     Lesions of both ulnar nerves 08/28/2017     Medial epicondylitis of right elbow 08/28/2017     Medial  epicondylitis, right 2017     Other enthesopathies, not elsewhere classified 2017     Other immediate postpartum hemorrhage 2011     Pregnancy      - PPH, retained placenta, transfusion     Vitamin D deficiency 2015      Past Surgical History:   Procedure Laterality Date     DILATION AND CURETTAGE  10/2011    for retained placenta     DILATION AND CURETTAGE  2015    for missed AB     DILATION AND CURETTAGE  2017    SAB     EXTRACTION(S) DENTAL      Catlett Teeth     Family History   Problem Relation Age of Onset     Breast Cancer Mother 53     Cancer-breast     Type 2 Diabetes Mother      Diabetes type II     HEART DISEASE Father 42     Heart Disease,MI; history of rheumatic fever and asd     Type 2 Diabetes Father      Diabetes type II     Thyroid Disease Sister      Thyroid Disease,hypothyroidism     Depression Brother      Depression     Social History   Substance Use Topics     Smoking status: Never Smoker     Smokeless tobacco: Never Used     Alcohol use No      Comment: Alcoholic Drinks/day: 3 per week     Social History     Social History Narrative    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 6 tablet 0     Cholecalciferol (D 5000) 5000 UNITS TABS Take by mouth daily       DOCOSAHEXAENOIC ACID PO        escitalopram (LEXAPRO) 10 MG tablet Take 5 mg by mouth daily       fish oil-omega-3 fatty acids 1000 MG capsule        fluticasone (FLONASE) 50 MCG/ACT spray Spray 1 spray into both nostrils daily       saccharomyces boulardii (FLORASTOR) 250 MG capsule Take 1 capsule by mouth daily       triamcinolone (KENALOG) 0.5  "% cream Apply sparingly to affected area three times daily. 15 g 1     Allergies   Allergen Reactions     Cats Other (See Comments)     congestion     Chlorhexidine Itching     No rash.  Also felt \"irritable\" after using wipes.       Review of Systems   Constitutional: Positive for chills, fatigue and fever. Negative for appetite change.   HENT: Positive for congestion, ear pain, postnasal drip, rhinorrhea, sinus pain, sinus pressure, sore throat and voice change.    Respiratory: Positive for cough and wheezing.    Skin: Positive for rash.   Psychiatric/Behavioral: Negative for mood changes.        OBJECTIVE:     /80 (BP Location: Right arm, Patient Position: Sitting, Cuff Size: Adult Regular)  Pulse 76  Temp 98.6  F (37  C) (Tympanic)  Ht 5' 6\" (1.676 m)  Wt 180 lb (81.6 kg)  Breastfeeding? No  BMI 29.05 kg/m2  Body mass index is 29.05 kg/(m^2).  Physical Exam   Constitutional: She appears well-developed.   HENT:   Head: Normocephalic.   Mouth/Throat: Oropharynx is clear and moist.   TMs are slightly retracted bilaterally, without redness.  Still have a pearly appearance.  She has tenderness over both the frontal and maxillary sinuses bilaterally.   Eyes: Pupils are equal, round, and reactive to light.   Neck: Normal range of motion. Neck supple. No thyromegaly present.   Cardiovascular: Normal rate, regular rhythm and normal heart sounds.    No murmur heard.  Pulmonary/Chest: Effort normal and breath sounds normal. No respiratory distress. She has no wheezes. She has no rales.   Musculoskeletal: She exhibits no edema.   Lymphadenopathy:     She has no cervical adenopathy.   Skin:   On the extensor surfaces of her forearms just distal to both elbows there are a cluster of fleshy toned slightly flat topped papules.       PHQ-9 SCORE 7/26/2017 10/11/2017 6/14/2018   Total Score 9 8 9       I personally reviewed results withpatient as listed below:   Diagnostic Test Results:  Results for orders placed or " performed in visit on 08/03/18 (from the past 24 hour(s))   Thyrotropin GH   Result Value Ref Range    Thyrotropin 2.05 0.34 - 5.60 IU/mL   CBC and Differential   Result Value Ref Range    WBC 7.0 4.0 - 11.0 10e9/L    RBC Count 4.28 3.8 - 5.2 10e12/L    Hemoglobin 13.0 11.7 - 15.7 g/dL    Hematocrit 39.0 35.0 - 47.0 %    MCV 91 78 - 100 fl    MCH 30.4 26.5 - 33.0 pg    MCHC 33.3 31.5 - 36.5 g/dL    RDW 12.5 10.0 - 15.0 %    Platelet Count 297 150 - 450 10e9/L    Diff Method Automated Method     % Neutrophils 62.8 %    % Lymphocytes 27.1 %    % Monocytes 8.5 %    % Eosinophils 0.9 %    % Basophils 0.4 %    % Immature Granulocytes 0.3 %    Absolute Neutrophil 4.4 1.6 - 8.3 10e9/L    Absolute Lymphocytes 1.9 0.8 - 5.3 10e9/L    Absolute Monocytes 0.6 0.0 - 1.3 10e9/L    Absolute Eosinophils 0.1 0.0 - 0.7 10e9/L    Absolute Basophils 0.0 0.0 - 0.2 10e9/L    Abs Immature Granulocytes 0.0 0 - 0.4 10e9/L     PROCEDURE:  XR CHEST 2 VW     HISTORY:  ; Cough.      COMPARISON:  None.     FINDINGS:   The cardiac silhouette is normal in size. The pulmonary vasculature is  normal.  The lungs are clear. No pleural effusion or pneumothorax.         IMPRESSION:  No acute cardiopulmonary disease.       SHARMIN HUMMEL MD  ASSESSMENT/PLAN:       ICD-10-CM    1. Cough R05 XR Chest 2 Views     CBC and Differential     Bordetella pert parapert DNA PCR     CBC and Differential     Bordetella pert parapert DNA PCR     azithromycin (ZITHROMAX) 250 MG tablet     albuterol (PROAIR RESPICLICK) 108 (90 Base) MCG/ACT AEPB inhaler     DISCONTINUED: azithromycin (ZITHROMAX) 250 MG tablet   2. Acute non-recurrent maxillary sinusitis J01.00 CBC and Differential     CBC and Differential   3. Fatigue, unspecified type R53.83 Thyrotropin GH     Thyrotropin GH     Anaplasma phagocytoph antibody IgG IgM     Lyme Disease Ab with reflex to WB Serum     Lyme Disease Ab with reflex to WB Serum     CANCELED: Anaplasma phagocytoph antibody IgG IgM   4. Rash R21         1.  Chest x-ray was negative.  Due to the length of time she has had this cough, testing for pertussis was obtained.  Treat empirically with Zithromax.  Also gave prescription for albuterol inhaler to use as needed.  Follow-up if cough is continuing to persist if pertussis testing is negative.  2.  CBC appears normal.  Treat persistent sinusitis with the Zithromax as noted above.  3.  TSH was normal as above.  Also added tick testing with Anaplasma and Lyme to rule those out as possible causes for fatigue.  Certainly her illness currently is likely contributing to the fatigue as well.  She also notes that she snores and may benefit from a sleep study in the future if this persists.  4.  The appearance of this rash looks consistent with dermatitis herpetiformis, although she has absolutely no itching which would not be consistent with this diagnosis.  Discussed considering a gluten elimination diet to see if the rash improves at all.  Also discussed consideration of biopsy of rash to confirm diagnosis.  Discussed also she could consider dermatology referral.  Merly Shah MD  [facility]

## 2018-08-04 ASSESSMENT — ENCOUNTER SYMPTOMS
SINUS PRESSURE: 1
FEVER: 1
SINUS PAIN: 1
VOICE CHANGE: 1
CHILLS: 1
SORE THROAT: 1
FATIGUE: 1
RHINORRHEA: 1
APPETITE CHANGE: 0
COUGH: 1
WHEEZING: 1

## 2018-08-04 ASSESSMENT — ANXIETY QUESTIONNAIRES: GAD7 TOTAL SCORE: 0

## 2018-08-07 ENCOUNTER — HOSPITAL ENCOUNTER (OUTPATIENT)
Dept: OCCUPATIONAL THERAPY | Facility: OTHER | Age: 43
Setting detail: THERAPIES SERIES
End: 2018-08-07
Attending: ORTHOPAEDIC SURGERY
Payer: OTHER MISCELLANEOUS

## 2018-08-07 LAB — B BURGDOR IGG+IGM SER QL: 0.08 (ref 0–0.89)

## 2018-08-07 PROCEDURE — 97110 THERAPEUTIC EXERCISES: CPT | Mod: GO | Performed by: OCCUPATIONAL THERAPIST

## 2018-08-07 PROCEDURE — 97035 APP MDLTY 1+ULTRASOUND EA 15: CPT | Mod: GO | Performed by: OCCUPATIONAL THERAPIST

## 2018-08-07 PROCEDURE — 97140 MANUAL THERAPY 1/> REGIONS: CPT | Mod: GO | Performed by: OCCUPATIONAL THERAPIST

## 2018-08-07 PROCEDURE — 97033 APP MDLTY 1+IONTPHRSIS EA 15: CPT | Mod: GO | Performed by: OCCUPATIONAL THERAPIST

## 2018-08-08 ENCOUNTER — TELEPHONE (OUTPATIENT)
Dept: ORTHOPEDICS | Facility: OTHER | Age: 43
End: 2018-08-08

## 2018-08-08 LAB
B PERT+PARAPERT DNA PNL SPEC NAA+PROBE: NEGATIVE
SPECIMEN SOURCE: NORMAL

## 2018-08-08 NOTE — TELEPHONE ENCOUNTER
Does Dr. Almonte do Plasma Rich Protein injections? - Pt has appt on 9/28 with him, but is just curious.    Thanks,    Camelia Fraga on 8/8/2018 at 4:18 PM

## 2018-08-09 ENCOUNTER — MYC MEDICAL ADVICE (OUTPATIENT)
Dept: FAMILY MEDICINE | Facility: OTHER | Age: 43
End: 2018-08-09

## 2018-08-09 ENCOUNTER — HOSPITAL ENCOUNTER (OUTPATIENT)
Dept: OCCUPATIONAL THERAPY | Facility: OTHER | Age: 43
Setting detail: THERAPIES SERIES
End: 2018-08-09
Attending: ORTHOPAEDIC SURGERY
Payer: OTHER MISCELLANEOUS

## 2018-08-09 DIAGNOSIS — J32.0 CHRONIC MAXILLARY SINUSITIS: Primary | ICD-10-CM

## 2018-08-09 LAB
A PHAGOCYTOPH DNA BLD QL NAA+PROBE: NOT DETECTED
E CHAFFEENSIS DNA BLD QL NAA+PROBE: NOT DETECTED
E EWINGII DNA SPEC QL NAA+PROBE: NOT DETECTED
EHRLICHIA DNA SPEC QL NAA+PROBE: NOT DETECTED

## 2018-08-09 PROCEDURE — 97140 MANUAL THERAPY 1/> REGIONS: CPT | Mod: GO | Performed by: OCCUPATIONAL THERAPIST

## 2018-08-09 PROCEDURE — 97033 APP MDLTY 1+IONTPHRSIS EA 15: CPT | Mod: GO | Performed by: OCCUPATIONAL THERAPIST

## 2018-08-09 PROCEDURE — 97110 THERAPEUTIC EXERCISES: CPT | Mod: GO | Performed by: OCCUPATIONAL THERAPIST

## 2018-08-09 PROCEDURE — 97035 APP MDLTY 1+ULTRASOUND EA 15: CPT | Mod: GO | Performed by: OCCUPATIONAL THERAPIST

## 2018-08-10 NOTE — TELEPHONE ENCOUNTER
No he doesn't.  Will have  call patient and let her know.  Tereza Kendrick LPN .......8/10/2018 10:02 AM

## 2018-08-10 NOTE — PROGRESS NOTES
Outpatient Occupational Therapy Progress Note     Patient: Gauri Hollis  : 1975    Beginning/End Dates of Reporting Period:  6/10/2018 to 8/10/2018    Referring Provider: Dr. Rivera/ Dr. Pimentel    Therapy Diagnosis: lateral epicondylitis    Client Self Report: (P) Gauri reports feeling the pian at the lateral epicondyle. muscle soreness is better. the taping really helps.     Objective Measurements:     Objective Measure: (P) pain   Details: (P) 2-5 pain    Objective Measure: (P) hand strength.    Details: (P) Grasp:39, with pain 5/10 lateral: 8 tripod5                                       Goals:     Goal Identifier (P) self cares   Goal Description (P) patient will report the ability to drink with her left hand at PLOF   Target Date (P) 18   Date Met      Progress: 2018 still has minor pain with grasping cup/glass with pronation position. Continue goal       Goal Identifier (P) ADLs   Goal Description (P) patient will report the ability to extend elbow after sleep with minimal to no pain. discomfort.    Target Date (P) 18   Date Met      Progress:  2018 wakes occasionally with pain . Rare. Monitor      Goal Identifier (P) IADLs   Goal Description (P) patient will report the ability to extend her elbow/ wrist during chiropractice procedures with pain <3/10   Target Date (P) 18   Date Met      Progress:  2018 pain is less now 4/10 continue to decrease pian.        Progress Toward Goals:   Progress this reporting period: pain is responding. Harder adjusting procedures still are uncomfortable and painful if done often.     Plan:  Continue therapy per current plan of care.    Discharge:  No

## 2018-08-16 ENCOUNTER — HOSPITAL ENCOUNTER (OUTPATIENT)
Dept: OCCUPATIONAL THERAPY | Facility: OTHER | Age: 43
Setting detail: THERAPIES SERIES
End: 2018-08-16
Attending: ORTHOPAEDIC SURGERY
Payer: OTHER MISCELLANEOUS

## 2018-08-16 PROCEDURE — 97140 MANUAL THERAPY 1/> REGIONS: CPT | Mod: GO | Performed by: OCCUPATIONAL THERAPIST

## 2018-08-17 NOTE — ADDENDUM NOTE
Encounter addended by: Fabiola Manriquez OT on: 8/17/2018  7:53 AM<BR>     Actions taken: Charge Capture section accepted

## 2018-08-21 ENCOUNTER — HOSPITAL ENCOUNTER (OUTPATIENT)
Dept: OCCUPATIONAL THERAPY | Facility: OTHER | Age: 43
Setting detail: THERAPIES SERIES
End: 2018-08-21
Attending: ORTHOPAEDIC SURGERY
Payer: OTHER MISCELLANEOUS

## 2018-08-21 PROCEDURE — 97140 MANUAL THERAPY 1/> REGIONS: CPT | Mod: GO | Performed by: OCCUPATIONAL THERAPIST

## 2018-08-24 ENCOUNTER — HOSPITAL ENCOUNTER (OUTPATIENT)
Dept: OCCUPATIONAL THERAPY | Facility: OTHER | Age: 43
Setting detail: THERAPIES SERIES
End: 2018-08-24
Attending: ORTHOPAEDIC SURGERY
Payer: OTHER MISCELLANEOUS

## 2018-08-24 PROCEDURE — 97110 THERAPEUTIC EXERCISES: CPT | Mod: GO | Performed by: OCCUPATIONAL THERAPIST

## 2018-08-24 PROCEDURE — 97140 MANUAL THERAPY 1/> REGIONS: CPT | Mod: GO | Performed by: OCCUPATIONAL THERAPIST

## 2018-08-28 ENCOUNTER — HOSPITAL ENCOUNTER (OUTPATIENT)
Dept: OCCUPATIONAL THERAPY | Facility: OTHER | Age: 43
Setting detail: THERAPIES SERIES
End: 2018-08-28
Attending: ORTHOPAEDIC SURGERY
Payer: OTHER MISCELLANEOUS

## 2018-08-28 PROCEDURE — 97140 MANUAL THERAPY 1/> REGIONS: CPT | Mod: GO | Performed by: OCCUPATIONAL THERAPIST

## 2018-08-28 PROCEDURE — 97035 APP MDLTY 1+ULTRASOUND EA 15: CPT | Mod: GO | Performed by: OCCUPATIONAL THERAPIST

## 2018-08-29 ENCOUNTER — OFFICE VISIT (OUTPATIENT)
Dept: FAMILY MEDICINE | Facility: OTHER | Age: 43
End: 2018-08-29
Attending: CHIROPRACTOR
Payer: OTHER MISCELLANEOUS

## 2018-08-29 VITALS
WEIGHT: 175 LBS | DIASTOLIC BLOOD PRESSURE: 84 MMHG | HEIGHT: 67 IN | SYSTOLIC BLOOD PRESSURE: 120 MMHG | BODY MASS INDEX: 27.47 KG/M2 | HEART RATE: 84 BPM

## 2018-08-29 DIAGNOSIS — M62.838 MUSCLE SPASM: ICD-10-CM

## 2018-08-29 DIAGNOSIS — M77.12 LATERAL EPICONDYLITIS OF LEFT ELBOW: ICD-10-CM

## 2018-08-29 DIAGNOSIS — S59.902D INJURY OF LEFT ELBOW, SUBSEQUENT ENCOUNTER: Primary | ICD-10-CM

## 2018-08-29 PROCEDURE — 99215 OFFICE O/P EST HI 40 MIN: CPT | Performed by: CHIROPRACTOR

## 2018-08-29 ASSESSMENT — PAIN SCALES - GENERAL: PAINLEVEL: MODERATE PAIN (4)

## 2018-08-29 NOTE — MR AVS SNAPSHOT
After Visit Summary   8/29/2018    Gauri Hollis    MRN: [id number]           Patient Information     Date Of Birth          1975        Visit Information        Provider Department      8/29/2018 10:00 AM Lyndon Caballero DC [facility]        Today's Diagnoses     Injury of left elbow, subsequent encounter    -  1    Lateral epicondylitis of left elbow        Muscle spasm           Follow-ups after your visit        Additional Services     CHIROPRACTIC REFERRAL       Dr. Mulligan            OCCUPATIONAL THERAPY REFERRAL       Continue tx to the left elbow            PHYSICAL THERAPY REFERRAL       Eval and tx for left c/d junction myalgia                  Follow-up notes from your care team     Return in about 6 weeks (around 10/10/2018).      Your next 10 appointments already scheduled     Aug 29, 2018  2:00 PM CDT   TEJA 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with Juice Mulligan DC   [facility]    [address] [address]   [phone]            Aug 30, 2018  9:00 AM CDT   Treatment with Fabiola Manriquez, OT   [facility]    [address] [address]   [phone]            Sep 04, 2018  9:00 AM CDT   Treatment with Fabiola Manriquez, OT   [facility]    [address] [address]   [phone]            Sep 11, 2018  9:00 AM CDT   Treatment with Fabiola Manriquez, OT   [facility]    [address] [address]   [phone]            Sep 18, 2018  9:00 AM CDT   Treatment with Fabiola Manriquez, OT   [facility]    [address] [address]   [phone]            Sep 25, 2018  9:00 AM CDT   Treatment with Fabiola  CORIE Manriquez, OT   [facility] ([facility])    [address]  [place] [address]   [phone]            Sep 28, 2018  8:15 AM CDT   New Visit with Preet Almonte MD   [facility] ([facility])    [address]  [place] [address]   [phone]            Oct 04, 2018  2:00 PM CDT   (Arrive by 1:45 PM)   New Visit with Dora Robins MD   [facility] [place] ([facility] [place] )    [address]  [place] [address]   [phone]            Oct 10, 2018  9:00 AM CDT   SHORT with Lyndon Caballero DC   [facility] ([facility])    [address]  [place] [address] [phone]              Who to contact     If you have questions or need follow up information about today's clinic visit or your schedule please contact [facility] directly at [phone].  Normal or non-critical lab and imaging results will be communicated to you by Cometahart, letter or phone within 4 business days after the clinic has received the results. If you do not hear from us within 7 days, please contact the clinic through OneFineMealt or phone. If you have a critical or abnormal lab result, we will notify you by phone as soon as possible.  Submit refill requests through L[employer] Works or call your pharmacy and they will forward the refill request to us. Please allow 3 business days for your refill to be completed.          Additional Information About Your Visit        Cometahart Information     L[employer] Works gives you secure access to your electronic health record. If you see a primary care provider, you can also send messages to your care team and make appointments. If you have questions, please call your primary care clinic.  If you do not have a primary care provider, please call [phone] and they will assist you.        Care EveryWhere ID      "This is your Care EveryWhere ID. This could be used by other organizations to access your [place] medical records  [id number]        Your Vitals Were     Pulse Height Breastfeeding? BMI (Body Mass Index)          84 5' 7\" (1.702 m) No 27.41 kg/m2         Blood Pressure from Last 3 Encounters:   08/29/18 120/84   08/03/18 120/80   06/14/18 106/72    Weight from Last 3 Encounters:   08/29/18 175 lb (79.4 kg)   08/03/18 180 lb (81.6 kg)   06/14/18 182 lb 1.6 oz (82.6 kg)              We Performed the Following     CHIROPRACTIC REFERRAL     OCCUPATIONAL THERAPY REFERRAL     PHYSICAL THERAPY REFERRAL        Primary Care Provider Office Phone # Fax #    Patricia Zavala  [phone] [address] [facility] [address]        Equal Access to Services     [facility]: Hadii aad ku hadasho Soomaali, waaxda luqadaha, qaybta kaalmada adeegyada, good johnson hayhailey whitaker . So [facility] [phone].    ATENCIÓN: Si habla español, tiene a woody disposición servicios gratuitos de asistencia lingüística. Llame al [phone].    We comply with applicable federal civil rights laws and [place] laws. We do not discriminate on the basis of race, color, national origin, age, disability, sex, sexual orientation, or gender identity.            Thank you!     Thank you for choosing [facility]  for your care. Our goal is always to provide you with excellent care. Hearing back from our patients is one way we can continue to improve our services. Please take a few minutes to complete the written survey that you may receive in the mail after your visit with us. Thank you!             Your Updated Medication List - Protect others around you: Learn how to safely use, store and throw away your medicines at www.disposemymeds.org.          This list is accurate as of 8/29/18 12:13 PM.  Always use your most recent med list.                   Brand Name Dispense Instructions for use " Diagnosis    albuterol 108 (90 Base) MCG/ACT Aepb inhaler    PROAIR RESPICLICK    1 Inhaler    Inhale 2 puffs into the lungs every 4 hours as needed for shortness of breath / dyspnea or wheezing    Cough       azithromycin 250 MG tablet    ZITHROMAX    6 tablet    Two tablets first day, then one tablet daily for four days.    Cough       D 5000 5000 units Tabs   Generic drug:  Cholecalciferol      Take by mouth daily        DOCOSAHEXAENOIC ACID PO           escitalopram 10 MG tablet    LEXAPRO     Take 5 mg by mouth daily        fish oil-omega-3 fatty acids 1000 MG capsule           fluticasone 50 MCG/ACT spray    FLONASE     Spray 1 spray into both nostrils daily        saccharomyces boulardii 250 MG capsule    FLORASTOR     Take 1 capsule by mouth daily        triamcinolone 0.5 % cream    KENALOG    15 g    Apply sparingly to affected area three times daily.    Skin rash

## 2018-08-29 NOTE — PROGRESS NOTES
Gauri Hollis presents today for ongoing work related injury involving her left elbow.    CHIEF COMPLAINT: Gauri Hollis is a 42 year old  female  Chief Complaint   Patient presents with     Work Comp     left elbow pain       HISTORY OF PRESENTING WORK INJURY     Onset and description: Patient presents today with chronic ongoing lateral epicondylitis related to repetitive work strain injury with onset on or about May 2018.  Patient has consulted with Dr. Chucky Rivera, orthopedic surgeon, who has performed x-rays and ordered occupational therapy performed by GURPREET Mcgee.  Additionally, patient has been treating with chiropractic methods at the frequency of twice monthly by Maranda Colmenares DC.  Work restrictions are in place and include 3/4 of the day allocated to clinical practice, and 1/4 of the day as needed for paperwork.  No other work restrictions given.  Her workstation has been evaluated for proper ergonomics.  These changes seem to help with her condition by mainly limiting wrist extension.  Patient is currently working 4 days per week with Wednesdays off.  She reports that having the break in the middle of the week helps rest her condition.  She most recently took a week off of work around the fourth of July hoping that this time would be enough to rest and heal the condition, but no significant improvement was noted.  Overall, the various forms of therapeutic means are beneficial to an extent.  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  Patient is also active in yoga, golf, and strength training and has had to decrease these activities due to her condition.   Patient was able to demonstrate to me her adjusting techniques as a chiropractor.  Description of pain: Dull and Knife-like with use, particularly at the lateral epicondyle and tissue midline of the radius and ulna.  She denies wrist discomfort but does note that wrist extension aggravates  "her posterior forearm to the elbow.  Pain scale: 4/10  Radiation of pain: Yes.  She describes the pain as travelling up into her left cervicodorsal junction to the base of the neck.  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  Improved by: Resting, Occupational therapy      PAST MEDICAL HISTORY:  Past Medical History:   Diagnosis Date     Anxiety disorder 2017     Bleeding in early pregnancy      Bronchitis 2012    Acute      Chronic lumbosacral pain 2015     Dislocation of ulnohumeral joint 1993    left; age 18     Epicondylitis, lateral (tennis elbow)-left 2018     Family history of malignant neoplasm of breast 2013     History of dysthymic disorder 2010    Resolved 15     Lesions of both ulnar nerves 2017     Medial epicondylitis of right elbow 2017     Medial epicondylitis, right 2017     Other enthesopathies, not elsewhere classified 2017     Other immediate postpartum hemorrhage 2011     Pregnancy      - PPH, retained placenta, transfusion     Vitamin D deficiency 2015       PAST SURGICAL HISTORY:  Past Surgical History:   Procedure Laterality Date     DILATION AND CURETTAGE  10/2011    for retained placenta     DILATION AND CURETTAGE  2015    for missed AB     DILATION AND CURETTAGE  2017    SAB     EXTRACTION(S) DENTAL      [place] [facility]       ALLERGIES:  Allergies   Allergen Reactions     Cats Other (See Comments)     congestion     Chlorhexidine Itching     No rash.  Also felt \"irritable\" after using wipes.       CURRENT MEDICATIONS:  Current Outpatient Prescriptions   Medication Sig Dispense Refill     albuterol (PROAIR RESPICLICK) 108 (90 Base) MCG/ACT AEPB inhaler Inhale 2 puffs into the lungs every 4 hours as needed for shortness of breath / dyspnea or wheezing 1 Inhaler 11     azithromycin (ZITHROMAX) 250 MG tablet Two tablets first day, then one tablet daily for " "four days. 6 tablet 0     Cholecalciferol (D 5000) 5000 UNITS TABS Take by mouth daily       DOCOSAHEXAENOIC ACID PO        escitalopram (LEXAPRO) 10 MG tablet Take 5 mg by mouth daily       fish oil-omega-3 fatty acids 1000 MG capsule        fluticasone (FLONASE) 50 MCG/ACT spray Spray 1 spray into both nostrils daily       saccharomyces boulardii (FLORASTOR) 250 MG capsule Take 1 capsule by mouth daily       triamcinolone (KENALOG) 0.5 % cream Apply sparingly to affected area three times daily. 15 g 1       SOCIAL HISTORY:  Marital Status: .  Children: yes.  Occupation: Chiropractor.  Alcohol use: Occasionally  Tobacco use: No  Are you or have you used illicit drugs: No    FAMILY HISTORY:  Family History   Problem Relation Age of Onset     Breast Cancer Mother 53     Cancer-breast     Type 2 Diabetes Mother      Diabetes type II     HEART DISEASE Father 42     Heart Disease,MI; history of rheumatic fever and asd     Type 2 Diabetes Father      Diabetes type II     Thyroid Disease Sister      Thyroid Disease,hypothyroidism     Depression Brother      Depression       REVIEW OF SYSTEMS:  The review of systems as documented in the HPI and on the intake questionnaire, completed by the patient on 8/29/2018 have been reviewed. Positives and negatives on the questionnaire were addressed.  The remainder of the complete review of systems was non-contributory.    20 minutes spent with chart review.    PHYSICAL EXAM:   /84 (BP Location: Right arm, Patient Position: Sitting, Cuff Size: Adult Regular)  Pulse 84  Ht 5' 7\" (1.702 m)  Wt 175 lb (79.4 kg)  Breastfeeding? No  BMI 27.41 kg/m2 Body mass index is 27.41 kg/(m^2).    General Appearance: Pleasant, alert, appropriate appearance for age.    Elbow:  Extension (0): -8 degrees hyperextension  Flexion (145): Full  Cozen's Test (Lateral Epicondylitis): positive  Golfer's Elbow Test (Medial Epicondylitis): negative  Valgus Stress Test: negative  Varus Stress " Test: negative  Pronation (70): 65 degrees  Supination (85): 80 degrees  Motion palpation of the elbow: radial head subluxation.  Anconeus spasm and trigger point.  Strength is unaffected and normal    Hand:  Sensation: Intact.  Radial and ulnar blood flow:  Normal.  Wrist motion is full with slight pain palpated at the capitate.    Radiograph images were independently reviewed and discussed with patient for 15 minutes.  There is a 5 mm hydroxyapatite crystal noted at the medial epicondyle.    X-ray as interpreted by radiologist:    PROCEDURE:  XR ELBOW LT G/E 3 VW     HISTORY: ; Elbow pain, left.     COMPARISON:  None.     TECHNIQUE:  4 views left elbow.     FINDINGS:  No fracture or dislocation is identified. The joint spaces  are preserved. There is a 5 mm calcific body projecting adjacent to  the medial epicondyle.         IMPRESSION: 5 mm calcific body adjacent to the medial epicondyle  suggests hydroxyapatite deposition versus intra-articular body.     SERGIO BRUCE MD      IMPRESSION:    (1) Lateral epicondylitis of the left elbow.  (2) radial head subluxation  (3) anconeus trigger point spasm  (4) left cervicodorsal myalgia      PLAN:    Patient is creating repetitive trauma to the existing elbow condition with her activities at work.  Her condition requires continued treatment as well as increased rest.  Therefore, I am modifying her work restrictions in order to emphasize rest.  See scanned work restriction sheet dated today, August 29, 2018.  We will continue with occupational therapy as well as order physical therapy to the left neck and shoulder region.  Additionally, chiropractic manipulation ordered for radial head subluxation.    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  Addressed proper upper torso body mechanics as well as emphasizing bilateral  procedural methods as much as possible.    Patient is scheduled on September 28 with Dr. Al[facility]te [facility].  We will follow-up with reexamination of her elbow and progress of her condition in 6 weeks.    Post Encounter: No further questions and all concerns answered to their satisfaction. Greater than 50% of this 78 minute encounter was spent in counseling and coordination of care regarding the above condition.        Lyndon Caballero DC  Director - Occupational Medicine Department  [address] [address]  Phone [phone]  Fax [phone]    Disclaimer:  This note consists of words and symbols derived from keyboarding, dictation, or using voice recognition software. As a result, there may be errors in the script that have gone undetected. Please consider this when interpreting information found in this note.    12:11 PM 8/29/2018

## 2018-08-30 ENCOUNTER — HOSPITAL ENCOUNTER (OUTPATIENT)
Dept: OCCUPATIONAL THERAPY | Facility: OTHER | Age: 43
Setting detail: THERAPIES SERIES
End: 2018-08-30
Attending: ORTHOPAEDIC SURGERY
Payer: OTHER MISCELLANEOUS

## 2018-08-30 PROCEDURE — 97140 MANUAL THERAPY 1/> REGIONS: CPT | Mod: GO | Performed by: OCCUPATIONAL THERAPIST

## 2018-09-04 ENCOUNTER — HOSPITAL ENCOUNTER (OUTPATIENT)
Dept: OCCUPATIONAL THERAPY | Facility: OTHER | Age: 43
Setting detail: THERAPIES SERIES
End: 2018-09-04
Attending: ORTHOPAEDIC SURGERY
Payer: OTHER MISCELLANEOUS

## 2018-09-04 PROCEDURE — 97140 MANUAL THERAPY 1/> REGIONS: CPT | Mod: GO | Performed by: OCCUPATIONAL THERAPIST

## 2018-09-11 ENCOUNTER — THERAPY VISIT (OUTPATIENT)
Dept: CHIROPRACTIC MEDICINE | Facility: OTHER | Age: 43
End: 2018-09-11
Attending: CHIROPRACTOR
Payer: OTHER MISCELLANEOUS

## 2018-09-11 ENCOUNTER — HOSPITAL ENCOUNTER (OUTPATIENT)
Dept: OCCUPATIONAL THERAPY | Facility: OTHER | Age: 43
Setting detail: THERAPIES SERIES
End: 2018-09-11
Attending: CHIROPRACTOR
Payer: OTHER MISCELLANEOUS

## 2018-09-11 DIAGNOSIS — M77.12 LATERAL EPICONDYLITIS OF LEFT ELBOW: Primary | ICD-10-CM

## 2018-09-11 DIAGNOSIS — M99.07 SOMATIC DYSFUNCTION OF SPINE AFFECTING UPPER EXTREMITY: ICD-10-CM

## 2018-09-11 PROCEDURE — 97140 MANUAL THERAPY 1/> REGIONS: CPT | Mod: GO | Performed by: OCCUPATIONAL THERAPIST

## 2018-09-11 PROCEDURE — 98943 CHIROPRACT MANJ XTRSPINL 1/>: CPT | Mod: AT | Performed by: CHIROPRACTOR

## 2018-09-11 PROCEDURE — 99212 OFFICE O/P EST SF 10 MIN: CPT | Mod: 25 | Performed by: CHIROPRACTOR

## 2018-09-11 NOTE — MR AVS SNAPSHOT
After Visit Summary   9/11/2018    Gauri Hollis    MRN: [id number]           Patient Information     Date Of Birth          1975        Visit Information        Provider Department      9/11/2018 2:30 PM Juice Mulligan DC [facility]        Today's Diagnoses     Lateral epicondylitis of left elbow    -  1    Somatic dysfunction of spine affecting upper extremity          Care Instructions    Practice YTW exercises as instructed.  Continue to utilize foam rolling techniques at home.  Continue to follow restrictions as directed          Follow-ups after your visit        Follow-up notes from your care team     Return in about 3 days (around 9/14/2018) for Routine Visit.      Your next 10 appointments already scheduled     Sep 12, 2018  9:00 AM CDT   CT SINUS W/O CONTRAST with GHCT1   [facility] ([facility])    [address]   [facility] [id number] [phone]           How do I prepare for my exam? (Food and drink instructions) No Food and Drink Restrictions.  How do I prepare for my exam? (Other instructions) You do not need to do anything special to prepare for this exam. For a sinus scan: Use your nose spray (nasal decongestant spray) as directed.  What should I wear: Please wear loose clothing, such as a sweat suit or jogging clothes. Avoid snaps, zippers and other metal. We may ask you to undress and put on a hospital gown.  How long does the exam take: Most scans take less than 20 minutes.  What should I bring: Please bring any scans or X-rays taken at other hospitals, if similar tests were done. Also bring a list of your medicines, including vitamins, minerals and over-the-counter drugs. It is safest to leave personal items at home.  Do I need a : No  is needed.  What do I need to tell my doctor? Be sure to tell your doctor: * If you have any allergies. * If there s any chance you are pregnant. * If  you are breastfeeding.  What should I do after the exam: No restrictions, You may resume normal activities.  What is this test: A CT (computed tomography) scan is a series of pictures that allows us to look inside your body. The scanner creates images of the body in cross sections, much like slices of bread. This helps us see any problems more clearly.  Who should I call with questions: If you have any questions, please call the Imaging Department where you will have your exam. Directions, parking instructions, and other information is available on our website, [email or web address].            Sep 17, 2018  8:00 AM CDT   Evaluation with Pamela Solorio, PT   [facility]    [address] [place] [address]   [phone]            Sep 18, 2018  9:00 AM CDT   Treatment with Fabiola Manriquez, OT   [facility]    [address] [place] [address]   [phone]            Sep 25, 2018  9:00 AM CDT   Treatment with Fabiola Manriquez, OT   [facility]    [address] [place] [address]   [phone]            Sep 28, 2018  8:15 AM CDT   New Visit with Preet Almonte MD   [facility] ([facility]    [address]  [place] [address]   [phone]            Oct 04, 2018  2:00 PM CDT   (Arrive by 1:45 PM)   New Visit with Dora Robins MD   [facility]    [address] [place] [address]   [phone]            Oct 10, 2018  9:00 AM CDT   SHORT with Lyndon Caballero DC   [facility] ([facility] [place])    [address]  [place] [address]   [phone]              Who to contact     If you have questions or need follow up information about  today's clinic visit or your schedule please contact [facility] directly at [phone].  Normal or non-critical lab and imaging results will be communicated to you by MyChart, letter or phone within 4 business days after the clinic has received the results. If you do not hear from us within 7 days, please contact the clinic through SafariDeskhart or phone. If you have a critical or abnormal lab result, we will notify you by phone as soon as possible.  Submit refill requests through BloomBoard or call your pharmacy and they will forward the refill request to us. Please allow 3 business days for your refill to be completed.          Additional Information About Your Visit        [facility] in Practice Information     BloomBoard gives you secure access to your electronic health record. If you see a primary care provider, you can also send messages to your care team and make appointments. If you have questions, please call your primary care clinic.  If you do not have a primary care provider, please call [phone] and they will assist you.        Care EveryWhere ID     This is your Care EveryWhere ID. This could be used by other organizations to access your [facility] medical records  [id number]         Blood Pressure from Last 3 Encounters:   08/29/18 120/84   08/03/18 120/80   06/14/18 106/72    Weight from Last 3 Encounters:   08/29/18 79.4 kg (175 lb)   08/03/18 81.6 kg (180 lb)   06/14/18 82.6 kg (182 lb 1.6 oz)              We Performed the Following     CHIROPRAC MANIP,EXTRASPINAL,1+ REGNS        Primary Care Provider Office Phone # Fax #    Patricia MANDI Zavala [phone] [address]  [place] [address]        Equal Access to Services     [facility] : Hadii aad matthew gale Sopauline, waaxda luqadaha, qaybta kaalmada good medley. So [facility] [phone].    ATENCIÓN: Si habla español, tiene a woody disposición servicios gratuitos de asistencia  lingüística. Yo al [phone].    We comply with applicable federal civil rights laws and [place] laws. We do not discriminate on the basis of race, color, national origin, age, disability, sex, sexual orientation, or gender identity.            Thank you!     Thank you for choosing [facility]  for your care. Our goal is always to provide you with excellent care. Hearing back from our patients is one way we can continue to improve our services. Please take a few minutes to complete the written survey that you may receive in the mail after your visit with us. Thank you!             Your Updated Medication List - Protect others around you: Learn how to safely use, store and throw away your medicines at www.disposemymeds.org.          This list is accurate as of 9/11/18 11:59 PM.  Always use your most recent med list.                   Brand Name Dispense Instructions for use Diagnosis    albuterol 108 (90 Base) MCG/ACT Aepb inhaler    PROAIR RESPICLICK    1 Inhaler    Inhale 2 puffs into the lungs every 4 hours as needed for shortness of breath / dyspnea or wheezing    Cough       azithromycin 250 MG tablet    ZITHROMAX    6 tablet    Two tablets first day, then one tablet daily for four days.    Cough       D 5000 5000 units Tabs   Generic drug:  Cholecalciferol      Take by mouth daily        DOCOSAHEXAENOIC ACID PO           escitalopram 10 MG tablet    LEXAPRO     Take 5 mg by mouth daily        fish oil-omega-3 fatty acids 1000 MG capsule           fluticasone 50 MCG/ACT spray    FLONASE     Spray 1 spray into both nostrils daily        saccharomyces boulardii 250 MG capsule    FLORASTOR     Take 1 capsule by mouth daily        triamcinolone 0.5 % cream    KENALOG    15 g    Apply sparingly to affected area three times daily.    Skin rash

## 2018-09-12 ENCOUNTER — HOSPITAL ENCOUNTER (OUTPATIENT)
Dept: CT IMAGING | Facility: OTHER | Age: 43
Discharge: HOME | End: 2018-09-12
Attending: FAMILY MEDICINE | Admitting: FAMILY MEDICINE
Payer: COMMERCIAL

## 2018-09-12 DIAGNOSIS — J32.0 CHRONIC MAXILLARY SINUSITIS: ICD-10-CM

## 2018-09-12 PROCEDURE — 70486 CT MAXILLOFACIAL W/O DYE: CPT

## 2018-09-12 NOTE — PATIENT INSTRUCTIONS
Practice YTW exercises as instructed.  Continue to utilize foam rolling techniques at home.  Continue to follow restrictions as directed

## 2018-09-12 NOTE — PROGRESS NOTES
PATIENT:  Gauri Hollis is a 42 year old female presenting for left elbow pain    PROBLEM:   Date of Initial Visit for this Episode:  9/11/2018     Visit #1    SUBJECTIVE / HPI:   Description and onset: Patient presents with primary complaints of left elbow pain with radiation of symptoms into the left shoulder.  Patient has currently been under occupational therapy care as well as physical therapy care at [facility] in [facility].  Patient reports symptoms began in May.  Patient was initially evaluated by Chucky Rivera DO. Patient has been treating with CRYSTAL Mcgee and Maranda Colmenares DC.  Patient was evaluated by DENIA Caballero DC on 8/29/18 at [facility], see this note for further details.  Patient presents for treatment of left elbow through chiropractic adjustment.    Radiation of pain: Patient notes pain extends from left elbow into left cervical thoracic junction  Pain rated at it's worst: 6/10  Pain rated currently:  3/10  Pain course: Staying the same.  Patient notes soft tissue therapies and occupational therapy and twice monthly adjustments have been beneficial, however symptoms will return.  Worse with: Extension and weightbearing  Improved by:  Rest, soft tissue therapies with OT  Additional Features: Crepitus is noted  Other Health Care Providers seen for this: See prior comment  Previous treatment: See prior comment  Previous injury: Patient has been treated for epicondylitis of the right elbow        See flowsheets in chart for details.  9/12/2018 Functional limitations: Weightbearing, patient is unable to perform a normal work day due to elbow pain    Exercise habits: Pain has severely limited/affected.  Patient notes being unable to perform planking exercises for yoga, patient has also been unable to perform elbow flexion isolation exercises, however rowing type motions have been able to be performed  Sleeping habits: Pain has affected patient's habits.    Past D.C. Care: yes,  "helpful             PAST MEDICAL HISTORY:  Past Medical History:   Diagnosis Date     Anxiety disorder 2017     Bleeding in early pregnancy      Bronchitis 2012    Acute      Chronic lumbosacral pain 2015     Dislocation of ulnohumeral joint 1993    left; age 18     Epicondylitis, lateral (tennis elbow)-left 2018     Family history of malignant neoplasm of breast 2013     History of dysthymic disorder 2010    Resolved 15     Lesions of both ulnar nerves 2017     Medial epicondylitis of right elbow 2017     Medial epicondylitis, right 2017     Other enthesopathies, not elsewhere classified 2017     Other immediate postpartum hemorrhage 2011     Pregnancy      - PPH, retained placenta, transfusion     Vitamin D deficiency 2015       PAST SURGICAL HISTORY:  Past Surgical History:   Procedure Laterality Date     DILATION AND CURETTAGE  10/2011    for retained placenta     DILATION AND CURETTAGE  2015    for missed AB     DILATION AND CURETTAGE  2017    SAB     EXTRACTION(S) DENTAL      [place] [facility]       ALLERGIES:  Allergies   Allergen Reactions     Cats Other (See Comments)     congestion     Chlorhexidine Itching     No rash.  Also felt \"irritable\" after using wipes.       CURRENT MEDICATIONS:  Current Outpatient Prescriptions   Medication Sig Dispense Refill     albuterol (PROAIR RESPICLICK) 108 (90 Base) MCG/ACT AEPB inhaler Inhale 2 puffs into the lungs every 4 hours as needed for shortness of breath / dyspnea or wheezing 1 Inhaler 11     azithromycin (ZITHROMAX) 250 MG tablet Two tablets first day, then one tablet daily for four days. 6 tablet 0     Cholecalciferol (D 5000) 5000 UNITS TABS Take by mouth daily       DOCOSAHEXAENOIC ACID PO        escitalopram (LEXAPRO) 10 MG tablet Take 5 mg by mouth daily       fish oil-omega-3 fatty acids 1000 MG capsule        fluticasone (FLONASE) 50 MCG/ACT spray Spray 1 spray into both " nostrils daily       saccharomyces boulardii (FLORASTOR) 250 MG capsule Take 1 capsule by mouth daily       triamcinolone (KENALOG) 0.5 % cream Apply sparingly to affected area three times daily. 15 g 1       SOCIAL HISTORY:  Marital Status: .  Children: yes.  Occupation: Chiropractor.  Alcohol use:Occassional.  Tobacco use: Smoker: no.    FAMILY HISTORY:  Family History   Problem Relation Age of Onset     Breast Cancer Mother 53     Cancer-breast     Type 2 Diabetes Mother      Diabetes type II     HEART DISEASE Father 42     Heart Disease,MI; history of rheumatic fever and asd     Type 2 Diabetes Father      Diabetes type II     Thyroid Disease Sister      Thyroid Disease,hypothyroidism     Depression Brother      Depression       Patient Active Problem List   Diagnosis     Anxiety disorder     Epicondylitis, lateral (tennis elbow)-left         ROS:  The patient denies any fevers, chills, nausea, vomiting, diarrhea, constipation,dysuria, hematuria, or urinary hesitancy or incontinence.  No shortness of breath, chest pain, or rashes.    OBJECTIVE:    DIAGNOSTICS:  No current spinal imaging taken.     PHYSICAL EXAM:     GENERAL APPEARANCE: healthy, alert, active, no distress, cooperative and smiling   GAIT: NORMAL    MUSCULOSKELETAL:   Posture: Posture is generally good.  Mild elevation of left shoulder compared to right, left elbow appears in mild flexion at rest.  Internal rotation of left shoulder noted which is mild to moderate      Elbow:  Extension (0): Hyperextension noted approximately 5-10   Flexion (145): Appears within normal limits  Cozen's Test (Lateral Epicondylitis): positive  Golfer's Elbow Test (Medial Epicondylitis): negative  Valgus Stress Test: negative  Varus Stress Test: negative  Pronation (70): Mildly decreased less than 5  approximately  Supination (85): Mildly decreased less than 5  approximately    Muscle strength tests were conducted of the pectoralis major, subscapularis, serratus  anterior, and of the anterior deltoid.  Pectoralis major, subscapularis, and anterior deltoid of the left arm all were weak when challenged,  4+/10 rated.  Palpation of the left radial head and olecranon revealed mild edema as did no solid end of range joint movement.  Point tenderness is noted over the left radial head, olecranon, triceps, anterior deltoid, and subscapularis muscles.    Patient exhibited rolling type exercise which has been performed with Occupational Therapy.  When observing rolling motion left arm appeared to internally rotate at the glenohumeral joint.  Elbow flexion appeared unimpeded.  Patient was asked to exhibit shoulder abduction and shoulder flexion.  Motion of the right arm remained in a singular plane with said motions, however left arm appeared to drift out of singular plane to accommodate motion.  Patient was also exhibited to hyperextend at the thoracolumbar junction to accommodate motion.          +Tenderness: Point tenderness noted over the left radial head, anconeus, left subscapularis, left anterior deltoid.  +Muscle spasm: Spasms are noted throughout the anconeus, left triceps, left scalenes, left anterior deltoid, and left subscapularis  +Joint asymmetry and restriction: Left radial head with extension and pronation, left ulnar humeral joint with extension and supination.  Left glenohumeral joint with external rotation and flexion    ASSESSMENT: Gauri Hollis is a 42 year old female      1. Lateral epicondylitis of left elbow    2. Somatic dysfunction of spine affecting upper extremity        PLAN    Evaluation and Management:  76619 Low to Moderate exam established patient 10 min    Procedures:  Modalities:  None performed this visit    CMT:  72431 Chiropractic manipulative treatment extraspinal dysfunction/restriction  Extra-spinal: Diversified, left radial head with pronation and extension, left ulna with extension and supination, left glenohumeral joint with shoulder  flexion and external rotation,    Therapeutic procedures:  None  The following were demonstrated and practiced: YTW exercises were recommended to help strengthen the left scapula.  It is believed that patient's left shoulder mechanics may also be contributing to pain experienced in left elbow.  By strengthening up the glenohumeral joint left elbow pain will improve as well    Response to Treatment  Reduction in symptoms as reported by patient    Prognosis: Good.  Patient has responded well with chiropractic in the past.  Is believed that treatment of the soft tissues by Fabiola Manriquez OTC, in combination with adjustments of the elbow and shoulder, and re-training of the shoulder joint will bring about patient progress.    9/12/2018 Plan of Care:  Up to 6 visits of Chiropractic Care including Spinal Adjustments and/or physiotherapy and active rehabilitation, to include exercises in the office and/or at home to meet care plan goals.     Frequency: 2xweek for up to 2 weeks followed by 1x week for 2 weeks. A reevaluation would be clinically appropriate in 4 visits, to determine progress and further course of care.    POC discussed and patient agreeable to plan of care.      9/12/2018 Goals:      Patient will report improved pain of the left elbow.   Patient stated her goals are to return to normal work duties and to return to yoga classes without painful limits.   Patient will demonstrate proper mechanics of the left shoulder to elbow with extended arm positions    INSTRUCTIONS   Practice YTW exercises as instructed.  Continue to utilize foam rolling techniques at home.  Continue to follow restrictions as directed    Follow-up:  Return to care in 3 days.        Disclaimer: This note consists of symbols derived from keyboarding, dictation and/or voice recognition software. As a result, there may be errors in the script that have gone undetected. Please consider this when interpreting information found in this  chart.

## 2018-09-14 ENCOUNTER — THERAPY VISIT (OUTPATIENT)
Dept: CHIROPRACTIC MEDICINE | Facility: OTHER | Age: 43
End: 2018-09-14
Attending: CHIROPRACTOR
Payer: OTHER MISCELLANEOUS

## 2018-09-14 DIAGNOSIS — M99.01 SEGMENTAL AND SOMATIC DYSFUNCTION OF CERVICAL REGION: ICD-10-CM

## 2018-09-14 DIAGNOSIS — M77.12 LATERAL EPICONDYLITIS OF LEFT ELBOW: Primary | ICD-10-CM

## 2018-09-14 DIAGNOSIS — M99.07 SOMATIC DYSFUNCTION OF SPINE AFFECTING UPPER EXTREMITY: ICD-10-CM

## 2018-09-14 PROCEDURE — 98943 CHIROPRACT MANJ XTRSPINL 1/>: CPT | Mod: AT | Performed by: CHIROPRACTOR

## 2018-09-14 NOTE — PATIENT INSTRUCTIONS
Perform activities as tolerated. Continue with YTW exercises and other therapies as discussed with Fabiola Manriquez

## 2018-09-14 NOTE — MR AVS SNAPSHOT
After Visit Summary   9/14/2018    Gauri Hollis    MRN: [id number]           Patient Information     Date Of Birth          1975        Visit Information        Provider Department      9/14/2018 10:00 AM Juice Mulligan DC [facility]        Today's Diagnoses     Lateral epicondylitis of left elbow    -  1    Somatic dysfunction of spine affecting upper extremity        Segmental and somatic dysfunction of cervical region          Care Instructions    Perform activities as tolerated. Continue with YTW exercises and other therapies as discussed with Fabiola Manriquez          Follow-ups after your visit        Follow-up notes from your care team     Return in about 4 days (around 9/18/2018) for Routine Visit.      Your next 10 appointments already scheduled     Sep 17, 2018  8:00 AM CDT   Evaluation with Pamela Solorio PT   [facility] ([facility])    [address] [place] [address]   [phone]            Sep 18, 2018  9:00 AM CDT   Treatment with Fabiola Manriquez OT   [facility] ([facility] [place] [facility])    [address] [address]   [phone]            Sep 25, 2018  9:00 AM CDT   Treatment with Fabiola Manriquez OT   [facility] ([facility] [place] [facility])    [address] [address]   [phone]            Sep 28, 2018  8:15 AM CDT   New Visit with Preet Almonte MD   [facility] ([facility])    [address]  [facility] [address]   [phone]            Oct 04, 2018  2:00 PM CDT   (Arrive by 1:45 PM)   New Visit with Dora Robins MD   [facility] ([facility] )    [address] [place] [address]   [phone]            Oct 10, 2018  9:00 AM CDT   SHORT with Lyndon Caballero DC   [facility] ([facility])    [address]  [place] [id number] [phone]              Who to contact     If you have questions or need follow up information about today's clinic visit or your schedule please contact [facility] directly at [phone].  Normal or non-critical lab and imaging results will be communicated to you by MyChart, letter or phone within 4 business days after the clinic has received the results. If you do not hear from us within 7 days, please contact the clinic through [employer] or phone. If you have a critical or abnormal lab result, we will notify you by phone as soon as possible.  Submit refill requests through [employer] or call your pharmacy and they will forward the refill request to us. Please allow 3 business days for your refill to be completed.          Additional Information About Your Visit        [employer] Information     [employer] gives you secure access to your electronic health record. If you see a primary care provider, you can also send messages to your care team and make appointments. If you have questions, please call your primary care clinic.  If you do not have a primary care provider, please call [phone] and they will assist you.        Care EveryWhere ID     This is your Care EveryWhere ID. This could be used by other organizations to access your [place] medical records  [id number]         Blood Pressure from Last 3 Encounters:   08/29/18 120/84   08/03/18 120/80   06/14/18 106/72    Weight from Last 3 Encounters:   08/29/18 79.4 kg (175 lb)   08/03/18 81.6 kg (180 lb)   06/14/18 82.6 kg (182 lb 1.6 oz)              We Performed the Following     CHIROPRAC MANIP,EXTRASPINAL,1+ REGNS        Primary Care Provider Office Phone # Fax #    Patricia Zavala [phone]       [address] [place] [address]        Equal Access to Services     [place] AH: Wesly gale  Sopauline, lenchoda luqadaha, qaybta kaalmary medley, good terrellkrystina chiara. So [facility] [phone].    ATENCIÓN: Si cathy wilkinson, tiene a woody disposición servicios gratuitos de asistencia lingüística. Yo al [phone].    We comply with applicable federal civil rights laws and [place] laws. We do not discriminate on the basis of race, color, national origin, age, disability, sex, sexual orientation, or gender identity.            Thank you!     Thank you for choosing [facility]  for your care. Our goal is always to provide you with excellent care. Hearing back from our patients is one way we can continue to improve our services. Please take a few minutes to complete the written survey that you may receive in the mail after your visit with us. Thank you!             Your Updated Medication List - Protect others around you: Learn how to safely use, store and throw away your medicines at www.disposemymeds.org.          This list is accurate as of 9/14/18 12:11 PM.  Always use your most recent med list.                   Brand Name Dispense Instructions for use Diagnosis    albuterol 108 (90 Base) MCG/ACT Aepb inhaler    PROAIR RESPICLICK    1 Inhaler    Inhale 2 puffs into the lungs every 4 hours as needed for shortness of breath / dyspnea or wheezing    Cough       azithromycin 250 MG tablet    ZITHROMAX    6 tablet    Two tablets first day, then one tablet daily for four days.    Cough       D 5000 5000 units Tabs   Generic drug:  Cholecalciferol      Take by mouth daily        DOCOSAHEXAENOIC ACID PO           escitalopram 10 MG tablet    LEXAPRO     Take 5 mg by mouth daily        fish oil-omega-3 fatty acids 1000 MG capsule           fluticasone 50 MCG/ACT spray    FLONASE     Spray 1 spray into both nostrils daily        saccharomyces boulardii 250 MG capsule    FLORASTOR     Take 1 capsule by mouth daily        triamcinolone 0.5 % cream    KENALOG    15 g     Apply sparingly to affected area three times daily.    Skin rash

## 2018-09-14 NOTE — PROGRESS NOTES
Visit #:  2    Subjective:  Gauri Hollis is a 42 year old female who is seen in f/u up for:        Lateral epicondylitis of left elbow  Somatic dysfunction of spine affecting upper extremity.     Since last visit on 9/11/2018,  Gauri Hollis reports:    Area of chief complaint:  Patient presents with primary complaints of left elbow pain.  Patient noted improvement of symptoms following last treatment.  Patient noted specifically there was no posttreatment soreness as she is accustomed to with prior adjustments to her left elbow.  Patient reports symptoms exacerbated last night.  Patient is unsure what caused symptoms to exacerbate.  Patient currently ranks elbow pain at 5 out of 10 and is qualified it as achy.  Prior to yesterday patient was able to perform full flexion without a clunking sensation of the left elbow.  Patient notes clunking sensation returned this morning.  Patient reports trying ice last night to help manage her symptoms.  Patient has also been using a home compex electrical stimulation machine to help with her elbow pain.     Objective:  The following was observed: Anterior deltoid and pectoralis strength checks revealed no weakness    P: Patient notes pain and tenderness with checks of the posterior left shoulder.  Palpatory edema is noted over the left radial head. Point discomfort over C4 noted on left.  A: static palpation demonstrates intersegmental asymmetry , left ulna, left radius, left glenohumeral joint.  R: motion palpation notes restricted motion, :  C4 Left rotation restricted and Extension restriction   Left Ulna with extension and supination  Left radius with extension and pronation  Left humerus with external rotation  T: Mild spasms are noted over the teres minor left side, left scalenes, left upper trapezius. Moderate hypertonicity over the left forearm extensors and left triceps/aconeous.     Segmental spinal dysfunction/restrictions found at:  C4 Left rotation restricted  and Extension restriction   Left Ulna with extension and supination  Left radius with extension and pronation  Left humerus with external rotation      Assessment:    Diagnoses:      1. Lateral epicondylitis of left elbow    2. Somatic dysfunction of spine affecting upper extremity        Patient's condition:  Patient had restrictions pre-manipulation    Treatment effectiveness:  Post extremity adjustment patient noted improvement of symptoms.  Patient noted left cervical dorsal junction pain with post checks.  Examination of the cervical spine revealed restriction of C4 which was adjusted.  Following adjustment cervical dorsal pain of the left CT junction resolved      Procedures:  CMT:  82781 Chiropractic manipulative treatment extraspinal dysfunction/restriction  Left radius, left ulna, left humerus, diversified, seated  CMT of 1-2 regions, C4, supine, diversified      Modalities:  None performed this visit    Therapeutic procedures:  None    Response to Treatment  Reduction in symptoms as reported by patient    Prognosis: Good. Patient is showing signs of improvement. Joint restriction of the left radius shows improvement. Patient reports she has not performed YTW exercises at home.     Progress towards Goals: Patient has not made progress towards goal.     Recommendations:    Instructions:Perform activities as tolerated. Continue with YTW exercises and other therapies as discussed with Fabiola Manriquez OTC.    Follow-up:  Recommendation 2 additional visits next week.

## 2018-09-17 ENCOUNTER — HOSPITAL ENCOUNTER (OUTPATIENT)
Dept: PHYSICAL THERAPY | Facility: OTHER | Age: 43
Setting detail: THERAPIES SERIES
End: 2018-09-17
Attending: CHIROPRACTOR
Payer: OTHER MISCELLANEOUS

## 2018-09-17 PROCEDURE — 97140 MANUAL THERAPY 1/> REGIONS: CPT | Mod: GP

## 2018-09-17 PROCEDURE — 40000185 ZZHC STATISTIC PT OUTPT VISIT

## 2018-09-17 PROCEDURE — 97161 PT EVAL LOW COMPLEX 20 MIN: CPT | Mod: GP

## 2018-09-18 ENCOUNTER — HOSPITAL ENCOUNTER (OUTPATIENT)
Dept: OCCUPATIONAL THERAPY | Facility: OTHER | Age: 43
Setting detail: THERAPIES SERIES
End: 2018-09-18
Attending: CHIROPRACTOR
Payer: OTHER MISCELLANEOUS

## 2018-09-18 PROCEDURE — 97140 MANUAL THERAPY 1/> REGIONS: CPT | Mod: GO | Performed by: OCCUPATIONAL THERAPIST

## 2018-09-21 ENCOUNTER — THERAPY VISIT (OUTPATIENT)
Dept: CHIROPRACTIC MEDICINE | Facility: OTHER | Age: 43
End: 2018-09-21
Attending: CHIROPRACTOR
Payer: OTHER MISCELLANEOUS

## 2018-09-21 DIAGNOSIS — M77.12 LATERAL EPICONDYLITIS OF LEFT ELBOW: Primary | ICD-10-CM

## 2018-09-21 DIAGNOSIS — M99.07 SOMATIC DYSFUNCTION OF SPINE AFFECTING UPPER EXTREMITY: ICD-10-CM

## 2018-09-21 PROCEDURE — 98943 CHIROPRACT MANJ XTRSPINL 1/>: CPT | Mod: AT | Performed by: CHIROPRACTOR

## 2018-09-21 NOTE — MR AVS SNAPSHOT
After Visit Summary   9/21/2018    Gauri Hollis    MRN: [id number]           Patient Information     Date Of Birth          1975        Visit Information        Provider Department      9/21/2018 9:45 AM Juice Mulligan DC [facility]        Today's Diagnoses     Lateral epicondylitis of left elbow    -  1    Somatic dysfunction of spine affecting upper extremity          Care Instructions      Instructions:Perform activities as tolerated and per therapists instructions          Follow-ups after your visit        Follow-up notes from your care team     Return in about 1 week (around 9/28/2018) for Routine Visit.      Your next 10 appointments already scheduled     Sep 26, 2018 10:30 AM CDT   Treatment with Fabiola Manriquez OT   [facility] ([facility]    [address] [address]   [phone]            Sep 28, 2018  8:15 AM CDT   New Visit with Preet Almonte MD   [facility])    [address]  [facility] [address]   [phone]            Oct 02, 2018 11:15 AM CDT   Treatment with Pamela L Orstad, PT   [facility] ([facility]    [address] [address]   [phone]            Oct 04, 2018 11:15 AM CDT   Treatment with Pamela L Orstad, PT   [facility] ([facility])    [address] [address]   [phone]            Oct 04, 2018  2:00 PM CDT   (Arrive by 1:45 PM)   New Visit with Dora Robins MD   [facility] [place] ([facility] [place] [facility] )    [address]  [place] [address]   [phone]            Oct 08, 2018  9:00 AM CDT   Treatment with Pamela L Orstad, PT   [facility] ([facility])    [address]  [place] [address]   [phone]            Oct 10, 2018  9:00 AM CDT   SHORT with Lyndon Caballero DC   [facility])    [address]  [place] [address] [phone]            Oct 10, 2018 11:15 AM CDT   Treatment with Pamela Solorio PT   [facility] ([facility])    [address]  [place] [address] [phone]              Who to contact     If you have questions or need follow up information about today's clinic visit or your schedule please contact [facility] directly at [phone].  Normal or non-critical lab and imaging results will be communicated to you by FeeFightershart, letter or phone within 4 business days after the clinic has received the results. If you do not hear from us within 7 days, please contact the clinic through FeeFightershart or phone. If you have a critical or abnormal lab result, we will notify you by phone as soon as possible.  Submit refill requests through Fluorofinder or call your pharmacy and they will forward the refill request to us. Please allow 3 business days for your refill to be completed.          Additional Information About Your Visit        Fluorofinder Information     Fluorofinder gives you secure access to your electronic health record. If you see a primary care provider, you can also send messages to your care team and make appointments. If you have questions, please call your primary care clinic.  If you do not have a primary care provider, please call [phone] and they will assist you.        Care EveryWhere ID     This is your Care EveryWhere ID. This could be used by other organizations to access your [place] medical records  [id number]         Blood Pressure from Last 3 Encounters:   08/29/18 120/84   08/03/18 120/80   06/14/18 106/72    Weight from Last 3 Encounters:   08/29/18 79.4 kg (175 lb)   08/03/18 81.6 kg (180 lb)   06/14/18  82.6 kg (182 lb 1.6 oz)              We Performed the Following     CHIROPRAC MANIP,EXTRASPINAL,1+ REGNS        Primary Care Provider Office Phone # Fax #    Patricia Zavala [phone] [address]  [place] [address]        Equal Access to Services     [place] : Hadii aad ku hadasho Soomaali, waaxda luqadaha, qaybta kaalmada adeegyada, waxay idiin hayaan adeeg kharastefany lasergio . So [facility] [phone].    ATENCIÓN: Si habla español, tiene a woody disposición servicios gratuitos de asistencia lingüística. Llame al [phone].    We comply with applicable federal civil rights laws and [place] laws. We do not discriminate on the basis of race, color, national origin, age, disability, sex, sexual orientation, or gender identity.            Thank you!     Thank you for choosing [facility]  for your care. Our goal is always to provide you with excellent care. Hearing back from our patients is one way we can continue to improve our services. Please take a few minutes to complete the written survey that you may receive in the mail after your visit with us. Thank you!             Your Updated Medication List - Protect others around you: Learn how to safely use, store and throw away your medicines at www.disposemymeds.org.          This list is accurate as of 9/21/18 10:37 AM.  Always use your most recent med list.                   Brand Name Dispense Instructions for use Diagnosis    albuterol 108 (90 Base) MCG/ACT Aepb inhaler    PROAIR RESPICLICK    1 Inhaler    Inhale 2 puffs into the lungs every 4 hours as needed for shortness of breath / dyspnea or wheezing    Cough       azithromycin 250 MG tablet    ZITHROMAX    6 tablet    Two tablets first day, then one tablet daily for four days.    Cough       D 5000 5000 units Tabs   Generic drug:  Cholecalciferol      Take by mouth daily        DOCOSAHEXAENOIC ACID PO           escitalopram 10 MG tablet    LEXAPRO      Take 5 mg by mouth daily        fish oil-omega-3 fatty acids 1000 MG capsule           fluticasone 50 MCG/ACT spray    FLONASE     Spray 1 spray into both nostrils daily        saccharomyces boulardii 250 MG capsule    FLORASTOR     Take 1 capsule by mouth daily        triamcinolone 0.5 % cream    KENALOG    15 g    Apply sparingly to affected area three times daily.    Skin rash

## 2018-09-21 NOTE — PROGRESS NOTES
Visit #:  3    Subjective:  Gauri Hollis is a 42 year old female who is seen in f/u up for:        Lateral epicondylitis of left elbow  Somatic dysfunction of spine affecting upper extremity.     Since last visit on 9/14/2018,  Gauri Hollis reports:    Area of chief complaint:  Left elbow:  Symptoms are graded at 3/10. The quality is described as stiff.  Motion has increased, but is still not normal, patient estimated 80-90% improvement from last treatment. Patient feels that they are improving. Patient reports receiving additional soft tissue therapy to her left shoulder this past week. Patient reports shoulder soreness continues following this last treatment. Patient reports left elbow pain continues to cause pain with resisted flexion and extension. Primarily patient reports elbow pain is at its worst with gripping/extension motions such as twisting off a jar lid.  No tenderness is noted of the cervicodorsal region following last treatment.  Patient reports feeling drained and achy this week.       Objective:  The following was observed:    P: Palpatory tenderness noted over the left radial head and over the left anterior deltoid  A: Joint asymmetry noted over the left radius, left olecranon, left humerus.  R: motion palpation notes restricted motion, left radius with pronation/extension, left ulna with extension and supination. Left humerus with flexion/external rotation.  T: hypertonicity at: left forearm extensors, left aconeus, left anterior deltoid is mildly spastic.    Segmental spinal dysfunction/restrictions found at:  R: motion palpation notes restricted motion, left radius with pronation/extension, left ulna with extension and supination. Left humerus with flexion/external rotation.      Assessment: It is believed patient's left shoulder is being affected due to alteration of normal mechanics of the left elbow.     Diagnoses:      1. Lateral epicondylitis of left elbow    2. Somatic dysfunction of  spine affecting upper extremity        Patient's condition:  Patient had restrictions pre-manipulation and Patient symptoms are gradually improving    Treatment effectiveness:  Post manipulation there is better intersegmental movement, Patient claims to feel looser post manipulation, Muscle spasm is reducing and patient noted mild soreness following todays adjustment.      Procedures:  CMT:  82121 Chiropractic manipulative treatment extraspinal dysfunction/restriction  Extra-spinal: Diversified, left radius, left olecranon, left humerus, Supine, Seated    Modalities:  None performed this visit    Therapeutic procedures:  None    Response to Treatment  No Change in symptoms patient noted todays treatment did cause mild soreness.     Prognosis: Good. Patient is showing signs of improvement. Today's adjustments caused mild soreness which has not been the case with past treatments. It is believed that the soft tissue treatments provided earlier this week may have contributed to this soreness. It is believed symptoms will improve within 24 hours of adjustment    Progress towards Goals: Patient is making progress towards the goal.     Recommendations:    Instructions:Perform activities as tolerated and per therapists instructions    Follow-up:  Return to care in 1 week.

## 2018-09-26 ENCOUNTER — HOSPITAL ENCOUNTER (OUTPATIENT)
Dept: OCCUPATIONAL THERAPY | Facility: OTHER | Age: 43
Setting detail: THERAPIES SERIES
End: 2018-09-26
Attending: CHIROPRACTOR
Payer: OTHER MISCELLANEOUS

## 2018-09-26 PROCEDURE — 97140 MANUAL THERAPY 1/> REGIONS: CPT | Mod: GO | Performed by: OCCUPATIONAL THERAPIST

## 2018-09-28 ENCOUNTER — OFFICE VISIT (OUTPATIENT)
Dept: ORTHOPEDICS | Facility: OTHER | Age: 43
End: 2018-09-28
Attending: SPECIALIST
Payer: OTHER MISCELLANEOUS

## 2018-09-28 DIAGNOSIS — Z00.00 ROUTINE GENERAL MEDICAL EXAMINATION AT A HEALTH CARE FACILITY: Primary | ICD-10-CM

## 2018-09-28 NOTE — MR AVS SNAPSHOT
After Visit Summary   9/28/2018    Gauri Hollis    MRN: [id number]           Patient Information     Date Of Birth          1975        Visit Information        Provider Department      9/28/2018 8:15 AM Preet Almonte MD [facility]        Today's Diagnoses     Routine general medical examination at a health care facility    -  1       Follow-ups after your visit        Your next 10 appointments already scheduled     Oct 12, 2018  9:15 AM CDT   TEJA 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with Juice Mulligan DC   [facility]    [address]  [place] [address]   [phone]            Oct 17, 2018 10:30 AM CDT   SHORT with Lyndon Caballero DC   [facility] ([facility])    [address]  [place] [address]   [phone]            Nov 08, 2018  8:30 AM CST   Office Visit with HC ALLERGY TESTING   [facility] [place] ([facility] [place] )    [address]  [place] [address]   [phone]           Bring a current list of meds and any records pertaining to this visit. For Physicals, please bring immunization records and any forms needing to be filled out. Please arrive 10 minutes early to complete paperwork.            Nov 08, 2018 10:15 AM CST   (Arrive by 10:00 AM)   Return Visit with Ruby Coleman PA-C   [facility] [place] ([facility] [place] )    [address]  [place] [address]   [phone]            Nov 15, 2018  2:45 PM CST   (Arrive by 2:30 PM)   Hearing Eval with Chary Duncan   [facility] [place] ([facility] [place] )    [address]  [place] [address]   [phone]            Dec 12, 2018  8:30 AM CST   MyChart Physical Adult with Serene Talley MD   [facility] ([facility])    [address] [facility]  [place] [address] [phone]              Who to contact     If you have questions or need follow up information about today's clinic visit or your schedule please contact [facility] directly at [phone].  Normal or non-critical lab and imaging results will be communicated to you by MyChart, letter or phone within 4 business days after the clinic has received the results. If you do not hear from us within 7 days, please contact the clinic through SupplierSynchart or phone. If you have a critical or abnormal lab result, we will notify you by phone as soon as possible.  Submit refill requests through HowStuffWorks or call your pharmacy and they will forward the refill request to us. Please allow 3 business days for your refill to be completed.          Additional Information About Your Visit        SupplierSyncharHubChilla Information     HowStuffWorks gives you secure access to your electronic health record. If you see a primary care provider, you can also send messages to your care team and make appointments. If you have questions, please call your primary care clinic.  If you do not have a primary care provider, please call [phone] and they will assist you.        Care EveryWhere ID     This is your Care EveryWhere ID. This could be used by other organizations to access your [place] medical records  [id number]         Blood Pressure from Last 3 Encounters:   10/04/18 116/68   08/29/18 120/84   08/03/18 120/80    Weight from Last 3 Encounters:   10/04/18 77.1 kg (170 lb)   08/29/18 79.4 kg (175 lb)   08/03/18 81.6 kg (180 lb)              Today, you had the following     No orders found for display       Primary Care Provider Office Phone # Fax #    Patricia Zavala  [phone]       [address]  [facility] [address]        Equal Access to Services     [place] AH: Wesly Shaffer, pepe luke, qaclaireta kaalmary medley, good guadarrama. So [facility]  [phone].    ATENCIÓN: Si cathy wilkinson, tiene a woody disposición servicios gratuitos de asistencia lingüística. Yo morgan [phone].    We comply with applicable federal civil rights laws and [place] laws. We do not discriminate on the basis of race, color, national origin, age, disability, sex, sexual orientation, or gender identity.            Thank you!     Thank you for choosing [facility]  for your care. Our goal is always to provide you with excellent care. Hearing back from our patients is one way we can continue to improve our services. Please take a few minutes to complete the written survey that you may receive in the mail after your visit with us. Thank you!             Your Updated Medication List - Protect others around you: Learn how to safely use, store and throw away your medicines at www.disposemymeds.org.          This list is accurate as of 9/28/18 11:59 PM.  Always use your most recent med list.                   Brand Name Dispense Instructions for use Diagnosis    albuterol 108 (90 Base) MCG/ACT Aepb inhaler    PROAIR RESPICLICK    1 Inhaler    Inhale 2 puffs into the lungs every 4 hours as needed for shortness of breath / dyspnea or wheezing    Cough       D 5000 5000 units Tabs   Generic drug:  Cholecalciferol      Take by mouth daily        DOCOSAHEXAENOIC ACID PO           escitalopram 10 MG tablet    LEXAPRO     Take 5 mg by mouth daily        fish oil-omega-3 fatty acids 1000 MG capsule      Take 1 g by mouth daily        fluticasone 50 MCG/ACT spray    FLONASE     Spray 1 spray into both nostrils daily        saccharomyces boulardii 250 MG capsule    FLORASTOR     Take 1 capsule by mouth daily        triamcinolone 0.5 % cream    KENALOG    15 g    Apply sparingly to affected area three times daily.    Skin rash

## 2018-10-02 ENCOUNTER — HOSPITAL ENCOUNTER (OUTPATIENT)
Dept: PHYSICAL THERAPY | Facility: OTHER | Age: 43
Setting detail: THERAPIES SERIES
End: 2018-10-02
Attending: CHIROPRACTOR
Payer: OTHER MISCELLANEOUS

## 2018-10-02 PROCEDURE — 40000185 ZZHC STATISTIC PT OUTPT VISIT

## 2018-10-02 PROCEDURE — 97140 MANUAL THERAPY 1/> REGIONS: CPT | Mod: GP

## 2018-10-04 ENCOUNTER — HOSPITAL ENCOUNTER (OUTPATIENT)
Dept: PHYSICAL THERAPY | Facility: OTHER | Age: 43
Setting detail: THERAPIES SERIES
End: 2018-10-04
Attending: CHIROPRACTOR
Payer: OTHER MISCELLANEOUS

## 2018-10-04 ENCOUNTER — OFFICE VISIT (OUTPATIENT)
Dept: OTOLARYNGOLOGY | Facility: OTHER | Age: 43
End: 2018-10-04
Attending: OTOLARYNGOLOGY
Payer: COMMERCIAL

## 2018-10-04 VITALS
DIASTOLIC BLOOD PRESSURE: 68 MMHG | BODY MASS INDEX: 26.68 KG/M2 | TEMPERATURE: 98 F | SYSTOLIC BLOOD PRESSURE: 116 MMHG | WEIGHT: 170 LBS | HEART RATE: 66 BPM | HEIGHT: 67 IN

## 2018-10-04 DIAGNOSIS — J30.1 ALLERGIC RHINITIS DUE TO POLLEN, UNSPECIFIED SEASONALITY: ICD-10-CM

## 2018-10-04 DIAGNOSIS — H69.93 DYSFUNCTION OF BOTH EUSTACHIAN TUBES: ICD-10-CM

## 2018-10-04 DIAGNOSIS — J34.3 NASAL TURBINATE HYPERTROPHY: ICD-10-CM

## 2018-10-04 DIAGNOSIS — R06.83 PRIMARY SNORING: ICD-10-CM

## 2018-10-04 DIAGNOSIS — J32.4 CHRONIC PANSINUSITIS: Primary | ICD-10-CM

## 2018-10-04 PROCEDURE — 31231 NASAL ENDOSCOPY DX: CPT | Performed by: OTOLARYNGOLOGY

## 2018-10-04 PROCEDURE — 97140 MANUAL THERAPY 1/> REGIONS: CPT | Mod: GP

## 2018-10-04 PROCEDURE — 40000185 ZZHC STATISTIC PT OUTPT VISIT

## 2018-10-04 PROCEDURE — 99204 OFFICE O/P NEW MOD 45 MIN: CPT | Mod: 25 | Performed by: OTOLARYNGOLOGY

## 2018-10-04 RX ORDER — BUDESONIDE 0.5 MG/2ML
INHALANT ORAL
Qty: 3 BOX | Refills: 11 | Status: SHIPPED | OUTPATIENT
Start: 2018-10-04 | End: 2018-12-12

## 2018-10-04 ASSESSMENT — PAIN SCALES - GENERAL: PAINLEVEL: MILD PAIN (2)

## 2018-10-04 NOTE — PROGRESS NOTES
Otolaryngology Consultation    Patient: Gauri Hollis  : 1975    Patient presents with:  Sinus Problem: Pt has been referred by Alyssa for chronic maxillary sinusitis.  Pt had a sinus CT completed.      HPI:  Gauri Hollis is a 43 year old female seen today for chronic sinusitis   The patient was sent here by  Dr. Shah.  She has had 4 episodes of sinusitis in the last year with frequent antibiotic use.  Symptoms typically include copious thick post nasal drainage, which has become chronic.  Other major symptoms include chronic congestion congestion , daytime somnolence and poor sleep due to congestion and post nasal drainage  She denies zee apnea but has started snoring  She also notes frequent ear plugging and notes a slowly progressive hearing loss  Symptoms all seemed to worsen during pregnancy and after delivery of her son 7 years ago  Her acute flare started this  after a viral URI     Treatment has consisted of amoxil, augmentin x 2, zithromax x 2  She has used nasal saline, antihistamines and nasal steroids .    Gauri denies prior nasal surgery or trauma.    She denies any allergy testing and has frequent symptoms of sneezing or itchy eyes.    There are no new environmental exposures in the home or at work.  She is a chiropractor    Currently taking Flonase    Current Outpatient Rx   Medication Sig Dispense Refill     albuterol (PROAIR RESPICLICK) 108 (90 Base) MCG/ACT AEPB inhaler Inhale 2 puffs into the lungs every 4 hours as needed for shortness of breath / dyspnea or wheezing 1 Inhaler 11     Cholecalciferol (D 5000) 5000 UNITS TABS Take by mouth daily       DOCOSAHEXAENOIC ACID PO        escitalopram (LEXAPRO) 10 MG tablet Take 5 mg by mouth daily       fish oil-omega-3 fatty acids 1000 MG capsule Take 1 g by mouth daily        fluticasone (FLONASE) 50 MCG/ACT spray Spray 1 spray into both nostrils daily       saccharomyces boulardii (FLORASTOR) 250 MG capsule Take 1  "capsule by mouth daily       triamcinolone (KENALOG) 0.5 % cream Apply sparingly to affected area three times daily. 15 g 1       Allergies: Cats and Chlorhexidine     Past Medical History:   Diagnosis Date     Anxiety disorder 2017     Bleeding in early pregnancy      Bronchitis 2012    Acute      Chronic lumbosacral pain 2015     Dislocation of ulnohumeral joint 1993    left; age 18     Epicondylitis, lateral (tennis elbow)-left 2018     Family history of malignant neoplasm of breast 2013     History of dysthymic disorder 2010    Resolved 13     Lesions of both ulnar nerves 2017     Medial epicondylitis of right elbow 2017     Medial epicondylitis, right 2017     Other enthesopathies, not elsewhere classified 2017     Other immediate postpartum hemorrhage 2011     Pregnancy      - PPH, retained placenta, transfusion     Vitamin D deficiency 2015       Past Surgical History:   Procedure Laterality Date     DILATION AND CURETTAGE  10/2011    for retained placenta     DILATION AND CURETTAGE  2015    for missed AB     DILATION AND CURETTAGE  2017    SAB     EXTRACTION(S) DENTAL      [facility]       ENT family history reviewed    Social History   Substance Use Topics     Smoking status: Never Smoker     Smokeless tobacco: Never Used     Alcohol use No      Comment: Alcoholic Drinks/day: 3 per week       Review of Systems  ROS: 10 point ROS neg other than the symptoms noted above in the HPI and eye irritation, mild IBS, tinnitus, jaw pain, neck pain, headhaches, changes in skin    Physical Exam  /68 (BP Location: Right arm, Cuff Size: Adult Regular)  Pulse 66  Temp 98  F (36.7  C) (Tympanic)  Ht 5' 7\" (1.702 m)  Wt 170 lb (77.1 kg)  BMI 26.63 kg/m2  General - The patient is well nourished and well developed, and appears to have good nutritional status.  Alert and oriented to person and place, answers questions and " cooperates with examination appropriately.   Head and Face - Normocephalic and atraumatic, with no gross asymmetry noted.  The facial nerve is intact, with strong symmetric movements.  Voice and Breathing - The patient was breathing comfortably without the use of accessory muscles. There was no wheezing, stridor, or stertor.  The patients voice was clear and strong, and had appropriate pitch and quality.  No zee peripheral digital clubbing or cyanosis   Ears -The external auditory canals are patent, the tympanic membranes are intact without effusion, retraction or mass.  Bony landmarks are intact.  Eyes - Extraocular movements intact, and the pupils were reactive to light.  Sclera were not icteric or injected, conjunctiva were pink and moist.  Mouth - Examination of the oral cavity showed pink, healthy oral mucosa. No lesions or ulcerations noted.  The tongue was mobile and midline, and the dentition were in good condition.    Throat - The walls of the oropharynx were smooth, pink, moist, symmetric, and had no lesions or ulcerations.  The tonsillar pillars and soft palate were symmetric.  The uvula was midline on elevation.    Neck - No palpable enlarged fixed cervical lymph nodes.  No neck cysts or unusual tenderness to palpation.   No palpable fixed thyroid nodules or concerning goiter.  The trachea is grossly midline.   Nose - External contour is symmetric, no gross deflection or scars.  Nasal mucosa is pink and moist with no abnormal mucus.  The septum and turbinates were evaluated: septum with slight non obstructive left spur.  No polyps, masses, or purulence noted on examination.see below      To evaluate the nose and sinuses in the post operative state, I performed rigid nasal endoscopy. The LPN had previously sprayed both nares with lidocaine and neosynephrine.    I began with the LEFT side using a 0 degree rigid nasal endoscope, and then similarly examined the RIGHT side    Findings:   Inferior  turbinates:  Enlarged, cobblestoned  Middle turbinate and middle meatus:  No purulence, no polyposis, no synechiae  Decent access to the middle meatus bilaterally  Mucosa is  healthy throughout without polyps nor polypoid degeneration  Nasopharynx is clear  eustachian tubes are edematous and narrowed bilaterally secondary to mucosal edema  No nasopharyngeal mass    CT sinus dated 9/12/2018 reveals still thickening in the right and left frontal sinuses the anterior and posterior ethmoid sinuses are completely opacified with polypoid occlusion similar occlusion of the maxillary sinuses bilaterally the inferior turbinates are severely enlarged bilaterally there is a slight septal skewed to the left.  Asked male, looks his are occluded there is mucoperiosteal thickening in the sphenoid sinuses.  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 Chronic pansinusitis J32.4 budesonide (PULMICORT) 0.5 MG/2ML neb solution   2. Nasal turbinate hypertrophy J34.3    3. Dysfunction of both eustachian tubes H69.83    4. Primary snoring R06.83    5. chronic rhinitis, likely allergic J30.1        She would require a short prednisone taper prior to and after surgery    Proceed with allergy testing risks of intradermal testing including anaphylaxis were discussed.  I would like her to start Dymista after testing is completed    Risks of oral steroid use were discussed and include psychiatric/mood changes, insomnia, stomach ulcers and potential GI bleeding, blood sugar elevation/worsening diabetes, hip/bone necrosis or bone demineralization.      The risks and complications of endoscopic sinus surgery, turbinate reduction  were openly discussed.  The potential risks include bleeding, general anesthesia, infection, scar formation, need for  additional surgery, septal perforation, and rarely injury to the eye with the possibility of blindness,  injury to the brain, meningitis or other intracranial complications.  Nonsurgical options were discussed including prolonged antibioitics and/or oral and topical steroids.   Sinus anatomy and sinus disease were reviewed.  CT sinus was reviewed with the patient.  All questions were answered.    plan total with propel    Option of in office procedure or turbinate reduction alone discussed    Use Budesonide Rinses Twice Daily  Use Flonase as prescribed  Complete Allergy Skin Testing  Audiogram pending  Consider Endoscopic Sinus Surgery and Turbinate Reduction  Follow up with SUZETTE Domingo after Allergy Testing    Finally, consider endoscopic dilation eustachian tubes in the future after treatment above completed if still symptomatic    We discussed the importance of high flow nasal saline use to prevent nasal secretion stasis, the correct use of nasal steroids, and nasal and sinus anatomy were reviewed.  CT was reviewed.  Surgical options reviewed.      Dora Robins D.O.  Otolaryngology/Head and Neck Surgery  Allergy

## 2018-10-04 NOTE — LETTER
10/4/2018         RE: Gauri Hollis  [address]  [facility] [address]        Dear Colleague,    Thank you for referring your patient, Gauri Hollis, to the [facility]. Please see a copy of my visit note below.    Otolaryngology Consultation    Patient: Gauri Hollis  : 1975    Patient presents with:  Sinus Problem: Pt has been referred by Alyssa for chronic maxillary sinusitis.  Pt had a sinus CT completed.      HPI:  Gauri Hollis is a 43 year old female seen today for chronic sinusitis   The patient was sent here by  Dr. Shah.  She has had 4 episodes of sinusitis in the last year with frequent antibiotic use.  Symptoms typically include copious thick post nasal drainage, which has become chronic.  Other major symptoms include chronic congestion congestion , daytime somnolence and poor sleep due to congestion and post nasal drainage  She denies zee apnea but has started snoring  She also notes frequent ear plugging and notes a slowly progressive hearing loss  Symptoms all seemed to worsen during pregnancy and after delivery of her son 7 years ago  Her acute flare started this  after a viral URI     Treatment has consisted of amoxil, augmentin x 2, zithromax x 2  She has used nasal saline, antihistamines and nasal steroids .    Gauri denies prior nasal surgery or trauma.    She denies any allergy testing and has frequent symptoms of sneezing or itchy eyes.    There are no new environmental exposures in the home or at work.  She is a chiropractor    Currently taking Flonase    Current Outpatient Rx   Medication Sig Dispense Refill     albuterol (PROAIR RESPICLICK) 108 (90 Base) MCG/ACT AEPB inhaler Inhale 2 puffs into the lungs every 4 hours as needed for shortness of breath / dyspnea or wheezing 1 Inhaler 11     Cholecalciferol (D 5000) 5000 UNITS TABS Take by mouth daily       DOCOSAHEXAENOIC ACID PO        escitalopram (LEXAPRO) 10 MG tablet Take 5  mg by mouth daily       fish oil-omega-3 fatty acids 1000 MG capsule Take 1 g by mouth daily        fluticasone (FLONASE) 50 MCG/ACT spray Spray 1 spray into both nostrils daily       saccharomyces boulardii (FLORASTOR) 250 MG capsule Take 1 capsule by mouth daily       triamcinolone (KENALOG) 0.5 % cream Apply sparingly to affected area three times daily. 15 g 1       Allergies: Cats and Chlorhexidine     Past Medical History:   Diagnosis Date     Anxiety disorder 2017     Bleeding in early pregnancy      Bronchitis 2012    Acute      Chronic lumbosacral pain 2015     Dislocation of ulnohumeral joint 1993    left; age 18     Epicondylitis, lateral (tennis elbow)-left 2018     Family history of malignant neoplasm of breast 2013     History of dysthymic disorder 2010    Resolved 13     Lesions of both ulnar nerves 2017     Medial epicondylitis of right elbow 2017     Medial epicondylitis, right 2017     Other enthesopathies, not elsewhere classified 2017     Other immediate postpartum hemorrhage 2011     Pregnancy      - PPH, retained placenta, transfusion     Vitamin D deficiency 2015       Past Surgical History:   Procedure Laterality Date     DILATION AND CURETTAGE  10/2011    for retained placenta     DILATION AND CURETTAGE  2015    for missed AB     DILATION AND CURETTAGE  2017    SAB     EXTRACTION(S) DENTAL      [facility]       ENT family history reviewed    Social History   Substance Use Topics     Smoking status: Never Smoker     Smokeless tobacco: Never Used     Alcohol use No      Comment: Alcoholic Drinks/day: 3 per week       Review of Systems  ROS: 10 point ROS neg other than the symptoms noted above in the HPI and eye irritation, mild IBS, tinnitus, jaw pain, neck pain, headhaches, changes in skin    Physical Exam  /68 (BP Location: Right arm, Cuff Size: Adult Regular)  Pulse 66  Temp 98  F (36.7  C)  "(Tympanic)  Ht 5' 7\" (1.702 m)  Wt 170 lb (77.1 kg)  BMI 26.63 kg/m2  General - The patient is well nourished and well developed, and appears to have good nutritional status.  Alert and oriented to person and place, answers questions and cooperates with examination appropriately.   Head and Face - Normocephalic and atraumatic, with no gross asymmetry noted.  The facial nerve is intact, with strong symmetric movements.  Voice and Breathing - The patient was breathing comfortably without the use of accessory muscles. There was no wheezing, stridor, or stertor.  The patients voice was clear and strong, and had appropriate pitch and quality.  No zee peripheral digital clubbing or cyanosis   Ears -The external auditory canals are patent, the tympanic membranes are intact without effusion, retraction or mass.  Bony landmarks are intact.  Eyes - Extraocular movements intact, and the pupils were reactive to light.  Sclera were not icteric or injected, conjunctiva were pink and moist.  Mouth - Examination of the oral cavity showed pink, healthy oral mucosa. No lesions or ulcerations noted.  The tongue was mobile and midline, and the dentition were in good condition.    Throat - The walls of the oropharynx were smooth, pink, moist, symmetric, and had no lesions or ulcerations.  The tonsillar pillars and soft palate were symmetric.  The uvula was midline on elevation.    Neck - No palpable enlarged fixed cervical lymph nodes.  No neck cysts or unusual tenderness to palpation.   No palpable fixed thyroid nodules or concerning goiter.  The trachea is grossly midline.   Nose - External contour is symmetric, no gross deflection or scars.  Nasal mucosa is pink and moist with no abnormal mucus.  The septum and turbinates were evaluated: septum with slight non obstructive left spur.  No polyps, masses, or purulence noted on examination.see below      To evaluate the nose and sinuses in the post operative state, I performed rigid " nasal endoscopy. The LPN had previously sprayed both nares with lidocaine and neosynephrine.    I began with the LEFT side using a 0 degree rigid nasal endoscope, and then similarly examined the RIGHT side    Findings:   Inferior turbinates:  Enlarged, cobblestoned  Middle turbinate and middle meatus:  No purulence, no polyposis, no synechiae  Decent access to the middle meatus bilaterally  Mucosa is  healthy throughout without polyps nor polypoid degeneration  Nasopharynx is clear  eustachian tubes are edematous and narrowed bilaterally secondary to mucosal edema  No nasopharyngeal mass    CT sinus dated 9/12/2018 reveals still thickening in the right and left frontal sinuses the anterior and posterior ethmoid sinuses are completely opacified with polypoid occlusion similar occlusion of the maxillary sinuses bilaterally the inferior turbinates are severely enlarged bilaterally there is a slight septal skewed to the left.  Asked male, looks his are occluded there is mucoperiosteal thickening in the sphenoid sinuses.  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 Chronic pansinusitis J32.4 budesonide (PULMICORT) 0.5 MG/2ML neb solution   2. Nasal turbinate hypertrophy J34.3    3. Dysfunction of both eustachian tubes H69.83    4. Primary snoring R06.83    5. chronic rhinitis, likely allergic J30.1        She would require a short prednisone taper prior to and after surgery    Proceed with allergy testing risks of intradermal testing including anaphylaxis were discussed.  I would like her to start Dymista after testing is completed    Risks of oral steroid use were discussed and include psychiatric/mood changes, insomnia, stomach ulcers and potential GI bleeding, blood sugar elevation/worsening diabetes, hip/bone  necrosis or bone demineralization.      The risks and complications of endoscopic sinus surgery, turbinate reduction  were openly discussed.  The potential risks include bleeding, general anesthesia, infection, scar formation, need for additional surgery, septal perforation, and rarely injury to the eye with the possibility of blindness,  injury to the brain, meningitis or other intracranial complications.  Nonsurgical options were discussed including prolonged antibioitics and/or oral and topical steroids.   Sinus anatomy and sinus disease were reviewed.  CT sinus was reviewed with the patient.  All questions were answered.    plan total with propel    Option of in office procedure or turbinate reduction alone discussed    Use Budesonide Rinses Twice Daily  Use Flonase as prescribed  Complete Allergy Skin Testing  Audiogram pending  Consider Endoscopic Sinus Surgery and Turbinate Reduction  Follow up with SUZETTE Domingo after Allergy Testing    Finally, consider endoscopic dilation eustachian tubes in the future after treatment above completed if still symptomatic    We discussed the importance of high flow nasal saline use to prevent nasal secretion stasis, the correct use of nasal steroids, and nasal and sinus anatomy were reviewed.  CT was reviewed.  Surgical options reviewed.      Dora Robins D.O.  Otolaryngology/Head and Neck Surgery  Allergy      Again, thank you for allowing me to participate in the care of your patient.        Sincerely,        Dora Robins MD

## 2018-10-04 NOTE — PATIENT INSTRUCTIONS
Thank you for allowing Dr. Robisn and our ENT team to participate in your care.  If your medications are too expensive, please give the nurse a call.  We can possibly change this medication.  If you have a scheduling or an appointment question please contact [facility] Coordinator at their direct line [phone].   ALL nursing questions or concerns can be directed to your ENT nurse at: [phone] - Kellen    Use Budesonide Rinses Twice Daily  Use Flonase as prescribed  Complete Allergy Skin Testing  Consider Endoscopic Sinus Surgery and Turbinate Reduction  Follow up with SUZETTE Domingo after Allergy Testing      Indications for allergy testing include:   1) Confirm suspicion of allergic rhinitis due to inhalant allergies  2) Identify the offending allergen to determine specific mode of treatment  3) In the case of chronic rhinosinusitis: when symptoms are not controlled by avoidance and pharmacotherapy  4) In the Asthma patient when exacerbations may be due to perennial allergen exposure  5) Suspect food allergy  6) Otitis Media, chronic rhinitis, atopic dermatitis, Meniere disease, headache, pharyngitis or eye symptoms    modified quantitative testing (MQT) will be performed.  Signed consent was obtained, and the risks of immunotherapy were discussed, including the potential for anaphylaxis.    If immunotherapy (IT) is recommended, there is continued risk of anaphylaxis.   Anaphylaxis can cause death. The patient will need to be monitored for 30 minutes post injection.  They must present their epinephrine pen prior to injection.  Subcutaneous as well as sublingual immunotherapy (SLIT) were discussed as potential treatment options.  The patient was told SLIT is not approved by the FDA and is cash pay.  The general time frame of immunotherapy was discussed (generally 3-5 years, sometimes longer), and the basic immunology behind IT was discussed.        Budesonide nasal saline irrigation per  instructions:  -Obtain [facility] Med Sinus rinse over the counter.    -Use warm distilled water and 2 packets of the salt solution that comes with the bottle, dissolve in bottle up to the 240 mL dhaval.  -Add 1 vial of budesonide.  -Irrigate each side of your nose leaning over the sink, using 1/3 to 1/2 the volume of the bottle in each nostril every irrigation.  Irrigate 2 times daily.  -If additional rinses are needed/recommended, you may use the plan [facility] Med Sinus irrigation without the use of added budesonide.

## 2018-10-04 NOTE — MR AVS SNAPSHOT
After Visit Summary   10/4/2018    Gauri Hollis    MRN: [id number]           Patient Information     Date Of Birth          1975        Visit Information        Provider Department      10/4/2018 2:00 PM Dora Robins MD [facility] [place]        Today's Diagnoses     Chronic maxillary sinusitis          Care Instructions    Thank you for allowing Dr. Robins and our ENT team to participate in your care.  If your medications are too expensive, please give the nurse a call.  We can possibly change this medication.  If you have a scheduling or an appointment question please contact [facility] Unit Coordinator at their direct line [phone].   ALL nursing questions or concerns can be directed to your ENT nurse at: [phone] - Kellen    Use Budesonide Rinses Twice Daily  Use Flonase as prescribed  Complete Allergy Skin Testing  Consider Endoscopic Sinus Surgery and Turbinate Reduction  Follow up with SUZETTE Domingo after Allergy Testing      Indications for allergy testing include:   1) Confirm suspicion of allergic rhinitis due to inhalant allergies  2) Identify the offending allergen to determine specific mode of treatment  3) In the case of chronic rhinosinusitis: when symptoms are not controlled by avoidance and pharmacotherapy  4) In the Asthma patient when exacerbations may be due to perennial allergen exposure  5) Suspect food allergy  6) Otitis Media, chronic rhinitis, atopic dermatitis, Meniere disease, headache, pharyngitis or eye symptoms    modified quantitative testing (MQT) will be performed.  Signed consent was obtained, and the risks of immunotherapy were discussed, including the potential for anaphylaxis.    If immunotherapy (IT) is recommended, there is continued risk of anaphylaxis.   Anaphylaxis can cause death. The patient will need to be monitored for 30 minutes post injection.  They must present their epinephrine pen prior to  injection.  Subcutaneous as well as sublingual immunotherapy (SLIT) were discussed as potential treatment options.  The patient was told SLIT is not approved by the FDA and is cash pay.  The general time frame of immunotherapy was discussed (generally 3-5 years, sometimes longer), and the basic immunology behind IT was discussed.        Budesonide nasal saline irrigation per instructions:  -Obtain Quoc Med Sinus rinse over the counter.    -Use warm distilled water and 2 packets of the salt solution that comes with the bottle, dissolve in bottle up to the 240 mL dhaval.  -Add 1 vial of budesonide.  -Irrigate each side of your nose leaning over the sink, using 1/3 to 1/2 the volume of the bottle in each nostril every irrigation.  Irrigate 2 times daily.  -If additional rinses are needed/recommended, you may use the plan Quoc Med Sinus irrigation without the use of added budesonide.                 Follow-ups after your visit        Your next 10 appointments already scheduled     Oct 08, 2018  9:00 AM CDT   Treatment with Pamela Solorio, PT   [facility] ([facility])    [address] [place] [address]   [phone]            Oct 10, 2018 11:15 AM CDT   Treatment with Pamelatyson Solorio, PT   [facility] ([facility])    [address] [place] [address]   [phone]            Oct 17, 2018 10:30 AM CDT   SHORT with Lyndon Caballero DC   [facility] ([facility])    [address]  [place] [address]   [phone]              Who to contact     If you have questions or need follow up information about today's clinic visit or your schedule please contact [facility] directly at [phone].  Normal or non-critical lab and imaging results will be communicated to you by MyChart, letter or phone within 4 business days after the clinic has  "received the results. If you do not hear from us within 7 days, please contact the clinic through [employer] or phone. If you have a critical or abnormal lab result, we will notify you by phone as soon as possible.  Submit refill requests through [employer] or call your pharmacy and they will forward the refill request to us. Please allow 3 business days for your refill to be completed.          Additional Information About Your Visit        [employer] Information     [employer] gives you secure access to your electronic health record. If you see a primary care provider, you can also send messages to your care team and make appointments. If you have questions, please call your primary care clinic.  If you do not have a primary care provider, please call [phone] and they will assist you.        Care EveryWhere ID     This is your Care EveryWhere ID. This could be used by other organizations to access your [facility] medical records  [id number]        Your Vitals Were     Pulse Temperature Height BMI (Body Mass Index)          66 98  F (36.7  C) (Tympanic) 5' 7\" (1.702 m) 26.63 kg/m2         Blood Pressure from Last 3 Encounters:   10/04/18 116/68   08/29/18 120/84   08/03/18 120/80    Weight from Last 3 Encounters:   10/04/18 170 lb (77.1 kg)   08/29/18 175 lb (79.4 kg)   08/03/18 180 lb (81.6 kg)              Today, you had the following     No orders found for display       Primary Care Provider Office Phone # Fax #    Patricia RAYMOND DO Sally [phone] [address] [facility] [address]        Equal Access to Services     [facility]: Hadii aad ku hadasho Soomaali, waaxda luqadaha, qaybta kaalmada adeegyada, good whitaker . So [facility] [phone].    ATENCIÓN: Si habla español, tiene a woody disposición servicios gratuitos de asistencia lingüística. Llame al [phone].    We comply with applicable federal civil rights laws and [place] laws. We do not discriminate on " the basis of race, color, national origin, age, disability, sex, sexual orientation, or gender identity.            Thank you!     Thank you for choosing [facility]  for your care. Our goal is always to provide you with excellent care. Hearing back from our patients is one way we can continue to improve our services. Please take a few minutes to complete the written survey that you may receive in the mail after your visit with us. Thank you!             Your Updated Medication List - Protect others around you: Learn how to safely use, store and throw away your medicines at www.disposemymeds.org.          This list is accurate as of 10/4/18  2:45 PM.  Always use your most recent med list.                   Brand Name Dispense Instructions for use Diagnosis    albuterol 108 (90 Base) MCG/ACT Aepb inhaler    PROAIR RESPICLICK    1 Inhaler    Inhale 2 puffs into the lungs every 4 hours as needed for shortness of breath / dyspnea or wheezing    Cough       D 5000 5000 units Tabs   Generic drug:  Cholecalciferol      Take by mouth daily        DOCOSAHEXAENOIC ACID PO           escitalopram 10 MG tablet    LEXAPRO     Take 5 mg by mouth daily        fish oil-omega-3 fatty acids 1000 MG capsule      Take 1 g by mouth daily        fluticasone 50 MCG/ACT spray    FLONASE     Spray 1 spray into both nostrils daily        saccharomyces boulardii 250 MG capsule    FLORASTOR     Take 1 capsule by mouth daily        triamcinolone 0.5 % cream    KENALOG    15 g    Apply sparingly to affected area three times daily.    Skin rash

## 2018-10-04 NOTE — NURSING NOTE
"Chief Complaint   Patient presents with     Sinus Problem     Pt has been referred by Alyssa for chronic maxillary sinusitis.  Pt had a sinus CT completed.       Initial /68 (BP Location: Right arm, Cuff Size: Adult Regular)  Pulse 66  Temp 98  F (36.7  C) (Tympanic)  Ht 5' 7\" (1.702 m)  Wt 170 lb (77.1 kg)  BMI 26.63 kg/m2 Estimated body mass index is 26.63 kg/(m^2) as calculated from the following:    Height as of this encounter: 5' 7\" (1.702 m).    Weight as of this encounter: 170 lb (77.1 kg).  Medication Reconciliation: complete    Porsche Sheets LPN    "

## 2018-10-08 ENCOUNTER — HOSPITAL ENCOUNTER (OUTPATIENT)
Dept: PHYSICAL THERAPY | Facility: OTHER | Age: 43
Setting detail: THERAPIES SERIES
End: 2018-10-08
Attending: CHIROPRACTOR
Payer: OTHER MISCELLANEOUS

## 2018-10-08 PROCEDURE — 40000185 ZZHC STATISTIC PT OUTPT VISIT

## 2018-10-08 PROCEDURE — 97140 MANUAL THERAPY 1/> REGIONS: CPT | Mod: GP

## 2018-10-10 ENCOUNTER — HOSPITAL ENCOUNTER (OUTPATIENT)
Dept: PHYSICAL THERAPY | Facility: OTHER | Age: 43
Setting detail: THERAPIES SERIES
End: 2018-10-10
Attending: CHIROPRACTOR
Payer: OTHER MISCELLANEOUS

## 2018-10-10 PROCEDURE — 97140 MANUAL THERAPY 1/> REGIONS: CPT | Mod: GP

## 2018-10-10 PROCEDURE — 40000185 ZZHC STATISTIC PT OUTPT VISIT

## 2018-10-12 ENCOUNTER — THERAPY VISIT (OUTPATIENT)
Dept: CHIROPRACTIC MEDICINE | Facility: OTHER | Age: 43
End: 2018-10-12
Attending: CHIROPRACTOR
Payer: OTHER MISCELLANEOUS

## 2018-10-12 DIAGNOSIS — M99.01 SEGMENTAL AND SOMATIC DYSFUNCTION OF CERVICAL REGION: ICD-10-CM

## 2018-10-12 DIAGNOSIS — M77.12 LATERAL EPICONDYLITIS OF LEFT ELBOW: Primary | ICD-10-CM

## 2018-10-12 DIAGNOSIS — M99.07 SOMATIC DYSFUNCTION OF SPINE AFFECTING UPPER EXTREMITY: ICD-10-CM

## 2018-10-12 DIAGNOSIS — M54.2 CERVICALGIA: ICD-10-CM

## 2018-10-12 PROCEDURE — 98943 CHIROPRACT MANJ XTRSPINL 1/>: CPT | Mod: AT | Performed by: CHIROPRACTOR

## 2018-10-12 NOTE — MR AVS SNAPSHOT
After Visit Summary   10/12/2018    Gauri Holils    MRN: [id number]           Patient Information     Date Of Birth          1975        Visit Information        Provider Department      10/12/2018 9:15 AM Juice Mulligan DC [facility]        Today's Diagnoses     Lateral epicondylitis of left elbow    -  1    Somatic dysfunction of spine affecting upper extremity        Segmental and somatic dysfunction of cervical region        Cervicalgia          Care Instructions    Perform activities as tolerated and as per physical therapy recommendations.          Follow-ups after your visit        Follow-up notes from your care team     Return if symptoms worsen or fail to improve.      Your next 10 appointments already scheduled     Oct 17, 2018 10:30 AM CDT   SHORT with Lyndon Caballero DC   [facility] ([facility])    [address]  [place] [address] [phone]            Nov 08, 2018  8:30 AM CST   Office Visit with HC ALLERGY TESTING   [facility] [place] ([facility] [place] )    [address]  [place] [address]   [phone]           Bring a current list of meds and any records pertaining to this visit. For Physicals, please bring immunization records and any forms needing to be filled out. Please arrive 10 minutes early to complete paperwork.            Nov 08, 2018 10:15 AM CST   (Arrive by 10:00 AM)   Return Visit with Ruby Coleman PA-C   [facility] [place] ([facility] [place] )    [address]  [place] [address]   [phone]            Nov 15, 2018  2:45 PM CST   (Arrive by 2:30 PM)   Hearing Eval with Chary Duncan   [facility] [place] ([facility] [place] )    [address]  [place] [address]   [phone]            Dec 12, 2018  8:30 AM CST   MyChart Physical Adult with Serene Talley MD   [place]  [facility])    [address]  [place] [address] [phone]              Who to contact     If you have questions or need follow up information about today's clinic visit or your schedule please contact [facility] directly at [phone].  Normal or non-critical lab and imaging results will be communicated to you by MyChart, letter or phone within 4 business days after the clinic has received the results. If you do not hear from us within 7 days, please contact the clinic through [employer] or phone. If you have a critical or abnormal lab result, we will notify you by phone as soon as possible.  Submit refill requests through [employer] or call your pharmacy and they will forward the refill request to us. Please allow 3 business days for your refill to be completed.          Additional Information About Your Visit        [employer] Information     [employer] gives you secure access to your electronic health record. If you see a primary care provider, you can also send messages to your care team and make appointments. If you have questions, please call your primary care clinic.  If you do not have a primary care provider, please call [phone] and they will assist you.        Care EveryWhere ID     This is your Care EveryWhere ID. This could be used by other organizations to access your [facility] medical records  [id number]         Blood Pressure from Last 3 Encounters:   10/04/18 116/68   08/29/18 120/84   08/03/18 120/80    Weight from Last 3 Encounters:   10/04/18 77.1 kg (170 lb)   08/29/18 79.4 kg (175 lb)   08/03/18 81.6 kg (180 lb)              We Performed the Following     CHIROPRAC MANIP,EXTRASPINAL,1+ REGNS        Primary Care Provider Office Phone # Fax #    Patricia Zavala [phone]       [address] [place] [address]        Equal Access to Services     YONAS PAR[facility] AH: Wesly gale  Damionali, lenchoda luqadaha, qaybta kaalmary medley, good terrellkrystina chiara. So [facility] [phone].    ATENCIÓN: Si cathy wilkinson, tiene a woody disposición servicios gratuitos de asistencia lingüística. Yo al [phone].    We comply with applicable federal civil rights laws and [place] laws. We do not discriminate on the basis of race, color, national origin, age, disability, sex, sexual orientation, or gender identity.            Thank you!     Thank you for choosing [facility]  for your care. Our goal is always to provide you with excellent care. Hearing back from our patients is one way we can continue to improve our services. Please take a few minutes to complete the written survey that you may receive in the mail after your visit with us. Thank you!             Your Updated Medication List - Protect others around you: Learn how to safely use, store and throw away your medicines at www.disposemymeds.org.          This list is accurate as of 10/12/18 12:39 PM.  Always use your most recent med list.                   Brand Name Dispense Instructions for use Diagnosis    albuterol 108 (90 Base) MCG/ACT Aepb inhaler    PROAIR RESPICLICK    1 Inhaler    Inhale 2 puffs into the lungs every 4 hours as needed for shortness of breath / dyspnea or wheezing    Cough       budesonide 0.5 MG/2ML neb solution    PULMICORT    3 Box    Squirt entire vial into previously made shivam med saline bottle, mix, and irrigate each nostril until entire bottle empty.  Do this twice daily.    Chronic pansinusitis       D 5000 5000 units Tabs   Generic drug:  Cholecalciferol      Take by mouth daily        DOCOSAHEXAENOIC ACID PO           escitalopram 10 MG tablet    LEXAPRO     Take 5 mg by mouth daily        fish oil-omega-3 fatty acids 1000 MG capsule      Take 1 g by mouth daily        fluticasone 50 MCG/ACT spray    FLONASE     Spray 1 spray into both nostrils daily        saccharomyces  boulardii 250 MG capsule    FLORASTOR     Take 1 capsule by mouth daily        triamcinolone 0.5 % cream    KENALOG    15 g    Apply sparingly to affected area three times daily.    Skin rash

## 2018-10-12 NOTE — PROGRESS NOTES
Visit #:  4    Subjective:  Gauri Hollis is a 43 year old female who is seen in f/u up for:        Lateral epicondylitis of left elbow  Somatic dysfunction of spine affecting upper extremity  Segmental and somatic dysfunction of cervical region  Cervicalgia.     Since last visit on 9/21/2018,  Gauri Hollis reports:    Area of chief complaint:  Patient presents for treatment of left lateral elbow pain.  Patient reports pain symptoms are currently at a 1-2 out of 10.  Patient underwent cortisone injection with Dr. Suzy LUCERO recently and reports improvement of symptoms since then.  Patient reports being able to  objects with less pain.  Patient has been treating at [facility] with physical therapy with good results.  Patient is noting increase of left shoulder pain.  Patient currently ranks this pain symptom at 3 out of 10 on a pain scale and is described as a ache.  Patient is also noting neck discomfort also ranked at 3 out of 10.  Patient is also reporting noticing mild photophobia on today's visit.  Patient is noting improvement of symptoms at this time.       Objective:  The following was observed:    P: Minimal palpatory tenderness is noted over the left olecranon, mild palpatory tenderness is noted of the posterior shoulder joint on the left and of the cervical spine on the left  A: static palpation demonstrates intersegmental asymmetry , cervical, left elbow, left shoulder  R: motion palpation notes restricted motion, :  C5 Right rotation restricted and Extension restriction  Extra-spinal left olecranon with extension, left humerus with external rotation  T: Taut tender fibers are noted of the anterior deltoid and teres minor muscle.  Hypertonicity is noted of the left forearm extensors. taut tender fibers are noted of the left upper trapezium and levator scapula, cervical paraspinals of the lower left cervical spine are mildly spastic    Segmental spinal dysfunction/restrictions found  at:  .:  C5 Right rotation restricted and Extension restriction  Extra-spinal left olecranon with extension, left humerus with external rotation      Assessment: Patient's left lateral epicondylitis symptoms are improving following cortisone shot injection and treatment by physical therapy at [facility].  Objectively there is minimal restriction of the left olecranon, no radial head restriction is noted on today's visit.  Restriction of left humerus external rotation is noted.  And there is segmental somatic dysfunction of the cervical spine.  Is believed due to altered mechanics of the left elbow the mechanics of the left shoulder and left cervical spine have compensated accordingly which is resulting in malalignment of the affected joints.    Diagnoses:      1. Lateral epicondylitis of left elbow    2. Somatic dysfunction of spine affecting upper extremity    3. Segmental and somatic dysfunction of cervical region    4. Cervicalgia        Patient's condition:  Patient had restrictions pre-manipulation and Patient is noticing a decrease in their symptoms    Treatment effectiveness:  Post manipulation there is better intersegmental movement, Patient claims to feel looser post manipulation and photophobia improved      Procedures:  CMT:  77992 Chiropractic manipulative treatment 1-2 regions performed   36813 Chiropractic manipulative treatment extraspinal dysfunction/restriction  Cervical: Diversified, C5 , Supine  Extraspinal: Left olecranon, left humerus, seated  Modalities:  None performed this visit    Therapeutic procedures:  None    Response to Treatment  Reduction in symptoms as reported by patient    Prognosis: Good, she continues to show subjective and objective signs of improvement.  Patient is believed to be on right course of care. It is believed patient may benefit from 1 or 2 additional adjustments.    Progress towards Goals: Patient is making progress towards the  goal.     Recommendations:    Instructions:Perform activities as tolerated and as directed by physical therapy    Follow-up:  Continue treatment PRN.

## 2018-10-17 ENCOUNTER — OFFICE VISIT (OUTPATIENT)
Dept: FAMILY MEDICINE | Facility: OTHER | Age: 43
End: 2018-10-17
Attending: CHIROPRACTOR
Payer: OTHER MISCELLANEOUS

## 2018-10-17 VITALS
HEIGHT: 67 IN | WEIGHT: 176 LBS | HEART RATE: 80 BPM | SYSTOLIC BLOOD PRESSURE: 120 MMHG | TEMPERATURE: 97.4 F | BODY MASS INDEX: 27.62 KG/M2 | DIASTOLIC BLOOD PRESSURE: 88 MMHG

## 2018-10-17 DIAGNOSIS — M62.838 MUSCLE SPASM: ICD-10-CM

## 2018-10-17 DIAGNOSIS — S59.902D INJURY OF LEFT ELBOW, SUBSEQUENT ENCOUNTER: Primary | ICD-10-CM

## 2018-10-17 PROCEDURE — 99213 OFFICE O/P EST LOW 20 MIN: CPT | Performed by: CHIROPRACTOR

## 2018-10-17 ASSESSMENT — PAIN SCALES - GENERAL: PAINLEVEL: NO PAIN (1)

## 2018-10-17 NOTE — NURSING NOTE
Gauri Hollis is a 43 year old female presenting for a work comp follow up of her left elbow.   Medication Reconciliation: complete    Swati Ball, CHRISTINAN

## 2018-10-17 NOTE — PROGRESS NOTES
Gauri Hollis was seen today for follow-up examination involving her left shoulder and elbow.    CHIEF COMPLAINT: Gauri Hollis is a 43 year old  female  Chief Complaint   Patient presents with     Work Comp     work comp follow up elbow       HISTORY OF PRESENTING WORK INJURY     Onset and description: Patient presents with overall improvement today, reporting the pain score of 1 out of 10.  She has been compliant in maintaining her physical therapy and chiropractic appointments.  She has been discharged from occupational therapy due to improvement.  She relates that a significant amount of her discomfort and pain subsided once she had to injection from Dr. Almonte.  Most of her pain now occurs at the CT junction on the left side.  She is continued to have chiropractic and physical therapy to these regions.  Review of therapeutic encounters show overall progression of improvement.  Patient has been able to extend her clinic hours as a result of her improvement.      PAST MEDICAL HISTORY:  Past Medical History:   Diagnosis Date     Anxiety disorder 2017     Bleeding in early pregnancy      Bronchitis 2012    Acute      Chronic lumbosacral pain 2015     Dislocation of ulnohumeral joint 1993    left; age 18     Epicondylitis, lateral (tennis elbow)-left 2018     Family history of malignant neoplasm of breast 2013     History of dysthymic disorder 2010    Resolved 13     Lesions of both ulnar nerves 2017     Medial epicondylitis of right elbow 2017     Medial epicondylitis, right 2017     Other enthesopathies, not elsewhere classified 2017     Other immediate postpartum hemorrhage 2011     Pregnancy      - PPH, retained placenta, transfusion     Vitamin D deficiency 2015       PAST SURGICAL HISTORY:  Past Surgical History:   Procedure Laterality Date     DILATION AND CURETTAGE  10/2011    for retained placenta     DILATION AND  "CURETTAGE  04/01/2015    for missed AB     DILATION AND CURETTAGE  05/2017    SAB     EXTRACTION(S) DENTAL      [place] [facility]       ALLERGIES:  Allergies   Allergen Reactions     Cats Other (See Comments)     congestion     Chlorhexidine Itching     No rash.  Also felt \"irritable\" after using wipes.       CURRENT MEDICATIONS:  Current Outpatient Prescriptions   Medication Sig Dispense Refill     albuterol (PROAIR RESPICLICK) 108 (90 Base) MCG/ACT AEPB inhaler Inhale 2 puffs into the lungs every 4 hours as needed for shortness of breath / dyspnea or wheezing 1 Inhaler 11     budesonide (PULMICORT) 0.5 MG/2ML neb solution Squirt entire vial into previously made shivam med saline bottle, mix, and irrigate each nostril until entire bottle empty.  Do this twice daily. 3 Box 11     Cholecalciferol (D 5000) 5000 UNITS TABS Take by mouth daily       DOCOSAHEXAENOIC ACID PO        escitalopram (LEXAPRO) 10 MG tablet Take 5 mg by mouth daily       fish oil-omega-3 fatty acids 1000 MG capsule Take 1 g by mouth daily        fluticasone (FLONASE) 50 MCG/ACT spray Spray 1 spray into both nostrils daily       saccharomyces boulardii (FLORASTOR) 250 MG capsule Take 1 capsule by mouth daily       triamcinolone (KENALOG) 0.5 % cream Apply sparingly to affected area three times daily. 15 g 1       SOCIAL HISTORY:  Unremarkable and unchanged     FAMILY HISTORY:  Family History   Problem Relation Age of Onset     Breast Cancer Mother 53     Cancer-breast     Type 2 Diabetes Mother      Diabetes type II     HEART DISEASE Father 42     Heart Disease,MI; history of rheumatic fever and asd     Type 2 Diabetes Father      Diabetes type II     Thyroid Disease Sister      Thyroid Disease,hypothyroidism     Depression Brother      Depression       REVIEW OF SYSTEMS:  Normal reportable changes.    PHYSICAL EXAM:   /88 (BP Location: Right arm, Patient Position: Sitting, Cuff Size: Adult Regular)  Pulse 80  Temp 97.4  F (36.3  C) (Tympanic)  " "Ht 5' 7\" (1.702 m)  Wt 176 lb (79.8 kg)  Breastfeeding? No  BMI 27.57 kg/m2 Body mass index is 27.57 kg/(m^2).    Complex chart review performed including all therapeutic notes.  23 minutes were spent pre-encounter with chart review.  Objective findings demonstrate marked improvement.  Subjective improvement follows this appropriately.      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  We will allow sufficient time to complete her therapeutic programs and perform a comprehensive examination on her in 8 weeks to determine progress and the possible need for ongoing care and/or job restrictions.  [facility]erral to [facility] may be also warranted should the shoulder not resolve with conservative measures.  Patient demonstrated understanding of the plan and restrictions going forward.  There were no questions post encounter.  Greater than 50% of today's visit was spent coordinating and counseling the patient and the above recommended care plan.  Total face-to-face time spent with the patient was 41 minutes.    Lyndon Caballero DC  Director - Occupational Medicine Department  [address] [address]  Phone [phone]  Fax [phone]    Disclaimer:  This note consists of words and symbols derived from keyboarding, dictation, or using voice recognition software. As a result, there may be errors in the script that have gone undetected. Please consider this when interpreting information found in this note.    12:09 PM 10/17/2018    "

## 2018-10-17 NOTE — MR AVS SNAPSHOT
After Visit Summary   10/17/2018    Gauri Hollis    MRN: [id number]           Patient Information     Date Of Birth          1975        Visit Information        Provider Department      10/17/2018 10:30 AM Lyndon Caballero DC [facility]        Today's Diagnoses     Injury of left elbow, subsequent encounter    -  1    Muscle spasm           Follow-ups after your visit        Follow-up notes from your care team     Return in about 2 months (around 12/17/2018).      Your next 10 appointments already scheduled     Nov 08, 2018  8:30 AM CST   Office Visit with [facility] ALLER[facility] TESTING   [facility] [place] ([facility] [place] )    [address]  [place] [address]   [phone]           Bring a current list of meds and any records pertaining to this visit. For Physicals, please bring immunization records and any forms needing to be filled out. Please arrive 10 minutes early to complete paperwork.            Nov 08, 2018 10:15 AM CST   (Arrive by 10:00 AM)   Return Visit with Ruby Coleman PA-C   [facility] [place] ([facility] [place] )    [address]  [place] [address]   [phone]            Nov 15, 2018  2:45 PM CST   (Arrive by 2:30 PM)   Hearing Eval with Chary Duncan   [facility] [place] ([facility] [place] )    [address]  [place] [address]   [phone]            Dec 12, 2018  8:30 AM CST   [facility]Chart Physical Adult [facility]ley [facility]   [facility]    [address]  [facility] [id number] [phone]            Dec 19, 2018  8:30 AM CST   SHORT with Lyndon Caballero DC   [facility]    [address]  [place] [address] [phone]              Who to contact     If you have questions or need follow up information about  "today's clinic visit or your schedule please contact [facility] directly at [phone].  Normal or non-critical lab and imaging results will be communicated to you by MyChart, letter or phone within 4 business days after the clinic has received the results. If you do not hear from us within 7 days, please contact the clinic through Meta[employer]Oh[employer] or phone. If you have a critical or abnormal lab result, we will notify you by phone as soon as possible.  Submit refill requests through B[employer] or call your pharmacy and they will forward the refill request to us. Please allow 3 business days for your refill to be completed.          Additional Information About Your Visit        [employer]Ohar[employer]re[employer] Information     [employer] gives you secure access to your electronic health record. If you see a primary care provider, you can also send messages to your care team and make appointments. If you have questions, please call your primary care clinic.  If you do not have a primary care provider, please call [phone] and they will assist you.        Care EveryWhere ID     This is your Care EveryWhere ID. This could be used by other organizations to access your [place] medical records  [id number]        Your Vitals Were     Pulse Temperature Height Breastfeeding? BMI (Body Mass Index)       80 97.4  F (36.3  C) (Tympanic) 5' 7\" (1.702 m) No 27.57 kg/m2        Blood Pressure from Last 3 Encounters:   10/17/18 120/88   10/04/18 116/68   08/29/18 120/84    Weight from Last 3 Encounters:   10/17/18 176 lb (79.8 kg)   10/04/18 170 lb (77.1 kg)   08/29/18 175 lb (79.4 kg)              Today, you had the following     No orders found for display       Primary Care Provider Office Phone # Fax #    Patricia Zavala [phone] [address] [facility] [address]        Equal Access to Services     YONAS [place] AH: Hadii khadijah maciaso Soomaali, waaxda luqadaha, qaybta kaalmada good medley " priya whitaker ah. So [facility] [phone].    ATENCIÓN: Si kizzyla minh, tiene a woody disposición servicios gratuitos de asistencia lingüística. Yo al [phone].    We comply with applicable federal civil rights laws and [place] laws. We do not discriminate on the basis of race, color, national origin, age, disability, sex, sexual orientation, or gender identity.            Thank you!     Thank you for choosing [facility]  for your care. Our goal is always to provide you with excellent care. Hearing back from our patients is one way we can continue to improve our services. Please take a few minutes to complete the written survey that you may receive in the mail after your visit with us. Thank you!             Your Updated Medication List - Protect others around you: Learn how to safely use, store and throw away your medicines at www.disposemymeds.org.          This list is accurate as of 10/17/18 12:32 PM.  Always use your most recent med list.                   Brand Name Dispense Instructions for use Diagnosis    albuterol 108 (90 Base) MCG/ACT Aepb inhaler    PROAIR RESPICLICK    1 Inhaler    Inhale 2 puffs into the lungs every 4 hours as needed for shortness of breath / dyspnea or wheezing    Cough       budesonide 0.5 MG/2ML neb solution    PULMICORT    3 Box    Squirt entire vial into previously made shivam med saline bottle, mix, and irrigate each nostril until entire bottle empty.  Do this twice daily.    Chronic pansinusitis       D 5000 5000 units Tabs   Generic drug:  Cholecalciferol      Take by mouth daily        DOCOSAHEXAENOIC ACID PO           escitalopram 10 MG tablet    LEXAPRO     Take 5 mg by mouth daily        fish oil-omega-3 fatty acids 1000 MG capsule      Take 1 g by mouth daily        fluticasone 50 MCG/ACT spray    FLONASE     Spray 1 spray into both nostrils daily        saccharomyces boulardii 250 MG capsule    FLORASTOR     Take 1 capsule by mouth  daily        triamcinolone 0.5 % cream    KENALOG    15 g    Apply sparingly to affected area three times daily.    Skin rash

## 2018-11-08 ENCOUNTER — OFFICE VISIT (OUTPATIENT)
Dept: OTOLARYNGOLOGY | Facility: OTHER | Age: 43
End: 2018-11-08
Attending: PHYSICIAN ASSISTANT
Payer: COMMERCIAL

## 2018-11-08 ENCOUNTER — OFFICE VISIT (OUTPATIENT)
Dept: ALLERGY | Facility: OTHER | Age: 43
End: 2018-11-08
Attending: PHYSICIAN ASSISTANT
Payer: COMMERCIAL

## 2018-11-08 VITALS
HEIGHT: 66 IN | DIASTOLIC BLOOD PRESSURE: 82 MMHG | HEART RATE: 86 BPM | SYSTOLIC BLOOD PRESSURE: 124 MMHG | WEIGHT: 170 LBS | TEMPERATURE: 97.5 F | BODY MASS INDEX: 27.32 KG/M2

## 2018-11-08 DIAGNOSIS — J34.3 NASAL TURBINATE HYPERTROPHY: ICD-10-CM

## 2018-11-08 DIAGNOSIS — R06.83 PRIMARY SNORING: ICD-10-CM

## 2018-11-08 DIAGNOSIS — J32.4 CHRONIC PANSINUSITIS: Primary | ICD-10-CM

## 2018-11-08 DIAGNOSIS — H69.93 DYSFUNCTION OF BOTH EUSTACHIAN TUBES: ICD-10-CM

## 2018-11-08 DIAGNOSIS — J31.0 CHRONIC RHINITIS: Primary | ICD-10-CM

## 2018-11-08 PROCEDURE — 95024 IQ TESTS W/ALLERGENIC XTRCS: CPT

## 2018-11-08 PROCEDURE — 95004 PERQ TESTS W/ALRGNC XTRCS: CPT

## 2018-11-08 PROCEDURE — 99213 OFFICE O/P EST LOW 20 MIN: CPT | Mod: 25 | Performed by: PHYSICIAN ASSISTANT

## 2018-11-08 RX ORDER — AZELASTINE HYDROCHLORIDE, FLUTICASONE PROPIONATE 137; 50 UG/1; UG/1
1 SPRAY, METERED NASAL 2 TIMES DAILY
Qty: 1 BOTTLE | Refills: 11 | Status: SHIPPED | OUTPATIENT
Start: 2018-11-08 | End: 2020-02-19

## 2018-11-08 ASSESSMENT — PAIN SCALES - GENERAL: PAINLEVEL: NO PAIN (0)

## 2018-11-08 NOTE — PROGRESS NOTES
Chief Complaint   Patient presents with     Other     [facility] allergy testing and follow-up     Patient presents for f/u from [facility].   She was seen by Dr. Robins on 10/4/18 for CRS. She was noted to have significant sinus changes, and was started on budesonide rinse BID and Flonase. She is to consider sinus surgery.   Gauri has been using the rinse BID which have helped with her frontal congestion. She has continued congestion along her maxillary sinuses and not able to relief. She has increase in nasal congestion, post nasal drip remaining.       CT sinus dated 2018 reveals still thickening in the right and left frontal sinuses the anterior and posterior ethmoid sinuses are completely opacified with polypoid occlusion similar occlusion of the maxillary sinuses bilaterally the inferior turbinates are severely enlarged bilaterally there is a slight septal skewed to the left.  Asked male, looks his are occluded there is mucoperiosteal thickening in the sphenoid sinuses.  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  keros 2  The skull base and lamina are intact optic nerve is not dehiscent  boaz   Past Medical History:   Diagnosis Date     Anxiety disorder 2017     Bleeding in early pregnancy      Bronchitis 2012    Acute      Chronic lumbosacral pain 2015     Dislocation of ulnohumeral joint 1993    left; age 18     Epicondylitis, lateral (tennis elbow)-left 2018     Family history of malignant neoplasm of breast 2013     History of dysthymic disorder 2010    Resolved 13     Lesions of both ulnar nerves 2017     Medial epicondylitis of right elbow 2017     Medial epicondylitis, right 2017     Other enthesopathies, not elsewhere classified 2017     Other immediate postpartum hemorrhage 2011     Pregnancy      - PPH, retained placenta, transfusion     Vitamin D deficiency 2015        Allergies   Allergen Reactions      "Cats Other (See Comments)     congestion     Chlorhexidine Itching     No rash.  Also felt \"irritable\" after using wipes.     Current Outpatient Prescriptions   Medication     albuterol (PROAIR RESPICLICK) 108 (90 Base) MCG/ACT AEPB inhaler     budesonide (PULMICORT) 0.5 MG/2ML neb solution     Cholecalciferol (D 5000) 5000 UNITS TABS     DOCOSAHEXAENOIC ACID PO     escitalopram (LEXAPRO) 10 MG tablet     fish oil-omega-3 fatty acids 1000 MG capsule     fluticasone (FLONASE) 50 MCG/ACT spray     saccharomyces boulardii (FLORASTOR) 250 MG capsule     triamcinolone (KENALOG) 0.5 % cream     No current facility-administered medications for this visit.       ROS: 10 point ROS neg other than the symptoms noted above in the HPI.  /82 (BP Location: Right arm, Patient Position: Chair, Cuff Size: Adult Regular)  Pulse 86  Temp 97.5  F (36.4  C) (Oral)  Ht 1.676 m (5' 6\")  Wt 77.1 kg (170 lb)  BMI 27.44 kg/m2  General - The patient is well nourished and well developed, and appears to have good nutritional status.  Alert and oriented to person and place, answers questions and cooperates with examination appropriately.   Head and Face - Normocephalic and atraumatic, with no gross asymmetry noted.  The facial nerve is intact, with strong symmetric movements.  Voice and Breathing - The patient was breathing comfortably without the use of accessory muscles. There was no wheezing, stridor, or stertor.  The patients voice was clear and strong, and had appropriate pitch and quality.  No zee peripheral digital clubbing or cyanosis   Ears -The external auditory canals are patent, the tympanic membranes are intact without effusion, retraction or mass.  Bony landmarks are intact.  Eyes - Extraocular movements intact, and the pupils were reactive to light.  Sclera were not icteric or injected, conjunctiva were pink and moist.  Mouth - Examination of the oral cavity showed pink, healthy oral mucosa. No lesions or ulcerations " noted.  The tongue was mobile and midline, and the dentition were in good condition.    Throat - The walls of the oropharynx were smooth, pink, moist, symmetric, and had no lesions or ulcerations.  The tonsillar pillars and soft palate were symmetric.  The uvula was midline on elevation.    Neck - No palpable enlarged fixed cervical lymph nodes.  No neck cysts or unusual tenderness to palpation.   No palpable fixed thyroid nodules or concerning goiter.  The trachea is grossly midline.   Nose - External contour is symmetric, no gross deflection or scars.  Nasal mucosa is pink and moist with no abnormal mucus.  The septum and turbinates were evaluated: septum with slight non obstructive left spur.  No polyps, masses, or purulence noted on examination.see below       ASSESSMENT:    ICD-10-CM    1. Chronic pansinusitis J32.4 azelastine-fluticasone (DYMISTA) 137-50 MCG/ACT nasal spray   2. Nasal turbinate hypertrophy J34.3 azelastine-fluticasone (DYMISTA) 137-50 MCG/ACT nasal spray   3. Dysfunction of both eustachian tubes H69.83 azelastine-fluticasone (DYMISTA) 137-50 MCG/ACT nasal spray   4. Primary snoring R06.83      Continue with budesonide rinse twice a day  Change Flonase to Dymista- 1 spray to each nostril twice a day.   Follow up with Dr. Robins for surgery  Will need prednisone taper prior to surgery.   She will need to notify staff of date of surgery; at that time would need to order Rx for prednisone.   Work on allergy avoidance    Risks and complications reviewed with patient.   Risks of oral steroid use were discussed and include psychiatric/mood changes, insomnia, stomach ulcers and potential GI bleeding, blood sugar elevation/worsening diabetes, hip/bone necrosis or bone demineralization.       The risks and complications of endoscopic sinus surgery, turbinate reduction  were openly discussed.  The potential risks include bleeding, general anesthesia, infection, scar formation, need for additional  surgery, septal perforation, and rarely injury to the eye with the possibility of blindness,  injury to the brain, meningitis or other intracranial complications.  Nonsurgical options were discussed including prolonged antibioitics and/or oral and topical steroids.   Sinus anatomy and sinus disease were reviewed.  CT sinus was reviewed with the patient.  All questions were answered.    plan total with ramses Coleman PA-C  ENT  [facility]  [phone]

## 2018-11-08 NOTE — MR AVS SNAPSHOT
After Visit Summary   11/8/2018    Gauri Hollis    MRN: [id number]           Patient Information     Date Of Birth          1975        Visit Information        Provider Department      11/8/2018 10:15 AM Ruby Coleman PA-C [facility]        Today's Diagnoses     Chronic pansinusitis    -  1    Nasal turbinate hypertrophy        Dysfunction of both eustachian tubes        Primary snoring          Care Instructions    Continue with budesonide rinse twice a day  Change Flonase to Dymista- 1 spray to each nostril twice a day.   Follow up with Dr. Robins for surgery  Will need prednisone taper prior to surgery.     Work on allergy avoidance  Thank you for allowing SUZETTE Domingo and our ENT team to participate in your care.  If your medications are too expensive, please give the nurse a call.  We can possibly change this medication.  If you have a scheduling or an appointment question please contact our Health Unit Coordinator at their direct line [phone].   ALL nursing questions or concerns can be directed to your ENT nurse at: [phone] [place]              Follow-ups after your visit        Your next 10 appointments already scheduled     Nov 15, 2018  2:45 PM CST   (Arrive by 2:30 PM)   Hearing Eval with Chary Duncan   [facility] ([facility] )    [address] [place] [address]   [phone]            Dec 12, 2018  8:30 AM CST   MyChart Physical Adult with Serene Talley MD   [facility]    [address]  [place] [id number] [phone]            Dec 19, 2018  8:30 AM CST   [facility]    [address]  [facility] [id number] [phone]              Who to contact     If you have questions or need follow up information about  "today's clinic visit or your schedule please contact [facility] directly at [phone].  Normal or non-critical lab and imaging results will be communicated to you by [employer], letter or phone within 4 business days after the clinic has received the results. If you do not hear from us within 7 days, please contact the clinic through [employer] or phone. If you have a critical or abnormal lab result, we will notify you by phone as soon as possible.  Submit refill requests through [facility] or call your pharmacy and they will forward the refill request to us. Please allow 3 business days for your refill to be completed.          Additional Information About Your Visit        [employer] Information     [facility] gives you secure access to your electronic health record. If you see a primary care provider, you can also send messages to your care team and make appointments. If you have questions, please call your primary care clinic.  If you do not have a primary care provider, please call [phone] and they will assist you.        Care EveryWhere ID     This is your Care EveryWhere ID. This could be used by other organizations to access your [facility] medical records  [id number]        Your Vitals Were     Pulse Temperature Height BMI (Body Mass Index)          86 97.5  F (36.4  C) (Oral) 1.676 m (5' 6\") 27.44 kg/m2         Blood Pressure from Last 3 Encounters:   11/08/18 124/82   10/17/18 120/88   10/04/18 116/68    Weight from Last 3 Encounters:   11/08/18 77.1 kg (170 lb)   10/17/18 79.8 kg (176 lb)   10/04/18 77.1 kg (170 lb)              Today, you had the following     No orders found for display         Today's Medication Changes          These changes are accurate as of 11/8/18 11:41 AM.  If you have any questions, ask your nurse or doctor.               Start taking these medicines.        Dose/Directions    azelastine-fluticasone 137-50 MCG/ACT nasal spray   Commonly known as:  DYMISTA   Used " for:  Chronic pansinusitis, Nasal turbinate hypertrophy, Dysfunction of both eustachian tubes   Started by:  Ruby Coleman PA-C        Dose:  1 spray   Spray 1 spray into both nostrils 2 times daily   Quantity:  1 Bottle   Refills:  11            Where to get your medicines      These medications were sent to [facility], - [place], [place] - [address]  [address], [place] [address]     Phone:  [phone]     azelastine-fluticasone 137-50 MCG/ACT nasal spray                Primary Care Provider Office Phone # Fax #    Patricia Zavala [phone]       [address]  [place] [address]        Equal Access to Services     [place] : Hadii khadijah castro hadasho Soomaali, waaxda luqadaha, qaybta kaalmada adeegyada, good guadarrama. So [facility] [phone].    ATENCIÓN: Si habla español, tiene a woody disposición servicios gratuitos de asistencia lingüística. [facility] [phone].    We comply with applicable federal civil rights laws and [place] laws. We do not discriminate on the basis of race, color, national origin, age, disability, sex, sexual orientation, or gender identity.            Thank you!     Thank you for choosing [facility]  for your care. Our goal is always to provide you with excellent care. Hearing back from our patients is one way we can continue to improve our services. Please take a few minutes to complete the written survey that you may receive in the mail after your visit with us. Thank you!             Your Updated Medication List - Protect others around you: Learn how to safely use, store and throw away your medicines at www.disposemymeds.org.          This list is accurate as of 11/8/18 11:41 AM.  Always use your most recent med list.                   Brand Name Dispense Instructions for use Diagnosis    albuterol 108 (90 Base) MCG/ACT Aepb inhaler    PROAIR RESPICLICK    1  Inhaler    Inhale 2 puffs into the lungs every 4 hours as needed for shortness of breath / dyspnea or wheezing    Cough       azelastine-fluticasone 137-50 MCG/ACT nasal spray    DYMISTA    1 Bottle    Spray 1 spray into both nostrils 2 times daily    Chronic pansinusitis, Nasal turbinate hypertrophy, Dysfunction of both eustachian tubes       budesonide 0.5 MG/2ML neb solution    PULMICORT    3 Box    Squirt entire vial into previously made shivam med saline bottle, mix, and irrigate each nostril until entire bottle empty.  Do this twice daily.    Chronic pansinusitis       D 5000 5000 units Tabs   Generic drug:  Cholecalciferol      Take by mouth daily        DOCOSAHEXAENOIC ACID PO           escitalopram 10 MG tablet    LEXAPRO     Take 10 mg by mouth daily        fish oil-omega-3 fatty acids 1000 MG capsule      Take 1 g by mouth daily        fluticasone 50 MCG/ACT spray    FLONASE     Spray 1 spray into both nostrils daily        saccharomyces boulardii 250 MG capsule    FLORASTOR     Take 1 capsule by mouth daily        triamcinolone 0.5 % cream    KENALOG    15 g    Apply sparingly to affected area three times daily.    Skin rash

## 2018-11-08 NOTE — LETTER
11/8/2018         RE: Gauri Hollis  [address]  [place] [address]        Dear Colleague,    Thank you for referring your patient, Gauri Hollis, to the [facility]. Please see a copy of my visit note below.    Chief Complaint   Patient presents with     Other     [facility] allergy testing and follow-up     Patient presents for f/u from T.   She was seen by Dr. Robins on 10/4/18 for CRS. She was noted to have significant sinus changes, and was started on budesonide rinse BID and Flonase. She is to consider sinus surgery.   Gauri has been using the rinse BID which have helped with her frontal congestion. She has continued congestion along her maxillary sinuses and not able to relief. She has increase in nasal congestion, post nasal drip remaining.       CT sinus dated 9/12/2018 reveals still thickening in the right and left frontal sinuses the anterior and posterior ethmoid sinuses are completely opacified with polypoid occlusion similar occlusion of the maxillary sinuses bilaterally the inferior turbinates are severely enlarged bilaterally there is a slight septal skewed to the left.  Asked male, looks his are occluded there is mucoperiosteal thickening in the sphenoid sinuses.  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  keros 2  The skull base and lamina are intact optic nerve is not dehiscent  [place] 20/24  Past Medical History:   Diagnosis Date     Anxiety disorder 11/08/2017     Bleeding in early pregnancy      Bronchitis 02/14/2012    Acute      Chronic lumbosacral pain 03/03/2015     Dislocation of ulnohumeral joint 1993    left; age 18     Epicondylitis, lateral (tennis elbow)-left 6/11/2018     Family history of malignant neoplasm of breast 07/12/2013     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    Resolved 6/7/13     Lesions of both ulnar nerves 08/28/2017     Medial epicondylitis of right elbow 08/28/2017     Medial epicondylitis, right 08/28/2017      "Other enthesopathies, not elsewhere classified 2017     Other immediate postpartum hemorrhage 2011     Pregnancy      - PPH, retained placenta, transfusion     Vitamin D deficiency 2015        Allergies   Allergen Reactions     Cats Other (See Comments)     congestion     Chlorhexidine Itching     No rash.  Also felt \"irritable\" after using wipes.     Current Outpatient Prescriptions   Medication     albuterol (PROAIR RESPICLICK) 108 (90 Base) MCG/ACT AEPB inhaler     budesonide (PULMICORT) 0.5 MG/2ML neb solution     Cholecalciferol (D 5000) 5000 UNITS TABS     DOCOSAHEXAENOIC ACID PO     escitalopram (LEXAPRO) 10 MG tablet     fish oil-omega-3 fatty acids 1000 MG capsule     fluticasone (FLONASE) 50 MCG/ACT spray     saccharomyces boulardii (FLORASTOR) 250 MG capsule     triamcinolone (KENALOG) 0.5 % cream     No current facility-administered medications for this visit.       ROS: 10 point ROS neg other than the symptoms noted above in the HPI.  /82 (BP Location: Right arm, Patient Position: Chair, Cuff Size: Adult Regular)  Pulse 86  Temp 97.5  F (36.4  C) (Oral)  Ht 1.676 m (5' 6\")  Wt 77.1 kg (170 lb)  BMI 27.44 kg/m2  General - The patient is well nourished and well developed, and appears to have good nutritional status.  Alert and oriented to person and place, answers questions and cooperates with examination appropriately.   Head and Face - Normocephalic and atraumatic, with no gross asymmetry noted.  The facial nerve is intact, with strong symmetric movements.  Voice and Breathing - The patient was breathing comfortably without the use of accessory muscles. There was no wheezing, stridor, or stertor.  The patients voice was clear and strong, and had appropriate pitch and quality.  No zee peripheral digital clubbing or cyanosis   Ears -The external auditory canals are patent, the tympanic membranes are intact without effusion, retraction or mass.  Bony landmarks are " intact.  Eyes - Extraocular movements intact, and the pupils were reactive to light.  Sclera were not icteric or injected, conjunctiva were pink and moist.  Mouth - Examination of the oral cavity showed pink, healthy oral mucosa. No lesions or ulcerations noted.  The tongue was mobile and midline, and the dentition were in good condition.    Throat - The walls of the oropharynx were smooth, pink, moist, symmetric, and had no lesions or ulcerations.  The tonsillar pillars and soft palate were symmetric.  The uvula was midline on elevation.    Neck - No palpable enlarged fixed cervical lymph nodes.  No neck cysts or unusual tenderness to palpation.   No palpable fixed thyroid nodules or concerning goiter.  The trachea is grossly midline.   Nose - External contour is symmetric, no gross deflection or scars.  Nasal mucosa is pink and moist with no abnormal mucus.  The septum and turbinates were evaluated: septum with slight non obstructive left spur.  No polyps, masses, or purulence noted on examination.see below       ASSESSMENT:    ICD-10-CM    1. Chronic pansinusitis J32.4 azelastine-fluticasone (DYMISTA) 137-50 MCG/ACT nasal spray   2. Nasal turbinate hypertrophy J34.3 azelastine-fluticasone (DYMISTA) 137-50 MCG/ACT nasal spray   3. Dysfunction of both eustachian tubes H69.83 azelastine-fluticasone (DYMISTA) 137-50 MCG/ACT nasal spray   4. Primary snoring R06.83      Continue with budesonide rinse twice a day  Change Flonase to Dymista- 1 spray to each nostril twice a day.   Follow up with Dr. Robins for surgery  Will need prednisone taper prior to surgery.   She will need to notify staff of date of surgery; at that time would need to order Rx for prednisone.   Work on allergy avoidance    Risks and complications reviewed with patient.   Risks of oral steroid use were discussed and include psychiatric/mood changes, insomnia, stomach ulcers and potential GI bleeding, blood sugar elevation/worsening diabetes,  hip/bone necrosis or bone demineralization.       The risks and complications of endoscopic sinus surgery, turbinate reduction  were openly discussed.  The potential risks include bleeding, general anesthesia, infection, scar formation, need for additional surgery, septal perforation, and rarely injury to the eye with the possibility of blindness,  injury to the brain, meningitis or other intracranial complications.  Nonsurgical options were discussed including prolonged antibioitics and/or oral and topical steroids.   Sinus anatomy and sinus disease were reviewed.  CT sinus was reviewed with the patient.  All questions were answered.    plan total with propel    Ruby Coleman PA-C  ENT  [facility]  [phone]        Again, thank you for allowing me to participate in the care of your patient.        Sincerely,        Ruby Coleman PA-C

## 2018-11-08 NOTE — PATIENT INSTRUCTIONS
Continue with budesonide rinse twice a day  Change Flonase to Dymista- 1 spray to each nostril twice a day.   Follow up with Dr. Robins for surgery  Will need prednisone taper prior to surgery.     Work on allergy avoidance  Thank you for allowing SUZETTE Domingo and our ENT team to participate in your care.  If your medications are too expensive, please give the nurse a call.  We can possibly change this medication.  If you have a scheduling or an appointment question please contact our Health Unit Coordinator at their direct line [phone].   ALL nursing questions or concerns can be directed to your ENT nurse at: [phone] [place]

## 2018-11-08 NOTE — PROGRESS NOTES
Prior to testing, verified patient identity using patient's name and date of birth.    Gauri was seen 11/08/18 for allergy skin testing. Patient was seen by this nurse in conjunction with ENT provider. All encounter details are documented in ENT Provider's appointment from this same date. Please see referenced encounter for this visits documentation.     Reema Garg RN

## 2018-11-08 NOTE — NURSING NOTE
"Chief Complaint   Patient presents with     Other     MQT allergy testing and follow-up       Initial /82 (BP Location: Right arm, Patient Position: Chair, Cuff Size: Adult Regular)  Pulse 86  Temp 97.5  F (36.4  C) (Oral)  Ht 1.676 m (5' 6\")  Wt 77.1 kg (170 lb)  BMI 27.44 kg/m2 Estimated body mass index is 27.44 kg/(m^2) as calculated from the following:    Height as of this encounter: 1.676 m (5' 6\").    Weight as of this encounter: 77.1 kg (170 lb).  Medication Reconciliation: complete    Reema Garg RN     This patient presents today for allergy skin testing.      Symptoms have included congestion, PND, sneezing, sinus pressure and headaches.  Symptoms have been present for about 2 years.  She noticed this last summer that she had lengthened symptoms with a sinus infection that took a couple of courses of antibiotics to treat.  She states that the drainage is generally of a medium consistency, but thicker when she has been sick.  Symptoms are worse in the spring and fall seasons.  Patient does have some small bumps on both elbows.  She has had them checked and it is thought it could be related to a gluten sensitivity.  She is going to work on cutting gluten out of her diet to see if it goes away/improves.     This patient lives in a single family home, with a basement.  This patient does suspect some mold issues that she periodically finds in the window frames.  She reports the house is very tightly sealed, so there is moisture present on the window frames.  There is carpet in the home, and in the bedroom.  Home has geothermal heat with forced air, natural gas backup.  There is air conditioning.        This patient used to have a cat for a pet.  They no longer have the cat, but it was indoors and did sleep in bed with the patient.    This patient has not had allergy testing in the past.    This patient's medications have been reviewed prior to testing and all appropriate medications have " been stopped.    Consent is signed by patient and signature is verified.     MQT/ID test is performed per protocol.  The patient tolerated testing well.  All findings are recorded on the paper flow sheet. Results are reviewed with this patient.  They are given written information regarding allergy.       The patient will follow-up with NICK Domingo, for treatment plan.      Reema Garg RN

## 2018-11-15 ENCOUNTER — TELEPHONE (OUTPATIENT)
Dept: OTOLARYNGOLOGY | Facility: OTHER | Age: 43
End: 2018-11-15

## 2018-11-15 ENCOUNTER — OFFICE VISIT (OUTPATIENT)
Dept: AUDIOLOGY | Facility: OTHER | Age: 43
End: 2018-11-15
Attending: AUDIOLOGIST
Payer: COMMERCIAL

## 2018-11-15 DIAGNOSIS — J30.1 ALLERGIC RHINITIS DUE TO POLLEN, UNSPECIFIED SEASONALITY: ICD-10-CM

## 2018-11-15 DIAGNOSIS — R06.83 PRIMARY SNORING: ICD-10-CM

## 2018-11-15 DIAGNOSIS — H69.93 DYSFUNCTION OF BOTH EUSTACHIAN TUBES: ICD-10-CM

## 2018-11-15 DIAGNOSIS — H69.93 DYSFUNCTION OF BOTH EUSTACHIAN TUBES: Primary | ICD-10-CM

## 2018-11-15 DIAGNOSIS — J34.3 NASAL TURBINATE HYPERTROPHY: ICD-10-CM

## 2018-11-15 DIAGNOSIS — J32.4 CHRONIC PANSINUSITIS: Primary | ICD-10-CM

## 2018-11-15 PROCEDURE — 92550 TYMPANOMETRY & REFLEX THRESH: CPT | Performed by: AUDIOLOGIST

## 2018-11-15 PROCEDURE — 92557 COMPREHENSIVE HEARING TEST: CPT | Performed by: AUDIOLOGIST

## 2018-11-15 RX ORDER — PREDNISONE 20 MG/1
40 TABLET ORAL DAILY
Qty: 7 TABLET | Refills: 0 | Status: SHIPPED | OUTPATIENT
Start: 2018-11-15 | End: 2018-11-17

## 2018-11-15 NOTE — TELEPHONE ENCOUNTER
This patient needs a script for Prednisone to be taken prior to sinus surgery. Please send script in.

## 2018-11-15 NOTE — PROGRESS NOTES
Audiology Evaluation Completed. Please refer SCANNED AUDIOGRAM and/or TYMPANOGRAM for BACKGROUND, RESULTS, RECOMMENDATIONS.      Pau TREVINO, [facility]  Audiologist #[id number]

## 2018-11-15 NOTE — MR AVS SNAPSHOT
After Visit Summary   11/15/2018    Gauri Hollis    MRN: [id number]           Patient Information     Date Of Birth          1975        Visit Information        Provider Department      11/15/2018 2:45 PM Pau Russell AuD [facility]        Today's Diagnoses     Dysfunction of both eustachian tubes    -  1       Follow-ups after your visit        Your next 10 appointments already scheduled     Nov 23, 2018 11:15 AM CST   (Arrive by 11:00 AM)   MA SCREENING BILATERAL W/ KINGSLEY with GHMA2   [facility] ([facility])    [address]  [place] [address]   [phone]           How do I prepare for my exam? (Food and drink instructions) No Food and Drink Restrictions.  How do I prepare for my exam? (Other instructions) Do not use any powder, lotion or deodorant under your arms or on your breast. If you do, we will ask you to remove it before your exam.  What should I wear: Wear comfortable, two-piece clothing.  How long does the exam take: Most scans will take 15 minutes.  What should I bring: Bring any previous mammograms from other facilities or have them mailed to the breast center.  Do I need a :  No  is needed.  What do I need to tell my doctor: If you have any allergies, tell your care team.  What should I do after the exam: No restrictions, You may resume normal activities.  What is this test: This test is an x-ray of the breast to look for breast disease. The breast is pressed between two plates to flatten and spread the tissue. An X-ray is taken of the breast from different angles.  Who should I call with questions: If you have any questions, please call the Imaging Department where you will have your exam. Directions, parking instructions, and other information is available on our website, [email or web address].  Other information about my exam Three-dimensional (3D) mammograms are available at  "[place] locations in [place], [place], [place], [place], [facility], [place], and [place].  Health locations include [place] and the [facility] in [place].  Benefits of 3D mammograms include: * Improved rate of cancer detection * Decreases your chance of having to go back for more tests, which means fewer: * \"False-positive\" results (This means that there is an abnormal area but it isn't cancer.) * Invasive testing procedures, such as a biopsy or surgery * Can provide clearer images of the breast if you have dense breast tissue.  *3D mammography is an optional exam that anyone can have with a 2D mammogram. It doesn't replace or take the place of a 2D mammogram. 2D mammograms remain an effective screening test for all women.  Not all insurance companies cover the cost of a 3D mammogram. Check with your insurance.            Nov 23, 2018 11:30 AM CST   SHORT with Sheyla Ocampo MD   [facility] ([facility])    [address]  [place] [address]   [phone]            Nov 30, 2018 11:30 AM CST   New Visit with Bryant Inman MD   [facility] ([facility])    [address]  [place] [address]   [phone]            Dec 12, 2018  8:30 AM CST   MyChart Physical Adult with Serene Talley MD   [facility] ([facility])    [address]  [place] [address]   [phone]            Dec 19, 2018  8:30 AM CST   SHORT with Lyndon Caballero DC   [facility] ([facility])    [address]  [place] [address]   [phone]              Future tests that were ordered for you today     Open Future Orders        Priority Expected Expires Ordered    MA Screen Bilateral w/Anthony Routine  11/15/2019 11/15/2018            Who to contact     If you have questions or need " follow up information about today's clinic visit or your schedule please contact [facility] directly at [phone].  Normal or non-critical lab and imaging results will be communicated to you by MyChart, letter or phone within 4 business days after the clinic has received the results. If you do not hear from us within 7 days, please contact the clinic through MyChart or phone. If you have a critical or abnormal lab result, we will notify you by phone as soon as possible.  Submit refill requests through Wuiper or call your pharmacy and they will forward the refill request to us. Please allow 3 business days for your refill to be completed.          Additional Information About Your Visit        [facility] Information     [employer] gives you secure access to your electronic health record. If you see a primary care provider, you can also send messages to your care team and make appointments. If you have questions, please call your primary care clinic.  If you do not have a primary care provider, please call [phone] and they will assist you.        Care EveryWhere ID     This is your Care EveryWhere ID. This could be used by other organizations to access your [facility] medical records  [id number]         Blood Pressure from Last 3 Encounters:   11/08/18 124/82   10/17/18 120/88   10/04/18 116/68    Weight from Last 3 Encounters:   11/08/18 77.1 kg (170 lb)   10/17/18 79.8 kg (176 lb)   10/04/18 77.1 kg (170 lb)              We Performed the Following     AUDIOGRAM/TYMPANOGRAM - INTERFACE        Primary Care Provider Office Phone # Fax #    Patricia Zavala [phone]       [address] [facility] [address]        Equal Access to Services     [facility] Hospital[facility]LEY : Hadii khadijah Shaffer, waaxda marly, qaybta good banda. So [facility] [phone].    ATENCIÓN: Si habla español, tiene a woody disposición servicios gratuitos  de asistencia lingüística. Yo al [phone].    We comply with applicable federal civil rights laws and [place] laws. We do not discriminate on the basis of race, color, national origin, age, disability, sex, sexual orientation, or gender identity.            Thank you!     Thank you for choosing [facility]  for your care. Our goal is always to provide you with excellent care. Hearing back from our patients is one way we can continue to improve our services. Please take a few minutes to complete the written survey that you may receive in the mail after your visit with us. Thank you!             Your Updated Medication List - Protect others around you: Learn how to safely use, store and throw away your medicines at www.disposemymeds.org.          This list is accurate as of 11/15/18  3:09 PM.  Always use your most recent med list.                   Brand Name Dispense Instructions for use Diagnosis    albuterol 108 (90 Base) MCG/ACT Aepb inhaler    PROAIR RESPICLICK    1 Inhaler    Inhale 2 puffs into the lungs every 4 hours as needed for shortness of breath / dyspnea or wheezing    Cough       azelastine-fluticasone 137-50 MCG/ACT nasal spray    DYMISTA    1 Bottle    Spray 1 spray into both nostrils 2 times daily    Chronic pansinusitis, Nasal turbinate hypertrophy, Dysfunction of both eustachian tubes       budesonide 0.5 MG/2ML neb solution    PULMICORT    3 Box    Squirt entire vial into previously made shivam med saline bottle, mix, and irrigate each nostril until entire bottle empty.  Do this twice daily.    Chronic pansinusitis       D 5000 5000 units Tabs   Generic drug:  Cholecalciferol      Take by mouth daily        DOCOSAHEXAENOIC ACID PO           escitalopram 10 MG tablet    LEXAPRO     Take 10 mg by mouth daily        fish oil-omega-3 fatty acids 1000 MG capsule      Take 1 g by mouth daily        fluticasone 50 MCG/ACT spray    FLONASE     Spray 1 spray into both nostrils  daily        saccharomyces boulardii 250 MG capsule    FLORASTOR     Take 1 capsule by mouth daily        triamcinolone 0.5 % cream    KENALOG    15 g    Apply sparingly to affected area three times daily.    Skin rash

## 2018-11-16 NOTE — PROGRESS NOTES
Outpatient Occupational Therapy Discharge Note     Patient: Gauri Hollis  : 1975    Beginning/End Dates of Reporting Period:  2018 to 2018    Referring Provider: Dr. Rivera    Therapy Diagnosis: Medial epicondylitis    Client Self Report: Gauri is still working at 50% patient contact time.  strength is affected due to pain at the lateral epiconyle and anterior deltoid.      Objective Measurements:     Objective Measure: pain   Details: 3-4   Objective Measure: hand strength.    Details: current: : 50 with minimal pain. lateral: 11  three pint: 8    Previous: Grasp:39, with pain 5/10 lateral: 8 tripod5             Goals:     Goal Identifier self cares   Goal Description patient will report the ability to drink with her left hand at PLOF   Target Date 18   Date Met      Progress:     Goal Identifier ADLs   Goal Description patient will report the ability to extend elbow after sleep with minimal to no pain. discomfort.    Target Date 18   Date Met      Progress:     Goal Identifier IADLs   Goal Description patient will report the ability to extend her elbow/ wrist during chiropractice procedures with pain <3/10   Target Date 18   Date Met      Progress:        Progress Toward Goals:   Not assessed this period.    Plan:  Discharge from therapy.    Discharge:    Reason for Discharge: Patient has failed to schedule further appointments.      Discharge Plan: Patient to continue home program.

## 2018-11-16 NOTE — ADDENDUM NOTE
Encounter addended by: Fabiola Manriquez OT on: 11/16/2018  3:26 PM<BR>     Actions taken: Episode resolved, Pend clinical note, Flowsheet accepted, Sign clinical note

## 2018-11-23 ENCOUNTER — HOSPITAL ENCOUNTER (OUTPATIENT)
Dept: MAMMOGRAPHY | Facility: OTHER | Age: 43
Discharge: HOME | End: 2018-11-23
Attending: FAMILY MEDICINE | Admitting: FAMILY MEDICINE
Payer: COMMERCIAL

## 2018-11-23 ENCOUNTER — OFFICE VISIT (OUTPATIENT)
Dept: FAMILY MEDICINE | Facility: OTHER | Age: 43
End: 2018-11-23
Attending: FAMILY MEDICINE
Payer: COMMERCIAL

## 2018-11-23 VITALS
WEIGHT: 179.6 LBS | BODY MASS INDEX: 28.99 KG/M2 | HEART RATE: 64 BPM | SYSTOLIC BLOOD PRESSURE: 122 MMHG | DIASTOLIC BLOOD PRESSURE: 62 MMHG

## 2018-11-23 DIAGNOSIS — Z12.31 VISIT FOR SCREENING MAMMOGRAM: ICD-10-CM

## 2018-11-23 DIAGNOSIS — S89.91XA KNEE INJURY, RIGHT, INITIAL ENCOUNTER: Primary | ICD-10-CM

## 2018-11-23 PROCEDURE — 77067 SCR MAMMO BI INCL CAD: CPT

## 2018-11-23 PROCEDURE — 99213 OFFICE O/P EST LOW 20 MIN: CPT | Performed by: FAMILY MEDICINE

## 2018-11-23 RX ORDER — ESCITALOPRAM OXALATE 10 MG/1
10 TABLET ORAL DAILY
Qty: 90 TABLET | Refills: 3 | Status: SHIPPED | OUTPATIENT
Start: 2018-11-23 | End: 2020-02-12

## 2018-11-23 ASSESSMENT — PAIN SCALES - GENERAL: PAINLEVEL: MODERATE PAIN (5)

## 2018-11-23 NOTE — NURSING NOTE
"Patient here for on going right knee pain for a bout a month.  No known injury.   Margi Lake LPN ..........11/23/2018 11:46 AM     Chief Complaint   Patient presents with     Pain     right knee       Initial /62 (BP Location: Right arm, Patient Position: Sitting, Cuff Size: Adult Regular)  Pulse 64  Wt 179 lb 9.6 oz (81.5 kg)  Breastfeeding? No  BMI 28.99 kg/m2 Estimated body mass index is 28.99 kg/(m^2) as calculated from the following:    Height as of 11/8/18: 5' 6\" (1.676 m).    Weight as of this encounter: 179 lb 9.6 oz (81.5 kg).  Medication Reconciliation: complete    Margi Laek LPN    "

## 2018-11-23 NOTE — MR AVS SNAPSHOT
After Visit Summary   11/23/2018    Gauri Hollis    MRN: [id number]           Patient Information     Date Of Birth          1975        Visit Information        Provider Department      11/23/2018 11:30 AM Sheyla Ocampo MD [facility]        Today's Diagnoses     Knee injury, right, initial encounter    -  1       Follow-ups after your visit        Your next 10 appointments already scheduled     Nov 26, 2018  8:15 AM CST   (Arrive by 8:00 AM)   MR KNEE RIGHT W/O CONTRAST with GHMR1   [facility]    [address]  [place] [address]   [phone]           How do I prepare for my exam? (Food and drink instructions) **If you will be receiving sedation or general anesthesia, please see special notes below.**  How do I prepare for my exam? (Other instructions) Take your medicines as usual, unless your doctor tells you not to. Please remove any body piercings and hair extensions before you arrive. Follow your doctor s orders. If you do not, we may have to postpone your exam. You may or may not receive IV contrast for this exam pending the discretion of the Radiologist.  You do not need to do anything special to prepare. **If you will be receiving sedation or general anesthesia, please see special notes below.**  What should I wear:  The MRI machine uses a strong magnet. Please wear clothes without metal (snaps, zippers). A sweatsuit works well, or we may give you a hospital gown.  How long does the exam take: Most tests take 30 to 60 minutes.  HOWEVER, IF YOUR DOCTOR PRESCRIBES ANESTHESIA please plan on spending four to five hours in the recovery room.  What should I bring: Bring a list of your current medicines to your exam (including vitamins, minerals and over-the-counter drugs). Also bring the results of similar scans you may have had.  Do I need a : **If you will be receiving sedation or general  anesthesia, please see special notes below.**  What should I do after the exam? No Restrictions, You may resume normal activities.  What is this test: MRI (magnetic resonance imaging) uses a strong magnet and radio waves to look inside the body. An MRA (magnetic resonance angiogram) does the same thing, but it lets us look at your blood vessels. A computer turns the radio waves into pictures showing cross sections of the body, much like slices of bread. This helps us see any problems more clearly.  Who should I call with questions: Please call the Imaging Department at your exam site with any questions. Directions, parking instructions, and other information is available on our website, [email or web address].  How do I prepare if I m having sedation or anesthesia? **IMPORTANT** THE INSTRUCTIONS BELOW ARE ONLY FOR THOSE PATIENTS WHO HAVE BEEN TOLD THEY WILL RECEIVE SEDATION OR GENERAL ANESTHESIA DURING THEIR MRI PROCEDURE:  IF YOU WILL RECEIVE SEDATION (take medicine to help you relax during your exam): You must get the medicine from your doctor before you arrive. Bring the medicine to the exam. Do not take it at home. Arrive one hour early. Bring someone who can take you home after the test. Your medicine will make you sleepy. After the exam, you may not drive, take a bus or take a taxi by yourself. No eating 8 hours before your exam. You may have clear liquids up until 4 hours before your exam. (Clear liquids include water, clear tea, black coffee and fruit juice without pulp.)  IF YOU WILL RECEIVE ANESTHESIA (be asleep for your exam): Arrive 1 1/2 hours early. Bring someone who can take you home after the test. You may not drive, take a bus or take a taxi by yourself. No eating 8 hours before your exam. You may have clear liquids up until 4 hours before your exam. (Clear liquids include water, clear tea, black coffee and fruit juice without pulp.) You will spend four to five hours in the recovery room.             Nov 30, 2018 11:30 AM CST   New Visit with Bryant Inman MD   [facility]    [address]  [place] [address] [phone]            Dec 12, 2018  8:30 AM CST   MyChart Physical Adult with Serene Talley MD   [facility]    [address]  [place] [address] [phone]            Dec 19, 2018  8:30 AM CST   SHORT with Lyndon Caballero DC   [facility]    [address]  [place] [address] [phone]            Dec 19, 2018   Procedure with Dora Robins MD   [facility] ([facility] )    [address] [place] [id number] [phone]              Future tests that were ordered for you today     Open Future Orders        Priority Expected Expires Ordered    MR Knee Right w/o Contrast Routine  11/23/2019 11/23/2018            Who to contact     If you have questions or need follow up information about today's clinic visit or your schedule please contact [facility] directly at [phone].  Normal or non-critical lab and imaging results will be communicated to you by [employer], letter or phone within 4 business days after the clinic has received the results. If you do not hear from us within 7 days, please contact the clinic through [employer] or phone. If you have a critical or abnormal lab result, we will notify you by phone as soon as possible.  Submit refill requests through [employer] or call your pharmacy and they will forward the refill request to us. Please allow 3 business days for your refill to be completed.          Additional Information About Your Visit        [employer] Information     [employer] gives you secure access to your electronic health record. If you see a primary care provider, you can also send messages to your care team and  make appointments. If you have questions, please call your primary care clinic.  If you do not have a primary care provider, please call [phone] and they will assist you.        Care EveryWhere ID     This is your Care EveryWhere ID. This could be used by other organizations to access your [facility] medical records  [id number]        Your Vitals Were     Pulse Breastfeeding? BMI (Body Mass Index)             64 No 28.99 kg/m2          Blood Pressure from Last 3 Encounters:   11/23/18 122/62   11/08/18 124/82   10/17/18 120/88    Weight from Last 3 Encounters:   11/23/18 179 lb 9.6 oz (81.5 kg)   11/08/18 170 lb (77.1 kg)   10/17/18 176 lb (79.8 kg)                 Where to get your medicines      These medications were sent to [facility], - [place], [place] - [address]  [address], [place] [address]     Phone:  [phone]     escitalopram 10 MG tablet          Primary Care Provider Office Phone # Fax #    Patricia Zavala [phone]       [address]  [place] [address]        Equal Access to Services     [place] : Hadii khadijah castro hadasho Soomaali, waaxda luqadaha, qaybta kaalmada adeegyada, good guadarrama. So [facility] [phone].    ATENCIÓN: Si habla español, tiene a woody disposición servicios gratuitos de asistencia lingüística. Llame al [phone].    We comply with applicable federal civil rights laws and [place] laws. We do not discriminate on the basis of race, color, national origin, age, disability, sex, sexual orientation, or gender identity.            Thank you!     Thank you for choosing [facility]  for your care. Our goal is always to provide you with excellent care. Hearing back from our patients is one way we can continue to improve our services. Please take a few minutes to complete the written survey that you may receive in the mail after your visit with us. Thank  you!             Your Updated Medication List - Protect others around you: Learn how to safely use, store and throw away your medicines at www.disposemymeds.org.          This list is accurate as of 11/23/18  2:45 PM.  Always use your most recent med list.                   Brand Name Dispense Instructions for use Diagnosis    albuterol 108 (90 Base) MCG/ACT inhaler    PROAIR RESPICLICK    1 Inhaler    Inhale 2 puffs into the lungs every 4 hours as needed for shortness of breath / dyspnea or wheezing    Cough       azelastine-fluticasone 137-50 MCG/ACT nasal spray    DYMISTA    1 Bottle    Spray 1 spray into both nostrils 2 times daily    Chronic pansinusitis, Nasal turbinate hypertrophy, Dysfunction of both eustachian tubes       budesonide 0.5 MG/2ML neb solution    PULMICORT    3 Box    Squirt entire vial into previously made shivam med saline bottle, mix, and irrigate each nostril until entire bottle empty.  Do this twice daily.    Chronic pansinusitis       D 5000 5000 units Tabs   Generic drug:  Cholecalciferol      Take by mouth daily        DOCOSAHEXAENOIC ACID PO           escitalopram 10 MG tablet    LEXAPRO    90 tablet    Take 1 tablet (10 mg) by mouth daily    Knee injury, right, initial encounter       fish oil-omega-3 fatty acids 1000 MG capsule      Take 1 g by mouth daily        fluticasone 50 MCG/ACT spray    FLONASE     Spray 1 spray into both nostrils daily        saccharomyces boulardii 250 MG capsule    FLORASTOR     Take 1 capsule by mouth daily        triamcinolone 0.5 % cream    KENALOG    15 g    Apply sparingly to affected area three times daily.    Skin rash

## 2018-11-23 NOTE — PROGRESS NOTES
SUBJECTIVE:   Gauri Hollis is a 43 year old female who presents to clinic today for the following health issues:    HPI Comments: Patient presents with R knee pain for the past 4 weeks. No known acute injury.   Has had knee injuries before related to sports.  But more recently she has had a lot more pain.  She has noted swelling of her right knee.  She reports pain along the medial joint line and behind her patella.  She does find that her knee catches at times, feels that she cannot over extend, and has difficulty pivoting.  She feels as though her knee might give out.        Review of Systems see HPI, review of systems is otherwise negative.    OBJECTIVE:     /62 (BP Location: Right arm, Patient Position: Sitting, Cuff Size: Adult Regular)  Pulse 64  Wt 179 lb 9.6 oz (81.5 kg)  Breastfeeding? No  BMI 28.99 kg/m2  Body mass index is 28.99 kg/(m^2).  Physical Exam   Constitutional: She appears well-developed and well-nourished.   HENT:   Right Ear: External ear normal.   Left Ear: External ear normal.   Eyes: Conjunctivae are normal. No scleral icterus.   Cardiovascular: Normal rate.    Pulmonary/Chest: Effort normal. No respiratory distress.   Musculoskeletal: She exhibits no edema.   Right knee with effusion present, no effusion noted in left knee.  Left knee exam normal.  Right knee with negative Lockman's, negative posterior drawer, pain with varus and valgus stressing, pain with Teofilo's.   Neurological: She is alert.   Skin: No rash noted.   Psychiatric: She has a normal mood and affect.         ASSESSMENT/PLAN:           ICD-10-CM    1. Knee injury, right, initial encounter S89.91XA escitalopram (LEXAPRO) 10 MG tablet     MR Knee Right w/o Contrast       Suspect meniscal injury.  Patient's symptoms have escalated to the point where she would consider arthroscopic surgery if indicated.  Therefore we will proceed with MRI imaging of her right knee.  Follow-up based on these results.    Sheyla CROW  MD [place]  [facility] [place]

## 2018-11-26 ENCOUNTER — HOSPITAL ENCOUNTER (OUTPATIENT)
Dept: MRI IMAGING | Facility: OTHER | Age: 43
Discharge: HOME | End: 2018-11-26
Attending: FAMILY MEDICINE | Admitting: FAMILY MEDICINE
Payer: COMMERCIAL

## 2018-11-26 DIAGNOSIS — S89.91XA KNEE INJURY, RIGHT, INITIAL ENCOUNTER: ICD-10-CM

## 2018-11-26 PROCEDURE — 73721 MRI JNT OF LWR EXTRE W/O DYE: CPT | Mod: RT

## 2018-11-28 ENCOUNTER — HOSPITAL ENCOUNTER (OUTPATIENT)
Dept: ULTRASOUND IMAGING | Facility: OTHER | Age: 43
Discharge: HOME | End: 2018-11-28
Attending: INTERNAL MEDICINE | Admitting: INTERNAL MEDICINE
Payer: COMMERCIAL

## 2018-11-28 ENCOUNTER — MYC MEDICAL ADVICE (OUTPATIENT)
Dept: INTERNAL MEDICINE | Facility: OTHER | Age: 43
End: 2018-11-28

## 2018-11-28 ENCOUNTER — HOSPITAL ENCOUNTER (OUTPATIENT)
Dept: MAMMOGRAPHY | Facility: OTHER | Age: 43
End: 2018-11-28
Attending: INTERNAL MEDICINE
Payer: COMMERCIAL

## 2018-11-28 DIAGNOSIS — R92.8 ABNORMAL FINDING ON BREAST IMAGING: ICD-10-CM

## 2018-11-28 PROCEDURE — 76642 ULTRASOUND BREAST LIMITED: CPT | Mod: RT

## 2018-11-28 PROCEDURE — G0279 TOMOSYNTHESIS, MAMMO: HCPCS

## 2018-11-30 ENCOUNTER — OFFICE VISIT (OUTPATIENT)
Dept: ORTHOPEDICS | Facility: OTHER | Age: 43
End: 2018-11-30
Attending: ORTHOPAEDIC SURGERY
Payer: COMMERCIAL

## 2018-11-30 DIAGNOSIS — Z00.00 ROUTINE GENERAL MEDICAL EXAMINATION AT A HEALTH CARE FACILITY: Primary | ICD-10-CM

## 2018-11-30 PROCEDURE — G0463 HOSPITAL OUTPT CLINIC VISIT: HCPCS

## 2018-11-30 NOTE — MR AVS SNAPSHOT
After Visit Summary   11/30/2018    Gauri Hollis    MRN: [id number]           Patient Information     Date Of Birth          1975        Visit Information        Provider Department      11/30/2018 11:30 AM Bryant Inman MD [facility]        Today's Diagnoses     Routine general medical examination at a health care facility    -  1       Follow-ups after your visit        Your next 10 appointments already scheduled     Dec 12, 2018  8:30 AM CST   MyChart Physical Adult with Serene C Cornelius Talley MD   [facility] ([facility])    [address]  [place] [address]   [phone]            Dec 12, 2018 10:00 AM CST   US BREAST BIOPSY VACUUM RIGHT with GHUS2,  IMAGING NURSE, GHRAD1   [facility] ([facility])    [address]  [place] [address]   [phone]           How do I prepare for my exam? (Food and drink instructions) No Food and Drink Restrictions.  How do I prepare for my exam? (Other instructions) IF YOUR DOCTOR ALSO PRESCRIBED SEDATION DURING THE EXAM (medicine to help you relax): You will receive separate instructions about driving, eating, and additional tests that may be necessary prior to your exam day.  What should I wear: Wear comfortable clothes.  How long does the exam take: Aspiration (no sedation treatment takes about an hour.  For paracentesis, thoracentesis or sedation plan to spend at least three hours at the hospital.  What should I bring: Bring a list of your medicines, including vitamins, minerals and over-the-counter drugs. It is safest to leave personal items at home.  Do I need a :  No  is needed.  What do I need to tell my doctor: Tell your doctor in advance: * If you are or may be pregnant. * If you are taking Coumadin (or any other blood thinners) 5 days prior to the exam for any special instructions. * If you  are diabetic to determine if your insulin needs have to be adjusted for the exam.  What should I do after the exam: Take it easy the rest of the day. You can return to normal activities the next day.  What is this test: This test uses a long, thin needle or tube to remove tissue, fluid, or other cells from your body. Pictures from an ultrasound will guide the needle to the right place. (Ultrasound uses sound waves to create pictures of the body on a video screen. You will not feel the sound waves.)  * A biopsy removes tissue or other cells from the body; we send the tissue or cells to a lab for testing. * Paracentesis removes fluid from the belly (abdomen) to relieve pressure, to test the fluid or both. * Thoracentesis removes fluid from the sac around the lungs to relieve pressure, to test the fluid or both. * Aspiration removes fluid from any part of the body, then the fluid is tested for disease or infection.  Who should I call with questions: If you have any questions, please call the Imaging Department where you will have your exam. Directions, parking instructions, and other information is available on our website, [email or web address].            Dec 13, 2018  8:00 AM CST   [facility]    [address]  [facility] [address]   [phone]            Dec 14, 2018 11:20 AM CST   Return Visit with Gertrudis [facility]tega [facility]    [address]  [facility] [address]   [phone]            Dec 19, 2018   Procedure with Dora Robins MD   [facility] ([facility] )    [address] [place] [id number] [phone]            Dec 27, 2018  1:15 PM CST   (Arrive by 1:00 PM)   Post Op with Ruby Coleman PA-C   [facility] ([facility] )    [address]  [place] [address]    [phone]            Rudy 10, 2019  2:15 PM CST   (Arrive by 2:00 PM)   Post Op with Dora Robins MD   [facility] ([facility] )    [place] [address]   [phone]              Who to contact     If you have questions or need follow up information about today's clinic visit or your schedule please contact [facility] directly at [phone].  Normal or non-critical lab and imaging results will be communicated to you by MakeMeReachhart, letter or phone within 4 business days after the clinic has received the results. If you do not hear from us within 7 days, please contact the clinic through ZenoLinkt or phone. If you have a critical or abnormal lab result, we will notify you by phone as soon as possible.  Submit refill requests through Zanbato or call your pharmacy and they will forward the refill request to us. Please allow 3 business days for your refill to be completed.          Additional Information About Your Visit        MakeMeReachharMoBank Information     Zanbato gives you secure access to your electronic health record. If you see a primary care provider, you can also send messages to your care team and make appointments. If you have questions, please call your primary care clinic.  If you do not have a primary care provider, please call [phone] and they will assist you.        Care EveryWhere ID     This is your Care EveryWhere ID. This could be used by other organizations to access your [facility] medical records  [id number]         Blood Pressure from Last 3 Encounters:   11/23/18 122/62   11/08/18 124/82   10/17/18 120/88    Weight from Last 3 Encounters:   11/23/18 81.5 kg (179 lb 9.6 oz)   11/08/18 77.1 kg (170 lb)   10/17/18 79.8 kg (176 lb)              Today, you had the following     No orders found for display       Primary Care Provider Office Phone # Fax #    Patricia Zavala [phone] [facility] [address]        Equal Access to Services     [place] : Hadii aad ku hadripnoemy Clarapauline, wanathalyamrik fernándezkrishnaha, kaitmarjorie villafanaciriloamrik medley, good rajanyarelistefany guadarrama. So [facility] [phone].    ATENCIÓN: Si habla español, tiene a woody disposición servicios gratuitos de asistencia lingüística. Llame al [phone].    We comply with applicable federal civil rights laws and [place] laws. We do not discriminate on the basis of race, color, national origin, age, disability, sex, sexual orientation, or gender identity.            Thank you!     Thank you for choosing [facility]  for your care. Our goal is always to provide you with excellent care. Hearing back from our patients is one way we can continue to improve our services. Please take a few minutes to complete the written survey that you may receive in the mail after your visit with us. Thank you!             Your Updated Medication List - Protect others around you: Learn how to safely use, store and throw away your medicines at www.disposemymeds.org.          This list is accurate as of 11/30/18 11:59 PM.  Always use your most recent med list.                   Brand Name Dispense Instructions for use Diagnosis    albuterol 108 (90 Base) MCG/ACT inhaler    PROAIR RESPICLICK    1 Inhaler    Inhale 2 puffs into the lungs every 4 hours as needed for shortness of breath / dyspnea or wheezing    Cough       azelastine-fluticasone 137-50 MCG/ACT nasal spray    DYMISTA    1 Bottle    Spray 1 spray into both nostrils 2 times daily    Chronic pansinusitis, Nasal turbinate hypertrophy, Dysfunction of both eustachian tubes       budesonide 0.5 MG/2ML neb solution    PULMICORT    3 Box    Squirt entire vial into previously made shivam med saline bottle, mix, and irrigate each nostril until entire bottle empty.  Do this twice daily.    Chronic pansinusitis       D 5000 5000 units Tabs   Generic drug:  Cholecalciferol       Take by mouth daily        DOCOSAHEXAENOIC ACID PO           escitalopram 10 MG tablet    LEXAPRO    90 tablet    Take 1 tablet (10 mg) by mouth daily    Knee injury, right, initial encounter       fish oil-omega-3 fatty acids 1000 MG capsule      Take 1 g by mouth daily        fluticasone 50 MCG/ACT nasal spray    FLONASE     Spray 1 spray into both nostrils daily        saccharomyces boulardii 250 MG capsule    FLORASTOR     Take 1 capsule by mouth daily        triamcinolone 0.5 % external cream    KENALOG    15 g    Apply sparingly to affected area three times daily.    Skin rash

## 2018-12-03 ENCOUNTER — TELEPHONE (OUTPATIENT)
Dept: OTOLARYNGOLOGY | Facility: OTHER | Age: 43
End: 2018-12-03

## 2018-12-03 NOTE — TELEPHONE ENCOUNTER
This patient faxed over FMLA forms to be filled out prior to her upcoming sinus surgery with Dr. Robins. She will be off from 12/19/18 returning to work on 2/2/19. All forms were filled out and faxed over to  at [facility]. The original was sent in the mail.

## 2018-12-04 NOTE — PROGRESS NOTES
VITALS:   Height:   67  Weight:   175  Pulse rate:  64  Blood Pressure:  122 / 62      CHIEF COMPLAINT: Right Knee Pain, Swelling    PROBLEMS:   Patient has no noted problems.    PATIENT REPORTED MEDICATIONS:  FISH OIL CAPSULE (OMEGA-3 FATTY ACIDS CAPS)   VITAMIN D TABLET (CHOLECALCIFEROL TABS)   CELEXA TABLET (CITALOPRAM HYDROBROMIDE TABS)   ZYRTEC ALLERGY TABLET (CETIRIZINE HCL TABS)     PATIENT REPORTED ALLERGIES:  * ENVIRONMENTAL/HAY FEVER (SevereReaction: No reaction details noted)      HISTORY OF PRESENT ILLNESS:    The patient is a very pleasant lady who comes in with a history of right knee pain.   It has been a nagging dull pain for two or three years but in the last six weeks with pain was acute with swelling, to the point that it was now time to come in to be evaluated.   The patient had x-rays and MRI.   Unfortunately for this very pleasant 43-year-old female she has developed tricompartmental arthritis, and it is significant at the patellofemoral joint medially and laterally, but especially medially.   Unfortunately some was already down to grade IV change.   There is grade III to IV change laterally and full thickness cartilage loss medially.   On the medial compartment there was about up to 19 mm of exposed bone with full thickness cartilage loss on the medial femoral condyle.    So unfortunately this 43-year-old has developed a significant amount of arthritis in her knee tricompartmentally.  No meniscus tear was seen.       PMH:   Health history form dated 11/30/18 is reviewed and signed.  Past medical history, surgical history, social history, family history, medications, and review of systems is noted and scanned into the chart.     PAST MEDICAL HISTORY:    No Past Medical History    *Medications & Allergies Not Listed On Health History Form 11/30/18*    PAST ORTHOPEDIC SURGICAL HISTORY:    Left Elbow Dislocation, Reduced Under Anesthesia in 1993    PAST SURGICAL HISTORY:    Dilatation & Curettage  "x2    FAMILY HISTORY:    Father:  MI at 42, Diabetes, MI at 70, High Blood Pressure  Mother:  Breast Cancer  Brother:  PTSD, Congenital Heart Problem or Rheumatic Fever Sequelae  Sister:  Hypothyroidism    SOCIAL HISTORY:   Occupation:  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Marital Status:    Alcohol Use:  Yes  Tobacco Use:  Never  Secondhand Smoke Exposure:  No  History of HIV:  No  History of Hepatitis:  No    PHYSICAL EXAMINATION:    On exam the patient is a very pleasant 43-year-old female.  Height: 5' 7\",  Weight: 175 pounds,  Pulse: 64,  BP: 122/62.  The patient basically just has the presence of a small effusion on exam of her right knee.   Tenderness is noted diffusely, compatible with arthritis.       X-RAY:  The patient had x-rays and MRI.   Unfortunately for this very pleasant 43-year-old female she has developed tricompartmental arthritis, and it is significant at the patellofemoral joint medially and laterally, but especially medially.   Unfortunately some was already down to grade IV change.   There is grade III to IV change laterally and full thickness cartilage loss medially.   On the medial compartment there was about up to 19 mm of exposed bone with full thickness cartilage loss on the medial femoral condyle.    So unfortunately this 43-year-old has developed a significant amount of arthritis in her knee tricompartmentally.  No meniscus tear was seen.       MRI:  MRI was diagnostic for no meniscus tear and there was no ligamentous tear.    ASSESSMENT:    Right Knee Tricompartmental Arthritis - Unfortunately pretty severe for a younger individual.    PROCEDURES:   Risks and benefits of the procedure were reviewed with the patient. Informed consent was obtained. Blood sugar risks for our diabetic patients were also discussed.    I injected the patient's right knee with 40 mg Kenalog and 3 cc Lidocaine.   The patient tolerated the procedure well after sterile prep.    PLAN:   Right Knee Tricompartmental Arthritis - " Unfortunately pretty severe for a younger individual:  We had a nice conversation about cortisone injections, antiinflammatories, Tylenol, glucosamine, chondroitin sulfate, exercise, strengthening program.   Orders were written for physical therapy.     I injected her right knee today.  I also talked to the patient a little bit about hyaluronic acid with viscosupplementation and how we sometimes will alternate between cortisone and/or viscosupplementation.   She would be very interested in that, so she will come back for periodic injections as her symptoms require.  I am hopeful she will go quite a few years before she requires surgery.    Dictated by Bryant Inman M.D.  D:  11/30/18  T:   12/04/18    Typed and/or reviewed and corrected by signing  below, and sent to the Physician for final review and signature.     This report was created using voice recording software and computer-generated templates. Although every effort has been made to review for and eliminate errors, some errors may still occur.       Electronically signed by Marisol Boothe  on 12/04/2018 at 3:20 PM    ________________________________________________________________________

## 2018-12-12 ENCOUNTER — OFFICE VISIT (OUTPATIENT)
Dept: FAMILY MEDICINE | Facility: OTHER | Age: 43
End: 2018-12-12
Attending: INTERNAL MEDICINE
Payer: COMMERCIAL

## 2018-12-12 ENCOUNTER — TELEPHONE (OUTPATIENT)
Dept: LAB | Facility: OTHER | Age: 43
End: 2018-12-12

## 2018-12-12 ENCOUNTER — HOSPITAL ENCOUNTER (OUTPATIENT)
Dept: ULTRASOUND IMAGING | Facility: OTHER | Age: 43
Discharge: HOME | End: 2018-12-12
Attending: INTERNAL MEDICINE | Admitting: INTERNAL MEDICINE
Payer: COMMERCIAL

## 2018-12-12 ENCOUNTER — HOSPITAL ENCOUNTER (OUTPATIENT)
Dept: MAMMOGRAPHY | Facility: OTHER | Age: 43
End: 2018-12-12
Attending: INTERNAL MEDICINE
Payer: COMMERCIAL

## 2018-12-12 VITALS
SYSTOLIC BLOOD PRESSURE: 112 MMHG | HEIGHT: 66 IN | HEART RATE: 72 BPM | DIASTOLIC BLOOD PRESSURE: 80 MMHG | WEIGHT: 181 LBS | BODY MASS INDEX: 29.09 KG/M2

## 2018-12-12 VITALS
DIASTOLIC BLOOD PRESSURE: 88 MMHG | SYSTOLIC BLOOD PRESSURE: 121 MMHG | TEMPERATURE: 98.7 F | OXYGEN SATURATION: 98 % | HEART RATE: 60 BPM

## 2018-12-12 DIAGNOSIS — Z01.818 PRE-OP EXAM: ICD-10-CM

## 2018-12-12 DIAGNOSIS — Z01.818 PREOP GENERAL PHYSICAL EXAM: ICD-10-CM

## 2018-12-12 DIAGNOSIS — R92.8 ABNORMAL ULTRASOUND OF BREAST: ICD-10-CM

## 2018-12-12 DIAGNOSIS — Z00.00 PHYSICAL EXAM, ANNUAL: Primary | ICD-10-CM

## 2018-12-12 DIAGNOSIS — N63.0 BREAST MASS: Primary | ICD-10-CM

## 2018-12-12 DIAGNOSIS — E55.9 VITAMIN D INSUFFICIENCY: ICD-10-CM

## 2018-12-12 DIAGNOSIS — J32.0 CHRONIC MAXILLARY SINUSITIS: ICD-10-CM

## 2018-12-12 DIAGNOSIS — E67.3 HYPERVITAMINOSIS D: ICD-10-CM

## 2018-12-12 DIAGNOSIS — J32.4 CHRONIC PANSINUSITIS: ICD-10-CM

## 2018-12-12 LAB
CALCIUM SERPL-MCNC: 9.5 MG/DL (ref 8.6–10.3)
COPATH REPORT: NORMAL
DEPRECATED CALCIDIOL+CALCIFEROL SERPL-MC: >100 NG/ML
HCG UR QL: NEGATIVE
PAP: NORMAL

## 2018-12-12 PROCEDURE — 27211116 US BREAST BIOPSY CORE NEEDLE RIGHT

## 2018-12-12 PROCEDURE — G0123 SCREEN CERV/VAG THIN LAYER: HCPCS | Performed by: FAMILY MEDICINE

## 2018-12-12 PROCEDURE — 40000986 MA POST PROCEDURE RIGHT

## 2018-12-12 PROCEDURE — 25000125 ZZHC RX 250: Performed by: RADIOLOGY

## 2018-12-12 PROCEDURE — 99396 PREV VISIT EST AGE 40-64: CPT | Performed by: FAMILY MEDICINE

## 2018-12-12 PROCEDURE — 88142 CYTOPATH C/V THIN LAYER: CPT | Mod: TC | Performed by: FAMILY MEDICINE

## 2018-12-12 PROCEDURE — 88305 TISSUE EXAM BY PATHOLOGIST: CPT

## 2018-12-12 PROCEDURE — 87624 HPV HI-RISK TYP POOLED RSLT: CPT | Performed by: FAMILY MEDICINE

## 2018-12-12 PROCEDURE — 36415 COLL VENOUS BLD VENIPUNCTURE: CPT | Performed by: FAMILY MEDICINE

## 2018-12-12 PROCEDURE — 81025 URINE PREGNANCY TEST: CPT | Performed by: FAMILY MEDICINE

## 2018-12-12 PROCEDURE — 82310 ASSAY OF CALCIUM: CPT | Performed by: FAMILY MEDICINE

## 2018-12-12 PROCEDURE — 82306 VITAMIN D 25 HYDROXY: CPT | Performed by: FAMILY MEDICINE

## 2018-12-12 RX ORDER — BUDESONIDE 0.5 MG/2ML
INHALANT ORAL
Qty: 3 BOX | Refills: 11 | Status: SHIPPED | OUTPATIENT
Start: 2018-12-12 | End: 2020-02-19

## 2018-12-12 RX ORDER — ACETAMINOPHEN 500 MG
500-1000 TABLET ORAL EVERY 6 HOURS PRN
COMMUNITY
End: 2019-05-24

## 2018-12-12 RX ORDER — LIDOCAINE HYDROCHLORIDE AND EPINEPHRINE 10; 10 MG/ML; UG/ML
1 INJECTION, SOLUTION INFILTRATION; PERINEURAL
Status: COMPLETED | OUTPATIENT
Start: 2018-12-12 | End: 2018-12-12

## 2018-12-12 RX ORDER — FLUTICASONE PROPIONATE 50 MCG
SPRAY, SUSPENSION (ML) NASAL
Refills: 11 | COMMUNITY
Start: 2018-10-04 | End: 2019-05-24

## 2018-12-12 RX ADMIN — LIDOCAINE HYDROCHLORIDE 10 ML: 10 INJECTION, SOLUTION INFILTRATION; PERINEURAL at 10:21

## 2018-12-12 RX ADMIN — LIDOCAINE HYDROCHLORIDE,EPINEPHRINE BITARTRATE 20 ML: 10; .01 INJECTION, SOLUTION INFILTRATION; PERINEURAL at 10:21

## 2018-12-12 ASSESSMENT — PAIN SCALES - GENERAL: PAINLEVEL: NO PAIN (1)

## 2018-12-12 ASSESSMENT — MIFFLIN-ST. JEOR: SCORE: 1502.76

## 2018-12-12 NOTE — IP AVS SNAPSHOT
[facility]  [address]  [place] [address]  Phone:  [phone]  Fax:  [phone]                                    After Visit Summary   12/12/2018    Gauri Hollis    MRN: [id number]           After Visit Summary Signature Page    I have received my discharge instructions, and my questions have been answered. I have discussed any challenges I see with this plan with the nurse or doctor.    ..........................................................................................................................................  Patient/Patient Representative Signature      ..........................................................................................................................................  Patient Representative Print Name and Relationship to Patient    ..................................................               ................................................  Date                                   Time    ..........................................................................................................................................  Reviewed by Signature/Title    ...................................................              ..............................................  Date                                               Time          22EPIC Rev 08/18

## 2018-12-12 NOTE — NURSING NOTE
Patient presents to the clinic today for a px.    Elizabeth Malave LPN.................. 12/12/2018 8:40 AM

## 2018-12-12 NOTE — TELEPHONE ENCOUNTER
Budesonide  Last Written Prescription Date: 10/4/18  Last Fill Quantity: 3 # of Refills: 11  Last Office Visit: 11/8/18      This patient is requesting a refill on her Budesonide. Please sign off.

## 2018-12-12 NOTE — DISCHARGE INSTRUCTIONS
"NEEDLE BIOPSY BREAST    Activity: Rest the remainder of the day. You may resume normal activity after the next day. Avoid any vigorous/strenuous physical activity for 24 hours.    Comfort: If you have discomfort or tenderness at the site you may take your usual or recommended pain medication. Do not take aspirin the day of the procedure or for 48 hours following the biopsy.    Diet: You may resume your usual diet.    Care of site: Leave ice pack in place for 4 hours, or until it is no longer cold. The ice pack is reusable and may be refrozen.  Keep your bra and the dressing on for 24 hours. Then you may remove the bandage and shower. If there are steri-strips you may remove them in 3 to 5 days.  You may have some discomfort and a small amount of bruising where the biopsy was performed. This is normal. For several days or even a couple of weeks, you may have tenderness or \"twinges\" and a tiny bump where the needle went into the skin. This can be bothersome, but is not abnormal. You can use warm moist washcloths, as this may help. Do Not Use A Heating Pad.    RETURN TO THE EMERGENCY ROOM FOR:   Shortness of breath   Rapid heart rate   If pain becomes worse    Call Your Doctor For:    A fever over 101 degrees   Increased redness, increased swelling, and/or persistent drainage/discomfort around the site    Other: At the end of your breast biopsy, a tiny titanium clip will be inserted through the biopsy needle and placed at the biopsy site within your breast. The marker provides a landmark of the biopsy for further mammograms or surgical procedures. This marker is MRI compatible and poses no known health risks.      If results are benign imaging may still recommended in 6 months by the radiologist after they review your pathology report. Our Radiology Department will call you to schedule this appointment.    For questions, problems, or concerns contact the Radiology Department at [phone].        "

## 2018-12-12 NOTE — PATIENT INSTRUCTIONS
Before Your Surgery  Fish oil and naproxen on hold until after surgery.      Call your surgeon if there is any change in your health. This includes signs of a cold or flu (such as a sore throat, runny nose, cough, rash or fever).    Do not smoke, drink alcohol or take over the counter medicine (unless your surgeon or primary care doctor tells you to) for the 24 hours before and after surgery.    If you take prescribed drugs: Follow your doctor s orders about which medicines to take and which to stop until after surgery.    Eating and drinking prior to surgery: follow the instructions from your surgeon    Take a shower or bath the night before surgery. Use the soap your surgeon gave you to gently clean your skin. If you do not have soap from your surgeon, use your regular soap. Do not shave or scrub the surgery site.  Wear clean pajamas and have clean sheets on your bed.

## 2018-12-12 NOTE — TELEPHONE ENCOUNTER
Gauri was here for an appointment today and was expecting more lab work to be ordered for a pre-op exam. All that was ordered was a vitamin D. Extra blood was taken if more lab work needs to be done.

## 2018-12-12 NOTE — PROGRESS NOTES
SUBJECTIVE:  Nursing Notes:   Elizabeth Malave LPN  2018  8:49 AM  Signed  Patient presents to the clinic today for a px.    Elizabeth Malave LPN.................. 2018 8:40 AM    Gauri Hollis is a 43 year old female who presents for her annual exam.    HPI: Gauri Hollis is a 43 year old female presents for her annual exam.      She also needs preop prior to sinus endoscopy surgery.  This is being done next week.  She plugs most all the time.  She has used neti pot, nasal steroid and allergy medications.  Allergy testing was positive for cats, dogs and molds.  She has ear clicking along with this too.    Surgeon:  Julienne    PRE-OP EVALUATION:  Today's date: 2018    Gauri Hollis (: 1975) presents for pre-operative evaluation assessment as requested by Dr. Robins.  She requires evaluation and anesthesia risk assessment prior to undergoing surgery/procedure for treatment of chronic sinusitis.    Proposed Surgery/ Procedure: sinus   Date of Surgery/ Procedure: 18  Time of Surgery/ Procedure: NA  Primary Physician: Patricia Zavala/ Serene Perez MD   Type of Anesthesia Anticipated: General    Patient has a Health Care Directive or Living Will:  NO    1. NO - Do you have a history of heart attack, stroke, stent, bypass or surgery on an artery in the head, neck, heart or legs?  2. NO - Do you ever have any pain or discomfort in your chest?  3. NO - Do you have a history of  Heart Failure?  4. NO - Are you troubled by shortness of breath when: walking on the level, up a slight hill or at night?  5. NO - Do you currently have a cold, bronchitis or other respiratory infection?  6. NO - Do you have a cough, shortness of breath or wheezing?  7. NO - Do you sometimes get pains in the calves of your legs when you walk?  8. NO - Do you or anyone in your family have previous history of blood clots?  9. NO - Do you or does anyone in your family have a serious bleeding  problem such as prolonged bleeding following surgeries or cuts?  10. NO - Have you ever had problems with anemia or been told to take iron pills?  11. NO - Have you had any abnormal blood loss such as black, tarry or bloody stools, or abnormal vaginal bleeding?  12. NO - Have you ever had a blood transfusion?  13. NO - Have you or any of your relatives ever had problems with anesthesia?  14. YES - DO YOU HAVE SLEEP APNEA, EXCESSIVE SNORING OR DAYTIME DROWSINESS? snoring  15. NO - Do you have any prosthetic heart valves?  16. NO - Do you have prosthetic joints?  17. NO - Is there any chance that you may be pregnant?    Last pap: 2015  Immunizations:  Flu is up to date   Mammogram done annually due to +FH - done last month with ultrasound with low probability finding and will be having biopsy done today , some non-cyclical nipple pain  Colon cancer screening at age 50    Being followed by Lyndon Caballero for left elbow tendonitis.      Anxiety/mood symptoms:  Continues to take Lexapro 10 mg daily.  She denies any side effects from this.  Feels that it continues to work adequately well for her.          No results found for: CHOL  HDL Cholesterol   Date Value Ref Range Status   12/15/2015 45 23 - 92 mg/dL      LDL Cholesterol Calculated   Date Value Ref Range Status   12/15/2015 121 (H) <100 mg/dL      Triglycerides   Date Value Ref Range Status   12/15/2015 82 <150 mg/dL            PROBLEM LIST:  Patient Active Problem List   Diagnosis     Anxiety disorder     Epicondylitis, lateral (tennis elbow)-left       PAST MEDICAL HISTORY:  Past Medical History:   Diagnosis Date     Anxiety disorder 11/08/2017     Bleeding in early pregnancy      Bronchitis 02/14/2012    Acute      Chronic lumbosacral pain 03/03/2015     Dislocation of ulnohumeral joint 1993    left; age 18     Epicondylitis, lateral (tennis elbow)-left 6/11/2018     Family history of malignant neoplasm of breast 07/12/2013     History of dysthymic disorder  2010    Resolved 13     Lesions of both ulnar nerves 2017     Medial epicondylitis of right elbow 2017     Medial epicondylitis, right 2017     Other enthesopathies, not elsewhere classified 2017     Other immediate postpartum hemorrhage 2011     Pregnancy      - PPH, retained placenta, transfusion     Vitamin D deficiency 2015     SURGICAL HISTORY:  Past Surgical History:   Procedure Laterality Date     DILATION AND CURETTAGE  10/2011    for retained placenta     DILATION AND CURETTAGE  2015    for missed AB     DILATION AND CURETTAGE  2017    SAB     EXTRACTION(S) DENTAL      [place] Teeth       SOCIAL HISTORY:  Social History     Socioeconomic History     Marital status:      Spouse name: Zan     Number of children: 1     Years of education: 18+     Highest education level: Professional school degree (e.g., MD, DDS, DVM, TRAM)   Social Needs     Financial resource strain: Not on file     Food insecurity - worry: Not on file     Food insecurity - inability: Not on file     Transportation needs - medical: Not on file     Transportation needs - non-medical: Not on file   Occupational History     Employer: [employer]   Tobacco Use     Smoking status: Never Smoker     Smokeless tobacco: Never Used   Substance and Sexual Activity     Alcohol use: No     Alcohol/week: 0.0 oz     Comment: Alcoholic Drinks/day: 3 per week     Drug use: No     Comment: Drug use: No     Sexual activity: Yes     Partners: Male     Birth control/protection: Rhythm     Comment: Birth control method: NFP   Other Topics Concern     Parent/sibling w/ CABG, MI or angioplasty before 65F 55M? 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[employer];  Zan; Son Morteza 10/2011     FAMILY HISTORY:    Family History   Problem Relation Age of Onset     Breast Cancer Mother 53        Cancer-breast     Diabetes Type 2  Mother         Diabetes type II     Heart Disease  "Father 42        Heart Disease,MI; history of rheumatic fever and asd     Diabetes Type 2  Father         Diabetes type II     Thyroid Disease Sister         Thyroid Disease,hypothyroidism     Depression Brother         Depression       CURRENT MEDICATIONS:   Current Outpatient Medications   Medication Sig Dispense Refill     albuterol (PROAIR RESPICLICK) 108 (90 Base) MCG/ACT AEPB inhaler Inhale 2 puffs into the lungs every 4 hours as needed for shortness of breath / dyspnea or wheezing 1 Inhaler 11     azelastine-fluticasone (DYMISTA) 137-50 MCG/ACT nasal spray Spray 1 spray into both nostrils 2 times daily 1 Bottle 11     Cholecalciferol (D 5000) 5000 UNITS TABS Take by mouth daily       DOCOSAHEXAENOIC ACID PO        escitalopram (LEXAPRO) 10 MG tablet Take 1 tablet (10 mg) by mouth daily 90 tablet 3     fish oil-omega-3 fatty acids 1000 MG capsule Take 1 g by mouth daily        saccharomyces boulardii (FLORASTOR) 250 MG capsule Take 1 capsule by mouth daily       triamcinolone (KENALOG) 0.5 % cream Apply sparingly to affected area three times daily. 15 g 1     acetaminophen (TYLENOL) 500 MG tablet Take 500-1,000 mg by mouth every 6 hours as needed for mild pain       budesonide (PULMICORT) 0.5 MG/2ML neb solution Squirt entire vial into previously made shivam med saline bottle, mix, and irrigate each nostril until entire bottle empty.  Do this twice daily. 3 Box 11     fluticasone (FLONASE) 50 MCG/ACT nasal spray Inhale 1 Spray into both nostrils once daily.  11     ALLERGIES:     Allergies   Allergen Reactions     Cats Other (See Comments)     congestion     Chlorhexidine Itching     No rash.  Also felt \"irritable\" after using wipes.         REVIEW OF SYSTEMS:  General: denies any general problems.  Eyes: denies problems - glasses    Ears/Nose/Throat: see above;, last dentist visit was a couple of months ago  Respiratory: denies problems  Cardiovascular: denies problems  Gastrointestinal: denies " "problems  Genitourinary: 21-25 day cycles, light bleeding, alternating degree of PMS symptoms   Musculoskeletal: right knee osteoarthritis changes with swelling - taking naproxen, Tylenol and icing, had cortisone injection done x1; left elbow tendonitis much better after therapy and injection    Skin: denies problems  Psyciatric - improved    DORIAN-7 SCORE 10/11/2017 6/14/2018 8/3/2018   Total Score 13 14 0       : denies problems  Endocrine: denies problems  Heme/Lymphatic: denies problems  Allergic/Immunologic: allergy testing this past year     PHQ-2 Score:     PHQ-2 ( 1999 Pfizer) 12/12/2018 11/23/2018   Q1: Little interest or pleasure in doing things 0 0   Q2: Feeling down, depressed or hopeless 0 0   PHQ-2 Score 0 0       PHQ-9 SCORE 7/26/2017 10/11/2017 6/14/2018   PHQ-9 Total Score 9 8 9     DORIAN-7 SCORE 10/11/2017 6/14/2018 8/3/2018   Total Score 13 14 0               OBJECTIVE:  /80 (BP Location: Right arm, Patient Position: Sitting, Cuff Size: Adult Regular)   Pulse 72   Ht 1.676 m (5' 6\")   Wt 82.1 kg (181 lb)   LMP 12/07/2018   BMI 29.21 kg/m     Patient's last menstrual period was 12/07/2018.    EXAM:   General Appearance: Pleasant, alert, appropriate appearance for age. No acute distress  Head Exam: Normal. Normocephalic, atraumatic.  Eye Exam:Normal external eye, conjunctiva, lids, cornea. JENIFFER.  Ear Exam: Normal TM's bilaterally. Normal auditory canals and external ears. Non-tender.  Nose Exam: Normal external nose, mucus membranes, and septum.  OroPharynx Exam:  Dental hygiene adequate. Normal buccal mucose. Normal pharynx.  Neck Exam:  Supple, no masses or nodes.  Thyroid Exam: No nodules or enlargement.  Chest/Respiratory Exam: Normal chest wall andrespirations. Clear to auscultation.  Breast Exam: No dimpling, nipple retraction or discharge. On right breast she has more prominent glands.  No masses or nodes.  Cardiovascular Exam: Regular rate and rhythm. S1, S2, no murmur, click, gallop, " or rubs.  Gastrointestinal Exam: Soft, non-tender, no masses or organomegaly  Genitourinary Exam Female: External genitalia, vulva and vagina appear normal. Bimanual exam reveals normal uterus and adnexa, nontender urethra and bladder. Pap smear and HPV testing done.    Lymphatic Exam: Non-palpable nodes in neck, clavicular, axillary, or inguinal regions.  Musculoskeletal Exam: left elbow tenderness  Foot Exam: Left andright foot: good pedal pulses, no lesions, nail hygiene good.  Skin: no rash or abnormalities  Neurologic Exam: Nonfocal, symmetric DTRs, normal gross motor, tone coordination and no tremor.  Psychiatric Exam: Alertand oriented - appropriate affect.    HCG negative         ICD-10-CM    1. Physical exam, annual Z00.00 HPV High Risk Types DNA Cervical     Pap Screen Thin Prep with HPV - recommended age 30 - 65 years (select HPV order below)   2. Chronic maxillary sinusitis J32.0    3. Pre-op exam Z01.818 HCG qualitative urine   4. Preop general physical exam Z01.818    5. Vitamin D insufficiency E55.9 Vitamin D Total     Vitamin D Total   6. Hypervitaminosis D E67.3 Calcium     Calcium     1.  Patient is cleared for upcoming nasal/sinus surgery.  2.  Urine hCG is obtained today, is negative.  3.  Pap smear with HPV testing obtained.  4.  Patient will be undergoing breast biopsy later today.  5.  Vitamin D level was obtained, elevated, calcium level is normal.  Will have patient decrease her vitamin D intake by 50%.      Serene Perez MD

## 2018-12-13 ENCOUNTER — OFFICE VISIT (OUTPATIENT)
Dept: FAMILY MEDICINE | Facility: OTHER | Age: 43
End: 2018-12-13
Attending: FAMILY MEDICINE
Payer: OTHER MISCELLANEOUS

## 2018-12-13 ENCOUNTER — TRANSFERRED RECORDS (OUTPATIENT)
Dept: HEALTH INFORMATION MANAGEMENT | Facility: OTHER | Age: 43
End: 2018-12-13

## 2018-12-13 VITALS
SYSTOLIC BLOOD PRESSURE: 120 MMHG | WEIGHT: 181 LBS | HEART RATE: 72 BPM | DIASTOLIC BLOOD PRESSURE: 78 MMHG | BODY MASS INDEX: 29.09 KG/M2 | RESPIRATION RATE: 16 BRPM | HEIGHT: 66 IN

## 2018-12-13 DIAGNOSIS — S59.902D INJURY OF LEFT ELBOW, SUBSEQUENT ENCOUNTER: Primary | ICD-10-CM

## 2018-12-13 DIAGNOSIS — M77.12 LATERAL EPICONDYLITIS OF LEFT ELBOW: ICD-10-CM

## 2018-12-13 PROCEDURE — 99213 OFFICE O/P EST LOW 20 MIN: CPT | Performed by: CHIROPRACTOR

## 2018-12-13 ASSESSMENT — MIFFLIN-ST. JEOR: SCORE: 1502.76

## 2018-12-13 ASSESSMENT — PAIN SCALES - GENERAL: PAINLEVEL: NO PAIN (0)

## 2018-12-13 NOTE — PROGRESS NOTES
"CHIEF COMPLAINT: Gauri Hollis is a 43 year old  female  Chief Complaint   Patient presents with     Work Comp     work comp follow up       HISTORY OF PRESENTING INJURY     Onset and description: Patient reports no pain occurring in the left elbow or left shoulder today.  She continues to work in a modified schedule and indicates she is able to keep this schedule without work restrictions in the future.  Her last 2 physical therapy sessions as well as chiropractic sessions demonstrated marked improvement with little inflammation and/or impairment.  Patient reports no significant difficulties with ADLs and/or work duties.      PAST MEDICAL HISTORY:  Past Medical History:   Diagnosis Date     Anxiety disorder 2017     Bleeding in early pregnancy      Bronchitis 2012    Acute      Chronic lumbosacral pain 2015     Dislocation of ulnohumeral joint 1993    left; age 18     Epicondylitis, lateral (tennis elbow)-left 2018     Family history of malignant neoplasm of breast 2013     History of dysthymic disorder 2010    Resolved 13     Lesions of both ulnar nerves 2017     Medial epicondylitis of right elbow 2017     Medial epicondylitis, right 2017     Other enthesopathies, not elsewhere classified 2017     Other immediate postpartum hemorrhage 2011     Pregnancy      - PPH, retained placenta, transfusion     Vitamin D deficiency 2015       PAST SURGICAL HISTORY:  Past Surgical History:   Procedure Laterality Date     DILATION AND CURETTAGE  10/2011    for retained placenta     DILATION AND CURETTAGE  2015    for missed AB     DILATION AND CURETTAGE  2017    SAB     EXTRACTION(S) DENTAL      [place] Teeth       ALLERGIES:  Allergies   Allergen Reactions     Cats Other (See Comments)     congestion     Chlorhexidine Itching     No rash.  Also felt \"irritable\" after using wipes.       CURRENT MEDICATIONS:  Current Outpatient " Medications   Medication Sig Dispense Refill     acetaminophen (TYLENOL) 500 MG tablet Take 500-1,000 mg by mouth every 6 hours as needed for mild pain       albuterol (PROAIR RESPICLICK) 108 (90 Base) MCG/ACT AEPB inhaler Inhale 2 puffs into the lungs every 4 hours as needed for shortness of breath / dyspnea or wheezing 1 Inhaler 11     azelastine-fluticasone (DYMISTA) 137-50 MCG/ACT nasal spray Spray 1 spray into both nostrils 2 times daily 1 Bottle 11     budesonide (PULMICORT) 0.5 MG/2ML neb solution Squirt entire vial into previously made shivam med saline bottle, mix, and irrigate each nostril until entire bottle empty.  Do this twice daily. 3 Box 11     Cholecalciferol (D 5000) 5000 UNITS TABS Take by mouth daily       DOCOSAHEXAENOIC ACID PO        escitalopram (LEXAPRO) 10 MG tablet Take 1 tablet (10 mg) by mouth daily 90 tablet 3     fish oil-omega-3 fatty acids 1000 MG capsule Take 1 g by mouth daily        fluticasone (FLONASE) 50 MCG/ACT nasal spray Inhale 1 Spray into both nostrils once daily.  11     saccharomyces boulardii (FLORASTOR) 250 MG capsule Take 1 capsule by mouth daily       triamcinolone (KENALOG) 0.5 % cream Apply sparingly to affected area three times daily. 15 g 1       SOCIAL HISTORY:  Social History     Socioeconomic History     Marital status:      Spouse name: Zan     Number of children: Not on file     Years of education: 18+     Highest education level: Not on file   Social Needs     Financial resource strain: Not on file     Food insecurity - worry: Not on file     Food insecurity - inability: Not on file     Transportation needs - medical: Not on file     Transportation needs - non-medical: Not on file   Occupational History     Not on file   Tobacco Use     Smoking status: Never Smoker     Smokeless tobacco: Never Used   Substance and Sexual Activity     Alcohol use: No     Alcohol/week: 0.0 oz     Comment: Alcoholic Drinks/day: 3 per week     Drug use: No     Comment:  "Drug use: No     Sexual activity: Yes     Partners: Male     Birth control/protection: Rhythm     Comment: Birth control method: NFP   Other Topics Concern     Parent/sibling w/ CABG, MI or angioplasty before 65F 55M? 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       FAMILY HISTORY:  Family History   Problem Relation Age of Onset     Breast Cancer Mother 53        Cancer-breast     Diabetes Type 2  Mother         Diabetes type II     Heart Disease Father 42        Heart Disease,MI; history of rheumatic fever and asd     Diabetes Type 2  Father         Diabetes type II     Thyroid Disease Sister         Thyroid Disease,hypothyroidism     Depression Brother         Depression       REVIEW OF SYSTEMS:  Unchanged from October 17, 2018 visit    PHYSICAL EXAM:   /78 (BP Location: Right arm, Patient Position: Sitting, Cuff Size: Adult Regular)   Pulse 72   Resp 16   Ht 1.676 m (5' 6\")   Wt 82.1 kg (181 lb)   LMP 12/07/2018   Breastfeeding? No   BMI 29.21 kg/m   Body mass index is 29.21 kg/m . General Appearance: No acute distress.  She has normal range of motion of the left shoulder in all planes.  Her left elbow also demonstrates normal range of motion, with increased extension due to ligament laxity.  There is skin wasting on the lateral epicondyle due to recent injection.  Patient is able to pronate and supinate to full motion.  Strength is graded 5 out of 5 in the left upper extremity.  Normal scapular winging and no spasm is noted in the upper thoracic spine.    IMPRESSION:    At [facility] for left elbow lateral epicondylitis and left shoulder pain    PLAN:    Patient is at [facility] for lateral epicondylitis of the left elbow as well as left shoulder pain.  Due to the chronicity of her condition, patient may require future injection to the lateral epicondylitis.  I am authorizing this to be covered under Worker's Compensation should it be needed.  Otherwise, patient is " discharged with no restrictions and no further treatment is warranted at this time.  Workability form was completed to indicate this.  Patient had no further questions posttreatment.        Lyndon Caballero DC  Director - Occupational Medicine Department  [address] [address]  Phone [phone]  Fax [phone]    Disclaimer:  This note consists of words and symbols derived from keyboarding, dictation, or using voice recognition software. As a result, there may be errors in the script that have gone undetected. Please consider this when interpreting information found in this note.    8:56 AM 12/13/2018

## 2018-12-13 NOTE — NURSING NOTE
Gauri Hollis is a 43 year old female presenting for a work comp follow up. DATE OF INJURY: 5/2018.  Patient's last menstrual period was 12/07/2018.  Medication Reconciliation: complete    Swati Ball LPN  12/13/2018 8:14 AM

## 2018-12-14 LAB
FINAL DIAGNOSIS: NORMAL
HPV HR 12 DNA CVX QL NAA+PROBE: NEGATIVE
HPV16 DNA SPEC QL NAA+PROBE: NEGATIVE
HPV18 DNA SPEC QL NAA+PROBE: NEGATIVE
SPECIMEN DESCRIPTION: NORMAL
SPECIMEN SOURCE CVX/VAG CYTO: NORMAL

## 2018-12-18 ENCOUNTER — ANESTHESIA EVENT (OUTPATIENT)
Dept: SURGERY | Facility: HOSPITAL | Age: 43
End: 2018-12-18
Payer: COMMERCIAL

## 2018-12-18 NOTE — ANESTHESIA PREPROCEDURE EVALUATION
Anesthesia Pre-Procedure Evaluation    Patient: Gauri Hollis   MRN: [id number] : 1975          Preoperative Diagnosis: CHRONIC PANSINUSITIS, NASAL TURBINATE HYPERTROPHY, DYSFUNCTION BOTH EUSTACHIAN TUBES, PRIMARY SNORING, CHRONIC RHINITIS    Procedure(s):  ENDOSCOPIC SINUS SURGERY  TURBINATE REDUCTION    Past Medical History:   Diagnosis Date     Anxiety disorder 2017     Bleeding in early pregnancy      Bronchitis 2012    Acute      Chronic lumbosacral pain 2015     Dislocation of ulnohumeral joint 1993    left; age 18     Epicondylitis, lateral (tennis elbow)-left 2018     Family history of malignant neoplasm of breast 2013     History of dysthymic disorder 2010    Resolved 13     Lesions of both ulnar nerves 2017     Medial epicondylitis of right elbow 2017     Medial epicondylitis, right 2017     Other enthesopathies, not elsewhere classified 2017     Other immediate postpartum hemorrhage 2011     Pregnancy      - PPH, retained placenta, transfusion     Vitamin D deficiency 2015     Past Surgical History:   Procedure Laterality Date     DILATION AND CURETTAGE  10/2011    for retained placenta     DILATION AND CURETTAGE  2015    for missed AB     DILATION AND CURETTAGE  2017    SAB     EXTRACTION(S) DENTAL      [place] Teeth       Anesthesia Evaluation     . Pt has had prior anesthetic.     No history of anesthetic complications          ROS/MED HX    ENT/Pulmonary:  - neg pulmonary ROS     Neurologic:     (+)migraines,     Cardiovascular:  - neg cardiovascular ROS       METS/Exercise Tolerance:     Hematologic:  - neg hematologic  ROS       Musculoskeletal:  - neg musculoskeletal ROS       GI/Hepatic:  - neg GI/hepatic ROS       Renal/Genitourinary:  - ROS Renal section negative       Endo:     (+) Obesity, .      Psychiatric:     (+) psychiatric history anxiety      Infectious Disease:  - neg infectious disease  "ROS       Malignancy:      - no malignancy   Other:    (+) No chance of pregnancy   - neg other ROS                      Physical Exam  Normal systems: cardiovascular, pulmonary and dental    Airway   Mallampati: II  TM distance: >3 FB  Neck ROM: full    Dental     Cardiovascular   Rhythm and rate: regular and normal      Pulmonary    breath sounds clear to auscultation            Lab Results   Component Value Date    WBC 7.0 08/03/2018    HGB 13.0 08/03/2018    HCT 39.0 08/03/2018     08/03/2018    CRP 0.0 06/14/2018    SED 7 06/14/2018     12/15/2015    POTASSIUM 4.0 12/15/2015    CHLORIDE 107 12/15/2015    CO2 28 12/15/2015    BUN 17 12/15/2015    CR 0.85 12/15/2015    GLC 94 12/15/2015    CATERINA 9.5 12/12/2018    ALBUMIN 4.3 12/15/2015    PROTTOTAL 6.9 12/15/2015    ALKPHOS 86 12/15/2015    BILITOTAL 0.4 12/15/2015    T4 0.90 10/11/2017    HCG Negative 12/12/2018       Preop Vitals  BP Readings from Last 3 Encounters:   12/13/18 120/78   12/12/18 121/88   12/12/18 112/80    Pulse Readings from Last 3 Encounters:   12/13/18 72   12/12/18 60   12/12/18 72      Resp Readings from Last 3 Encounters:   12/13/18 16   10/25/17 16   07/26/17 18    SpO2 Readings from Last 3 Encounters:   12/12/18 98%      Temp Readings from Last 1 Encounters:   12/12/18 98.7  F (37.1  C) (Tympanic)    Ht Readings from Last 1 Encounters:   12/13/18 1.676 m (5' 6\")      Wt Readings from Last 1 Encounters:   12/13/18 82.1 kg (181 lb)    Estimated body mass index is 29.21 kg/m  as calculated from the following:    Height as of 12/13/18: 1.676 m (5' 6\").    Weight as of 12/13/18: 82.1 kg (181 lb).       Anesthesia Plan      History & Physical Review  History and physical reviewed and following examination; no interval change. History and physical reviewed and following examination, relevant changes include:    ASA Status:  2 .    NPO Status:  > 8 hours    Plan for General and ETT with Intravenous and Propofol induction. Maintenance " will be Balanced.    PONV prophylaxis:  Ondansetron (or other 5HT-3), Dexamethasone or Solumedrol and Scopolamine patch       Postoperative Care  Postoperative pain management:  IV analgesics and Oral pain medications.      Consents  Anesthetic plan, risks, benefits and alternatives discussed with:  Patient..                 OLVIN Pizano CRNA

## 2018-12-19 ENCOUNTER — HOSPITAL ENCOUNTER (OUTPATIENT)
Facility: HOSPITAL | Age: 43
Discharge: HOME | End: 2018-12-19
Attending: OTOLARYNGOLOGY | Admitting: OTOLARYNGOLOGY
Payer: COMMERCIAL

## 2018-12-19 ENCOUNTER — ANESTHESIA (OUTPATIENT)
Dept: SURGERY | Facility: HOSPITAL | Age: 43
End: 2018-12-19
Payer: COMMERCIAL

## 2018-12-19 ENCOUNTER — APPOINTMENT (OUTPATIENT)
Dept: LAB | Facility: HOSPITAL | Age: 43
End: 2018-12-19
Attending: OTOLARYNGOLOGY
Payer: COMMERCIAL

## 2018-12-19 VITALS
OXYGEN SATURATION: 95 % | DIASTOLIC BLOOD PRESSURE: 85 MMHG | SYSTOLIC BLOOD PRESSURE: 118 MMHG | BODY MASS INDEX: 28.61 KG/M2 | RESPIRATION RATE: 14 BRPM | TEMPERATURE: 97.1 F | WEIGHT: 178 LBS | HEIGHT: 66 IN

## 2018-12-19 DIAGNOSIS — Z98.890 POST-OPERATIVE STATE: Primary | ICD-10-CM

## 2018-12-19 PROCEDURE — 25000125 ZZHC RX 250: Performed by: OTOLARYNGOLOGY

## 2018-12-19 PROCEDURE — 25000128 H RX IP 250 OP 636: Performed by: NURSE ANESTHETIST, CERTIFIED REGISTERED

## 2018-12-19 PROCEDURE — 36000056 ZZH SURGERY LEVEL 3 1ST 30 MIN: Performed by: OTOLARYNGOLOGY

## 2018-12-19 PROCEDURE — 25000132 ZZH RX MED GY IP 250 OP 250 PS 637

## 2018-12-19 PROCEDURE — 31255 NSL/SINS NDSC W/TOT ETHMDCT: CPT | Performed by: ANESTHESIOLOGY

## 2018-12-19 PROCEDURE — 27110028 ZZH OR GENERAL SUPPLY NON-STERILE: Performed by: OTOLARYNGOLOGY

## 2018-12-19 PROCEDURE — 88304 TISSUE EXAM BY PATHOLOGIST: CPT | Mod: TC | Performed by: OTOLARYNGOLOGY

## 2018-12-19 PROCEDURE — 30930 THER FX NASAL INF TURBINATE: CPT | Performed by: OTOLARYNGOLOGY

## 2018-12-19 PROCEDURE — 01999 UNLISTED ANES PROCEDURE: CPT | Performed by: NURSE ANESTHETIST, CERTIFIED REGISTERED

## 2018-12-19 PROCEDURE — 25000128 H RX IP 250 OP 636: Performed by: ANESTHESIOLOGY

## 2018-12-19 PROCEDURE — 27210794 ZZH OR GENERAL SUPPLY STERILE: Performed by: OTOLARYNGOLOGY

## 2018-12-19 PROCEDURE — 37000008 ZZH ANESTHESIA TECHNICAL FEE, 1ST 30 MIN: Performed by: OTOLARYNGOLOGY

## 2018-12-19 PROCEDURE — 37000009 ZZH ANESTHESIA TECHNICAL FEE, EACH ADDTL 15 MIN: Performed by: OTOLARYNGOLOGY

## 2018-12-19 PROCEDURE — 71000027 ZZH RECOVERY PHASE 2 EACH 15 MINS: Performed by: OTOLARYNGOLOGY

## 2018-12-19 PROCEDURE — 31267 ENDOSCOPY MAXILLARY SINUS: CPT | Mod: 50 | Performed by: OTOLARYNGOLOGY

## 2018-12-19 PROCEDURE — 25000125 ZZHC RX 250

## 2018-12-19 PROCEDURE — 25000125 ZZHC RX 250: Performed by: ANESTHESIOLOGY

## 2018-12-19 PROCEDURE — C1726 CATH, BAL DIL, NON-VASCULAR: HCPCS | Performed by: OTOLARYNGOLOGY

## 2018-12-19 PROCEDURE — 40000305 ZZH STATISTIC PRE PROC ASSESS I: Performed by: OTOLARYNGOLOGY

## 2018-12-19 PROCEDURE — 36000058 ZZH SURGERY LEVEL 3 EA 15 ADDTL MIN: Performed by: OTOLARYNGOLOGY

## 2018-12-19 PROCEDURE — 71000014 ZZH RECOVERY PHASE 1 LEVEL 2 FIRST HR: Performed by: OTOLARYNGOLOGY

## 2018-12-19 PROCEDURE — S1090 MOMETASONE SINUS IMPLANT: HCPCS | Performed by: OTOLARYNGOLOGY

## 2018-12-19 PROCEDURE — 25000125 ZZHC RX 250: Performed by: NURSE ANESTHETIST, CERTIFIED REGISTERED

## 2018-12-19 PROCEDURE — 31253 NSL/SINS NDSC TOTAL: CPT | Mod: 50 | Performed by: OTOLARYNGOLOGY

## 2018-12-19 PROCEDURE — 25000128 H RX IP 250 OP 636: Performed by: OTOLARYNGOLOGY

## 2018-12-19 PROCEDURE — 25000566 ZZH SEVOFLURANE, EA 15 MIN: Performed by: ANESTHESIOLOGY

## 2018-12-19 DEVICE — PROPEL-CONTOUR DISSOLVABLE: Type: IMPLANTABLE DEVICE | Site: SINUS | Status: FUNCTIONAL

## 2018-12-19 RX ORDER — FENTANYL CITRATE 50 UG/ML
25-50 INJECTION, SOLUTION INTRAMUSCULAR; INTRAVENOUS
Status: DISCONTINUED | OUTPATIENT
Start: 2018-12-19 | End: 2018-12-19 | Stop reason: HOSPADM

## 2018-12-19 RX ORDER — FENTANYL CITRATE 50 UG/ML
25-50 INJECTION, SOLUTION INTRAMUSCULAR; INTRAVENOUS
Status: DISCONTINUED | OUTPATIENT
Start: 2018-12-19 | End: 2018-12-19

## 2018-12-19 RX ORDER — LABETALOL HYDROCHLORIDE 5 MG/ML
10 INJECTION, SOLUTION INTRAVENOUS
Status: DISCONTINUED | OUTPATIENT
Start: 2018-12-19 | End: 2018-12-19 | Stop reason: HOSPADM

## 2018-12-19 RX ORDER — NALOXONE HYDROCHLORIDE 0.4 MG/ML
.1-.4 INJECTION, SOLUTION INTRAMUSCULAR; INTRAVENOUS; SUBCUTANEOUS
Status: DISCONTINUED | OUTPATIENT
Start: 2018-12-19 | End: 2018-12-19 | Stop reason: HOSPADM

## 2018-12-19 RX ORDER — HYDROCODONE BITARTRATE AND ACETAMINOPHEN 7.5; 325 MG/1; MG/1
TABLET ORAL
Status: COMPLETED
Start: 2018-12-19 | End: 2018-12-19

## 2018-12-19 RX ORDER — HYDROMORPHONE HYDROCHLORIDE 1 MG/ML
.3-.5 INJECTION, SOLUTION INTRAMUSCULAR; INTRAVENOUS; SUBCUTANEOUS EVERY 10 MIN PRN
Status: DISCONTINUED | OUTPATIENT
Start: 2018-12-19 | End: 2018-12-19 | Stop reason: HOSPADM

## 2018-12-19 RX ORDER — PROPOFOL 10 MG/ML
INJECTION, EMULSION INTRAVENOUS PRN
Status: DISCONTINUED | OUTPATIENT
Start: 2018-12-19 | End: 2018-12-19

## 2018-12-19 RX ORDER — MEPERIDINE HYDROCHLORIDE 50 MG/ML
12.5 INJECTION INTRAMUSCULAR; INTRAVENOUS; SUBCUTANEOUS
Status: DISCONTINUED | OUTPATIENT
Start: 2018-12-19 | End: 2018-12-19 | Stop reason: HOSPADM

## 2018-12-19 RX ORDER — FENTANYL CITRATE 50 UG/ML
INJECTION, SOLUTION INTRAMUSCULAR; INTRAVENOUS PRN
Status: DISCONTINUED | OUTPATIENT
Start: 2018-12-19 | End: 2018-12-19

## 2018-12-19 RX ORDER — ONDANSETRON 4 MG/1
4 TABLET, ORALLY DISINTEGRATING ORAL EVERY 30 MIN PRN
Status: DISCONTINUED | OUTPATIENT
Start: 2018-12-19 | End: 2018-12-19 | Stop reason: HOSPADM

## 2018-12-19 RX ORDER — LIDOCAINE 40 MG/G
CREAM TOPICAL
Status: DISCONTINUED | OUTPATIENT
Start: 2018-12-19 | End: 2018-12-19 | Stop reason: HOSPADM

## 2018-12-19 RX ORDER — OXYMETAZOLINE HYDROCHLORIDE 0.05 G/100ML
SPRAY NASAL
Status: COMPLETED
Start: 2018-12-19 | End: 2018-12-19

## 2018-12-19 RX ORDER — ONDANSETRON 2 MG/ML
INJECTION INTRAMUSCULAR; INTRAVENOUS PRN
Status: DISCONTINUED | OUTPATIENT
Start: 2018-12-19 | End: 2018-12-19

## 2018-12-19 RX ORDER — LIDOCAINE HYDROCHLORIDE AND EPINEPHRINE 10; 10 MG/ML; UG/ML
INJECTION, SOLUTION INFILTRATION; PERINEURAL PRN
Status: DISCONTINUED | OUTPATIENT
Start: 2018-12-19 | End: 2018-12-19 | Stop reason: HOSPADM

## 2018-12-19 RX ORDER — LIDOCAINE HYDROCHLORIDE 20 MG/ML
INJECTION, SOLUTION INFILTRATION; PERINEURAL PRN
Status: DISCONTINUED | OUTPATIENT
Start: 2018-12-19 | End: 2018-12-19

## 2018-12-19 RX ORDER — PREDNISONE 20 MG/1
TABLET ORAL
Qty: 6 TABLET | Refills: 1 | Status: SHIPPED | OUTPATIENT
Start: 2018-12-19 | End: 2019-05-24

## 2018-12-19 RX ORDER — NALOXONE HYDROCHLORIDE 0.4 MG/ML
.1-.4 INJECTION, SOLUTION INTRAMUSCULAR; INTRAVENOUS; SUBCUTANEOUS
Status: DISCONTINUED | OUTPATIENT
Start: 2018-12-19 | End: 2018-12-19

## 2018-12-19 RX ORDER — HYDRALAZINE HYDROCHLORIDE 20 MG/ML
2.5-5 INJECTION INTRAMUSCULAR; INTRAVENOUS EVERY 10 MIN PRN
Status: DISCONTINUED | OUTPATIENT
Start: 2018-12-19 | End: 2018-12-19 | Stop reason: HOSPADM

## 2018-12-19 RX ORDER — SODIUM CHLORIDE, SODIUM LACTATE, POTASSIUM CHLORIDE, CALCIUM CHLORIDE 600; 310; 30; 20 MG/100ML; MG/100ML; MG/100ML; MG/100ML
INJECTION, SOLUTION INTRAVENOUS CONTINUOUS
Status: DISCONTINUED | OUTPATIENT
Start: 2018-12-19 | End: 2018-12-19 | Stop reason: HOSPADM

## 2018-12-19 RX ORDER — ONDANSETRON 2 MG/ML
4 INJECTION INTRAMUSCULAR; INTRAVENOUS EVERY 30 MIN PRN
Status: DISCONTINUED | OUTPATIENT
Start: 2018-12-19 | End: 2018-12-19 | Stop reason: HOSPADM

## 2018-12-19 RX ORDER — OXYMETAZOLINE HYDROCHLORIDE 0.05 G/100ML
3 SPRAY NASAL
Status: COMPLETED | OUTPATIENT
Start: 2018-12-19 | End: 2018-12-19

## 2018-12-19 RX ORDER — FENTANYL CITRATE 50 UG/ML
INJECTION, SOLUTION INTRAMUSCULAR; INTRAVENOUS
Status: DISCONTINUED
Start: 2018-12-19 | End: 2018-12-19 | Stop reason: HOSPADM

## 2018-12-19 RX ORDER — DEXAMETHASONE SODIUM PHOSPHATE 10 MG/ML
INJECTION, SOLUTION INTRAMUSCULAR; INTRAVENOUS PRN
Status: DISCONTINUED | OUTPATIENT
Start: 2018-12-19 | End: 2018-12-19

## 2018-12-19 RX ORDER — OXYCODONE HYDROCHLORIDE 5 MG/1
5 TABLET ORAL EVERY 4 HOURS PRN
Status: DISCONTINUED | OUTPATIENT
Start: 2018-12-19 | End: 2018-12-19 | Stop reason: HOSPADM

## 2018-12-19 RX ORDER — HYDROCODONE BITARTRATE AND ACETAMINOPHEN 7.5; 325 MG/1; MG/1
1 TABLET ORAL EVERY 6 HOURS PRN
Qty: 10 TABLET | Refills: 0 | Status: SHIPPED | OUTPATIENT
Start: 2018-12-19 | End: 2019-05-24

## 2018-12-19 RX ORDER — CEFDINIR 300 MG/1
300 CAPSULE ORAL 2 TIMES DAILY
Qty: 14 CAPSULE | Refills: 0 | Status: SHIPPED | OUTPATIENT
Start: 2018-12-19 | End: 2019-05-24

## 2018-12-19 RX ORDER — ACETAMINOPHEN 325 MG/1
975 TABLET ORAL
Status: DISCONTINUED | OUTPATIENT
Start: 2018-12-19 | End: 2018-12-19 | Stop reason: HOSPADM

## 2018-12-19 RX ORDER — ALBUTEROL SULFATE 0.83 MG/ML
2.5 SOLUTION RESPIRATORY (INHALATION) EVERY 4 HOURS PRN
Status: DISCONTINUED | OUTPATIENT
Start: 2018-12-19 | End: 2018-12-19 | Stop reason: HOSPADM

## 2018-12-19 RX ORDER — ONDANSETRON 4 MG/1
4 TABLET, ORALLY DISINTEGRATING ORAL EVERY 30 MIN PRN
Status: DISCONTINUED | OUTPATIENT
Start: 2018-12-19 | End: 2018-12-19

## 2018-12-19 RX ORDER — DEXAMETHASONE SODIUM PHOSPHATE 4 MG/ML
4 INJECTION, SOLUTION INTRA-ARTICULAR; INTRALESIONAL; INTRAMUSCULAR; INTRAVENOUS; SOFT TISSUE EVERY 10 MIN PRN
Status: DISCONTINUED | OUTPATIENT
Start: 2018-12-19 | End: 2018-12-19 | Stop reason: HOSPADM

## 2018-12-19 RX ORDER — ALBUTEROL SULFATE 0.83 MG/ML
2.5 SOLUTION RESPIRATORY (INHALATION) EVERY 4 HOURS PRN
Status: DISCONTINUED | OUTPATIENT
Start: 2018-12-19 | End: 2018-12-19

## 2018-12-19 RX ORDER — CEFAZOLIN SODIUM 1 G/3ML
INJECTION, POWDER, FOR SOLUTION INTRAMUSCULAR; INTRAVENOUS
Status: DISCONTINUED
Start: 2018-12-19 | End: 2018-12-19 | Stop reason: HOSPADM

## 2018-12-19 RX ORDER — SCOLOPAMINE TRANSDERMAL SYSTEM 1 MG/1
1 PATCH, EXTENDED RELEASE TRANSDERMAL ONCE
Status: COMPLETED | OUTPATIENT
Start: 2018-12-19 | End: 2018-12-19

## 2018-12-19 RX ORDER — ONDANSETRON 2 MG/ML
4 INJECTION INTRAMUSCULAR; INTRAVENOUS EVERY 30 MIN PRN
Status: DISCONTINUED | OUTPATIENT
Start: 2018-12-19 | End: 2018-12-19

## 2018-12-19 RX ADMIN — FENTANYL CITRATE 50 MCG: 50 INJECTION, SOLUTION INTRAMUSCULAR; INTRAVENOUS at 13:01

## 2018-12-19 RX ADMIN — HYDROCODONE BITARTRATE AND ACETAMINOPHEN 1 TABLET: 7.5; 325 TABLET ORAL at 12:52

## 2018-12-19 RX ADMIN — OXYMETAZOLINE HYDROCHLORIDE 3 SPRAY: 5 SPRAY NASAL at 09:39

## 2018-12-19 RX ADMIN — ONDANSETRON 4 MG: 2 INJECTION INTRAMUSCULAR; INTRAVENOUS at 11:30

## 2018-12-19 RX ADMIN — FENTANYL CITRATE 50 MCG: 50 INJECTION, SOLUTION INTRAMUSCULAR; INTRAVENOUS at 13:13

## 2018-12-19 RX ADMIN — SCOPALAMINE 1 PATCH: 1 PATCH, EXTENDED RELEASE TRANSDERMAL at 10:29

## 2018-12-19 RX ADMIN — LIDOCAINE HYDROCHLORIDE 80 MG: 20 INJECTION, SOLUTION INFILTRATION; PERINEURAL at 10:17

## 2018-12-19 RX ADMIN — SODIUM CHLORIDE, POTASSIUM CHLORIDE, SODIUM LACTATE AND CALCIUM CHLORIDE: 600; 310; 30; 20 INJECTION, SOLUTION INTRAVENOUS at 10:55

## 2018-12-19 RX ADMIN — OXYMETAZOLINE HYDROCHLORIDE 3 SPRAY: 0.05 SPRAY NASAL at 09:49

## 2018-12-19 RX ADMIN — FENTANYL CITRATE 50 MCG: 50 INJECTION, SOLUTION INTRAMUSCULAR; INTRAVENOUS at 10:16

## 2018-12-19 RX ADMIN — PROPOFOL 150 MG: 10 INJECTION, EMULSION INTRAVENOUS at 10:17

## 2018-12-19 RX ADMIN — DEXAMETHASONE SODIUM PHOSPHATE 2 MG: 10 INJECTION, SOLUTION INTRAMUSCULAR; INTRAVENOUS at 10:41

## 2018-12-19 RX ADMIN — Medication 100 MG: at 10:17

## 2018-12-19 RX ADMIN — OXYMETAZOLINE HYDROCHLORIDE 3 SPRAY: 0.05 SPRAY NASAL at 09:59

## 2018-12-19 RX ADMIN — OXYMETAZOLINE HYDROCHLORIDE 3 SPRAY: 0.05 SPRAY NASAL at 09:39

## 2018-12-19 RX ADMIN — DEXAMETHASONE SODIUM PHOSPHATE 10 MG: 10 INJECTION, SOLUTION INTRAMUSCULAR; INTRAVENOUS at 10:26

## 2018-12-19 RX ADMIN — SODIUM CHLORIDE, POTASSIUM CHLORIDE, SODIUM LACTATE AND CALCIUM CHLORIDE: 600; 310; 30; 20 INJECTION, SOLUTION INTRAVENOUS at 09:58

## 2018-12-19 ASSESSMENT — MIFFLIN-ST. JEOR: SCORE: 1489.15

## 2018-12-19 NOTE — ANESTHESIA CARE TRANSFER NOTE
Patient: Gauri Hollis    Procedure(s):  ENDOSCOPIC SINUS SURGERY  TURBINATE REDUCTION    Diagnosis: CHRONIC PANSINUSITIS, NASAL TURBINATE HYPERTROPHY, DYSFUNCTION BOTH EUSTACHIAN TUBES, PRIMARY SNORING, CHRONIC RHINITIS  Diagnosis Additional Information: No value filed.    Anesthesia Type:   General, ETT     Note:  Airway :Blow-by  Patient transferred to:PACU  Handoff Report: Identifed the Patient, Identified the Reponsible Provider, Reviewed the pertinent medical history, Discussed the surgical course, Reviewed Intra-OP anesthesia mangement and issues during anesthesia, Set expectations for post-procedure period and Allowed opportunity for questions and acknowledgement of understanding      Vitals: (Last set prior to Anesthesia Care Transfer)    CRNA VITALS  12/19/2018 1113 - 12/19/2018 1148      12/19/2018             Resp Rate (set):  8                Electronically Signed By: OLVIN Logan CRNA  December 19, 2018  11:48 AM

## 2018-12-19 NOTE — OR NURSING
Patient and responsible adult given discharge instructions with no questions regarding instructions. Meaghan score 20. Pain level 3/10.  Discharged from unit via w/c. Patient discharged to home.

## 2018-12-19 NOTE — DISCHARGE INSTRUCTIONS
Instructions for Sinus Surgery    Recovery - Everyone recovers differently from a general anesthetic.  Symptoms such as fatigue, nausea, light-headedness, and sometimes a low grade fever (up to 100 degrees) are not unusual.  As your body removes the anesthetic drugs from circulation, these symptoms will resolve.  Your nose will be sore after surgery, and you may even have symptoms similar to a sinus infection with headache, congestion, and pressure.  These will resolve with healing.  For several days you may experience bloody drainage from the nose, please use the drip pad as necessary for this.  If there is persistent bleeding, please call the office during business hours or the on call ENT physician after hours.  There are no diet restrictions after sinus surgery, and you can resume your home medications.      Please do not blow your nose until two weeks after surgery.  At 2 weeks you may gently blow your nose, unless otherwise indicated by Dr. Robins.     Limit your activity to no strenuous activities until I see you for the first follow-up visit in approximately 2 weeks.      Medications - You were sent home with narcotic pain medication.  If you can tolerate the discomfort during your recovery by using just plain Tylenol or ibuprofen (advil), please do so.  However, do not hesitate to use the stronger pain medication if needed.  If you were sent home with an antibiotic, it is primarily used to help the healing process.  If it causes loose bowel movements or other signs of intolerance, it is appropriate to discontinue it.  By far the most important measure you can take to speed recovery, and maximize the chances of long term success of sinus surgery is using the sinus rinses at least three time per day for the first month after surgery.       Start Shivam Med saline irrigation tomorrow and use at least 5 times daily.     I recommend 2 shivam med bottles, one to add the budesonide to and irrigate with the  budesonide rinse twice a day for 2 months.    In the other shivam med bottle use only the saline solution, and irrigate with this at least 3 additional times daily.    Perform gentle irrigation for the first week.  Starting 1 week after surgery, you should increase the volume of shivam med saline irrigation to each nostril, continuing to use the rinses in an alternating fashion at least 5 times daily.  You cannot use too much of the shivam med saline, but please limit budesonide rinses to twice daily.    At 2 weeks after surgery, you may also restart nasal steroids (flonase, nasonex, etc).        Complications - Problems related to sinus surgery almost always are detected during the operation, and special instruction will be given in that situation.  However, unexpected things can happen, and are all related to the structures around the sinus cavities.  Symptoms that should alert you to a possible problem include: severe eye pain or eye swelling, persistent heavy bleeding from the nose, and high fevers with headache and neck pain.  Any of these symptoms should be called into my office or to the on call ENT if after hours.  The most common non-emergency complication of sinus surgery is the formation of scar tissue which can re-block the sinuses.  This is addressed below.    Follow-up -  As you have noted, there are quite a few follow-up visits after sinus surgery.  This is done to aggressively manage the most common complication of this technique, which is scar tissue blocking the sinuses.  These visits will require the examination of your nose and possibly removal of crusts of dry mucous and blood, with possible removal of early scar tissue.  Please prepare for these visits by using your sinus rinses.    If there are any questions or issues with the above, or if there are other issues that concern you, always feel free to call the clinic and I am happy to speak with you as soon as I can.    Dora Robins,  ANNA  Otolaryngology/Head and Neck Surgery  Allergy    [phone] office          Post-Anesthesia Patient Instructions    IMMEDIATELY FOLLOWING SURGERY:  Do not drive or operate machinery for the first twenty four hours after surgery.  Do not make any important decisions for twenty four hours after surgery or while taking narcotic pain medications or sedatives.  If you develop intractable nausea and vomiting or a severe headache please notify your doctor immediately.    FOLLOW-UP:  Please make an appointment with your surgeon as instructed. You do not need to follow up with anesthesia unless specifically instructed to do so.    WOUND CARE INSTRUCTIONS (if applicable):  Keep a dry clean dressing on the anesthesia/puncture wound site if there is drainage.  Once the wound has quit draining you may leave it open to air.  Generally you should leave the bandage intact for twenty four hours unless there is drainage.  If the epidural site drains for more than 36-48 hours please call the anesthesia department.    QUESTIONS?:  Please feel free to call your physician or the hospital  if you have any questions, and they will be happy to assist you.       Remove the scopolamine patch behind your right ear after 24 hours after application.   After removing the patch, wash your hands and the area behind your ear thoroughly with soap and water.   The patch will still contain some medicine after use.   To avoid accidental contact or ingestion by children or pets, fold the used patch in half with the sticky side together and throw away in the trash out of the reach of children and pets.

## 2018-12-19 NOTE — OP NOTE
Otolaryngology Operative Note     Pre-op Diagnosis: 1.  Chronic pansinusitis , bilateral inferior turbinate hypertrophy   post-op Diagnosis:  same  Procedure:   1.  Bilateral endoscopic frontal sinusotomy  2. Bilateral total ethmoidectomy  3. Bilateral maxillary antrostomy with tissue removal  4. Bilateral submucous reduction inferior turbinates    Surgeon:  Dora Robins D.O.  Anesthesia:  General endotracheal  EBL:  5 ml  Findings: Mild mucosal edema throughout  Complications:  none  Condition:  stable     Description of the Procedure  After surgical consent was obtained patient was brought back to the upper midline comfortable and supine position.  She was administered a general anesthetic by member of anesthesia.    The table was turned 180 degrees and a timeout was taken.  Cocaine pledgets were placed in the nose.  The [employer] navigation system was calibrated and found to be normal working condition.  Is a 30 degree endoscope to examine the nose inferior turbinates middle turbinates and lateral nasal walls were anesthetized with 1% lidocaine with 1-100,000 epinephrine.    Attention was turned first turned to the turbinate reduction.  The Coblation turbinate blade was placed into the left inferior turbinate and two 10-second Coblation's were performed distal and proximally.  The turbinate was then outfractured with a [place].  A turbinate reduction was similarly performed on the right side with similar findings and results.    I then used a 30 degree scope to examine the middle meatus I first turned my attention to the left side.  A [place] was used to medialize the middle turbinate and maxillary sinus seeker was used to incise the uncinate process and reflected anterior backbiter as well as the microdebrider blade was used to remove the uncinate process.  The natural ostia was identified and enlarged with the microdebrider blade.  The maxillary sinus was then visualized the mucosa is flat and healthy  the sinus was irrigated with Ancef saline irrigation and gently suctioned.  There is no purulence or polypoid mucosal degeneration within the maxillary sinus.  Next the ethmoid bulla was penetrated inferior and medial and the anterior ethmoid cells were removed.  Identified the lamina Propecia and dissected along the lamina preserving the lamina throughout.  Once the anterior ethmoid cells were evacuated the ground lamella was penetrated inferior and medial and the posterior ethmoid cells were opened after identification and preservation of the skull base.  I then dissected from posterior to anterior removing the posterior ethmoid cells.  Next I directed the scope into the frontal recess and removed the agar cell with up-biting Blakesley's.  I then placed the relieve a guide and guidewire within the frontal sinus the frontal sinus with was dilated in 3 serial dilations at 12 mm atmospheric pressure.  The balloon was deflated the frontal sinus was irrigated and suctioned and the relieve the system was removed.  The frontal sinus is widely patent.  Photos were taken throughout.  There is mild mucosal edema throughout the sinuses but no concerning polypoid degeneration or purulence.  There is no bleeding.    I then turned my attention to the right side.  A right maxillary antrostomy total ethmoidectomy frontal sinusotomy were performed with similar findings and results.    Hemostasis is adequate.    Next I placed a contour mometasone implant into the frontal sinus bilaterally in good position.    She was then handed back over to anesthesia was awakened and brought to the recovery room in stable condition having tolerated the procedure well.

## 2018-12-19 NOTE — ANESTHESIA POSTPROCEDURE EVALUATION
Patient: Gauri Hollis    Procedure(s):  ENDOSCOPIC SINUS SURGERY  TURBINATE REDUCTION    Diagnosis:CHRONIC PANSINUSITIS, NASAL TURBINATE HYPERTROPHY, DYSFUNCTION BOTH EUSTACHIAN TUBES, PRIMARY SNORING, CHRONIC RHINITIS  Diagnosis Additional Information: No value filed.    Anesthesia Type:  General, ETT    Note:  Anesthesia Post Evaluation    Patient location during evaluation: Phase 2, PACU and Bedside  Patient participation: Able to fully participate in evaluation  Level of consciousness: awake and alert  Pain management: adequate  Airway patency: patent  Cardiovascular status: acceptable  Respiratory status: acceptable  Hydration status: stable  PONV: none     Anesthetic complications: None          Last vitals:  Vitals:    12/19/18 1229 12/19/18 1245 12/19/18 1300   BP: 121/82 112/80 124/97   Resp: 14     Temp:      SpO2: 94% 95% 93%         Electronically Signed By: Fernando Pastor MD  December 19, 2018  1:20 PM

## 2018-12-19 NOTE — ADDENDUM NOTE
Addendum  created 12/19/18 1325 by Catherine Fleming APRN CRNA    Intraprocedure LDAs edited, LDA properties accepted

## 2018-12-20 LAB — COPATH REPORT: NORMAL

## 2019-01-02 ENCOUNTER — OFFICE VISIT (OUTPATIENT)
Dept: OTOLARYNGOLOGY | Facility: OTHER | Age: 44
End: 2019-01-02
Attending: PHYSICIAN ASSISTANT
Payer: COMMERCIAL

## 2019-01-02 VITALS
HEIGHT: 66 IN | DIASTOLIC BLOOD PRESSURE: 72 MMHG | BODY MASS INDEX: 28.61 KG/M2 | OXYGEN SATURATION: 95 % | HEART RATE: 66 BPM | SYSTOLIC BLOOD PRESSURE: 96 MMHG | WEIGHT: 178 LBS | TEMPERATURE: 98.2 F

## 2019-01-02 DIAGNOSIS — Z98.890 S/P FESS (FUNCTIONAL ENDOSCOPIC SINUS SURGERY): Primary | ICD-10-CM

## 2019-01-02 DIAGNOSIS — Z98.890 S/P NASAL SURGERY: ICD-10-CM

## 2019-01-02 DIAGNOSIS — J32.4 CHRONIC PANSINUSITIS: ICD-10-CM

## 2019-01-02 PROCEDURE — 31231 NASAL ENDOSCOPY DX: CPT | Mod: 58 | Performed by: PHYSICIAN ASSISTANT

## 2019-01-02 PROCEDURE — 99024 POSTOP FOLLOW-UP VISIT: CPT | Performed by: PHYSICIAN ASSISTANT

## 2019-01-02 RX ORDER — CETIRIZINE HYDROCHLORIDE 10 MG/1
10 TABLET ORAL DAILY
COMMUNITY
End: 2020-12-15

## 2019-01-02 RX ORDER — SODIUM PHOSPHATE,MONO-DIBASIC 19G-7G/118
1 ENEMA (ML) RECTAL DAILY
COMMUNITY
End: 2019-05-24

## 2019-01-02 ASSESSMENT — MIFFLIN-ST. JEOR: SCORE: 1489.15

## 2019-01-02 ASSESSMENT — PAIN SCALES - GENERAL: PAINLEVEL: MILD PAIN (2)

## 2019-01-02 NOTE — PATIENT INSTRUCTIONS
Follow postoperative instructions  Continue with shivam med rinses 3+ times daily  Continue with budesonide rinse twice a day  Small piece of Gelfoam placed in left   Hold rinses until this evening.   Follow up as scheduled with Dr. Robins.     Thank you for allowing SU[facility] and our ENT team to participate in your care.  If your medications are too expensive, please give the nurse a call.  We can possibly change this medication.  If you have a scheduling or an appointment question please contact our Health Unit Coordinator at their direct line [phone].   ALL nursing questions or concerns can be directed to your ENT nurse at: [phone] [place]

## 2019-01-02 NOTE — LETTER
1/2/2019         RE: Gauri Hollis  [address]  [facility] [address]        Dear Colleague,    Thank you for referring your patient, Gauri Hollis, to the [facility]. Please see a copy of my visit note below.    Chief Complaint   Patient presents with     Post-op Visit     s/p FESS and TR 12/19/18         HPI - Gauri Hollis is here for their first postoperative visit, status post endoscopic sinus surgery (with septoplasty and turbinate reduction) performed on 12/19/18.  There was the expected amount of congestion which has now mostly resolved, and no bleeding has occurred.    The generalized facial pressure has been resolving, and there have been no fevers or chills since surgery. The sinus rinses are continuing three times per day, and are being tolerated well.  No visual changes.  She has been Budesonide rinses twice a day and Quoc med 2+ times daily.   Tolerated Prednisone w/o side effects.       Pre-op Diagnosis: 1.  Chronic pansinusitis , bilateral inferior turbinate hypertrophy   post-op Diagnosis:  same  Procedure:   1.  Bilateral endoscopic frontal sinusotomy  2. Bilateral total ethmoidectomy  3. Bilateral maxillary antrostomy with tissue removal  4. Bilateral submucous reduction inferior turbinates      Past Medical History:   Diagnosis Date     Anxiety disorder 11/08/2017     Bleeding in early pregnancy      Bronchitis 02/14/2012    Acute      Chronic lumbosacral pain 03/03/2015     Dislocation of ulnohumeral joint 1993    left; age 18     Epicondylitis, lateral (tennis elbow)-left 6/11/2018     Family history of malignant neoplasm of breast 07/12/2013     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    Resolved 6/7/13     Lesions of both ulnar nerves 08/28/2017     Medial epicondylitis of right elbow 08/28/2017     Medial epicondylitis, right 08/28/2017     Other enthesopathies, not elsewhere classified 08/28/2017     Other immediate postpartum hemorrhage 11/16/2011      "Pregnancy      - PPH, retained placenta, transfusion     Vitamin D deficiency 2015        Allergies   Allergen Reactions     Cats Other (See Comments)     congestion     Chlorhexidine Itching     No rash.  Also felt \"irritable\" after using wipes.     Current Outpatient Medications   Medication     acetaminophen (TYLENOL) 500 MG tablet     albuterol (PROAIR RESPICLICK) 108 (90 Base) MCG/ACT AEPB inhaler     azelastine-fluticasone (DYMISTA) 137-50 MCG/ACT nasal spray     budesonide (PULMICORT) 0.5 MG/2ML neb solution     Cholecalciferol (D 5000) 5000 UNITS TABS     DOCOSAHEXAENOIC ACID PO     escitalopram (LEXAPRO) 10 MG tablet     fish oil-omega-3 fatty acids 1000 MG capsule     fluticasone (FLONASE) 50 MCG/ACT nasal spray     predniSONE (DELTASONE) 20 MG tablet     saccharomyces boulardii (FLORASTOR) 250 MG capsule     triamcinolone (KENALOG) 0.5 % cream     No current facility-administered medications for this visit.       ROS: 10 point ROS neg other than the symptoms noted above in the HPI.  BP 96/72 (BP Location: Right arm, Patient Position: Sitting, Cuff Size: Adult Large)   Pulse 66   Temp 98.2  F (36.8  C) (Tympanic)   Ht 1.676 m (5' 6\")   Wt 80.7 kg (178 lb)   LMP 2018   SpO2 95%   BMI 28.73 kg/m       General - The patient is awake and alert, and answers questions appropriately during the history and physical.  The vocal quality is hypernasal, but there is no dyspnea or stridor noted.  Eyes - The EOMI, there is no conjuncitval or scleral injection.  Pupils are equally round and reactive to light.  Oral - The oral mucosa is pink and moist.  The tongue is mobile and midline on protrusion, no edema noted.  Nasal -     To evaluate the nose and sinuses in the post operative state, I performed rigid nasal endoscopy. The LPN had previously sprayed both nares with lidocaine and neosynephrine.    I began with the LEFT side using a 0 degree rigid nasal endoscope, and then similarly examined " the RIGHT side    Findings:  Septum- Midline  Inferior turbinates:  Reduced. Left clear. Right w/ crusting remaining.   Middle turbinate and middle meatus: Removed clot obstructing left Maxillary.   Antrostomy patent.   Ethmoid cavity with implant.  Implants from frontal sinuses were not removed from right. There was implant partially removed from left. Tolerated well. Scant oozing. Small piece of gelfoam placed on superior margin of MT.       ASSESSMENT:    ICD-10-CM    1. S/P FESS (functional endoscopic sinus surgery) Z98.890    2. S/P nasal turbinate reduction Z98.890    3. Chronic pansinusitis J32.4          Gauri Hollis is status post endoscopic nasal surgery and does not show any signs of complications. Release of work provided.   Follow postoperative instructions  Continue with shivam med rinses 3+ times daily  Continue with budesonide rinse twice a day  Small piece of Gelfoam placed in left     Follow up as scheduled with Dr. Robins.    Ruby Coleman PA-C  ENT  [facility]  [phone]        Again, thank you for allowing me to participate in the care of your patient.        Sincerely,        Ruby Coleman PA-C

## 2019-01-02 NOTE — PROGRESS NOTES
"Chief Complaint   Patient presents with     Post-op Visit     s/p FESS and TR 18         HPI - Gauri Hollis is here for their first postoperative visit, status post endoscopic sinus surgery (with septoplasty and turbinate reduction) performed on 18.  There was the expected amount of congestion which has now mostly resolved, and no bleeding has occurred.    The generalized facial pressure has been resolving, and there have been no fevers or chills since surgery. The sinus rinses are continuing three times per day, and are being tolerated well.  No visual changes.  She has been Budesonide rinses twice a day and Quoc med 2+ times daily.   Tolerated Prednisone w/o side effects.       Pre-op Diagnosis: 1.  Chronic pansinusitis , bilateral inferior turbinate hypertrophy   post-op Diagnosis:  same  Procedure:   1.  Bilateral endoscopic frontal sinusotomy  2. Bilateral total ethmoidectomy  3. Bilateral maxillary antrostomy with tissue removal  4. Bilateral submucous reduction inferior turbinates      Past Medical History:   Diagnosis Date     Anxiety disorder 2017     Bleeding in early pregnancy      Bronchitis 2012    Acute      Chronic lumbosacral pain 2015     Dislocation of ulnohumeral joint 1993    left; age 18     Epicondylitis, lateral (tennis elbow)-left 2018     Family history of malignant neoplasm of breast 2013     History of dysthymic disorder 2010    Resolved 15     Lesions of both ulnar nerves 2017     Medial epicondylitis of right elbow 2017     Medial epicondylitis, right 2017     Other enthesopathies, not elsewhere classified 2017     Other immediate postpartum hemorrhage 2011     Pregnancy      - PPH, retained placenta, transfusion     Vitamin D deficiency 2015        Allergies   Allergen Reactions     Cats Other (See Comments)     congestion     Chlorhexidine Itching     No rash.  Also felt \"irritable\" after " "using wipes.     Current Outpatient Medications   Medication     acetaminophen (TYLENOL) 500 MG tablet     albuterol (PROAIR RESPICLICK) 108 (90 Base) MCG/ACT AEPB inhaler     azelastine-fluticasone (DYMISTA) 137-50 MCG/ACT nasal spray     budesonide (PULMICORT) 0.5 MG/2ML neb solution     Cholecalciferol (D 5000) 5000 UNITS TABS     DOCOSAHEXAENOIC ACID PO     escitalopram (LEXAPRO) 10 MG tablet     fish oil-omega-3 fatty acids 1000 MG capsule     fluticasone (FLONASE) 50 MCG/ACT nasal spray     predniSONE (DELTASONE) 20 MG tablet     saccharomyces boulardii (FLORASTOR) 250 MG capsule     triamcinolone (KENALOG) 0.5 % cream     No current facility-administered medications for this visit.       ROS: 10 point ROS neg other than the symptoms noted above in the HPI.  BP 96/72 (BP Location: Right arm, Patient Position: Sitting, Cuff Size: Adult Large)   Pulse 66   Temp 98.2  F (36.8  C) (Tympanic)   Ht 1.676 m (5' 6\")   Wt 80.7 kg (178 lb)   LMP 12/07/2018   SpO2 95%   BMI 28.73 kg/m      General - The patient is awake and alert, and answers questions appropriately during the history and physical.  The vocal quality is hypernasal, but there is no dyspnea or stridor noted.  Eyes - The EOMI, there is no conjuncitval or scleral injection.  Pupils are equally round and reactive to light.  Oral - The oral mucosa is pink and moist.  The tongue is mobile and midline on protrusion, no edema noted.  Nasal -     To evaluate the nose and sinuses in the post operative state, I performed rigid nasal endoscopy. The LPN had previously sprayed both nares with lidocaine and neosynephrine.    I began with the LEFT side using a 0 degree rigid nasal endoscope, and then similarly examined the RIGHT side    Findings:  Septum- Midline  Inferior turbinates:  Reduced. Left clear. Right w/ crusting remaining.   Middle turbinate and middle meatus: Removed clot obstructing left Maxillary.   Antrostomy patent.   Ethmoid cavity with " implant.  Implants from frontal sinuses were not removed from right. There was implant partially removed from left. Tolerated well. Scant oozing. Small piece of gelfoam placed on superior margin of MT.       ASSESSMENT:    ICD-10-CM    1. S/P FESS (functional endoscopic sinus surgery) Z98.890    2. S/P nasal turbinate reduction Z98.890    3. Chronic pansinusitis J32.4          Gauri Hollis is status post endoscopic nasal surgery and does not show any signs of complications. Release of work provided.   Follow postoperative instructions  Continue with shivam med rinses 3+ times daily  Continue with budesonide rinse twice a day  Small piece of Gelfoam placed in left     Follow up as scheduled with Dr. Robins.    Ruby Coleman PA-C  ENT  [facility]  [phone]

## 2019-01-14 ENCOUNTER — OFFICE VISIT (OUTPATIENT)
Dept: OTOLARYNGOLOGY | Facility: OTHER | Age: 44
End: 2019-01-14
Attending: OTOLARYNGOLOGY
Payer: COMMERCIAL

## 2019-01-14 VITALS
HEIGHT: 66 IN | OXYGEN SATURATION: 97 % | TEMPERATURE: 98.1 F | WEIGHT: 178 LBS | HEART RATE: 70 BPM | DIASTOLIC BLOOD PRESSURE: 74 MMHG | BODY MASS INDEX: 28.61 KG/M2 | SYSTOLIC BLOOD PRESSURE: 108 MMHG

## 2019-01-14 DIAGNOSIS — Z98.890 S/P FESS (FUNCTIONAL ENDOSCOPIC SINUS SURGERY): Primary | ICD-10-CM

## 2019-01-14 DIAGNOSIS — J30.89 PERENNIAL ALLERGIC RHINITIS: ICD-10-CM

## 2019-01-14 PROCEDURE — 31237 NSL/SINS NDSC SURG BX POLYPC: CPT | Mod: 79 | Performed by: OTOLARYNGOLOGY

## 2019-01-14 PROCEDURE — 99024 POSTOP FOLLOW-UP VISIT: CPT | Performed by: OTOLARYNGOLOGY

## 2019-01-14 ASSESSMENT — PAIN SCALES - GENERAL: PAINLEVEL: MILD PAIN (2)

## 2019-01-14 ASSESSMENT — MIFFLIN-ST. JEOR: SCORE: 1489.15

## 2019-01-14 NOTE — NURSING NOTE
"Chief Complaint   Patient presents with     Post-op Visit     s/p HUBER COOK on 12/19/18       Initial /74 (BP Location: Right arm, Patient Position: Sitting, Cuff Size: Adult Large)   Pulse 70   Temp 98.1  F (36.7  C) (Tympanic)   Ht 1.676 m (5' 6\")   Wt 80.7 kg (178 lb)   SpO2 97%   BMI 28.73 kg/m   Estimated body mass index is 28.73 kg/m  as calculated from the following:    Height as of this encounter: 1.676 m (5' 6\").    Weight as of this encounter: 80.7 kg (178 lb).  Medication Reconciliation: complete    Lena Henao LPN  "

## 2019-01-14 NOTE — LETTER
"    1/14/2019         RE: Gauri Hollis  [address]  [facility] [address]        Dear Colleague,    Thank you for referring your patient, Gauri Hollis, to the [facility]. Please see a copy of my visit note below.    Otolaryngology Progress Note      History of Present Illness   Patient presents with:  Post-op Visit: s/p FESS, TR on 12/19/18      Gauri Hollis is a 43 year old female  Presents status post endoscopic sinus surgery and TR on December 19  Procedure:   1.  Bilateral endoscopic frontal sinusotomy  2. Bilateral total ethmoidectomy  3. Bilateral maxillary antrostomy with tissue removal  4. Bilateral submucous reduction inferior turbinates    Small piece in Gelfoam was placed in the left nares when she saw Ruby at her postop visit January 2.  She is using budesonide irrigations twice a day    Preop snot score 59 thus far postop snot score is 39.  She does still have some right-sided facial pressure    A contour was placed into the frontal sinuses bilaterally    Intradermal testing was again reviewed dated 11/8/2018 high sensitivity to cat dog , alternatia moderate sensitivities to dust , ragweed, birch and additional molds    She is using loratadine or cetirizine daily and was on Dymista preoperatively    SPECIMEN(S):   Sinus contents, bilateral     FINAL DIAGNOSIS:   Sinuses, FESS   - Mild chronic sinusitis without eosinophils    Physical Exam  /74 (BP Location: Right arm, Patient Position: Sitting, Cuff Size: Adult Large)   Pulse 70   Temp 98.1  F (36.7  C) (Tympanic)   Ht 1.676 m (5' 6\")   Wt 80.7 kg (178 lb)   SpO2 97%   BMI 28.73 kg/m     General - The patient is well nourished and well developed, and appears to have good nutritional status.  Alert and oriented to person and place, interactive.  Head and Face - Normocephalic and atraumatic, with no gross asymmetry noted of the contour of the facial features.  The facial nerve is intact, with strong symmetric " movements.  Eyes - Extraocular movements intact.     Nose - Nasal mucosa is pink and moist with no abnormal mucus.  The septum was grossly midline and non-obstructive, turbinates of normal size and position.  No polyps, masses, or purulence noted on examination.    To evaluate the nose and sinuses in the post operative state, I performed rigid nasal endoscopy. The LPN had previously sprayed both nares with lidocaine and neosynephrine.    I began with the LEFT side using a 0 degree rigid nasal endoscope, and then similarly examined the RIGHT side    Findings:  Inferior turbinates: Lateralized  I used an 8 Cortez and Blakesley forceps to remove all clots, mucus and a contour implant from the frontal recess.  I used an 8 Cortez to remove secretions from the right maxillary sinus.  Silver nitrate was used to control some oozing from the right middle turbinate  Middle turbinate and middle meatus:  No purulence, no polyposis, no synechiae  Antrostomy patent bilaterally  Ethmoid cavity clear  Frontal recess clear  Mucosa is  healthy throughout without polyps nor polypoid degeneration    Impression/Plan  Gauri Hollis is a 43 year old female    ICD-10-CM    1. S/P FESS (functional endoscopic sinus surgery) Z98.890    2. Perennial allergic rhinitis J30.89          Use Budesonide Rinses Twice Daily  Use Quoc Med Nasal Saline Once Daily  Continue Dymista as prescribed  Continue daily antihistamine  Follow up with Dr. Robins in 1 month  Fill out snot at 3 months post op    Budesonide nasal saline irrigation per instructions:  -Obtain Quoc Med Sinus rinse over the counter.    -Use warm distilled water and 2 packets of the salt solution that comes with the bottle, dissolve in bottle up to the 240 mL dhaval.  -Add 1 vial of budesonide.  -Irrigate each side of your nose leaning over the sink, using 1/3 to 1/2 the volume of the bottle in each nostril every irrigation.  Irrigate 2 times daily.  -If additional rinses are  needed/recommended, you may use the plan Shivam Med Sinus irrigation without the use of added budesonide.       Use high volume shivam med saline irrigation.  Use warm distilled water and 2 packets of the salt solution that comes with the bottle, dissolve in bottle up to 240 ml dhaval.  Irrigate each side of your nose leaning over the sink, using 1/3 to 1/2 the volume of the bottle in each nostril every irrigation.  Irrigate 1 times daily and as needed.      Post op SNOT today 39, preop 59    Dora Robins D.O.  Otolaryngology/Head and Neck Surgery  Allergy            Again, thank you for allowing me to participate in the care of your patient.        Sincerely,        Dora Robins MD

## 2019-01-14 NOTE — PROGRESS NOTES
"Otolaryngology Progress Note      History of Present Illness   Patient presents with:  Post-op Visit: s/p FESS, TR on 12/19/18      Gauri Hollis is a 43 year old female  Presents status post endoscopic sinus surgery and TR on December 19  Procedure:   1.  Bilateral endoscopic frontal sinusotomy  2. Bilateral total ethmoidectomy  3. Bilateral maxillary antrostomy with tissue removal  4. Bilateral submucous reduction inferior turbinates    Small piece in Gelfoam was placed in the left nares when she saw Ruby at her postop visit January 2.  She is using budesonide irrigations twice a day    Preop snot score 59 thus far postop snot score is 39.  She does still have some right-sided facial pressure    A contour was placed into the frontal sinuses bilaterally    Intradermal testing was again reviewed dated 11/8/2018 high sensitivity to cat dog , alternatia moderate sensitivities to dust , ragweed, birch and additional molds    She is using loratadine or cetirizine daily and was on Dymista preoperatively    SPECIMEN(S):   Sinus contents, bilateral     FINAL DIAGNOSIS:   Sinuses, FESS   - Mild chronic sinusitis without eosinophils    Physical Exam  /74 (BP Location: Right arm, Patient Position: Sitting, Cuff Size: Adult Large)   Pulse 70   Temp 98.1  F (36.7  C) (Tympanic)   Ht 1.676 m (5' 6\")   Wt 80.7 kg (178 lb)   SpO2 97%   BMI 28.73 kg/m    General - The patient is well nourished and well developed, and appears to have good nutritional status.  Alert and oriented to person and place, interactive.  Head and Face - Normocephalic and atraumatic, with no gross asymmetry noted of the contour of the facial features.  The facial nerve is intact, with strong symmetric movements.  Eyes - Extraocular movements intact.     Nose - Nasal mucosa is pink and moist with no abnormal mucus.  The septum was grossly midline and non-obstructive, turbinates of normal size and position.  No polyps, masses, or purulence noted " on examination.    To evaluate the nose and sinuses in the post operative state, I performed rigid nasal endoscopy. The LPN had previously sprayed both nares with lidocaine and neosynephrine.    I began with the LEFT side using a 0 degree rigid nasal endoscope, and then similarly examined the RIGHT side    Findings:  Inferior turbinates: Lateralized  I used an 8 Cortez and Blakesley forceps to remove all clots, mucus and a contour implant from the frontal recess.  I used an 8 Cortez to remove secretions from the right maxillary sinus.  Silver nitrate was used to control some oozing from the right middle turbinate  Middle turbinate and middle meatus:  No purulence, no polyposis, no synechiae  Antrostomy patent bilaterally  Ethmoid cavity clear  Frontal recess clear  Mucosa is  healthy throughout without polyps nor polypoid degeneration    Impression/Plan  Gauri Hollis is a 43 year old female    ICD-10-CM    1. S/P FESS (functional endoscopic sinus surgery) Z98.890    2. Perennial allergic rhinitis J30.89          Use Budesonide Rinses Twice Daily  Use Shivam Med Nasal Saline Once Daily  Continue Dymista as prescribed  Continue daily antihistamine  Follow up with Dr. Robins in 1 month  Fill out snot at 3 months post op    Budesonide nasal saline irrigation per instructions:  -Obtain Shivam Med Sinus rinse over the counter.    -Use warm distilled water and 2 packets of the salt solution that comes with the bottle, dissolve in bottle up to the 240 mL dhaval.  -Add 1 vial of budesonide.  -Irrigate each side of your nose leaning over the sink, using 1/3 to 1/2 the volume of the bottle in each nostril every irrigation.  Irrigate 2 times daily.  -If additional rinses are needed/recommended, you may use the plan Shivam Med Sinus irrigation without the use of added budesonide.       Use high volume shivam med saline irrigation.  Use warm distilled water and 2 packets of the salt solution that comes with the bottle, dissolve  in bottle up to 240 ml dhaval.  Irrigate each side of your nose leaning over the sink, using 1/3 to 1/2 the volume of the bottle in each nostril every irrigation.  Irrigate 1 times daily and as needed.      Post op SNOT today 39, preop 59    Dora Robins D.O.  Otolaryngology/Head and Neck Surgery  Allergy

## 2019-01-14 NOTE — PATIENT INSTRUCTIONS
Thank you for allowing Dr. Robins and our ENT team to participate in your care.  If your medications are too expensive, please give the nurse a call.  We can possibly change this medication.  If you have a scheduling or an appointment question please contact our Health Unit Coordinator at their direct line [phone].   ALL nursing questions or concerns can be directed to your ENT nurse at: [phone] - Kellen    Use Budesonide Rinses Twice Daily  Use Shivam Med Nasal Saline Once Daily  Continue Dymista as prescribed  Follow up with Dr. Robins in 1 month    Budesonide nasal saline irrigation per instructions:  -Obtain Shivam Med Sinus rinse over the counter.    -Use warm distilled water and 2 packets of the salt solution that comes with the bottle, dissolve in bottle up to the 240 mL dhaval.  -Add 1 vial of budesonide.  -Irrigate each side of your nose leaning over the sink, using 1/3 to 1/2 the volume of the bottle in each nostril every irrigation.  Irrigate 2 times daily.  -If additional rinses are needed/recommended, you may use the plan Shivam Med Sinus irrigation without the use of added budesonide.       Use high volume shivam med saline irrigation.  Use warm distilled water and 2 packets of the salt solution that comes with the bottle, dissolve in bottle up to 240 ml dhaval.  Irrigate each side of your nose leaning over the sink, using 1/3 to 1/2 the volume of the bottle in each nostril every irrigation.  Irrigate 1 times daily and as needed.

## 2019-02-25 ENCOUNTER — OFFICE VISIT (OUTPATIENT)
Dept: OTOLARYNGOLOGY | Facility: OTHER | Age: 44
End: 2019-02-25
Attending: OTOLARYNGOLOGY
Payer: COMMERCIAL

## 2019-02-25 VITALS
WEIGHT: 178 LBS | TEMPERATURE: 98.4 F | SYSTOLIC BLOOD PRESSURE: 112 MMHG | BODY MASS INDEX: 28.61 KG/M2 | HEART RATE: 69 BPM | OXYGEN SATURATION: 98 % | HEIGHT: 66 IN | DIASTOLIC BLOOD PRESSURE: 76 MMHG

## 2019-02-25 DIAGNOSIS — J30.2 PERENNIAL ALLERGIC RHINITIS WITH SEASONAL VARIATION: ICD-10-CM

## 2019-02-25 DIAGNOSIS — J30.89 PERENNIAL ALLERGIC RHINITIS WITH SEASONAL VARIATION: ICD-10-CM

## 2019-02-25 DIAGNOSIS — Z98.890 S/P FESS (FUNCTIONAL ENDOSCOPIC SINUS SURGERY): Primary | ICD-10-CM

## 2019-02-25 PROCEDURE — 31231 NASAL ENDOSCOPY DX: CPT | Mod: 58 | Performed by: OTOLARYNGOLOGY

## 2019-02-25 PROCEDURE — 99024 POSTOP FOLLOW-UP VISIT: CPT | Performed by: OTOLARYNGOLOGY

## 2019-02-25 ASSESSMENT — MIFFLIN-ST. JEOR: SCORE: 1489.15

## 2019-02-25 ASSESSMENT — PAIN SCALES - GENERAL: PAINLEVEL: NO PAIN (0)

## 2019-02-25 NOTE — PROGRESS NOTES
"Otolaryngology Progress Note      History of Present Illness   Patient presents with:  Surgical Followup: S/P FESS, Turbinate Reduction on 12/19/18      Gauri Hollis is a 43 year old female  Presents for follow-up of sinus surgery and turbinate reduction in December  She has been using her budesonide irrigate irrigations bid  No problems, breathing well out of her nose  Occasionally notices a sensation along her right nasal sidewall up to the area of her medial punctum but there is no air escape and no complaints of epiphora      Procedure:   1. Bilateral endoscopic frontal sinusotomy  2. Bilateral total ethmoidectomy  3. Bilateral maxillary antrostomy with tissue removal  4. Bilateral submucous reduction inferior turbinates    Preop snot score 59 thus far postop snot score is 39.  She does still have some right-sided facial pressure     A contour was placed into the frontal sinuses bilaterally     Intradermal testing was again reviewed dated 11/8/2018 high sensitivity to cat dog , alternatia moderate sensitivities to dust , ragweed, birch and additional molds     She is using loratadine or cetirizine daily and was on Dymista preoperatively    SPECIMEN(S):   Sinus contents, bilateral     FINAL DIAGNOSIS:   Sinuses, FESS   - Mild chronic sinusitis without eosinophils        Physical Exam  /76 (BP Location: Left arm, Patient Position: Sitting, Cuff Size: Adult Regular)   Pulse 69   Temp 98.4  F (36.9  C) (Tympanic)   Ht 1.676 m (5' 6\")   Wt 80.7 kg (178 lb)   SpO2 98%   BMI 28.73 kg/m      General - The patient is well nourished and well developed, and appears to have good nutritional status.  Alert and oriented to person and place, interactive.  Head and Face - Normocephalic and atraumatic, with no gross asymmetry noted of the contour of the facial features.  The facial nerve is intact, with strong symmetric movements.  Neck-no palpable lymphadenopathy or thyroid mass.  Trachea is midline.  Eyes - " Extraocular movements intact.  I debrided some  Nose - Nasal mucosa is pink and moist with no abnormal mucus.  The septum was grossly midline and non-obstructive, turbinates of normal size and position.  No polyps, masses, or purulence noted on examination.  Mouth - Examination of the oral cavity shows pink, healthy, moist mucosa.  No lesions or ulceration noted.  The dentition are in good repair.  The tongue is mobile and midline.  Throat - The walls of the oropharynx were smooth, pink, moist, symmetric, and had no lesions or ulcerations.  The tonsillar pillars and soft palate were symmetric.  The uvula was midline on elevation.      To evaluate the nose and sinuses in the post operative state, I performed rigid nasal endoscopy. The LPN had previously sprayed both nares with lidocaine and neosynephrine.    I began with the LEFT side using a 0 degree rigid nasal endoscope, and then similarly examined the RIGHT side    Findings:  Inferior turbinates:  Lateralized  Normal-appearing secretions from the inferior meatus on the right side  Middle turbinate and middle meatus:  No purulence, no polyposis, no synechiae on left  On the right there is a synechiae band from the middle turbinate to the lateral nasal wall but I am able to pass the suction at the superior edge of this and the maxillary sinus is patent  Antrostomy clear left  Ethmoid cavity clear  Mucosa is  healthy throughout without polyps nor polypoid degeneration       Post op SNOT today 28,  preop 59      Impression/Plan  Gauri Hollis is a 43 year old female    ICD-10-CM    1. S/P FESS (functional endoscopic sinus surgery) Z98.890    2. Perennial allergic rhinitis with seasonal variation J30.89     J30.2            Use Budesonide Rinses Twice Daily for 1 month  After 1 month, use plain Quoc Med Nasal Saline  May uese flonase or dymista prn   Prn AH use    Follow up with ENT as needed    If she does have recurrent sinusitis and requires debridement I would  have her take pre-visit Valium and Vicodin have a ride this is discussed today    She is thankful       Dora Robins D.O.  Otolaryngology/Head and Neck Surgery  Allergy

## 2019-02-25 NOTE — NURSING NOTE
"Chief Complaint   Patient presents with     Surgical Followup     S/P FESS, Turbinate Reduction on 12/19/18       Initial /76 (BP Location: Left arm, Patient Position: Sitting, Cuff Size: Adult Regular)   Pulse 69   Temp 98.4  F (36.9  C) (Tympanic)   Ht 1.676 m (5' 6\")   Wt 80.7 kg (178 lb)   SpO2 98%   BMI 28.73 kg/m   Estimated body mass index is 28.73 kg/m  as calculated from the following:    Height as of this encounter: 1.676 m (5' 6\").    Weight as of this encounter: 80.7 kg (178 lb).  Medication Reconciliation: complete    Lena Henao LPN  "

## 2019-02-25 NOTE — PATIENT INSTRUCTIONS
Thank you for allowing Dr. Robins and our ENT team to participate in your care.  If your medications are too expensive, please give the nurse a call.  We can possibly change this medication.  If you have a scheduling or an appointment question please contact our Health Unit Coordinator at their direct line [phone].   ALL nursing questions or concerns can be directed to your ENT nurse at: [phone] - Kellen    Use Budesonide Rinses Twice Daily for 1 month  After 1 month, use plain Shivam Med Nasal Saline  Continue Flonase 2 sprays, once daily  Follow up with ENT as needed    Budesonide nasal saline irrigation per instructions:  -Obtain Shivam Med Sinus rinse over the counter.    -Use warm distilled water and 2 packets of the salt solution that comes with the bottle, dissolve in bottle up to the 240 mL dhaval.  -Add 1 vial of budesonide.  -Irrigate each side of your nose leaning over the sink, using 1/3 to 1/2 the volume of the bottle in each nostril every irrigation.  Irrigate 2 times daily.  -If additional rinses are needed/recommended, you may use the plan Shivam Med Sinus irrigation without the use of added budesonide.       Use high volume shivam med saline irrigation.  Use warm distilled water and 2 packets of the salt solution that comes with the bottle, dissolve in bottle up to 240 ml dhaval.  Irrigate each side of your nose leaning over the sink, using 1/3 to 1/2 the volume of the bottle in each nostril every irrigation.  Irrigate 1 time daily and as needed.

## 2019-02-25 NOTE — LETTER
"    2/25/2019         RE: Gauri Hollis  [address]  [facility] [address]        Dear Colleague,    Thank you for referring your patient, Gauri Hollis, to the [facility]. Please see a copy of my visit note below.    Otolaryngology Progress Note      History of Present Illness   Patient presents with:  Surgical Followup: S/P FESS, Turbinate Reduction on 12/19/18      Gauri Hollis is a 43 year old female  Presents for follow-up of sinus surgery and turbinate reduction in December  She has been using her budesonide irrigate irrigations bid  No problems, breathing well out of her nose  Occasionally notices a sensation along her right nasal sidewall up to the area of her medial punctum but there is no air escape and no complaints of epiphora      Procedure:   1. Bilateral endoscopic frontal sinusotomy  2. Bilateral total ethmoidectomy  3. Bilateral maxillary antrostomy with tissue removal  4. Bilateral submucous reduction inferior turbinates    Preop snot score 59 thus far postop snot score is 39.  She does still have some right-sided facial pressure     A contour was placed into the frontal sinuses bilaterally     Intradermal testing was again reviewed dated 11/8/2018 high sensitivity to cat dog , alternatia moderate sensitivities to dust , ragweed, birch and additional molds     She is using loratadine or cetirizine daily and was on Dymista preoperatively    SPECIMEN(S):   Sinus contents, bilateral     FINAL DIAGNOSIS:   Sinuses, FESS   - Mild chronic sinusitis without eosinophils        Physical Exam  /76 (BP Location: Left arm, Patient Position: Sitting, Cuff Size: Adult Regular)   Pulse 69   Temp 98.4  F (36.9  C) (Tympanic)   Ht 1.676 m (5' 6\")   Wt 80.7 kg (178 lb)   SpO2 98%   BMI 28.73 kg/m       General - The patient is well nourished and well developed, and appears to have good nutritional status.  Alert and oriented to person and place, interactive.  Head and Face - " Normocephalic and atraumatic, with no gross asymmetry noted of the contour of the facial features.  The facial nerve is intact, with strong symmetric movements.  Neck-no palpable lymphadenopathy or thyroid mass.  Trachea is midline.  Eyes - Extraocular movements intact.  I debrided some  Nose - Nasal mucosa is pink and moist with no abnormal mucus.  The septum was grossly midline and non-obstructive, turbinates of normal size and position.  No polyps, masses, or purulence noted on examination.  Mouth - Examination of the oral cavity shows pink, healthy, moist mucosa.  No lesions or ulceration noted.  The dentition are in good repair.  The tongue is mobile and midline.  Throat - The walls of the oropharynx were smooth, pink, moist, symmetric, and had no lesions or ulcerations.  The tonsillar pillars and soft palate were symmetric.  The uvula was midline on elevation.      To evaluate the nose and sinuses in the post operative state, I performed rigid nasal endoscopy. The LPN had previously sprayed both nares with lidocaine and neosynephrine.    I began with the LEFT side using a 0 degree rigid nasal endoscope, and then similarly examined the RIGHT side    Findings:  Inferior turbinates:  Lateralized  Normal-appearing secretions from the inferior meatus on the right side  Middle turbinate and middle meatus:  No purulence, no polyposis, no synechiae on left  On the right there is a synechiae band from the middle turbinate to the lateral nasal wall but I am able to pass the suction at the superior edge of this and the maxillary sinus is patent  Antrostomy clear left  Ethmoid cavity clear  Mucosa is  healthy throughout without polyps nor polypoid degeneration       Post op SNOT today 28,  preop 59      Impression/Plan  Gauri Hollis is a 43 year old female    ICD-10-CM    1. S/P FESS (functional endoscopic sinus surgery) Z98.890    2. Perennial allergic rhinitis with seasonal variation J30.89     J30.2            Use  Budesonide Rinses Twice Daily for 1 month  After 1 month, use plain Quoc Med Nasal Saline  May uese flonase or dymista prn   Prn AH use    Follow up with ENT as needed    If she does have recurrent sinusitis and requires debridement I would have her take pre-visit Valium and Vicodin have a ride this is discussed today    She is thankful       Dora Robins D.O.  Otolaryngology/Head and Neck Surgery  Allergy            Again, thank you for allowing me to participate in the care of your patient.        Sincerely,        Dora Robins MD

## 2019-03-05 NOTE — PROGRESS NOTES
Outpatient Physical Therapy Discharge Note     Patient: Gauri Hollis  : 1975    Beginning/End Dates of Reporting Period:  18 to 10/10/18    Referring Provider: Lyndon Caballero DC    Therapy Diagnosis: myofascial tightness and pain     Client Self Report: Elbow is 1-2/10 at most for pain. Work activitis and kitchen chopping is easier. Shoulder area 3-4/10 at worst, Dull ache in to neck today. Took epsom salt bath and some foam rolling. Feels she has progressed.     Objective Measurements:  Objective Measure: tissue loading  Details: limited through right shoulder; general listening to right upper abdomen, local listening to liver and right oblique fissure  Objective Measure: myofascial  Details: right and left traingular ligaments, coronary ligament,         Goals:  Goal Identifier tissue loading   Goal Description patient to have equal tissue loading thorugh thorax to reduce strain to left upper extrmeity during work tasks.   Target Date 19   Date Met      Progress: not met     Goal Identifier work tasks   Goal Description Patient to tolerate pushing, pulling, lifting to complete work tasks without ongoing increased pain in left upper quarter.    Target Date 19   Date Met      Progress: not met     Goal Identifier driving   Goal Description Patient to tolerate gripping steering wheel for drive to work.   Target Date 19   Date Met      Progress: not met     Goal Identifier headaches   Goal Description Patient to report no headaches brought on by work duties.   Target Date 19   Date Met      Progress: not met           Progress Toward Goals:   Progress this reporting period: per last patient report, goals not met. Patient did not return after 5th visit.           Plan:  Discharge from therapy.    Discharge:    Reason for Discharge: Patient has failed to schedule further appointments.    Equipment Issued: NA    Discharge Plan: Patient to continue home program.

## 2019-03-05 NOTE — ADDENDUM NOTE
Encounter addended by: Pamela Solorio, PT on: 3/5/2019 1:11 PM   Actions taken: Pend clinical note, Flowsheet accepted, Sign clinical note, Episode resolved

## 2019-05-24 ENCOUNTER — OFFICE VISIT (OUTPATIENT)
Dept: FAMILY MEDICINE | Facility: OTHER | Age: 44
End: 2019-05-24
Attending: PHYSICIAN ASSISTANT
Payer: COMMERCIAL

## 2019-05-24 VITALS
BODY MASS INDEX: 29.86 KG/M2 | HEART RATE: 68 BPM | RESPIRATION RATE: 18 BRPM | TEMPERATURE: 97.6 F | WEIGHT: 185 LBS | DIASTOLIC BLOOD PRESSURE: 80 MMHG | SYSTOLIC BLOOD PRESSURE: 100 MMHG

## 2019-05-24 DIAGNOSIS — R53.83 FATIGUE, UNSPECIFIED TYPE: ICD-10-CM

## 2019-05-24 DIAGNOSIS — M25.50 MULTIPLE JOINT PAIN: ICD-10-CM

## 2019-05-24 DIAGNOSIS — K92.1 BLOOD IN STOOL: ICD-10-CM

## 2019-05-24 DIAGNOSIS — L29.0 ANAL ITCH: ICD-10-CM

## 2019-05-24 DIAGNOSIS — K64.4 EXTERNAL HEMORRHOIDS: ICD-10-CM

## 2019-05-24 DIAGNOSIS — R12 HEARTBURN: ICD-10-CM

## 2019-05-24 DIAGNOSIS — R19.5 LOOSE STOOLS: Primary | ICD-10-CM

## 2019-05-24 LAB
ALBUMIN SERPL-MCNC: 4.2 G/DL (ref 3.5–5.7)
ALP SERPL-CCNC: 91 U/L (ref 34–104)
ALT SERPL W P-5'-P-CCNC: 15 U/L (ref 7–52)
ANION GAP SERPL CALCULATED.3IONS-SCNC: 8 MMOL/L (ref 3–14)
AST SERPL W P-5'-P-CCNC: 19 U/L (ref 13–39)
BASOPHILS # BLD AUTO: 0 10E9/L (ref 0–0.2)
BASOPHILS NFR BLD AUTO: 0.4 %
BILIRUB SERPL-MCNC: 0.6 MG/DL (ref 0.3–1)
BUN SERPL-MCNC: 18 MG/DL (ref 7–25)
CALCIUM SERPL-MCNC: 9.4 MG/DL (ref 8.6–10.3)
CHLORIDE SERPL-SCNC: 105 MMOL/L (ref 98–107)
CO2 SERPL-SCNC: 25 MMOL/L (ref 21–31)
CREAT SERPL-MCNC: 0.77 MG/DL (ref 0.6–1.2)
DIFFERENTIAL METHOD BLD: NORMAL
EOSINOPHIL # BLD AUTO: 0 10E9/L (ref 0–0.7)
EOSINOPHIL NFR BLD AUTO: 0.4 %
ERYTHROCYTE [DISTWIDTH] IN BLOOD BY AUTOMATED COUNT: 12.8 % (ref 10–15)
GFR SERPL CREATININE-BSD FRML MDRD: 83 ML/MIN/{1.73_M2}
GLUCOSE SERPL-MCNC: 92 MG/DL (ref 70–105)
HCT VFR BLD AUTO: 42.2 % (ref 35–47)
HGB BLD-MCNC: 14 G/DL (ref 11.7–15.7)
IMM GRANULOCYTES # BLD: 0 10E9/L (ref 0–0.4)
IMM GRANULOCYTES NFR BLD: 0.1 %
LYMPHOCYTES # BLD AUTO: 1.6 10E9/L (ref 0.8–5.3)
LYMPHOCYTES NFR BLD AUTO: 22.3 %
MCH RBC QN AUTO: 30.4 PG (ref 26.5–33)
MCHC RBC AUTO-ENTMCNC: 33.2 G/DL (ref 31.5–36.5)
MCV RBC AUTO: 92 FL (ref 78–100)
MONOCYTES # BLD AUTO: 0.6 10E9/L (ref 0–1.3)
MONOCYTES NFR BLD AUTO: 9 %
NEUTROPHILS # BLD AUTO: 4.7 10E9/L (ref 1.6–8.3)
NEUTROPHILS NFR BLD AUTO: 67.8 %
PLATELET # BLD AUTO: 286 10E9/L (ref 150–450)
POTASSIUM SERPL-SCNC: 4.1 MMOL/L (ref 3.5–5.1)
PROT SERPL-MCNC: 6.7 G/DL (ref 6.4–8.9)
RBC # BLD AUTO: 4.61 10E12/L (ref 3.8–5.2)
SODIUM SERPL-SCNC: 138 MMOL/L (ref 134–144)
WBC # BLD AUTO: 7 10E9/L (ref 4–11)

## 2019-05-24 PROCEDURE — 86618 LYME DISEASE ANTIBODY: CPT | Mod: ZL | Performed by: PHYSICIAN ASSISTANT

## 2019-05-24 PROCEDURE — 99214 OFFICE O/P EST MOD 30 MIN: CPT | Performed by: PHYSICIAN ASSISTANT

## 2019-05-24 PROCEDURE — 80053 COMPREHEN METABOLIC PANEL: CPT | Mod: ZL | Performed by: PHYSICIAN ASSISTANT

## 2019-05-24 PROCEDURE — 85025 COMPLETE CBC W/AUTO DIFF WBC: CPT | Mod: ZL | Performed by: PHYSICIAN ASSISTANT

## 2019-05-24 PROCEDURE — 36415 COLL VENOUS BLD VENIPUNCTURE: CPT | Mod: ZL | Performed by: PHYSICIAN ASSISTANT

## 2019-05-24 PROCEDURE — 87798 DETECT AGENT NOS DNA AMP: CPT | Mod: ZL | Performed by: PHYSICIAN ASSISTANT

## 2019-05-24 RX ORDER — MULTIPLE VITAMINS W/ MINERALS TAB 9MG-400MCG
1 TAB ORAL DAILY
COMMUNITY
Start: 2019-05-24 | End: 2020-08-25

## 2019-05-24 RX ORDER — MULTIVITAMIN WITH IRON
1 TABLET ORAL DAILY
COMMUNITY
Start: 2019-05-24 | End: 2020-08-25

## 2019-05-24 NOTE — PROGRESS NOTES
Nursing Notes:   Tari Wells LPN  5/24/2019  2:39 PM  Signed  Chief Complaint   Patient presents with     Joint Pain     Abdominal Pain         Medication Reconciliation: complete    Tari Wells LPN      HPI:    Gauri Hollis is a 43 year old female who presents for diarrhea and joint pain.  Patient has been in the clinic for similar symptoms in the past.  Last evaluated in June 2018 by Dr. Zavala in internal medicine.  At that time she noticed worsening joint pain overall for a few months.  Patient had a bout of a GI illness in February 2018.  No recurrent symptoms after the illness.  Patient has had extensive autoimmune history of the past.  They have been negative previously.  Had celiac blood test completed in June 2018 which was normal.  Sister is gluten-free.  History of thyroid nodules.  No history of smoking.  No history of smokeless tobacco.  Last summer patient had multiple tests completed including CBC, Lyme's, ehrlichiosis, anaplasmosis, TSH, IgA, tissue transglutaminase antibody IgA and IgG, ESR, CRP, AG.    Coming in today with joint pain and abdominal pain.   Vomited this morning.  Patient has had lower abdominal pain and multiple joint aches and pains over the last 6-8 weeks. Lower abdominal discomfort.  Feels like her lower abdomen is cramping.  Has tried a warm bath and heating pad. Looser stools. Not watery. 2-3 episodes in the morning. Hx irritable bowel in the past.  Feels like this is different.  Joint stiffness in all joints.  Her hands, elbows, shoulders, knees and ankles are achy.  Joints are not red or swollen.  Achy and tingly. In [place] end of March.  No known tick bites.  Felt fine in [place]. 1-2 weeks after returning she started getting symptoms. Feel some rectal prolapse. Anal itching.  No family members have anal itching.  Curious if she has pinworms.  Has not seen any worms.  Nauseas.  Slight scratchy throat this week. No fevers. Chills.    Bright red  blood off and on for a few years.  She gets a red streak every few months.  Not typically associated with constipation or diarrhea.  It is very random.   Sister is 51 years old. In her late 30s her sister had fatigue, bloating, thyroid growth. Went gluten free which helped her symptoms.  She did not get any formal testing completed.    Patient has history of having heartburn with pregnancy. Now over the last few years she has had a slight sensation of heartburn.  Wondering if there is a celiac concern for herself.  Has not tried going gluten-free.  Want EGD and colonoscopy completed to rule out concerns.       Past Medical History:   Diagnosis Date     Anxiety disorder 2017     Bleeding in early pregnancy      Bronchitis 2012    Acute      Chronic lumbosacral pain 2015     Dislocation of ulnohumeral joint 1993    left; age 18     Epicondylitis, lateral (tennis elbow)-left 2018     Family history of malignant neoplasm of breast 2013     History of dysthymic disorder 2010    Resolved 13     Lesions of both ulnar nerves 2017     Medial epicondylitis of right elbow 2017     Medial epicondylitis, right 2017     Other enthesopathies, not elsewhere classified 2017     Other immediate postpartum hemorrhage 2011     Pregnancy      - PPH, retained placenta, transfusion     Vitamin D deficiency 2015       Past Surgical History:   Procedure Laterality Date     DILATION AND CURETTAGE  10/2011    for retained placenta     DILATION AND CURETTAGE  2015    for missed AB     DILATION AND CURETTAGE  2017    SAB     ENDOSCOPIC SINUS SURGERY N/A 2018    Procedure: ENDOSCOPIC SINUS SURGERY;  Surgeon: Dora Robins MD;  Location: [facility]     EXTRACTION(S) DENTAL      [place] Teeth     TURBINOPLASTY Bilateral 2018    Procedure: TURBINATE REDUCTION;  Surgeon: Dora Robins MD;  Location: [facility]       Family History   Problem  Relation Age of Onset     Breast Cancer Mother 53        Cancer-breast     Diabetes Type 2  Mother         Diabetes type II     Heart Disease Father 42        Heart Disease,MI; history of rheumatic fever and asd     Diabetes Type 2  Father         Diabetes type II     Thyroid Disease Sister         Thyroid Disease,hypothyroidism     Depression Brother         Depression       Social History     Tobacco Use     Smoking status: Never Smoker     Smokeless tobacco: Never Used   Substance Use Topics     Alcohol use: No     Alcohol/week: 0.0 oz     Comment: Alcoholic Drinks/day: 3 per week       Current Outpatient Medications   Medication Sig Dispense Refill     albuterol (PROAIR RESPICLICK) 108 (90 Base) MCG/ACT AEPB inhaler Inhale 2 puffs into the lungs every 4 hours as needed for shortness of breath / dyspnea or wheezing 1 Inhaler 11     azelastine-fluticasone (DYMISTA) 137-50 MCG/ACT nasal spray Spray 1 spray into both nostrils 2 times daily 1 Bottle 11     budesonide (PULMICORT) 0.5 MG/2ML neb solution Squirt entire vial into previously made shivam med saline bottle, mix, and irrigate each nostril until entire bottle empty.  Do this twice daily. 3 Box 11     cetirizine (ZYRTEC) 10 MG tablet Take 10 mg by mouth daily       Cholecalciferol (D 5000) 5000 UNITS TABS Take 1,000 Units by mouth daily        escitalopram (LEXAPRO) 10 MG tablet Take 1 tablet (10 mg) by mouth daily 90 tablet 3     fish oil-omega-3 fatty acids 1000 MG capsule Take 1 g by mouth daily        magnesium 250 MG tablet Take 1 tablet (250 mg) by mouth daily       multivitamin w/minerals (THERA-VIT-M) tablet Take 1 tablet by mouth daily       Pyrantel Pamoate 144 (50 Base) MG/ML SUSP Take 17.5 mLs by mouth once for 1 dose Repeat in 2 weeks 60 mL 1     saccharomyces boulardii (FLORASTOR) 250 MG capsule Take 1 capsule by mouth daily       triamcinolone (KENALOG) 0.5 % cream Apply sparingly to affected area three times daily. 15 g 1       Allergies  "  Allergen Reactions     Birch Trees      Cats Other (See Comments)     congestion     Chlorhexidine Itching     No rash.  Also felt \"irritable\" after using wipes.     Dogs      Mold        REVIEW OF SYSTEMS:  Refer to HPI.    EXAM:   Vitals:    /80 (BP Location: Right arm, Patient Position: Sitting, Cuff Size: Adult Regular)   Pulse 68   Temp 97.6  F (36.4  C)   Resp 18   Wt 83.9 kg (185 lb)   LMP 05/15/2019   BMI 29.86 kg/m      General Appearance: Pleasant, alert, appropriate appearance for age. No acute distress  OroPharynx Exam: Dental hygiene adequate. Normal buccal mucosa. Normal pharynx.  Chest/Respiratory Exam: Normal chest wall and respirations. Clear to auscultation.  Cardiovascular Exam: Regular rate and rhythm. S1, S2, no murmur, click, gallop, or rubs.  Gastrointestinal Exam: Soft, no masses or organomegaly. Normal BS x 4.  Mild tenderness of the left lower quadrant and right lower quadrant.  No rebound tenderness or guarding appreciated.  No CVA tenderness to palpation.  Rectal Exam: A few external hemorrhoids appreciated that are not swollen or irritated.  Nontender to palpation.  No bleeding or fissures appreciated.  Musculoskeletal Exam: Back is straight, full ROM of upper and lower extremities.  Skin: no rash or abnormalities  Neurologic Exam: Nonfocal, normal gross motor, tone coordination and no tremor.  Psychiatric Exam: Alert and oriented - appropriate affect.    PHQ Depression Screen  PHQ-9 SCORE 7/26/2017 10/11/2017 6/14/2018   PHQ-9 Total Score 9 8 9       ASSESSMENT AND PLAN:      ICD-10-CM    1. Loose stools R19.5 Stool culture     Clostridium difficile Toxin B PCR     H Pylori antigen stool     Giardia antigen     Ova and Parasite Exam Routine     CBC and Differential     Comprehensive Metabolic Panel     Ehrlichia Anaplasma Sp by PCR     Lyme Disease Ab with reflex to WB Serum     GASTROENTEROLOGY ADULT REF PROCEDURE ONLY Other ([place])     Ehrlichia Anaplasma Sp by " PCR     CBC and Differential     Comprehensive Metabolic Panel     Lyme Disease Ab with reflex to WB Serum   2. Multiple joint pain M25.50 Stool culture     Clostridium difficile Toxin B PCR     H Pylori antigen stool     Giardia antigen     Ova and Parasite Exam Routine     CBC and Differential     Comprehensive Metabolic Panel     Ehrlichia Anaplasma Sp by PCR     Lyme Disease Ab with reflex to WB Serum     GASTROENTEROLOGY ADULT REF PROCEDURE ONLY Other ([place])     Ehrlichia Anaplasma Sp by PCR     CBC and Differential     Comprehensive Metabolic Panel     Lyme Disease Ab with reflex to WB Serum   3. Fatigue, unspecified type R53.83 Stool culture     Clostridium difficile Toxin B PCR     H Pylori antigen stool     Giardia antigen     Ova and Parasite Exam Routine     CBC and Differential     Comprehensive Metabolic Panel     Ehrlichia Anaplasma Sp by PCR     Lyme Disease Ab with reflex to WB Serum     GASTROENTEROLOGY ADULT REF PROCEDURE ONLY Other ([place])     Ehrlichia Anaplasma Sp by PCR     CBC and Differential     Comprehensive Metabolic Panel     Lyme Disease Ab with reflex to WB Serum   4. Anal itch L29.0 Pyrantel Pamoate 144 (50 Base) MG/ML SUSP     GASTROENTEROLOGY ADULT REF PROCEDURE ONLY Other ([place])   5. Heartburn R12 GASTROENTEROLOGY ADULT REF PROCEDURE ONLY Other ([place])   6. Blood in stool K92.1 GASTROENTEROLOGY ADULT REF PROCEDURE ONLY Other ([place])   7. External hemorrhoids K64.4        Discussed symptoms at length.  Completed a CBC and CMP for monitoring with the fatigue, loose stools, heartburn and multiple joint pain.  CBC is normal.  CMP is pending.  Completed Lyme's, ehrlichiosis and anaplasmosis to rule out infection concerns.  Results pending.    Ordered stool studies to be completed including H. pylori, ova and parasite, Giardia, C. difficile and stool culture.    Send a prescription for Reeses pinworm medication to be taken over the next month to treat  a possible pinworm infection.    Ordered an EGD and colonoscopy to rule out concerns especially with the loose stools, blood in the stool, heartburn, joint pain, fatigue and anal itch.    Try small amounts of food and drink frequently. Offer a bland diet. Advance as tolerated. Avoid dairy products the first day. You may add yogurt tomorrow as the first dairy product.    Try the BRAT diet (bread, bananas, rice, applesauce, tea, toast).  This is a very bland diet which should not irritate your colon.  Hold off on spicy foods, red sauces, mexican or chinese food.    Call or return to clinic as needed if your symptoms worsen or fail to improve as anticipated.     If the pain does not begin improving, localizes to the right lower belly, there is increased fever, or other progression of symptoms, return for reassessment.    Should I see a doctor or nurse about my stomach ache? -- Most people do not need to see a doctor or nurse for a stomach ache. But you should see your doctor or nurse if:  ?You have bloody bowel movements, diarrhea, or vomiting  ?Your pain is severe and lasts more than an hour or comes and goes for more than 24 hours  ?You cannot eat or drink for hours  ?You have a fever higher than 102 F (39 C)  ?You lose a lot of weight without trying to, or lose interest in food     Gave warning signs and symptoms.    Greater than 25 minutes were spent in counseling and coordination of care.       Patient Instructions   Try small amounts of food and drink frequently. Offer a bland diet. Advance as tolerated. Avoid dairy products the first day. You may add yogurt tomorrow as the first dairy product.    Try the BRAT diet (bread, bananas, rice, applesauce, tea, toast).  This is a very bland diet which should not irritate your colon.  Hold off on spicy foods, red sauces, mexican or chinese food.    Call or return to clinic as needed if your symptoms worsen or fail to improve as anticipated.     If the pain does not begin  improving, localizes to the right lower belly, there is increased fever, or other progression of symptoms, return for reassessment.    Should I see a doctor or nurse about my stomach ache? -- Most people do not need to see a doctor or nurse for a stomach ache. But you should see your doctor or nurse if:  ?You have bloody bowel movements, diarrhea, or vomiting  ?Your pain is severe and lasts more than an hour or comes and goes for more than 24 hours  ?You cannot eat or drink for hours  ?You have a fever higher than 102 F (39 C)  ?You lose a lot of weight without trying to, or lose interest in food        Caprice Marsh PA-C..................5/24/2019 12:44 PM

## 2019-05-24 NOTE — PATIENT INSTRUCTIONS
Try small amounts of food and drink frequently. Offer a bland diet. Advance as tolerated. Avoid dairy products the first day. You may add yogurt tomorrow as the first dairy product.    Try the BRAT diet (bread, bananas, rice, applesauce, tea, toast).  This is a very bland diet which should not irritate your colon.  Hold off on spicy foods, red sauces, mexican or chinese food.    Call or return to clinic as needed if your symptoms worsen or fail to improve as anticipated.     If the pain does not begin improving, localizes to the right lower belly, there is increased fever, or other progression of symptoms, return for reassessment.    Should I see a doctor or nurse about my stomach ache? -- Most people do not need to see a doctor or nurse for a stomach ache. But you should see your doctor or nurse if:  ?You have bloody bowel movements, diarrhea, or vomiting  ?Your pain is severe and lasts more than an hour or comes and goes for more than 24 hours  ?You cannot eat or drink for hours  ?You have a fever higher than 102 F (39 C)  ?You lose a lot of weight without trying to, or lose interest in food

## 2019-05-24 NOTE — NURSING NOTE
Chief Complaint   Patient presents with     Joint Pain     Abdominal Pain         Medication Reconciliation: complete    Tari Wells, LPN

## 2019-05-28 ENCOUNTER — TELEPHONE (OUTPATIENT)
Dept: SURGERY | Facility: OTHER | Age: 44
End: 2019-05-28

## 2019-05-28 LAB — B BURGDOR IGG+IGM SER QL: 0.04 (ref 0–0.89)

## 2019-05-28 NOTE — TELEPHONE ENCOUNTER
Patient referred by Elise Marsh for a diagnostic colonoscopy and Upper GI Endoscopy.  Diagnosis is loose stools, fatigue, anal itch blood in stool and heartburn . Please advise.   Thank you. Eloisa Olson on 5/28/2019 at 9:50 AM

## 2019-06-01 ENCOUNTER — APPOINTMENT (OUTPATIENT)
Dept: LAB | Facility: OTHER | Age: 44
End: 2019-06-01
Attending: INTERNAL MEDICINE
Payer: COMMERCIAL

## 2019-06-01 LAB
C DIFF TOX B STL QL: NEGATIVE
G LAMBLIA AG STL QL IA: NORMAL
H PYLORI AG STL QL IA: NORMAL
SPECIMEN SOURCE: NORMAL

## 2019-06-01 PROCEDURE — 87177 OVA AND PARASITES SMEARS: CPT | Mod: ZL | Performed by: PHYSICIAN ASSISTANT

## 2019-06-01 PROCEDURE — 87329 GIARDIA AG IA: CPT | Mod: ZL | Performed by: PHYSICIAN ASSISTANT

## 2019-06-01 PROCEDURE — 87209 SMEAR COMPLEX STAIN: CPT | Mod: ZL | Performed by: PHYSICIAN ASSISTANT

## 2019-06-01 PROCEDURE — 87045 FECES CULTURE AEROBIC BACT: CPT | Mod: ZL | Performed by: PHYSICIAN ASSISTANT

## 2019-06-01 PROCEDURE — 87338 HPYLORI STOOL AG IA: CPT | Mod: ZL | Performed by: PHYSICIAN ASSISTANT

## 2019-06-01 PROCEDURE — 87493 C DIFF AMPLIFIED PROBE: CPT | Mod: ZL | Performed by: PHYSICIAN ASSISTANT

## 2019-06-01 PROCEDURE — 87046 STOOL CULTR AEROBIC BACT EA: CPT | Mod: ZL,91 | Performed by: PHYSICIAN ASSISTANT

## 2019-06-01 PROCEDURE — 87046 STOOL CULTR AEROBIC BACT EA: CPT | Mod: ZL | Performed by: PHYSICIAN ASSISTANT

## 2019-06-03 LAB
BACTERIA SPEC CULT: NORMAL
E COLI SXT1+2 STL IA: NORMAL
SPECIMEN SOURCE: NORMAL

## 2019-06-04 LAB
O+P STL MICRO: NORMAL
O+P STL MICRO: NORMAL
SPECIMEN SOURCE: NORMAL

## 2019-06-05 ENCOUNTER — TELEPHONE (OUTPATIENT)
Dept: FAMILY MEDICINE | Facility: OTHER | Age: 44
End: 2019-06-05

## 2019-06-05 NOTE — TELEPHONE ENCOUNTER
05/28  Called and spoke with patient , she was not at home and would call back to schedule colonoscopy.  Called and left message on 06/04.  Letter has been sent out today for her to call and schedule.  Eloisa Olson on 6/5/2019 at 1:05 PM

## 2019-06-12 ENCOUNTER — HOSPITAL ENCOUNTER (OUTPATIENT)
Dept: MAMMOGRAPHY | Facility: OTHER | Age: 44
Discharge: HOME | End: 2019-06-12
Attending: INTERNAL MEDICINE | Admitting: INTERNAL MEDICINE
Payer: COMMERCIAL

## 2019-06-12 DIAGNOSIS — Z09 FOLLOW-UP EXAM, 3-6 MONTHS SINCE PREVIOUS EXAM: ICD-10-CM

## 2019-06-12 DIAGNOSIS — N60.32 FIBROSIS, BREAST, LEFT: ICD-10-CM

## 2019-06-12 PROCEDURE — G0279 TOMOSYNTHESIS, MAMMO: HCPCS

## 2019-06-20 ENCOUNTER — TELEPHONE (OUTPATIENT)
Dept: SURGERY | Facility: OTHER | Age: 44
End: 2019-06-20

## 2019-06-20 NOTE — TELEPHONE ENCOUNTER
Patient was here for mammogram last week and asked if she could have a Wednesday appointment with Dr. Ortega to discuss breast health.  Dr. Ortega contacted and she states ok to schedule patient on a Wednesday after her surgery schedule is completed.  Left messages x2 for patient to call me back, no return call received from patient.  Savanna Francisco RN.

## 2019-07-01 ENCOUNTER — TELEPHONE (OUTPATIENT)
Dept: INTERNAL MEDICINE | Facility: OTHER | Age: 44
End: 2019-07-01

## 2019-07-01 ENCOUNTER — OFFICE VISIT (OUTPATIENT)
Dept: FAMILY MEDICINE | Facility: OTHER | Age: 44
End: 2019-07-01
Attending: NURSE PRACTITIONER
Payer: COMMERCIAL

## 2019-07-01 ENCOUNTER — HOSPITAL ENCOUNTER (OUTPATIENT)
Dept: GENERAL RADIOLOGY | Facility: OTHER | Age: 44
End: 2019-07-01
Attending: NURSE PRACTITIONER
Payer: COMMERCIAL

## 2019-07-01 ENCOUNTER — HOSPITAL ENCOUNTER (OUTPATIENT)
Dept: GENERAL RADIOLOGY | Facility: OTHER | Age: 44
Discharge: HOME | End: 2019-07-01
Attending: NURSE PRACTITIONER | Admitting: NURSE PRACTITIONER
Payer: COMMERCIAL

## 2019-07-01 VITALS
DIASTOLIC BLOOD PRESSURE: 74 MMHG | HEART RATE: 72 BPM | RESPIRATION RATE: 16 BRPM | SYSTOLIC BLOOD PRESSURE: 114 MMHG | BODY MASS INDEX: 29.97 KG/M2 | HEIGHT: 66 IN | TEMPERATURE: 97.6 F | WEIGHT: 186.5 LBS

## 2019-07-01 DIAGNOSIS — M25.522 LEFT ELBOW PAIN: ICD-10-CM

## 2019-07-01 DIAGNOSIS — M25.532 LEFT WRIST PAIN: ICD-10-CM

## 2019-07-01 DIAGNOSIS — M25.522 LEFT ELBOW PAIN: Primary | ICD-10-CM

## 2019-07-01 DIAGNOSIS — T14.8XXA AVULSION FRACTURE: ICD-10-CM

## 2019-07-01 DIAGNOSIS — S52.125A CLOSED NONDISPLACED FRACTURE OF HEAD OF LEFT RADIUS, INITIAL ENCOUNTER: ICD-10-CM

## 2019-07-01 PROCEDURE — 73110 X-RAY EXAM OF WRIST: CPT | Mod: LT

## 2019-07-01 PROCEDURE — 99213 OFFICE O/P EST LOW 20 MIN: CPT | Performed by: NURSE PRACTITIONER

## 2019-07-01 PROCEDURE — 73080 X-RAY EXAM OF ELBOW: CPT | Mod: LT

## 2019-07-01 ASSESSMENT — ENCOUNTER SYMPTOMS
JOINT SWELLING: 1
ARTHRALGIAS: 1

## 2019-07-01 ASSESSMENT — PAIN SCALES - GENERAL: PAINLEVEL: SEVERE PAIN (6)

## 2019-07-01 ASSESSMENT — ANXIETY QUESTIONNAIRES
5. BEING SO RESTLESS THAT IT IS HARD TO SIT STILL: NOT AT ALL
1. FEELING NERVOUS, ANXIOUS, OR ON EDGE: NOT AT ALL
IF YOU CHECKED OFF ANY PROBLEMS ON THIS QUESTIONNAIRE, HOW DIFFICULT HAVE THESE PROBLEMS MADE IT FOR YOU TO DO YOUR WORK, TAKE CARE OF THINGS AT HOME, OR GET ALONG WITH OTHER PEOPLE: NOT DIFFICULT AT ALL
3. WORRYING TOO MUCH ABOUT DIFFERENT THINGS: NOT AT ALL
GAD7 TOTAL SCORE: 0
7. FEELING AFRAID AS IF SOMETHING AWFUL MIGHT HAPPEN: NOT AT ALL
2. NOT BEING ABLE TO STOP OR CONTROL WORRYING: NOT AT ALL
6. BECOMING EASILY ANNOYED OR IRRITABLE: NOT AT ALL

## 2019-07-01 ASSESSMENT — PATIENT HEALTH QUESTIONNAIRE - PHQ9
5. POOR APPETITE OR OVEREATING: NOT AT ALL
SUM OF ALL RESPONSES TO PHQ QUESTIONS 1-9: 0

## 2019-07-01 ASSESSMENT — MIFFLIN-ST. JEOR: SCORE: 1527.71

## 2019-07-01 NOTE — PROGRESS NOTES
SUBJECTIVE:   Gauri Hollis is a 43 year old female who presents to clinic today for the following health issues:    HPI  Patient presents for evaluation of fall from ladder on . 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 She did not hit her head. She reports immediate pain in left elbow and left wrist. She has treated with ibuprofen and naproxen and completed ice therapy. Pain worse on lateral side of wrist. She can't full extend elbow. 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 She has some slight neck discomfort, but otherwise reports no shoulder involvement. Both left wrist and elbow are acutely swollen.      Patient Active Problem List    Diagnosis Date Noted     Loose stools 2019     Priority: Medium     Multiple joint pain 2019     Priority: Medium     Fatigue, unspecified type 2019     Priority: Medium     Heartburn 2019     Priority: Medium     Blood in stool 2019     Priority: Medium     External hemorrhoids 2019     Priority: Medium     Epicondylitis, lateral (tennis elbow)-left 2018     Priority: Medium     Anxiety disorder 2017     Priority: Medium     Past Medical History:   Diagnosis Date     Anxiety disorder 2017     Bleeding in early pregnancy      Bronchitis 2012    Acute      Chronic lumbosacral pain 2015     Dislocation of ulnohumeral joint 1993    left; age 18     Epicondylitis, lateral (tennis elbow)-left 2018     Family history of malignant neoplasm of breast 2013     History of dysthymic disorder 2010    Resolved 13     Lesions of both ulnar nerves 2017     Medial epicondylitis of right elbow 2017     Medial epicondylitis, right 2017     Other enthesopathies, not elsewhere classified 2017     Other immediate postpartum hemorrhage 2011     Pregnancy      - PPH, retained placenta, transfusion     Vitamin  "D deficiency 01/21/2015      Past Surgical History:   Procedure Laterality Date     DILATION AND CURETTAGE  10/2011    for retained placenta     DILATION AND CURETTAGE  04/01/2015    for missed AB     DILATION AND CURETTAGE  05/2017    SAB     ENDOSCOPIC SINUS SURGERY N/A 12/19/2018    Procedure: ENDOSCOPIC SINUS SURGERY;  Surgeon: Dora Robins MD;  Location: [facility]     EXTRACTION(S) DENTAL      [place] Teeth     TURBINOPLASTY Bilateral 12/19/2018    Procedure: TURBINATE REDUCTION;  Surgeon: Dora Robins MD;  Location: [facility]       Review of Systems   Musculoskeletal: Positive for arthralgias and joint swelling.        OBJECTIVE:     /74 (BP Location: Right arm, Patient Position: Sitting, Cuff Size: Adult Regular)   Pulse 72   Temp 97.6  F (36.4  C) (Tympanic)   Resp 16   Ht 1.676 m (5' 6\")   Wt 84.6 kg (186 lb 8 oz)   Breastfeeding? No   BMI 30.10 kg/m    Body mass index is 30.1 kg/m .  Physical Exam   Constitutional: She appears well-developed and well-nourished.   Musculoskeletal:        Left elbow: She exhibits decreased range of motion and swelling. She exhibits no effusion, no deformity and no laceration. Tenderness found. Radial head and lateral epicondyle tenderness noted. No medial epicondyle tenderness noted.        Left wrist: She exhibits decreased range of motion, tenderness, bony tenderness and swelling. She exhibits no effusion, no crepitus, no deformity and no laceration.   ROM L shoulder intact.   Sensation intact of left arm. Radial pulse +3. Good CMS.     Diagnostic Test Results:  Xray - PROCEDURE:  XR WRIST LEFT G/E 3 VIEWS     HISTORY: Fell from 5 ft onto left side.; Left wrist pain     COMPARISON:  None.     TECHNIQUE:  3 views of the left wrist were obtained.     FINDINGS:  There is an avulsion fracture of the triquetrum dorsally.  No additional wrist fracture or dislocation.                                                                       IMPRESSION: Dorsal " triquetral avulsion fracture.     PROCEDURE:  XR ELBOW LT G/E 3 VW     HISTORY: Fell onto left side from 5 ft. Can't full extend elbow.  History of dislocation.; Left elbow pain     COMPARISON:  6/11/2018     TECHNIQUE:  3 views of the left elbow were obtained.     FINDINGS:  There is a nondisplaced fracture of the radial head and  neck extending to the articular surface. No additional elbow fracture  or dislocation. Elbow joint effusion is present.                                                                       IMPRESSION: Nondisplaced radial head fracture.       SHARMIN HUMMEL MD      ASSESSMENT/PLAN:   1. Left elbow pain  - XR Elbow Left G/E 3 Views; Future    2. Left wrist pain  - XR Wrist Left G/E 3 Views; Future    3. Closed nondisplaced fracture of head of left radius, initial encounter   Discussed with orthopedics who recommended arm sling with ROM daily and strict non-weight bearing for 3-4 weeks. Referral completed for orthopedics to evaluate. Continue with ibuprofen for pain control and ice therapy for the next 24-48 hours. Work note provided.   - ARM SLING L  - ORTHOPEDICS ADULT REFERRAL    4. Avulsion fracture  Discussed with orthopedics, this is wrist sprain. Patient will already have wrist immobilized with sling for the next 3-4 weeks due to radial fracture. Orthopedics to follow.     Joselyn Meehan [facility]

## 2019-07-01 NOTE — NURSING NOTE
Patient presents to clinic after falling yesterday on left hip and left arm.  She is experiencing pain to left elbow and left wrist.  Pain rated at 6.    Medication Reconciliation: complete    Stefany Huddleston LPN

## 2019-07-01 NOTE — LETTER
[facility]  [address]  [place] [address]  [phone]      July 1, 2019      RE: Gauri Hollis  [address]  [facility] [address]       To whom it may concern:    Gauri Hollis was seen in our clinic today.  She was diagnosed with a fracture. She will need to be in an arm sling for four weeks, until 7/29/2019, with strict immobility of left arm. She will have further evaluation at that time and may require further work restrictions.     Sincerely,      Joselyn RODGERS

## 2019-07-01 NOTE — TELEPHONE ENCOUNTER
"Patient called requesting \"Work Ability Form\" completion. Stated forgot to request at appt. Today. Was advised to have limited lifting for next 3-4 weeks. Patient was wondering if could have DCS complete and call when ready for  at window 4. Please call patient and advise. Thank you!    Nelli Buchanan on 7/1/2019 at 11:59 AM    "

## 2019-07-01 NOTE — PATIENT INSTRUCTIONS
Non-displaced radial head fracture: Sling, non-weightbearing, Range of motion a couple times a day, follow-up with orthopedics    Dorsal triquetral avulsion fracture

## 2019-07-02 ASSESSMENT — ANXIETY QUESTIONNAIRES: GAD7 TOTAL SCORE: 0

## 2019-07-10 ENCOUNTER — TELEPHONE (OUTPATIENT)
Dept: FAMILY MEDICINE | Facility: OTHER | Age: 44
End: 2019-07-10

## 2019-07-10 DIAGNOSIS — S52.125A CLOSED NONDISPLACED FRACTURE OF HEAD OF LEFT RADIUS, INITIAL ENCOUNTER: Primary | ICD-10-CM

## 2019-07-10 DIAGNOSIS — T14.8XXA AVULSION FRACTURE: ICD-10-CM

## 2019-07-10 NOTE — TELEPHONE ENCOUNTER
Patient requesting waterproof wrist splint from Pancho Kang at [facility].  This is for increased support for left wrist and to maintain elbow range of motion.  She has been using ice, ibuprofen, naproxen and been avoiding lifting with left arm.    She has dropped off forms for medical leave.  Please send to [facility] upon completion.    Stefany Huddleston LPN............7/10/2019 8:37 AM

## 2019-07-10 NOTE — TELEPHONE ENCOUNTER
Order faxed to [facility] for wrist splint and inter office envelope sent down to [facility] [employer].    Notified patient order has been sent and paperwork routed.  Stefany Huddleston LPN............7/10/2019 1:04 PM

## 2019-07-19 ENCOUNTER — HOSPITAL ENCOUNTER (OUTPATIENT)
Dept: GENERAL RADIOLOGY | Facility: OTHER | Age: 44
End: 2019-07-19
Attending: SPECIALIST
Payer: COMMERCIAL

## 2019-07-19 ENCOUNTER — HOSPITAL ENCOUNTER (OUTPATIENT)
Dept: GENERAL RADIOLOGY | Facility: OTHER | Age: 44
Discharge: HOME | End: 2019-07-19
Attending: SPECIALIST | Admitting: SPECIALIST
Payer: COMMERCIAL

## 2019-07-19 ENCOUNTER — OFFICE VISIT (OUTPATIENT)
Dept: ORTHOPEDICS | Facility: OTHER | Age: 44
End: 2019-07-19
Attending: NURSE PRACTITIONER
Payer: COMMERCIAL

## 2019-07-19 DIAGNOSIS — S52.125D CLOSED NONDISPLACED FRACTURE OF HEAD OF LEFT RADIUS WITH ROUTINE HEALING, SUBSEQUENT ENCOUNTER: ICD-10-CM

## 2019-07-19 DIAGNOSIS — M25.532 LEFT WRIST PAIN: Primary | ICD-10-CM

## 2019-07-19 DIAGNOSIS — S52.125D CLOSED NONDISPLACED FRACTURE OF HEAD OF LEFT RADIUS WITH ROUTINE HEALING, SUBSEQUENT ENCOUNTER: Primary | ICD-10-CM

## 2019-07-19 DIAGNOSIS — M25.532 LEFT WRIST PAIN: ICD-10-CM

## 2019-07-19 PROCEDURE — G0463 HOSPITAL OUTPT CLINIC VISIT: HCPCS | Performed by: SPECIALIST

## 2019-07-19 PROCEDURE — 73110 X-RAY EXAM OF WRIST: CPT | Mod: LT

## 2019-07-19 PROCEDURE — 73080 X-RAY EXAM OF ELBOW: CPT | Mod: LT

## 2019-07-23 NOTE — PROGRESS NOTES
VITALS:   Height:   66  Weight:   180      CHIEF COMPLAINT: Left Elbow & Left Wrist Fracture    PROBLEMS:   Patient has no noted problems.    PATIENT REPORTED MEDICATIONS:  PROBIOTIC CAPSULE (SACCHAROMYCES BOULARDII CAPS)   MULTIVITAMIN ADULT ORAL TABLET (MULTIPLE VITAMINS-MINERALS) ; Route: ORAL  FISH OIL CAPSULE (OMEGA-3 FATTY ACIDS CAPS)   NAPROXEN SODIUM CAPSULE (NAPROXEN SODIUM CAPS)   IBUPROFEN CAPSULE (IBUPROFEN CAPS)   LEXAPRO 10 MG ORAL TABLET (ESCITALOPRAM OXALATE) ; Route: ORAL  FISH OIL CAPSULE (OMEGA-3 FATTY ACIDS CAPS)   VITAMIN D TABLET (CHOLECALCIFEROL TABS)   CELEXA TABLET (CITALOPRAM HYDROBROMIDE TABS)   ZYRTEC ALLERGY TABLET (CETIRIZINE HCL TABS)     Medications were reviewed with patient this visit.    PATIENT REPORTED ALLERGIES:  * ENVIRONMENTAL/HAY FEVER (SevereReaction: No reaction details noted)  * MOLD (SevereReaction: Unknown)    Allergies were reviewed during this visit.    RISK FACTORS:  Tobacco use:   never smoker  Passive smoke exposure: No  Alcohol Use:   Yes    HISTORY OF PRESENT ILLNESS:    Gauri returns for followup.    She is status post left radial head and dorsal triquetral chip fracture which occurred on 06/30/19 when she fell.    She has been splinted.   She is unable to work because of this injury.      PMH:   Health history form dated 07/19/19 is reviewed and signed.  Past medical history, surgical history, social history, family history, medications, and review of systems is noted and scanned into the chart.     PAST MEDICAL HISTORY:    Arthritis - Right Knee, Cervical Spine    PAST ORTHOPEDIC SURGICAL HISTORY:    Left Elbow Dislocation, Reduced Under Anesthesia in 1993    PAST SURGICAL HISTORY:    Dilatation & Curettage x2  Sinus Surgery  Post Partum Retained Placenta    FAMILY HISTORY:    Father:  MI at 42, Diabetes, Heart Disease,  MI at 70, High Blood Pressure  Mother:  Breast Cancer  Brother:  PTSD, Congenital Heart Problem or Rheumatic Fever Sequelae, Anxiety  Sister:  " Hypothyroidism    SOCIAL HISTORY:   Occupation:  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Marital Status:    Alcohol Use:  Yes  Tobacco Use:  Never  Secondhand Smoke Exposure:  No  History of HIV:  No  History of Hepatitis:  No    REVIEW OF SYSTEMS:  Joint or Muscle pain: Yes  Stiffness:  Yes  Swelling:  Yes  Difficulty in walking: No  Cold extremities: No  Weakness of muscles: No  Rash or Itching: No  Bruising:  Yes    PHYSICAL EXAMINATION:    General:    Physical examination today reveals a 43-year-old female.  Height: 5' 6\",  Weight: 180 pounds.  The patient is pleasant, alert, oriented x3, appropriate, in no acute distress.    The patient's gait and station are appropriate.  The patient is well groomed, well kempt.  Skin is healthy and intact with no erythema, warmth, rashes, or bruising.   Normal capillary refill.  Pulses are palpable.  Motor is five out of five in all muscle groups tested.   Sensory exam is intact.    Musculoskeletal:        Examination of both upper extremities shows full range of motion of shoulders and right elbow.  Left elbow shows a 30  to 100  arc of motion, pronation and supination intact.   She has tenderness with palpation over the distal radius.   Digital range of motion is full.      X-RAY:  Plain film radiographs are reviewed including AP and lateral views of the left elbow.   These reveal nondisplaced radial head fracture.  AP, lateral, oblique views of the left wrist reveal a dorsal triquetral chip fracture.    ASSESSMENT:    Left radial head fracture with left dorsal triquetral chip fracture    PLAN:   My recommendation is to proceed with nonoperative treatment.     We will initiate therapy for range of motion exercise of the elbow.     She will be off work.  We will reassess her in three weeks' time with x-rays at that time if clinically indicated.    Dictated by Preet Almonte M.D.  D:  07/19/19  T:   07/23/19    Typed and/or reviewed and corrected by signing  below, and " sent to the Physician for final review and signature.     This report was created using voice recording software and computer-generated templates. Although every effort has been made to review for and eliminate errors, some errors may still occur.         Electronically signed by Marisol Boothe  on 07/23/2019 at 3:55 PM    Electronically signed by Preet Almonte MD on 07/23/2019 at 4:09 PM  ________________________________________________________________________

## 2019-07-31 ENCOUNTER — HOSPITAL ENCOUNTER (OUTPATIENT)
Dept: OCCUPATIONAL THERAPY | Facility: OTHER | Age: 44
Setting detail: THERAPIES SERIES
End: 2019-07-31
Attending: SPECIALIST
Payer: COMMERCIAL

## 2019-07-31 DIAGNOSIS — S69.92XA LEFT WRIST INJURY: ICD-10-CM

## 2019-07-31 PROCEDURE — 97165 OT EVAL LOW COMPLEX 30 MIN: CPT | Mod: GO | Performed by: OCCUPATIONAL THERAPIST

## 2019-07-31 PROCEDURE — 97140 MANUAL THERAPY 1/> REGIONS: CPT | Mod: GO | Performed by: OCCUPATIONAL THERAPIST

## 2019-07-31 PROCEDURE — 97110 THERAPEUTIC EXERCISES: CPT | Mod: GO | Performed by: OCCUPATIONAL THERAPIST

## 2019-08-01 NOTE — PROGRESS NOTES
"   07/31/19 0900   Quick Adds   Type of Visit Initial Outpatient Occupational Therapy Evaluation   General Information   Start Of Care Date 07/31/19   Referring Physician DR. Almonte   Orders Evaluate and treat as indicated   Orders Date 07/22/19   Medical Diagnosis Left wrist injur, non displaced radial head fracture   Onset of Illness/Injury or Date of Surgery 06/30/19   Surgical/Medical History Reviewed Yes   Additional Occupational Profile Info/Pertinent History of Current Problem Gauri fell of a short ladder while cleaning resulting in a nondisplaced radial head fracture and an avulsion fracture of the triquetrium. She was in a sling for 2 weeks and has bee =n in a wrist brace now for over 4 weeks. She reports pain wit certian movement in both th ewrist and elbow. Elbow extension is limited. She has pian with lifting items with wrist in neutral.  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    Role/Living Environment   Patient/family Goals Statement \"to be able to return to work with out injurying myself\"   Pain   Patient currently in pain Yes   Pain rating 0-4   Pain description Ache;Discomfort;Dull   Fall Risk Screen   Fall screen completed by OT   Have you fallen 2 or more times in the past year? No   Have you fallen and had an injury in the past year? Yes   Is patient a fall risk? No   Fall screen comments fall off ladder   Abuse Screen (yes response referral indicated)   Feels Unsafe at Home or Work/School no   Feels Threatened by Someone no   Does Anyone Try to Keep You From Having Contact with Others or Doing Things Outside Your Home? no   Physical Signs of Abuse Present no   Left Elbow Extension/Flexion ROM   Left Elbow Extension/Flexion AROM - degrees --9 to 141   Right Elbow Extension/Flexion ROM   Right Elbow Extension/Flexion AROM - degrees 0-145   Right Forearm Supination ROM   Right Forearm Supination AROM - degrees 0-90 "   Right Forearm Pronation ROM   Right Forearm Pronation AROM - degrees 0-90   Left Wrist Flexion ROM   Left Wrist Flexion AROM - degrees 77   Left Wrist Extension ROM   Left Wrist Extension AROM - degrees 56   Left Wrist Ulnar Deviation ROM   Left Wrist Ulnar Deviation AROM - degrees 22   Left Wrist Radial Deviation ROM   Left Wrist Radial Deviation AROM - degrees 16   Right Wrist Flexion ROM   Right Wrist Flexion AROM - degrees 90   Right Wrist Extension ROM   Right Wrist Extension AROM - degrees 75   Right Wrist Ulnar Deviation ROM   Right Wrist Ulnar Deviation AROM - degrees 32   Right Wrist Radial Deviation ROM   Right Wrist Radial Deviation AROM - degrees 28   Hand Strength   Hand Dominance Right   Left Hand  (pounds) 33 pounds   Right Hand  (pounds) 67 pounds   Left Lateral Pinch (pounds) 11 pounds   Right Lateral Pinch (pounds) 15 pounds   Left Three Point Pinch (pounds) 6 pounds   Right Three Point Pinch (pounds) 8 pounds   Planned Therapy Interventions   Planned Therapy Interventions Manual therapy;Joint mobilization;Orthotic fitting/training;Strengthening;Stretching;Therapeutic activities   Planned Modalities Ultrasound;Paraffin bath    OT Goal 1   Goal Identifier STG 1   Goal Description Pt. will report the ability to grasp a coffee cup in her left hand ans bring to mouth iwith pain levels < 3/10   Target Date 08/22/19    OT Goal 2   Goal Identifier STG2    Goal Description Pateint will report hte ability to lift up to 10# with pain levels < 3/10   Target Date 09/12/19    OT Goal 3   Goal Identifier STG3   Goal Description patient will report chrystal ability to push down on her left wrist in extension to perform rosie techniques with pain levels < 3/10   Target Date 09/26/19   OT Goal 4   Goal Identifier LTG   Goal Description pateint will report the abiity to return to work and perform rosie techniques with pian <3/10   Target Date 10/10/19   Clinical Impression   Criteria for Skilled Therapeutic  Interventions Met Yes, treatment indicated   OT Diagnosis  decline n wrok , leisure, grasping,    Influenced by the following impairments pain, weakness,    Identified Performance Deficits 1   Clinical Decision Making (Complexity) Low complexity   Therapy Frequency 2x/week 8 weeks   Predicted Duration of Therapy Intervention (days/wks) 6 visits   Risks and Benefits of Treatment have been explained. Yes   Patient, Family & other staff in agreement with plan of care Yes   Clinical Impression Comments aobut -9 degress elbow extension lag at elbow. wrist limited by pain. ableto achieve close to ull elbow ROM   Education Assessment   Barriers To Learning No Barriers   Preferred Learning Style Listening;Demonstration;Pictures/video   Total Evaluation Time   OT Eval, Low Complexity Minutes (71986) 15

## 2019-08-05 ENCOUNTER — HOSPITAL ENCOUNTER (OUTPATIENT)
Dept: OCCUPATIONAL THERAPY | Facility: OTHER | Age: 44
Setting detail: THERAPIES SERIES
End: 2019-08-05
Attending: SPECIALIST
Payer: COMMERCIAL

## 2019-08-05 PROCEDURE — 97140 MANUAL THERAPY 1/> REGIONS: CPT | Mod: GO

## 2019-08-05 PROCEDURE — 97110 THERAPEUTIC EXERCISES: CPT | Mod: GO

## 2019-08-05 PROCEDURE — 97035 APP MDLTY 1+ULTRASOUND EA 15: CPT | Mod: GO

## 2019-08-07 ENCOUNTER — HOSPITAL ENCOUNTER (OUTPATIENT)
Dept: OCCUPATIONAL THERAPY | Facility: OTHER | Age: 44
Setting detail: THERAPIES SERIES
End: 2019-08-07
Attending: SPECIALIST
Payer: COMMERCIAL

## 2019-08-07 DIAGNOSIS — M25.532 LEFT WRIST PAIN: ICD-10-CM

## 2019-08-07 DIAGNOSIS — S52.125D CLOSED NONDISPLACED FRACTURE OF HEAD OF LEFT RADIUS WITH ROUTINE HEALING, SUBSEQUENT ENCOUNTER: Primary | ICD-10-CM

## 2019-08-07 PROCEDURE — 97140 MANUAL THERAPY 1/> REGIONS: CPT | Mod: GO | Performed by: OCCUPATIONAL THERAPIST

## 2019-08-07 PROCEDURE — 97110 THERAPEUTIC EXERCISES: CPT | Mod: GO | Performed by: OCCUPATIONAL THERAPIST

## 2019-08-07 NOTE — ADDENDUM NOTE
Encounter addended by: Fabiola Manriquez OT on: 8/7/2019 2:28 PM   Actions taken: Charge Capture section accepted

## 2019-08-09 ENCOUNTER — HOSPITAL ENCOUNTER (OUTPATIENT)
Dept: GENERAL RADIOLOGY | Facility: OTHER | Age: 44
Discharge: HOME | End: 2019-08-09
Attending: SPECIALIST | Admitting: SPECIALIST
Payer: COMMERCIAL

## 2019-08-09 ENCOUNTER — HOSPITAL ENCOUNTER (OUTPATIENT)
Dept: GENERAL RADIOLOGY | Facility: OTHER | Age: 44
End: 2019-08-09
Attending: SPECIALIST
Payer: COMMERCIAL

## 2019-08-09 ENCOUNTER — OFFICE VISIT (OUTPATIENT)
Dept: ORTHOPEDICS | Facility: OTHER | Age: 44
End: 2019-08-09
Attending: SPECIALIST
Payer: COMMERCIAL

## 2019-08-09 DIAGNOSIS — S52.125D CLOSED NONDISPLACED FRACTURE OF HEAD OF LEFT RADIUS WITH ROUTINE HEALING, SUBSEQUENT ENCOUNTER: ICD-10-CM

## 2019-08-09 DIAGNOSIS — Z00.00 ROUTINE GENERAL MEDICAL EXAMINATION AT A HEALTH CARE FACILITY: Primary | ICD-10-CM

## 2019-08-09 DIAGNOSIS — M25.532 LEFT WRIST PAIN: ICD-10-CM

## 2019-08-09 PROCEDURE — 73080 X-RAY EXAM OF ELBOW: CPT | Mod: LT

## 2019-08-09 PROCEDURE — 73110 X-RAY EXAM OF WRIST: CPT | Mod: LT

## 2019-08-09 PROCEDURE — G0463 HOSPITAL OUTPT CLINIC VISIT: HCPCS | Performed by: SPECIALIST

## 2019-08-12 ENCOUNTER — HOSPITAL ENCOUNTER (OUTPATIENT)
Dept: OCCUPATIONAL THERAPY | Facility: OTHER | Age: 44
Setting detail: THERAPIES SERIES
End: 2019-08-12
Attending: SPECIALIST
Payer: COMMERCIAL

## 2019-08-12 PROCEDURE — 97018 PARAFFIN BATH THERAPY: CPT | Mod: GO

## 2019-08-12 PROCEDURE — 97140 MANUAL THERAPY 1/> REGIONS: CPT | Mod: GO

## 2019-08-12 PROCEDURE — 97110 THERAPEUTIC EXERCISES: CPT | Mod: GO

## 2019-08-14 NOTE — PROGRESS NOTES
CHIEF COMPLAINT: Left Elbow & Left Wrist Fracture Recheck    PROBLEMS:   Patient has no noted problems.    PATIENT REPORTED MEDICATIONS:  PROBIOTIC CAPSULE (SACCHAROMYCES BOULARDII CAPS)   MULTIVITAMIN ADULT ORAL TABLET (MULTIPLE VITAMINS-MINERALS) ; Route: ORAL  FISH OIL CAPSULE (OMEGA-3 FATTY ACIDS CAPS)   NAPROXEN SODIUM CAPSULE (NAPROXEN SODIUM CAPS)   IBUPROFEN CAPSULE (IBUPROFEN CAPS)   LEXAPRO 10 MG ORAL TABLET (ESCITALOPRAM OXALATE) ; Route: ORAL  FISH OIL CAPSULE (OMEGA-3 FATTY ACIDS CAPS)   VITAMIN D TABLET (CHOLECALCIFEROL TABS)   CELEXA TABLET (CITALOPRAM HYDROBROMIDE TABS)   ZYRTEC ALLERGY TABLET (CETIRIZINE HCL TABS)     PATIENT REPORTED ALLERGIES:  * ENVIRONMENTAL/HAY FEVER (SevereReaction: No reaction details noted)  * MOLD (SevereReaction: Unknown)      HISTORY OF PRESENT ILLNESS:    Gauri returns for followup with respect to her left elbow radial head fracture and dorsal triquetral chip fracture.    She has noted improving function but she does not feel quite ready to return to the rigors of work.   She is doing therapy.    PAST MEDICAL HISTORY:    Arthritis - Right Knee, Cervical Spine    PAST ORTHOPEDIC SURGICAL HISTORY:    Left Elbow Dislocation, Reduced Under Anesthesia in 1993    PAST SURGICAL HISTORY:    Dilatation & Curettage x2  Sinus Surgery  Post Partum Retained Placenta    FAMILY HISTORY:    Father:  MI at 42, Diabetes, Heart Disease,  MI at 70, High Blood Pressure  Mother:  Breast Cancer  Brother:  PTSD, Congenital Heart Problem or Rheumatic Fever Sequelae, Anxiety  Sister:  Hypothyroidism    SOCIAL HISTORY:   Occupation:  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Marital Status:    Alcohol Use:  Yes  Tobacco Use:  Never  Secondhand Smoke Exposure:  No  History of HIV:  No  History of Hepatitis:  No    PHYSICAL EXAMINATION:    General:    The patient is pleasant, alert, oriented x3, appropriate, in no acute distress.    Elbow range of motion is 5  short of full extension with flexion to 130 , pronation  and supination is full.    X-RAY:  X-rays are reviewed including AP, lateral, oblique views of the left elbow as well as the left wrist.  The left elbow shows nondisplaced healing radial fracture.   Wrist films reveal dorsal triquetral chip.    ASSESSMENT:    Status post left radial head fracture with dorsal triquetral chip    PLAN:   At this point our plan will be to return to work after two to three weeks with a limited schedule to start, advancing as tolerated.      I will see her in about four weeks to monitor her progress, sooner if any problems occur.    Dictated by Preet Almonte M.D.  D:  08/09/19  T:   08/14/19    Typed and/or reviewed and corrected by signing  below, and sent to the Physician for final review and signature.     This report was created using voice recording software and computer-generated templates. Although every effort has been made to review for and eliminate errors, some errors may still occur.     Electronically signed by Marisol Boothe  on 08/14/2019 at 1:59 PM    ________________________________________________________________________

## 2019-08-20 ENCOUNTER — HOSPITAL ENCOUNTER (OUTPATIENT)
Dept: OCCUPATIONAL THERAPY | Facility: OTHER | Age: 44
Setting detail: THERAPIES SERIES
End: 2019-08-20
Attending: SPECIALIST
Payer: COMMERCIAL

## 2019-08-20 PROCEDURE — 97110 THERAPEUTIC EXERCISES: CPT | Mod: GO | Performed by: OCCUPATIONAL THERAPIST

## 2019-08-20 PROCEDURE — 97140 MANUAL THERAPY 1/> REGIONS: CPT | Mod: GO | Performed by: OCCUPATIONAL THERAPIST

## 2019-08-29 ENCOUNTER — TELEPHONE (OUTPATIENT)
Dept: ORTHOPEDICS | Facility: OTHER | Age: 44
End: 2019-08-29

## 2019-08-29 NOTE — TELEPHONE ENCOUNTER
Suzy-Patient called and requested to speak with Bel. She stated she feels she needs a new work ability form, she stated the one she has is to start next week with reduced work load she feels she is not 100% and may need it modified. Please call pt to advise.  Asif Villarreal on 8/29/2019 at 12:39 PM

## 2019-09-03 ENCOUNTER — HOSPITAL ENCOUNTER (OUTPATIENT)
Dept: OCCUPATIONAL THERAPY | Facility: OTHER | Age: 44
Setting detail: THERAPIES SERIES
End: 2019-09-03
Attending: INTERNAL MEDICINE
Payer: COMMERCIAL

## 2019-09-03 PROCEDURE — 97140 MANUAL THERAPY 1/> REGIONS: CPT | Mod: GO

## 2019-09-03 PROCEDURE — 97035 APP MDLTY 1+ULTRASOUND EA 15: CPT | Mod: GO

## 2019-09-03 PROCEDURE — 97110 THERAPEUTIC EXERCISES: CPT | Mod: GO

## 2019-09-04 NOTE — TELEPHONE ENCOUNTER
I talked to patient. I sent a message to Dr. Almonte. I will have him call her tomorrow when he is in clinic in [place].

## 2019-09-05 ENCOUNTER — HOSPITAL ENCOUNTER (OUTPATIENT)
Dept: OCCUPATIONAL THERAPY | Facility: OTHER | Age: 44
Setting detail: THERAPIES SERIES
End: 2019-09-05
Attending: INTERNAL MEDICINE
Payer: COMMERCIAL

## 2019-09-05 PROCEDURE — 97110 THERAPEUTIC EXERCISES: CPT | Mod: GO

## 2019-09-05 PROCEDURE — 97140 MANUAL THERAPY 1/> REGIONS: CPT | Mod: GO

## 2019-09-05 PROCEDURE — 97018 PARAFFIN BATH THERAPY: CPT | Mod: GO

## 2019-09-10 ENCOUNTER — HOSPITAL ENCOUNTER (OUTPATIENT)
Dept: OCCUPATIONAL THERAPY | Facility: OTHER | Age: 44
Setting detail: THERAPIES SERIES
End: 2019-09-10
Attending: INTERNAL MEDICINE
Payer: COMMERCIAL

## 2019-09-10 PROCEDURE — 97110 THERAPEUTIC EXERCISES: CPT | Mod: GO | Performed by: OCCUPATIONAL THERAPIST

## 2019-09-10 PROCEDURE — 97140 MANUAL THERAPY 1/> REGIONS: CPT | Mod: GO | Performed by: OCCUPATIONAL THERAPIST

## 2019-09-10 PROCEDURE — 97018 PARAFFIN BATH THERAPY: CPT | Mod: GO | Performed by: OCCUPATIONAL THERAPIST

## 2019-09-11 DIAGNOSIS — S52.125D CLOSED NONDISPLACED FRACTURE OF HEAD OF LEFT RADIUS WITH ROUTINE HEALING, SUBSEQUENT ENCOUNTER: Primary | ICD-10-CM

## 2019-09-11 DIAGNOSIS — S62.102D CLOSED FRACTURE OF LEFT WRIST WITH ROUTINE HEALING, SUBSEQUENT ENCOUNTER: ICD-10-CM

## 2019-09-13 ENCOUNTER — HOSPITAL ENCOUNTER (OUTPATIENT)
Dept: GENERAL RADIOLOGY | Facility: OTHER | Age: 44
Discharge: HOME | End: 2019-09-13
Attending: SPECIALIST | Admitting: SPECIALIST
Payer: COMMERCIAL

## 2019-09-13 ENCOUNTER — OFFICE VISIT (OUTPATIENT)
Dept: ORTHOPEDICS | Facility: OTHER | Age: 44
End: 2019-09-13
Attending: SPECIALIST
Payer: COMMERCIAL

## 2019-09-13 ENCOUNTER — HOSPITAL ENCOUNTER (OUTPATIENT)
Dept: OCCUPATIONAL THERAPY | Facility: OTHER | Age: 44
Setting detail: THERAPIES SERIES
End: 2019-09-13
Attending: INTERNAL MEDICINE
Payer: COMMERCIAL

## 2019-09-13 ENCOUNTER — HOSPITAL ENCOUNTER (OUTPATIENT)
Dept: GENERAL RADIOLOGY | Facility: OTHER | Age: 44
End: 2019-09-13
Attending: SPECIALIST
Payer: COMMERCIAL

## 2019-09-13 DIAGNOSIS — S62.102D CLOSED FRACTURE OF LEFT WRIST WITH ROUTINE HEALING, SUBSEQUENT ENCOUNTER: ICD-10-CM

## 2019-09-13 DIAGNOSIS — S52.125D CLOSED NONDISPLACED FRACTURE OF HEAD OF LEFT RADIUS WITH ROUTINE HEALING, SUBSEQUENT ENCOUNTER: ICD-10-CM

## 2019-09-13 DIAGNOSIS — Z00.00 ROUTINE GENERAL MEDICAL EXAMINATION AT A HEALTH CARE FACILITY: Primary | ICD-10-CM

## 2019-09-13 PROCEDURE — 97018 PARAFFIN BATH THERAPY: CPT | Mod: GO | Performed by: OCCUPATIONAL THERAPIST

## 2019-09-13 PROCEDURE — G0463 HOSPITAL OUTPT CLINIC VISIT: HCPCS | Performed by: SPECIALIST

## 2019-09-13 PROCEDURE — 73080 X-RAY EXAM OF ELBOW: CPT | Mod: LT

## 2019-09-13 PROCEDURE — 97110 THERAPEUTIC EXERCISES: CPT | Mod: GO | Performed by: OCCUPATIONAL THERAPIST

## 2019-09-13 PROCEDURE — 73110 X-RAY EXAM OF WRIST: CPT | Mod: LT

## 2019-09-13 PROCEDURE — 97140 MANUAL THERAPY 1/> REGIONS: CPT | Mod: GO | Performed by: OCCUPATIONAL THERAPIST

## 2019-09-17 ENCOUNTER — HOSPITAL ENCOUNTER (OUTPATIENT)
Dept: OCCUPATIONAL THERAPY | Facility: OTHER | Age: 44
Setting detail: THERAPIES SERIES
End: 2019-09-17
Attending: INTERNAL MEDICINE
Payer: COMMERCIAL

## 2019-09-17 PROCEDURE — 97035 APP MDLTY 1+ULTRASOUND EA 15: CPT | Mod: GO | Performed by: OCCUPATIONAL THERAPIST

## 2019-09-17 PROCEDURE — 97110 THERAPEUTIC EXERCISES: CPT | Mod: GO | Performed by: OCCUPATIONAL THERAPIST

## 2019-09-17 PROCEDURE — 97140 MANUAL THERAPY 1/> REGIONS: CPT | Mod: GO | Performed by: OCCUPATIONAL THERAPIST

## 2019-09-18 NOTE — PROGRESS NOTES
CHIEF COMPLAINT: Left Wrist & Left Elbow Fracture Recheck    PROBLEMS:   Patient has no noted problems.    PATIENT REPORTED MEDICATIONS:  PROBIOTIC CAPSULE (SACCHAROMYCES BOULARDII CAPS)   MULTIVITAMIN ADULT ORAL TABLET (MULTIPLE VITAMINS-MINERALS) ; Route: ORAL  FISH OIL CAPSULE (OMEGA-3 FATTY ACIDS CAPS)   NAPROXEN SODIUM CAPSULE (NAPROXEN SODIUM CAPS)   IBUPROFEN CAPSULE (IBUPROFEN CAPS)   LEXAPRO 10 MG ORAL TABLET (ESCITALOPRAM OXALATE) ; Route: ORAL  FISH OIL CAPSULE (OMEGA-3 FATTY ACIDS CAPS)   VITAMIN D TABLET (CHOLECALCIFEROL TABS)   CELEXA TABLET (CITALOPRAM HYDROBROMIDE TABS)   ZYRTEC ALLERGY TABLET (CETIRIZINE HCL TABS)     PATIENT REPORTED ALLERGIES:  * ENVIRONMENTAL/HAY FEVER (SevereReaction: No reaction details noted)  * MOLD (SevereReaction: Unknown)      HISTORY OF PRESENT ILLNESS:    Gauri returns for followup 10  weeks status post left radial head fracture and left dorsal triquetral chip fracture.   She is back today.   She feels about 85% better.   She has been doing some limited work activities.   She feels she would like to continue with these for a time prior to returning to regular activities.       PAST MEDICAL HISTORY:    Arthritis - Right Knee, Cervical Spine    PAST ORTHOPEDIC SURGICAL HISTORY:    Left Elbow Dislocation, Reduced Under Anesthesia in 1993    PAST SURGICAL HISTORY:    Dilatation & Curettage x2  Sinus Surgery  Post Partum Retained Placenta    FAMILY HISTORY:    Father:  MI at 42, Diabetes, Heart Disease,  MI at 70, High Blood Pressure  Mother:  Breast Cancer  Brother:  PTSD, Congenital Heart Problem or Rheumatic Fever Sequelae, Anxiety  Sister:  Hypothyroidism    SOCIAL HISTORY:   Occupation:  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Marital Status:    Alcohol Use:  Yes  Tobacco Use:  Never  Secondhand Smoke Exposure:  No  History of HIV:  No  History of Hepatitis:  No    PHYSICAL EXAMINATION:    Physical examination today reveals a mild flexion contracture of her left elbow compared to  slight hyperextension on the contralateral side.   Her pronation and supination is full.        X-RAY:  X-rays were reviewed.   AP, lateral, oblique views of the left elbow as well as AP, lateral, oblique views of the left wrist.   Radial fracture appears to be healing in good position.    There is no significant effusion.   Dorsal triquetral fracture is not visualized.        ASSESSMENT:    10  weeks status post left radial head fracture with dorsal triquetral chip fracture    PLAN:   At this point I think she is progressing nicely.   I think it would be reasonable to continue with some limited duties for the next 1  weeks or so, advancing as tolerated.     We will see her back if her symptoms persist in about four weeks.    Dictated by Preet Almonte M.D.  D:  09/13/19  T:   09/17/19    Typed and/or reviewed and corrected by signing  below, and sent to the Physician for final review and signature.     This report was created using voice recording software and computer-generated templates. Although every effort has been made to review for and eliminate errors, some errors may still occur.       Electronically signed by Marisol Boothe  on 09/17/2019 at 12:52 PM    Electronically signed by Preet Almonte MD on 09/17/2019 at 3:22 PM  ________________________________________________________________________

## 2019-09-18 NOTE — PROGRESS NOTES
Outpatient Occupational Therapy Progress Note     Patient: Gauri Hollis  : 1975    Beginning/End Dates of Reporting Period:  2019 to 2019    Referring Provider: Dr. Almonte    Therapy Diagnosis: Lt wrist injury, radial impingement and dorsal impingement,     Client Self Report:   Pt. Reports she still has pain in the radial head and dorsal wrist with full forceful wrist and elbow extension during chiro manipulations.     Objective Measurements:     Objective Measure: Pain   Details: 3/10 bothersome and annoying   Objective Measure: Hand strength:                                          Goals:     Goal Identifier STG 1   Goal Description Pt. will report the ability to grasp a coffee cup in her left hand ans bring to mouth iwith pain levels < 3/10   Target Date 19(not too bad for pain.  grabbing pain is still painful  2/10)   Date Met      Progress:  minimal pain with proper positioning.      Goal Identifier STG2    Goal Description Pateint will report hte ability to lift up to 10# with pain levels < 3/10   Target Date 19(minimal pain with proper positioning. )   Date Met      Progress:     Goal Identifier STG3   Goal Description patient will report chrystal ability to push down on her left wrist in extension to perform rosie techniques with pain levels < 3/10   Target Date 19(sharp on the radial head 6-7/10  eports about 75% )   Date Met      Progress:     Goal Identifier LTG   Goal Description pateint will report the abiity to return to work and perform manual techniques with pian <3/10   Target Date 10/10/19   Date Met      Progress:        Progress Toward Goals:   Progress this reporting period: Gauri has shown increase in hand and arm strength and is now able to lift light object with her left UE and have minimal to no pain. She is still having significant pian during some manipulation that limite her ability to perform her job completley.     Plan:  Continue therapy per  current plan of care.    Discharge:  No

## 2019-09-18 NOTE — PROGRESS NOTES
Outpatient Occupational Therapy Progress Note     Patient: Gauri Hollis  : 1975    Beginning/End Dates of Reporting Period:  2019 to 2019    Referring Provider: Dr. Almonte    Therapy Diagnosis: weakness, decline in wor and self cares.       Client Self Report: Gauri reports she is doing well as far as dialy activities are going. Sh eis still haivng some pain with certain Chiropractic manuveurs.       Objective Measurements:     Objective Measure: Pain   Details: 3/10 bothersome and annoying   Objective Measure: Hand strength:    Details: left: : 48 lateral: 11 tripod, 6  left:  : 33 lateral 11 pinch: 6   Objective Measure: wrist ROM: left:    Details: Ext; 70  Flex: 74 RD: 21 UD: 25                               Goals:     Goal Identifier STG 1   Goal Description Pt. will report the ability to grasp a coffee cup in her left hand ans bring to mouth iwith pain levels < 3/10   Target Date 19(not too bad for pain.  grabbing pain is still painful  2/10)   Date Met      Progress:     Goal Identifier STG2    Goal Description Pateint will report hte ability to lift up to 10# with pain levels < 3/10   Target Date 19(minimal pain with proper positioning. )   Date Met      Progress:     Goal Identifier STG3   Goal Description patient will report chrystal ability to push down on her left wrist in extension to perform rosie techniques with pain levels < 3/10   Target Date 19(sharp on the radial head 6-7/10  eports about 75% )   Date Met      Progress:     Goal Identifier LTG   Goal Description pateint will report the abiity to return to work and perform rosie techniques with pian <3/10   Target Date 10/10/19   Date Met      Progress:    Progress Toward Goals:   Progress this reporting period: Gauri has see good improvement in pian control and strength. She still feels some pain in her wrist ans radilhlead with certain chiropractic maneuvers that limit full return to work .      Plan:  Continue therapy per current plan of care.    Discharge:  No

## 2019-09-20 ENCOUNTER — HOSPITAL ENCOUNTER (OUTPATIENT)
Dept: OCCUPATIONAL THERAPY | Facility: OTHER | Age: 44
Setting detail: THERAPIES SERIES
End: 2019-09-20
Attending: SPECIALIST
Payer: COMMERCIAL

## 2019-09-20 PROCEDURE — 97035 APP MDLTY 1+ULTRASOUND EA 15: CPT | Mod: GO | Performed by: OCCUPATIONAL THERAPIST

## 2019-09-20 PROCEDURE — 97140 MANUAL THERAPY 1/> REGIONS: CPT | Mod: GO | Performed by: OCCUPATIONAL THERAPIST

## 2019-09-20 PROCEDURE — 97110 THERAPEUTIC EXERCISES: CPT | Mod: GO | Performed by: OCCUPATIONAL THERAPIST

## 2019-09-25 ENCOUNTER — HOSPITAL ENCOUNTER (OUTPATIENT)
Dept: OCCUPATIONAL THERAPY | Facility: OTHER | Age: 44
Setting detail: THERAPIES SERIES
End: 2019-09-25
Attending: SPECIALIST
Payer: COMMERCIAL

## 2019-09-25 PROCEDURE — 97110 THERAPEUTIC EXERCISES: CPT | Mod: GO | Performed by: OCCUPATIONAL THERAPIST

## 2019-09-25 PROCEDURE — 97140 MANUAL THERAPY 1/> REGIONS: CPT | Mod: GO | Performed by: OCCUPATIONAL THERAPIST

## 2019-09-25 NOTE — PROGRESS NOTES
Outpatient Occupational Therapy Progress Note     Patient: Gauri Hollis  : 1975    Beginning/End Dates of Reporting Period:  2019 to 2019    Referring Provider: Dr. Almonte    Therapy Diagnosis:  weakness, decline in wor and self cares weakness, decline in work and self cares    Client Self Report:        Objective Measurements:     Objective Measure: Pain   Details: 3/10 bothersome and annoying   Objective Measure: Hand strength:    Details: left: : 48 lateral: 11 tripod, 6  left:  : 33 lateral 11 pinch: 6   Objective Measure: wrist ROM: left:    Details: Ext; 70  Flex: 74 RD: 21 UD: 25                               Goals:     Goal Identifier STG 1   Goal Description Pt. will report the ability to grasp a coffee cup in her left hand ans bring to mouth iwith pain levels < 3/10   Target Date 19(not too bad for pain.  grabbing pain is still painful  /10)   Date Met      Progress:   2019 not too bad for pain.  grabbing pain is still painful  210     Goal Identifier STG2    Goal Description Pateint will report hte ability to lift up to 10# with pain levels < 3/10   Target Date 19(minimal pain with proper positioning. )   Date Met      Progress:       Goal Identifier STG3   Goal Description patient will report chrystal ability to push down on her left wrist in extension to perform rosie techniques with pain levels < 3/10   Target Date 19(sharp on the radial head 6-7/10  eports about 75% )   Date Met      Progress:  2019 minimal pain with proper positioning     Goal Identifier LTG   Goal Description pateint will report the abiity to return to work and perform rosie techniques with pian <3/10   Target Date 10/10/19   Date Met      Progress: 2019  sharp on the radial head 6-7/10  eports about 75%     Progress Toward Goals:   Progress this reporting period:  Gauri is making steady progress. Her ability to  and carry objects has improved. she is still  feeling pian with full wrist extension. She also has abnormal feelings I the elbow joint with full elbow flex/ext. she is able to perform some chiropractic maneuvers with modified technique.     Plan:  Continue therapy per current plan of care.    Discharge:  No      New plan of care from 9/25/2019 to 11/20/2019

## 2019-10-03 ENCOUNTER — HOSPITAL ENCOUNTER (OUTPATIENT)
Dept: OCCUPATIONAL THERAPY | Facility: OTHER | Age: 44
Setting detail: THERAPIES SERIES
End: 2019-10-03
Attending: SPECIALIST
Payer: COMMERCIAL

## 2019-10-03 PROCEDURE — 97140 MANUAL THERAPY 1/> REGIONS: CPT | Mod: GO | Performed by: OCCUPATIONAL THERAPIST

## 2019-10-03 PROCEDURE — 97110 THERAPEUTIC EXERCISES: CPT | Mod: GO | Performed by: OCCUPATIONAL THERAPIST

## 2019-10-10 ENCOUNTER — HOSPITAL ENCOUNTER (OUTPATIENT)
Dept: OCCUPATIONAL THERAPY | Facility: OTHER | Age: 44
Setting detail: THERAPIES SERIES
End: 2019-10-10
Attending: SPECIALIST
Payer: COMMERCIAL

## 2019-10-10 PROCEDURE — 97110 THERAPEUTIC EXERCISES: CPT | Mod: GO | Performed by: OCCUPATIONAL THERAPIST

## 2019-10-10 PROCEDURE — 97140 MANUAL THERAPY 1/> REGIONS: CPT | Mod: GO | Performed by: OCCUPATIONAL THERAPIST

## 2019-10-17 ENCOUNTER — HOSPITAL ENCOUNTER (OUTPATIENT)
Dept: OCCUPATIONAL THERAPY | Facility: OTHER | Age: 44
Setting detail: THERAPIES SERIES
End: 2019-10-17
Attending: SPECIALIST
Payer: COMMERCIAL

## 2019-10-17 PROCEDURE — 97110 THERAPEUTIC EXERCISES: CPT | Mod: GO | Performed by: OCCUPATIONAL THERAPIST

## 2019-10-17 PROCEDURE — 97140 MANUAL THERAPY 1/> REGIONS: CPT | Mod: GO | Performed by: OCCUPATIONAL THERAPIST

## 2019-10-24 ENCOUNTER — HOSPITAL ENCOUNTER (OUTPATIENT)
Dept: OCCUPATIONAL THERAPY | Facility: OTHER | Age: 44
Setting detail: THERAPIES SERIES
End: 2019-10-24
Attending: SPECIALIST
Payer: COMMERCIAL

## 2019-10-24 PROCEDURE — 97110 THERAPEUTIC EXERCISES: CPT | Mod: GO | Performed by: OCCUPATIONAL THERAPIST

## 2019-10-24 PROCEDURE — 97140 MANUAL THERAPY 1/> REGIONS: CPT | Mod: GO | Performed by: OCCUPATIONAL THERAPIST

## 2019-12-13 ENCOUNTER — HOSPITAL ENCOUNTER (OUTPATIENT)
Dept: MAMMOGRAPHY | Facility: OTHER | Age: 44
Discharge: HOME | End: 2019-12-13
Attending: INTERNAL MEDICINE | Admitting: INTERNAL MEDICINE
Payer: COMMERCIAL

## 2019-12-13 DIAGNOSIS — R92.8 ABNORMAL FINDING ON BREAST IMAGING: ICD-10-CM

## 2019-12-13 PROCEDURE — G0279 TOMOSYNTHESIS, MAMMO: HCPCS

## 2019-12-13 PROCEDURE — 77066 DX MAMMO INCL CAD BI: CPT

## 2019-12-27 NOTE — PROGRESS NOTES
Outpatient Occupational Therapy Discharge Note     Patient: Gauri Hollis  : 1975    Beginning/End Dates of Reporting Period:   2019 to 2019    Referring Provider: Dr. Almonte    Therapy Diagnosis: weakness, decline in wor and self cares weakness, decline in work and self cares    Client Self Report: Pt. reports attempting a chiropractic maneuver that required straight arm.  She felt a little instability at the elbow ans did not complete the maneuver.       Objective Measurements:     Objective Measure: Pain   Details: increase in soreness   Objective Measure: Hand strength:    Details: left: : 48 lateral: 11 tripod, 6  left:  : 33 lateral 11 pinch: 6   Objective Measure: wrist ROM: left:    Details: Ext; 70  Flex: 74 RD: 21 UD: 25  v           Goals:     Goal Identifier STG 1   Goal Description Pt. will report the ability to grasp a coffee cup in her left hand ans bring to mouth iwith pain levels < 3/10   Target Date 19(not too bad for pain.  grabbing pain is still painful  2/10)   Date Met      Progress:     Goal Identifier STG2    Goal Description Pateint will report hte ability to lift up to 10# with pain levels < 3/10   Target Date 19(minimal pain with proper positioning. )   Date Met      Progress:     Goal Identifier STG3   Goal Description patient will report chrystal ability to push down on her left wrist in extension to perform rosie techniques with pain levels < 3/10   Target Date 19(sharp on the radial head 6-7/10  eports about 75% )   Date Met      Progress:     Goal Identifier LTG   Goal Description pateint will report the abiity to return to work and perform rosie techniques with pian <3/10   Target Date 10/10/19   Date Met      Progress:        Progress Toward Goals:   Not assessed this period.    Plan:  Discharge from therapy.    Discharge:    Reason for Discharge: pre approved units met     Discharge Plan: Patient to continue home program.  
I will START or STAY ON the medications listed below when I get home from the hospital:    Percocet 5/325 oral tablet  -- 1 tab(s) by mouth every 6 hours, As Needed  -- Indication: For Pain control

## 2020-02-10 ENCOUNTER — HEALTH MAINTENANCE LETTER (OUTPATIENT)
Age: 45
End: 2020-02-10

## 2020-02-12 ENCOUNTER — OFFICE VISIT (OUTPATIENT)
Dept: FAMILY MEDICINE | Facility: OTHER | Age: 45
End: 2020-02-12
Attending: FAMILY MEDICINE
Payer: COMMERCIAL

## 2020-02-12 VITALS
BODY MASS INDEX: 30.86 KG/M2 | RESPIRATION RATE: 18 BRPM | HEART RATE: 76 BPM | TEMPERATURE: 97.6 F | DIASTOLIC BLOOD PRESSURE: 76 MMHG | HEIGHT: 66 IN | WEIGHT: 192 LBS | SYSTOLIC BLOOD PRESSURE: 110 MMHG

## 2020-02-12 DIAGNOSIS — Z00.00 PHYSICAL EXAM, ANNUAL: Primary | ICD-10-CM

## 2020-02-12 DIAGNOSIS — K62.3 RECTAL PROLAPSE: ICD-10-CM

## 2020-02-12 DIAGNOSIS — N64.4 BREAST PAIN, RIGHT: ICD-10-CM

## 2020-02-12 DIAGNOSIS — R12 HEARTBURN: ICD-10-CM

## 2020-02-12 DIAGNOSIS — J34.3 NASAL TURBINATE HYPERTROPHY: ICD-10-CM

## 2020-02-12 DIAGNOSIS — J32.4 CHRONIC PANSINUSITIS: ICD-10-CM

## 2020-02-12 DIAGNOSIS — F41.8 DEPRESSION WITH ANXIETY: ICD-10-CM

## 2020-02-12 PROCEDURE — 99396 PREV VISIT EST AGE 40-64: CPT | Performed by: FAMILY MEDICINE

## 2020-02-12 RX ORDER — ESCITALOPRAM OXALATE 10 MG/1
10 TABLET ORAL DAILY
Qty: 90 TABLET | Refills: 3 | Status: SHIPPED | OUTPATIENT
Start: 2020-02-12 | End: 2020-12-16

## 2020-02-12 RX ORDER — BUDESONIDE 0.5 MG/2ML
INHALANT ORAL
Qty: 3 BOX | Refills: 11 | Status: CANCELLED | OUTPATIENT
Start: 2020-02-12

## 2020-02-12 RX ORDER — AZELASTINE HYDROCHLORIDE, FLUTICASONE PROPIONATE 137; 50 UG/1; UG/1
1 SPRAY, METERED NASAL 2 TIMES DAILY
Qty: 1 BOTTLE | Refills: 11 | Status: CANCELLED | OUTPATIENT
Start: 2020-02-12

## 2020-02-12 RX ORDER — OMEPRAZOLE 20 MG/1
20 TABLET, DELAYED RELEASE ORAL DAILY
COMMUNITY
Start: 2020-02-12 | End: 2020-08-25

## 2020-02-12 ASSESSMENT — ENCOUNTER SYMPTOMS
ROS SKIN COMMENTS: DRYNESS
MYALGIAS: 1
EYES NEGATIVE: 1
CARDIOVASCULAR NEGATIVE: 1
JOINT SWELLING: 1

## 2020-02-12 ASSESSMENT — MIFFLIN-ST. JEOR: SCORE: 1547.66

## 2020-02-12 ASSESSMENT — PAIN SCALES - GENERAL: PAINLEVEL: NO PAIN (1)

## 2020-02-12 NOTE — PROGRESS NOTES
"Nursing Notes:   Elizabeth Malave LPN  2/12/2020  8:38 AM  Signed  Patient presents to the clinic today for a px.    Med rec complete.  Elizabeth Frieda CASTANEDA.................. 2/12/2020 8:33 AM      SUBJECTIVE:   CC:  Gauri Hollis is a 44 year old female who presents to clinic today for the following health issues:  Physical     HPI  Gauri Hollis is a 44 year old female who presents for physical.     Pap smear due 2023.  Mammogram was last done in December.  History of benign biopsy on the right.  She has some tenderness right areola - a little superiorly.  No nipple discharge.  Is not cyclical.       Asthma:  Better this year after her sinus surgery.  She can blow and get mucous out.  Tends to get some sinus headaches.    Cough and reflux symptoms:Started omeprazole a month ago - doesn't feel like pregnancy reflux or GI upset from spicy food.  It doesn't burn.  This helps some.      She added time back in with her schedule for a short stretch.  However, this interferes with her self care time and her own musculoskeletal recovery.      Depression and anxiety symptoms for which she is currently taking Lexapro 10 mg daily.  She is also having somewhat regular counseling sessions with Coni Valero.  She will find herself getting easily overwhelmed.  She finds her self feeling easily guilty.    Allergies   Allergen Reactions     Birch Trees      Cats Other (See Comments)     congestion     Chlorhexidine Itching     No rash.  Also felt \"irritable\" after using wipes.     Dogs      Mold      Current Outpatient Medications   Medication     albuterol (PROAIR RESPICLICK) 108 (90 Base) MCG/ACT AEPB inhaler     azelastine-fluticasone (DYMISTA) 137-50 MCG/ACT nasal spray     budesonide (PULMICORT) 0.5 MG/2ML neb solution     cetirizine (ZYRTEC) 10 MG tablet     Cholecalciferol (D 5000) 5000 UNITS TABS     escitalopram (LEXAPRO) 10 MG tablet     fish oil-omega-3 fatty acids 1000 MG capsule     magnesium 250 MG tablet     " multivitamin w/minerals (THERA-VIT-M) tablet     omeprazole (PRILOSEC OTC) 20 MG EC tablet     saccharomyces boulardii (FLORASTOR) 250 MG capsule     triamcinolone (KENALOG) 0.5 % cream     No current facility-administered medications for this visit.       Past Medical History:   Diagnosis Date     Anxiety disorder 2017     Bleeding in early pregnancy      Bronchitis 2012    Acute      Chronic lumbosacral pain 2015     Dislocation of ulnohumeral joint 1993    left; age 18     Epicondylitis, lateral (tennis elbow)-left 2018     Family history of malignant neoplasm of breast 2013     History of dysthymic disorder 2010    Resolved 13     Lesions of both ulnar nerves 2017     Medial epicondylitis of right elbow 2017     Medial epicondylitis, right 2017     Other enthesopathies, not elsewhere classified 2017     Other immediate postpartum hemorrhage 2011     Pregnancy      - PPH, retained placenta, transfusion     Vitamin D deficiency 2015      Past Surgical History:   Procedure Laterality Date     DILATION AND CURETTAGE  10/2011    for retained placenta     DILATION AND CURETTAGE  2015    for missed AB     DILATION AND CURETTAGE  2017    SAB     ENDOSCOPIC SINUS SURGERY N/A 2018    Procedure: ENDOSCOPIC SINUS SURGERY;  Surgeon: Dora Robins MD;  Location: [facility]     EXTRACTION(S) DENTAL      [place] Teeth     TURBINOPLASTY Bilateral 2018    Procedure: TURBINATE REDUCTION;  Surgeon: Dora Robins MD;  Location: [facility]     Family History   Problem Relation Age of Onset     Breast Cancer Mother 53        Cancer-breast     Diabetes Type 2  Mother         Diabetes type II     Heart Disease Father 42        Heart Disease,MI; history of rheumatic fever and asd     Diabetes Type 2  Father         Diabetes type II     Thyroid Disease Sister         Thyroid Disease,hypothyroidism     Depression Brother          "Depression       Review of Systems   Constitutional:        Weight gain   HENT:        Congestion has improved   Eyes: Negative.    Respiratory:        Snoring   Cardiovascular: Negative.    Gastrointestinal:        Possible rectal prolapse - feels enough irritation that this interferes with activity   - this is a couple of times a week     Breasts:  Positive for tenderness. Negative for discharge.   Genitourinary: Negative.    Musculoskeletal: Positive for joint swelling and myalgias.   Skin:        Dryness   Psychiatric/Behavioral: Positive for mood changes.        PHQ-2 Score:     PHQ-2 ( 1999 [employer]) 7/1/2019 5/24/2019   Q1: Little interest or pleasure in doing things 0 0   Q2: Feeling down, depressed or hopeless 0 0   PHQ-2 Score 0 0       DORIAN-7 SCORE 6/14/2018 8/3/2018 7/1/2019   Total Score 14 0 0       PHQ 9 score today is 8, DORIAN score today is 8    PHQ-9 SCORE 10/11/2017 6/14/2018 7/1/2019   PHQ-9 Total Score 8 9 0         OBJECTIVE:     /76   Pulse 76   Temp 97.6  F (36.4  C) (Tympanic)   Resp 18   Ht 1.676 m (5' 6\")   Wt 87.1 kg (192 lb)   LMP 02/07/2020   Breastfeeding No   BMI 30.99 kg/m    Body mass index is 30.99 kg/m .  Physical Exam  Vitals signs and nursing note reviewed.   Constitutional:       Appearance: Normal appearance.   Eyes:      Pupils: Pupils are equal, round, and reactive to light.   Cardiovascular:      Rate and Rhythm: Normal rate and regular rhythm.      Heart sounds: No murmur.   Pulmonary:      Effort: Pulmonary effort is normal.      Breath sounds: Normal breath sounds.   Chest:      Comments: Tenderness, mild, right breast superior to her areola  Fibrous changes noted  Neurological:      Mental Status: She is alert.   Psychiatric:         Thought Content: Thought content normal.         Judgment: Judgment normal.          No results found for any visits on 02/12/20.      ASSESSMENT/PLAN:       ICD-10-CM    1. Physical exam, annual Z00.00 Lipid Panel   2. Chronic " pansinusitis J32.4    3. Nasal turbinate hypertrophy J34.3    4. Rectal prolapse K62.3 GENERAL SURG ADULT REFERRAL   5. Breast pain, right N64.4    6. Depression with anxiety F41.8 escitalopram (LEXAPRO) 10 MG tablet   7. Heartburn R12 omeprazole (PRILOSEC OTC) 20 MG EC tablet            PLAN:  1.  She will return when fasting for labs.  2.  I do not detect findings on her current breast exam to warrant additional imaging.  3.  We discussed her rectal symptoms.  In the past, she had been diagnosed with hemorrhoids, diagnosed with pruritus a night.  These do not necessarily seem to match the symptoms that she is having however.  She is agreeable for general surgery consultation, perhaps anoscopy or colonoscopy, for initial evaluation.  4.  Continue Lexapro 10 mg daily for depression and anxiety symptoms.  Reviewed some persistent factors in her mood symptoms.  5.  Medications from ENT are reviewed.      GAIL SUE MD  [facility]    This note was created using voice recognition software and was screened for errors in transcription.

## 2020-02-12 NOTE — NURSING NOTE
Patient presents to the clinic today for a px.    Med rec complete.  Elizabeth Malave LPN.................. 2/12/2020 8:33 AM

## 2020-02-18 ENCOUNTER — HOSPITAL ENCOUNTER (OUTPATIENT)
Facility: OTHER | Age: 45
End: 2020-02-18
Attending: SURGERY | Admitting: SURGERY
Payer: COMMERCIAL

## 2020-02-18 ENCOUNTER — OFFICE VISIT (OUTPATIENT)
Dept: SURGERY | Facility: OTHER | Age: 45
End: 2020-02-18
Attending: FAMILY MEDICINE
Payer: COMMERCIAL

## 2020-02-18 VITALS
SYSTOLIC BLOOD PRESSURE: 114 MMHG | HEART RATE: 68 BPM | BODY MASS INDEX: 31.15 KG/M2 | TEMPERATURE: 98.1 F | WEIGHT: 193 LBS | RESPIRATION RATE: 16 BRPM | DIASTOLIC BLOOD PRESSURE: 70 MMHG

## 2020-02-18 DIAGNOSIS — K62.3 RECTAL PROLAPSE: Primary | ICD-10-CM

## 2020-02-18 DIAGNOSIS — R12 HEARTBURN: ICD-10-CM

## 2020-02-18 PROCEDURE — 99204 OFFICE O/P NEW MOD 45 MIN: CPT | Performed by: SURGERY

## 2020-02-18 ASSESSMENT — PAIN SCALES - GENERAL: PAINLEVEL: NO PAIN (0)

## 2020-02-18 NOTE — TELEPHONE ENCOUNTER
Screening Questions for the Scheduling of Screening Colonoscopies   (If Colonoscopy is diagnostic, Provider should review the chart before scheduling.)  Are you younger than 50 or older than 80?  Yes - Younger  Do you take aspirin or fish oil?  No (if yes, tell patient to stop 1 week prior to Colonoscopy)  Do you take warfarin (Coumadin), clopidogrel (Plavix), apixaban (Eliquis), dabigatram (Pradaxa), rivaroxaban (Xarelto) or any blood thinner? No  Do you use oxygen at home?  No  Do you have kidney disease? No  Are you on dialysis? No  Have you had a stroke or heart attack in the last year? No  Have you had a stent in your heart or any blood vessel in the last year? No  Have you had a transplant of any organ? No  Have you had a colonoscopy or upper endoscopy (EGD) before? No         When?  --  Date of scheduled Colonoscopy:  2.26.2020  Provider:  Dr. Ortega  Pharmacy:  [facility] on 2/18/2020 at 4:10 PM

## 2020-02-18 NOTE — NURSING NOTE
"Chief Complaint   Patient presents with     Consult     Rectal prolapse       Initial /70 (BP Location: Right arm, Patient Position: Sitting, Cuff Size: Adult Regular)   Pulse 68   Temp 98.1  F (36.7  C) (Tympanic)   Resp 16   Wt 87.5 kg (193 lb)   LMP 02/07/2020   BMI 31.15 kg/m   Estimated body mass index is 31.15 kg/m  as calculated from the following:    Height as of 2/12/20: 1.676 m (5' 6\").    Weight as of this encounter: 87.5 kg (193 lb).  Medication Reconciliation: complete    Tereza Kendrick LPN    "

## 2020-02-18 NOTE — PROGRESS NOTES
OFFICE CONSULTATION NOTE  Patient Name: Gauri Hollis  Address: [address] [place] [id number]  Age:44 year old  Sex: female     Primary Care Physician: Serene Vogel    I wasrequested to see this patient in consultation by SERENE SUE MD for evaluation of possible rectal prolapse. A copy of this note will be sent to SERENE SUE MD.    HPI:   The patient is 44 year old female with anal discomfort that has been present for years but over the last year or so is more bothersome. She feels a bump at times after a bowel movement. It seems to be different than her previous hemorrhoids. She also notes that she has some increased irritation if she is trying to exercise. She hasn't had any bleeding. There hasn't been any drainage. Bowel movements are soft to loose. The patient has a bowel movement daily. She has some lower abdomen aching. The patient has been doing [employer] meals so is getting more fiber. She has tried a probiotic in the past but not recently. She notes a feeling of epigastric pain and burning when she has an empty stomach. She has been taking omeprazole for a few weeks because of morning cough and snoring. Last colonoscopy: none.     CONSULTATION ASSESSMENT AND PLAN/RECOMMENDATIONS:   Assessment: reflux and anal discomfort-possible prolapse  Plan:   I discussed with the patient the pathophysiology of internal hemorrhoids and rectal prolapse. We also discussed reflux and gastritis and gastropathy. We discussed the risks, benefits and alternatives to diagnostic EGD and colonoscopy with possible banding of internal hemorrhoids for her symptoms. I specifically explained the risks of perforation, bleeding, possible inability to reach the cecum and hemorrhoid  recurrence.The patient expressed understanding and wishes to proceed. We will schedule this at a time that is is convenient for the patient. The patient will call with questions or concerns.The  patient denies questions at this time.    REVIEW OF SYSTEMS  GENERAL: No fevers or chills. Denies fatigue, recent weight loss.  HEENT: No sinus drainage. No changes with vision or hearing. No difficultyswallowing.   LYMPHATICS:  No swollen nodes in axilla, neck or groin.  CARDIOVASCULAR: Denies chest pain, palpitations and dyspnea on exertion.  PULMONARY: No shortness of breath or cough. No increase in sputum production.  GI: Denies melena, rare bright red blood with wiping. No hematemesis.  : No dysuria or hematuria.  SKIN: No recent rashes or ulcers.   HEMATOLOGY:  No history of easy bruising or bleeding.  ENDOCRINE:  No history of diabetes or thyroid problems.  NEUROLOGY:  No history of seizures or headaches. No motor or sensory changes.    Past Medical History:   Diagnosis Date     Anxiety disorder 2017     Bleeding in early pregnancy      Bronchitis 2012    Acute      Chronic lumbosacral pain 2015     Dislocation of ulnohumeral joint 1993    left; age 18     Epicondylitis, lateral (tennis elbow)-left 2018     Family history of malignant neoplasm of breast 2013     History of dysthymic disorder 2010    Resolved 13     Lesions of both ulnar nerves 2017     Medial epicondylitis of right elbow 2017     Medial epicondylitis, right 2017     Other enthesopathies, not elsewhere classified 2017     Other immediate postpartum hemorrhage 2011     Pregnancy      - PPH, retained placenta, transfusion     Vitamin D deficiency 2015     Past Surgical History:   Procedure Laterality Date     DILATION AND CURETTAGE  10/2011    for retained placenta     DILATION AND CURETTAGE  2015    for missed AB     DILATION AND CURETTAGE  2017    SAB     ENDOSCOPIC SINUS SURGERY N/A 2018    Procedure: ENDOSCOPIC SINUS SURGERY;  Surgeon: Dora Robins MD;  Location: [facility]     EXTRACTION(S) DENTAL      [place] Teeth     TURBINOPLASTY  "Bilateral 12/19/2018    Procedure: TURBINATE REDUCTION;  Surgeon: Dora Robins MD;  Location: [facility]     Current Outpatient Medications   Medication     albuterol (PROAIR RESPICLICK) 108 (90 Base) MCG/ACT AEPB inhaler     azelastine-fluticasone (DYMISTA) 137-50 MCG/ACT nasal spray     budesonide (PULMICORT) 0.5 MG/2ML neb solution     cetirizine (ZYRTEC) 10 MG tablet     Cholecalciferol (D 5000) 5000 UNITS TABS     escitalopram (LEXAPRO) 10 MG tablet     fish oil-omega-3 fatty acids 1000 MG capsule     magnesium 250 MG tablet     multivitamin w/minerals (THERA-VIT-M) tablet     omeprazole (PRILOSEC OTC) 20 MG EC tablet     saccharomyces boulardii (FLORASTOR) 250 MG capsule     triamcinolone (KENALOG) 0.5 % cream     No current facility-administered medications for this visit.      Allergies   Allergen Reactions     Birch Trees      Cats Other (See Comments)     congestion     Chlorhexidine Itching     No rash.  Also felt \"irritable\" after using wipes.     Dogs      Mold      Family History   Problem Relation Age of Onset     Breast Cancer Mother 53        Cancer-breast     Diabetes Type 2  Mother         Diabetes type II     Heart Disease Father 42        Heart Disease,MI; history of rheumatic fever and asd     Diabetes Type 2  Father         Diabetes type II     Thyroid Disease Sister         Thyroid Disease,hypothyroidism     Depression Brother         Depression      Social History     Socioeconomic History     Marital status:      Spouse name: Zan     Number of children: 1     Years of education: 18+     Highest education level: Professional school degree (e.g., MD, DDS, DVM, TRAM)   Occupational History     [employer]er: [employer]    Social Needs     Financial resource strain: None     Food insecurity:     Worry: None     Inability: None     Transportation needs:     Medical: None     Non-medical: None   Tobacco Use     Smoking status: Never Smoker     Smokeless tobacco: Never Used "   Substance and Sexual Activity     Alcohol use: No     Alcohol/week: 0.0 standard drinks     Comment: Alcoholic Drinks/day: 3 per week     Drug use: No     Sexual activity: Yes     Partners: Male     Birth control/protection: Rhythm     Comment: Birth control method: NFP   Lifestyle     Physical activity:     Days per week: None     Minutes per session: None     Stress: None   Relationships     Social connections:     Talks on phone: None     Gets together: None     Attends Yazidi service: None     Active member of club or organization: None     Attends meetings of clubs or organizations: None     Relationship status: None     Intimate partner violence:     Fear of current or ex partner: None     Emotionally abused: None     Physically abused: None     Forced sexual activity: None   Other Topics Concern     Parent/sibling w/ CABG, MI or angioplasty before 65F 55M? 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  GENERAL: Healthy appearing patient in no acute distress. Pleasant and cooperative with exam and interview.   HEENT: Head-normocephalic. Eyes-no scleral icterus. Nose-no nasal drainage. No lesions. Mouth-oral mucosa pink and moist, no lesions.  SKIN: Pink, warm and dry. No jaundice. No rash.  NEURO:  Cranial nerves II-XII grossly intact. Alert and oriented.  PSYCH: Appropriate mood and affect.  RECTAL: deferred for colonoscopy

## 2020-02-19 RX ORDER — BISACODYL 5 MG/1
TABLET, DELAYED RELEASE ORAL
Qty: 2 TABLET | Refills: 0 | Status: ON HOLD | OUTPATIENT
Start: 2020-02-19 | End: 2020-08-26

## 2020-02-19 RX ORDER — POLYETHYLENE GLYCOL 3350, SODIUM CHLORIDE, SODIUM BICARBONATE, POTASSIUM CHLORIDE 420; 11.2; 5.72; 1.48 G/4L; G/4L; G/4L; G/4L
4000 POWDER, FOR SOLUTION ORAL ONCE
Qty: 4000 ML | Refills: 0 | Status: SHIPPED | OUTPATIENT
Start: 2020-02-19 | End: 2020-02-19

## 2020-02-25 RX ORDER — SODIUM CHLORIDE, SODIUM LACTATE, POTASSIUM CHLORIDE, CALCIUM CHLORIDE 600; 310; 30; 20 MG/100ML; MG/100ML; MG/100ML; MG/100ML
INJECTION, SOLUTION INTRAVENOUS CONTINUOUS
Status: CANCELLED | OUTPATIENT
Start: 2020-02-25

## 2020-02-25 RX ORDER — ONDANSETRON 2 MG/ML
4 INJECTION INTRAMUSCULAR; INTRAVENOUS
Status: CANCELLED | OUTPATIENT
Start: 2020-02-25

## 2020-02-25 RX ORDER — LIDOCAINE 40 MG/G
CREAM TOPICAL
Status: CANCELLED | OUTPATIENT
Start: 2020-02-25

## 2020-08-17 ENCOUNTER — TELEPHONE (OUTPATIENT)
Dept: SURGERY | Facility: OTHER | Age: 45
End: 2020-08-17

## 2020-08-17 NOTE — TELEPHONE ENCOUNTER
Patient called to schedule colonoscopy and Egd,  She would like a Wednesday .  Is it possible to get her in sooner than 09/23.  Please advise.  Thank you.  Eloisa Olson on 8/17/2020 at 12:48 PM

## 2020-08-23 ENCOUNTER — ALLIED HEALTH/NURSE VISIT (OUTPATIENT)
Dept: FAMILY MEDICINE | Facility: OTHER | Age: 45
End: 2020-08-23
Attending: SURGERY
Payer: COMMERCIAL

## 2020-08-23 DIAGNOSIS — Z01.818 PRE-OP EXAM: Primary | ICD-10-CM

## 2020-08-23 PROCEDURE — C9803 HOPD COVID-19 SPEC COLLECT: HCPCS

## 2020-08-23 PROCEDURE — U0003 INFECTIOUS AGENT DETECTION BY NUCLEIC ACID (DNA OR RNA); SEVERE ACUTE RESPIRATORY SYNDROME CORONAVIRUS 2 (SARS-COV-2) (CORONAVIRUS DISEASE [COVID-19]), AMPLIFIED PROBE TECHNIQUE, MAKING USE OF HIGH THROUGHPUT TECHNOLOGIES AS DESCRIBED BY CMS-2020-01-R: HCPCS | Mod: ZL | Performed by: SURGERY

## 2020-08-23 PROCEDURE — 99207 ZZC NO CHARGE NURSE ONLY: CPT

## 2020-08-24 LAB
LABORATORY COMMENT REPORT: NORMAL
SARS-COV-2 RNA SPEC QL NAA+PROBE: NEGATIVE
SARS-COV-2 RNA SPEC QL NAA+PROBE: NORMAL
SPECIMEN SOURCE: NORMAL
SPECIMEN SOURCE: NORMAL

## 2020-08-25 ENCOUNTER — TELEPHONE (OUTPATIENT)
Dept: SURGERY | Facility: OTHER | Age: 45
End: 2020-08-25

## 2020-08-25 NOTE — TELEPHONE ENCOUNTER
Several messages left in regard to colonoscopy with out response will leave on schedule and she is last

## 2020-08-26 ENCOUNTER — MYC MEDICAL ADVICE (OUTPATIENT)
Dept: FAMILY MEDICINE | Facility: OTHER | Age: 45
End: 2020-08-26

## 2020-08-26 ENCOUNTER — HOSPITAL ENCOUNTER (OUTPATIENT)
Facility: OTHER | Age: 45
Discharge: HOME | End: 2020-08-26
Attending: SURGERY | Admitting: SURGERY
Payer: COMMERCIAL

## 2020-08-26 ENCOUNTER — ANESTHESIA EVENT (OUTPATIENT)
Dept: SURGERY | Facility: OTHER | Age: 45
End: 2020-08-26
Payer: COMMERCIAL

## 2020-08-26 ENCOUNTER — ANESTHESIA (OUTPATIENT)
Dept: SURGERY | Facility: OTHER | Age: 45
End: 2020-08-26
Payer: COMMERCIAL

## 2020-08-26 VITALS
RESPIRATION RATE: 16 BRPM | TEMPERATURE: 96.9 F | HEART RATE: 72 BPM | DIASTOLIC BLOOD PRESSURE: 79 MMHG | SYSTOLIC BLOOD PRESSURE: 108 MMHG | OXYGEN SATURATION: 98 %

## 2020-08-26 DIAGNOSIS — E55.9 VITAMIN D INSUFFICIENCY: Primary | ICD-10-CM

## 2020-08-26 DIAGNOSIS — K63.5 POLYP OF SIGMOID COLON, UNSPECIFIED TYPE: ICD-10-CM

## 2020-08-26 DIAGNOSIS — Q39.8 GASTRIC INLET PATCH OF ESOPHAGUS: Primary | ICD-10-CM

## 2020-08-26 DIAGNOSIS — K64.4 EXTERNAL HEMORRHOIDS WITHOUT COMPLICATION: ICD-10-CM

## 2020-08-26 LAB — HCG UR QL: NEGATIVE

## 2020-08-26 PROCEDURE — 25000125 ZZHC RX 250: Performed by: NURSE ANESTHETIST, CERTIFIED REGISTERED

## 2020-08-26 PROCEDURE — 43239 EGD BIOPSY SINGLE/MULTIPLE: CPT | Performed by: SURGERY

## 2020-08-26 PROCEDURE — 40000010 ZZH STATISTIC ANES STAT CODE-CRNA PER MINUTE: Performed by: SURGERY

## 2020-08-26 PROCEDURE — 81025 URINE PREGNANCY TEST: CPT | Performed by: SURGERY

## 2020-08-26 PROCEDURE — 45380 COLONOSCOPY AND BIOPSY: CPT | Performed by: SURGERY

## 2020-08-26 PROCEDURE — 25000128 H RX IP 250 OP 636: Performed by: NURSE ANESTHETIST, CERTIFIED REGISTERED

## 2020-08-26 PROCEDURE — 25000125 ZZHC RX 250: Performed by: SURGERY

## 2020-08-26 PROCEDURE — 45380 COLONOSCOPY AND BIOPSY: CPT | Performed by: NURSE ANESTHETIST, CERTIFIED REGISTERED

## 2020-08-26 PROCEDURE — 88305 TISSUE EXAM BY PATHOLOGIST: CPT

## 2020-08-26 PROCEDURE — 43239 EGD BIOPSY SINGLE/MULTIPLE: CPT | Mod: 51 | Performed by: SURGERY

## 2020-08-26 PROCEDURE — 25800030 ZZH RX IP 258 OP 636: Performed by: SURGERY

## 2020-08-26 RX ORDER — ONDANSETRON 2 MG/ML
4 INJECTION INTRAMUSCULAR; INTRAVENOUS EVERY 6 HOURS PRN
Status: CANCELLED | OUTPATIENT
Start: 2020-08-26

## 2020-08-26 RX ORDER — SODIUM CHLORIDE, SODIUM LACTATE, POTASSIUM CHLORIDE, CALCIUM CHLORIDE 600; 310; 30; 20 MG/100ML; MG/100ML; MG/100ML; MG/100ML
INJECTION, SOLUTION INTRAVENOUS CONTINUOUS
Status: DISCONTINUED | OUTPATIENT
Start: 2020-08-26 | End: 2020-08-26 | Stop reason: HOSPADM

## 2020-08-26 RX ORDER — ONDANSETRON 2 MG/ML
4 INJECTION INTRAMUSCULAR; INTRAVENOUS
Status: DISCONTINUED | OUTPATIENT
Start: 2020-08-26 | End: 2020-08-26 | Stop reason: HOSPADM

## 2020-08-26 RX ORDER — FLUMAZENIL 0.1 MG/ML
0.2 INJECTION, SOLUTION INTRAVENOUS
Status: CANCELLED | OUTPATIENT
Start: 2020-08-26 | End: 2020-08-26

## 2020-08-26 RX ORDER — NALOXONE HYDROCHLORIDE 0.4 MG/ML
.1-.4 INJECTION, SOLUTION INTRAMUSCULAR; INTRAVENOUS; SUBCUTANEOUS
Status: CANCELLED | OUTPATIENT
Start: 2020-08-26 | End: 2020-08-27

## 2020-08-26 RX ORDER — PROPOFOL 10 MG/ML
INJECTION, EMULSION INTRAVENOUS PRN
Status: DISCONTINUED | OUTPATIENT
Start: 2020-08-26 | End: 2020-08-26

## 2020-08-26 RX ORDER — SUCRALFATE 1 G/1
1 TABLET ORAL 3 TIMES DAILY PRN
Qty: 90 TABLET | Refills: 1 | Status: SHIPPED | OUTPATIENT
Start: 2020-08-26 | End: 2020-12-15

## 2020-08-26 RX ORDER — PROPOFOL 10 MG/ML
INJECTION, EMULSION INTRAVENOUS CONTINUOUS PRN
Status: DISCONTINUED | OUTPATIENT
Start: 2020-08-26 | End: 2020-08-26

## 2020-08-26 RX ORDER — LIDOCAINE HYDROCHLORIDE 20 MG/ML
INJECTION, SOLUTION INFILTRATION; PERINEURAL PRN
Status: DISCONTINUED | OUTPATIENT
Start: 2020-08-26 | End: 2020-08-26

## 2020-08-26 RX ORDER — ONDANSETRON 4 MG/1
4 TABLET, ORALLY DISINTEGRATING ORAL EVERY 6 HOURS PRN
Status: CANCELLED | OUTPATIENT
Start: 2020-08-26

## 2020-08-26 RX ORDER — LIDOCAINE 40 MG/G
CREAM TOPICAL
Status: DISCONTINUED | OUTPATIENT
Start: 2020-08-26 | End: 2020-08-26 | Stop reason: HOSPADM

## 2020-08-26 RX ADMIN — SODIUM CHLORIDE, POTASSIUM CHLORIDE, SODIUM LACTATE AND CALCIUM CHLORIDE: 600; 310; 30; 20 INJECTION, SOLUTION INTRAVENOUS at 09:47

## 2020-08-26 RX ADMIN — PROPOFOL 50 MG: 10 INJECTION, EMULSION INTRAVENOUS at 10:32

## 2020-08-26 RX ADMIN — LIDOCAINE HYDROCHLORIDE 40 MG: 20 INJECTION, SOLUTION INFILTRATION; PERINEURAL at 10:30

## 2020-08-26 RX ADMIN — PROPOFOL 80 MG: 10 INJECTION, EMULSION INTRAVENOUS at 10:30

## 2020-08-26 RX ADMIN — PROPOFOL 160 MCG/KG/MIN: 10 INJECTION, EMULSION INTRAVENOUS at 10:30

## 2020-08-26 NOTE — H&P
"CHIEF COMPLAINT / REASON FOR PROCEDURE:  Reflux, rectal bleeding.    PERTINENT HISTORY   Patient complains of reflux and intermittent rectal bleeding. Previous colonoscopy none, previous EGD none. No family history of colon polyps or colon cancer.  Past Medical History:   Diagnosis Date     Anxiety disorder 2017     Bleeding in early pregnancy      Bronchitis 2012    Acute      Chronic lumbosacral pain 2015     Dislocation of ulnohumeral joint 1993    left; age 18     Epicondylitis, lateral (tennis elbow)-left 2018     Family history of malignant neoplasm of breast 2013     History of dysthymic disorder 2010    Resolved 13     Lesions of both ulnar nerves 2017     Medial epicondylitis of right elbow 2017     Medial epicondylitis, right 2017     Other enthesopathies, not elsewhere classified 2017     Other immediate postpartum hemorrhage 2011     Pregnancy      - PPH, retained placenta, transfusion     Vitamin D deficiency 2015     Past Surgical History:   Procedure Laterality Date     DILATION AND CURETTAGE  10/2011    for retained placenta     DILATION AND CURETTAGE  2015    for missed AB     DILATION AND CURETTAGE  2017    SAB     ENDOSCOPIC SINUS SURGERY N/A 2018    Procedure: ENDOSCOPIC SINUS SURGERY;  Surgeon: Dora Robins MD;  [facility]     EXTRACTION(S) DENTAL      [place] Teeth     TURBINOPLASTY Bilateral 2018    Procedure: TURBINATE REDUCTION;  Surgeon: Dora Robins MD;  Location: [facility]     Other:  None  Bleeding tendencies:  No    Relevant Family History: none    Relevant Social History:  none    A relevant review of systems was performed and was Negative. See HPI.    ALLERGIES/SENSITIVITIES:   Allergies   Allergen Reactions     Birch Trees      Cats Other (See Comments)     congestion     Chlorhexidine Itching     No rash.  Also felt \"irritable\" after using wipes.     Dogs      " Mold         CURRENT MEDICATIONS:    Current Facility-Administered Medications   Medication     lactated ringers infusion     lidocaine (LMX4) cream     lidocaine 1 % 0.1-1 mL     ondansetron (ZOFRAN) injection 4 mg     sodium chloride (PF) 0.9% PF flush 3 mL     sodium chloride (PF) 0.9% PF flush 3 mL     No current facility-administered medications on file prior to encounter.   cetirizine (ZYRTEC) 10 MG tablet, Take 10 mg by mouth daily  escitalopram (LEXAPRO) 10 MG tablet, Take 1 tablet (10 mg) by mouth daily  triamcinolone (KENALOG) 0.5 % cream, Apply sparingly to affected area three times daily.        PRE-SEDATION ASSESSMENT:    /79   Temp 98.3  F (36.8  C) (Tympanic)   Resp 20   SpO2 98%   Lung Exam:  Normal  Heart Exam:  Normal    Comment(s):      IMPRESSION:  Reflux and intermittent rectal bleeding.    PLAN:  I discussed diagnostic EGD and colonoscopy with the patient.

## 2020-08-26 NOTE — OR NURSING
Patient was discharged home with  via ambulatory.   Discharge instructions were reviewed with patient and . And they verbalized understanding.   Prescriptions: escribed to pharmacy

## 2020-08-26 NOTE — OP NOTE
PROCEDURE NOTE    SURGEON:Gertrudis Ortega MD    PRE-OP DIAGNOSIS:   Reflux, rectal bleeding      POST-OP DIAGNOSIS: gastric inlet patch, sigmoid polyp, external hemorrhoids    PROCEDURE PLANNED:   Diagnostic EGD      Diagnostic Colonoscopy    PROCEDURE PERFORMED:  Flexible EGD with biopsies, colonoscopy with biopsies and polypectomy    SPECIMEN:  Duodenum, antrum, GEJ, gastric inlet patch, terminal ileum and random colon biopsies, sigmoid polyp    ANESTHESIA:  See anesthesia note    ESTIMATED BLOOD LOSS: none    COMPLICATIONS:  None    INDICATION FOR THE PROCEDURE: The patient is a 44 year old female. The patient presents with reflux and intermittent rectal bleeding. I explained to the patient the risks, benefits and alternatives to diagnostic EGD and colonoscopy for evaluating her complaints. We specifically discussed the risks of bleeding, infection, perforation, potential inability to reach the cecum and the risks of sedation. The patient's questions were answered and the patient wished to proceed. Informed consent paperwork was completed.    PROCEDURE: The patient was taken to the endoscopy suite. Appropriate monitors were attached. The patient was placed in the left lateral decubitus position. Biteblock was positioned. Timeout was performed confirming the patient's identity and procedure to be performed. After appropriate sedation was confirmed, the flexible endoscope was advanced into the oropharynx. The posterior oropharynx appeared grossly normal. The scope was advanced into the proximal esophagus. Gastric inlet patch was noted. The esophagus was insufflated with air. The scope was advanced under direct visualization. No acute abnormalities of the esophagus were noted. The scope was advanced into the stomach. The scope was advanced through the pylorus into the duodenal bulb. The bulb and distal duodenum appeared grossly normal. Biopsies were obtained. The scope was withdrawn back into the stomach. Antral  biopsy was obtained and sent to pathology. The scope was retroflexed and the GE junction inspected. No abnormalities were noted. The scope was returned to a neutral position and the stomach was decompressed. The scope was withdrawn to the GE junction and biopsy obtained. The mucosa of the esophagus was inspected while withdrawing the scope. Biopsy was taken at the gastric inlet patch. The scope was withdrawn from the patient. The bite block was removed.    The patient was repositioned. After appropriate sedation, digital rectal exam was performed.  There was normal tone and no gross abnormality was noted.  The lubricated flexible colonoscope was introduced into the anus the colon was insufflated with air. The prep quality was adequate. Under direct visualization the scope was advanced to the cecum. The ileocecal valve was intubated and the terminal ileum inspected. No gross abnormality was noted. Biopsy was obtained. The scope was withdrawn back into the cecum. The mucosa of colon was inspected while withdrawing the scope. Random cold biopsies were taken. A flat polyp was noted in the sigmoid colon and removed with cold forceps. The scope was retroflexed in the rectum and the anorectal junction was inspected. No abnormalities were noted other than mild internal hemorrhoids. The scope was returned to a neutral position and the colon was decompressed. The scope was removed. The patient tolerated the procedure with no immediately apparent complication. The patient was taken to recovery in stable condition.    FOLLOW UP:  RECOMMENDATIONS add carafate three times a day as needed for sore throat. Will call with pathology results. High fiber diet for colon health.

## 2020-08-26 NOTE — ANESTHESIA PREPROCEDURE EVALUATION
Anesthesia Pre-Procedure Evaluation    Patient: Gauri Hollis   MRN: [id number] : 1975          Preoperative Diagnosis: Reflux esophagitis [K21.0]  Rectal discomfort [K62.89]    Procedure(s):  COLONOSCOPY  ESOPHAGOGASTRODUODENOSCOPY (EGD)    Past Medical History:   Diagnosis Date     Anxiety disorder 2017     Bleeding in early pregnancy      Bronchitis 2012    Acute      Chronic lumbosacral pain 2015     Dislocation of ulnohumeral joint 1993    left; age 18     Epicondylitis, lateral (tennis elbow)-left 2018     Family history of malignant neoplasm of breast 2013     History of dysthymic disorder 2010    Resolved 13     Lesions of both ulnar nerves 2017     Medial epicondylitis of right elbow 2017     Medial epicondylitis, right 2017     Other enthesopathies, not elsewhere classified 2017     Other immediate postpartum hemorrhage 2011     Pregnancy      - PPH, retained placenta, transfusion     Vitamin D deficiency 2015     Past Surgical History:   Procedure Laterality Date     DILATION AND CURETTAGE  10/2011    for retained placenta     DILATION AND CURETTAGE  2015    for missed AB     DILATION AND CURETTAGE  2017    SAB     ENDOSCOPIC SINUS SURGERY N/A 2018    Procedure: ENDOSCOPIC SINUS SURGERY;  Surgeon: Dora Robins MD;  Location: [facility]     EXTRACTION(S) DENTAL      [place] Teeth     TURBINOPLASTY Bilateral 2018    Procedure: TURBINATE REDUCTION;  Surgeon: Dora Robins MD;  Location: [facility]       Anesthesia Evaluation     . Pt has had prior anesthetic.     No history of anesthetic complications          ROS/MED HX    ENT/Pulmonary:  - neg pulmonary ROS     Neurologic:     (+)migraines,     Cardiovascular:  - neg cardiovascular ROS       METS/Exercise Tolerance:  >4 METS   Hematologic:  - neg hematologic  ROS       Musculoskeletal:  - neg musculoskeletal ROS       GI/Hepatic:  - neg  "GI/hepatic ROS       Renal/Genitourinary:  - ROS Renal section negative       Endo:  - neg endo ROS       Psychiatric:     (+) psychiatric history anxiety      Infectious Disease:  - neg infectious disease ROS       Malignancy:      - no malignancy   Other:    (+) No chance of pregnancy   - neg other ROS                      Physical Exam  Normal systems: cardiovascular, pulmonary and dental    Airway   Mallampati: II  TM distance: >3 FB  Neck ROM: full    Dental     Cardiovascular   Rhythm and rate: regular and normal      Pulmonary             Lab Results   Component Value Date    WBC 7.0 05/24/2019    HGB 14.0 05/24/2019    HCT 42.2 05/24/2019     05/24/2019    CRP 0.0 06/14/2018    SED 7 06/14/2018     05/24/2019    POTASSIUM 4.1 05/24/2019    CHLORIDE 105 05/24/2019    CO2 25 05/24/2019    BUN 18 05/24/2019    CR 0.77 05/24/2019    GLC 92 05/24/2019    CATERINA 9.4 05/24/2019    ALBUMIN 4.2 05/24/2019    PROTTOTAL 6.7 05/24/2019    ALT 15 05/24/2019    AST 19 05/24/2019    ALKPHOS 91 05/24/2019    BILITOTAL 0.6 05/24/2019    T4 0.90 10/11/2017    HCG Negative 08/26/2020       Preop Vitals  BP Readings from Last 3 Encounters:   08/26/20 109/79   02/18/20 114/70   02/12/20 110/76    Pulse Readings from Last 3 Encounters:   02/18/20 68   02/12/20 76   07/01/19 72      Resp Readings from Last 3 Encounters:   08/26/20 20   02/18/20 16   02/12/20 18    SpO2 Readings from Last 3 Encounters:   08/26/20 98%   02/25/19 98%   01/14/19 97%      Temp Readings from Last 1 Encounters:   08/26/20 98.3  F (36.8  C) (Tympanic)    Ht Readings from Last 1 Encounters:   02/12/20 1.676 m (5' 6\")      Wt Readings from Last 1 Encounters:   02/18/20 87.5 kg (193 lb)    Estimated body mass index is 31.15 kg/m  as calculated from the following:    Height as of 2/12/20: 1.676 m (5' 6\").    Weight as of 2/18/20: 87.5 kg (193 lb).       Anesthesia Plan      History & Physical Review      ASA Status:  1 .    NPO Status:  > 8 " hours    Plan for General with Intravenous induction. Maintenance will be Balanced.      Risks, benefits and alternatives discussed and would like to proceed. General anesthesia ok if indicated.           Postoperative Care      Consents  Anesthetic plan, risks, benefits and alternatives discussed with:  Patient and Spouse.  Use of blood products discussed: No .   .                 OLVIN Herrera CRNA

## 2020-08-26 NOTE — ANESTHESIA POSTPROCEDURE EVALUATION
Patient: Gauri Hollis    Procedure(s):  COLONOSCOPY, WITH POLYPECTOMY AND BIOPSY  ESOPHAGOGASTRODUODENOSCOPY, WITH BIOPSY    Diagnosis:Reflux esophagitis [K21.0]  Rectal discomfort [K62.89]  Diagnosis Additional Information: No value filed.    Anesthesia Type:  General    Note:  Anesthesia Post Evaluation    Patient location during evaluation: Phase 2  Patient participation: Able to fully participate in evaluation  Level of consciousness: awake and alert  Pain management: adequate  Airway patency: patent  Cardiovascular status: acceptable  Respiratory status: acceptable  Hydration status: acceptable  PONV: none             Last vitals:  Vitals:    08/26/20 1110 08/26/20 1115 08/26/20 1130   BP: 97/52 110/71 108/79   Pulse:  62 72   Resp: 16     Temp: 96.9  F (36.1  C)     SpO2: 97% 96% 98%         Electronically Signed By: OLVIN PEREZ CRNA  August 26, 2020  11:48 AM

## 2020-08-26 NOTE — ANESTHESIA CARE TRANSFER NOTE
Patient: Gauri Hollis    Procedure(s):  COLONOSCOPY, WITH POLYPECTOMY AND BIOPSY  ESOPHAGOGASTRODUODENOSCOPY, WITH BIOPSY    Diagnosis: Reflux esophagitis [K21.0]  Rectal discomfort [K62.89]  Diagnosis Additional Information: No value filed.    Anesthesia Type:   General     Note:  Airway :Room Air  Patient transferred to:Phase II  Handoff Report: Identifed the Patient, Identified the Reponsible Provider, Reviewed the pertinent medical history, Discussed the surgical course, Reviewed Intra-OP anesthesia mangement and issues during anesthesia, Set expectations for post-procedure period and Allowed opportunity for questions and acknowledgement of understanding      Vitals: (Last set prior to Anesthesia Care Transfer)    CRNA VITALS  8/26/2020 1034 - 8/26/2020 1106      8/26/2020             Pulse:  85    Ht Rate:  85    SpO2:  97 %                Electronically Signed By: OLVIN PEREZ CRNA  August 26, 2020  11:06 AM

## 2020-08-26 NOTE — DISCHARGE INSTRUCTIONS
Procedure you had done: egd and colonoscopy with biopsies  Your health care provider is:  Serene Vogel  Your surgeon is Dr. Gertrudis Ortega.   Please call your health care provider or surgeon at [phone] if:    - you feel you are getting worse or having an increase in problems    - fever greater than 101 degrees  - increasing shortness of breath or chest pain  - any signs of infection (increasing redness, swelling, tenderness, warmth, change in appearance, or  increased drainage)  - blood in your urine or stool  - coughing or vomiting blood  - nausea (upset stomach) and vomiting and/or diarrhea that will not stop  - severe pain that is not relieved by medicine, rest or ice  You have had medications for sedation. Please be aware that this can cause drowsiness and impaired judgment for up to 24 hours after your procedure. Do not drive, operate power tools or drink alcohol for 24 hours.  If samples were taken-you will get a phone call and a letter with your results in the next 7-10 days. If you don't get results, please call and let us know!   If you choose to have external hemorrhoids removed, call to set up pre surgery visit with Dr. Ortega.

## 2020-09-01 ENCOUNTER — MYC MEDICAL ADVICE (OUTPATIENT)
Dept: FAMILY MEDICINE | Facility: OTHER | Age: 45
End: 2020-09-01

## 2020-09-01 DIAGNOSIS — K63.9 ENTEROPATHY: ICD-10-CM

## 2020-09-01 DIAGNOSIS — N92.4 EXCESSIVE BLEEDING IN PREMENOPAUSAL PERIOD: Primary | ICD-10-CM

## 2020-09-23 DIAGNOSIS — K63.9 ENTEROPATHY: ICD-10-CM

## 2020-09-23 DIAGNOSIS — N92.4 EXCESSIVE BLEEDING IN PREMENOPAUSAL PERIOD: ICD-10-CM

## 2020-09-23 PROBLEM — R73.03 PREDIABETES: Status: ACTIVE | Noted: 2020-01-01

## 2020-09-23 LAB
ERYTHROCYTE [DISTWIDTH] IN BLOOD BY AUTOMATED COUNT: 12.9 % (ref 10–15)
ESTRADIOL SERPL-MCNC: 152 PG/ML
FERRITIN SERPL-MCNC: 27 NG/ML (ref 23.9–336.2)
HBA1C MFR BLD: 5.8 % (ref 4–6)
HCT VFR BLD AUTO: 42.2 % (ref 35–47)
HGB BLD-MCNC: 13.9 G/DL (ref 11.7–15.7)
MCH RBC QN AUTO: 29.9 PG (ref 26.5–33)
MCHC RBC AUTO-ENTMCNC: 32.9 G/DL (ref 31.5–36.5)
MCV RBC AUTO: 91 FL (ref 78–100)
PLATELET # BLD AUTO: 301 10E9/L (ref 150–450)
PROGEST SERPL-MCNC: 8.2 NG/ML
RBC # BLD AUTO: 4.65 10E12/L (ref 3.8–5.2)
TSH SERPL DL<=0.05 MIU/L-ACNC: 3.11 IU/ML (ref 0.34–5.6)
WBC # BLD AUTO: 7.9 10E9/L (ref 4–11)

## 2020-09-23 PROCEDURE — 85027 COMPLETE CBC AUTOMATED: CPT | Mod: ZL | Performed by: FAMILY MEDICINE

## 2020-09-23 PROCEDURE — 82670 ASSAY OF TOTAL ESTRADIOL: CPT | Mod: ZL | Performed by: FAMILY MEDICINE

## 2020-09-23 PROCEDURE — 83036 HEMOGLOBIN GLYCOSYLATED A1C: CPT | Mod: ZL | Performed by: FAMILY MEDICINE

## 2020-09-23 PROCEDURE — 83516 IMMUNOASSAY NONANTIBODY: CPT | Mod: ZL | Performed by: FAMILY MEDICINE

## 2020-09-23 PROCEDURE — 36415 COLL VENOUS BLD VENIPUNCTURE: CPT | Mod: ZL | Performed by: FAMILY MEDICINE

## 2020-09-23 PROCEDURE — 84144 ASSAY OF PROGESTERONE: CPT | Mod: ZL | Performed by: FAMILY MEDICINE

## 2020-09-23 PROCEDURE — 84443 ASSAY THYROID STIM HORMONE: CPT | Mod: ZL | Performed by: FAMILY MEDICINE

## 2020-09-23 PROCEDURE — 82728 ASSAY OF FERRITIN: CPT | Mod: ZL | Performed by: FAMILY MEDICINE

## 2020-09-25 ENCOUNTER — TELEPHONE (OUTPATIENT)
Dept: FAMILY MEDICINE | Facility: OTHER | Age: 45
End: 2020-09-25

## 2020-09-25 DIAGNOSIS — L23.7 CONTACT DERMATITIS DUE TO POISON IVY: Primary | ICD-10-CM

## 2020-09-25 LAB
TTG IGA SER-ACNC: 1 U/ML
TTG IGG SER-ACNC: <1 U/ML

## 2020-09-25 RX ORDER — PREDNISONE 20 MG/1
20 TABLET ORAL 2 TIMES DAILY
Qty: 10 TABLET | Refills: 1 | Status: SHIPPED | OUTPATIENT
Start: 2020-09-25 | End: 2020-09-30

## 2020-09-25 NOTE — TELEPHONE ENCOUNTER
Patient called, has rash, possible poison ivy.  Please call to advise.  Nano Kerr on 9/25/2020 at 10:38 AM

## 2020-09-25 NOTE — TELEPHONE ENCOUNTER
Spoke with patient. She thinks she may have poison ivy. She has had it before. She has a prescription for triamcinolone, which doesn't seem to be helping. She has also taken oatmeal/epsom bath, Zyrtec, and benadryl. She states rash is on hangs, neck, face, legs and states it's itchy painful red and raised. She states she also has a rash on her bottom, states it's red, more of a rough feeling to it. She states it's kind of like a diaper rash, and isn't sure if this is poison ivy or something else. She is wondering if she could may try some oral steroids? She states she has a video visit next week for a follow up. She states if [facility] prefers she can do in person.     Elizabeth Malave LPN.................. 9/25/2020 11:42 AM

## 2020-09-30 ENCOUNTER — VIRTUAL VISIT (OUTPATIENT)
Dept: FAMILY MEDICINE | Facility: OTHER | Age: 45
End: 2020-09-30
Attending: FAMILY MEDICINE
Payer: COMMERCIAL

## 2020-09-30 DIAGNOSIS — J34.3 NASAL TURBINATE HYPERTROPHY: ICD-10-CM

## 2020-09-30 DIAGNOSIS — L23.7 CONTACT DERMATITIS DUE TO POISON IVY: ICD-10-CM

## 2020-09-30 DIAGNOSIS — R73.03 PREDIABETES: ICD-10-CM

## 2020-09-30 DIAGNOSIS — K63.9 ENTEROPATHY: Primary | ICD-10-CM

## 2020-09-30 DIAGNOSIS — N92.4 EXCESSIVE BLEEDING IN PREMENOPAUSAL PERIOD: ICD-10-CM

## 2020-09-30 DIAGNOSIS — M25.50 MULTIPLE JOINT PAIN: ICD-10-CM

## 2020-09-30 PROCEDURE — 99214 OFFICE O/P EST MOD 30 MIN: CPT | Mod: 95 | Performed by: FAMILY MEDICINE

## 2020-09-30 RX ORDER — BIFIDOBACTERIUM LONGUM SUBSP. INFANTIS, AVOBENZONE, HOMOSALATE, OCTISALATE, OCTOCRYLENE, AND OXYBENZONE
KIT
COMMUNITY
Start: 2020-09-30 | End: 2022-04-27

## 2020-09-30 ASSESSMENT — PAIN SCALES - GENERAL: PAINLEVEL: MILD PAIN (3)

## 2020-09-30 NOTE — NURSING NOTE
Patient presents to the clinic today for a follow up on from her colonoscopy. She is also having some memory and attention problems.     Med rec complete.  Elizabeth Malave LPN.................. 9/30/2020 8:25 AM

## 2020-09-30 NOTE — PROGRESS NOTES
"Gauri Hollis is a 44 year old female who is being evaluated via a billable video visit.      The patient has been notified of following:     \"This video visit will be conducted via a call between you and your physician/provider. We have found that certain health care needs can be provided without the need for an in-person physical exam.  This service lets us provide the care you need with a video conversation.  If a prescription is necessary we can send it directly to your pharmacy.  If lab work is needed we can place an order for that and you can then stop by our lab to have the test done at a later time.    Video visits are billed at different rates depending on your insurance coverage.  Please reach out to your insurance provider with any questions.    If during the course of the call the physician/provider feels a video visit is not appropriate, you will not be charged for this service.\"    Patient has given verbal consent for Video visit? Yes  How would you like to obtain your AVS? MyChart  If you are dropped from the video visit, the video invite should be resent to: Other e-mail: Mychart  Will anyone else be joining your video visit? No    Subjective     Gauri Hollis is a 44 year old female who presents today via video visit for the following health issues: EGD and colonoscopy follow up, pre-diabetes, poison ivy     HPI Gauri Hollis is a 44 year old female assessed on her birthday for several concerns.    EGD and colonoscopy:  Colonoscopy with benign polyp.  EGD with lymphocytes on biopsy.  She has tested negative for celiac.  She does take NSAIDs at least 3 days a week.    She was given prescription for Carafate from Gertrudis Ortega MD - hasn't started yet.  Continues to have hemorrhoids.  Feels better after BM this morning.      Pre-diabetes -   Lab Results   Component Value Date    A1C 5.8 09/23/2020       Poison ivy:  Started on prednisone 3 days ago     Other skin issues:  A month ago egg shaped, " slightly itchy dry patch showed up; she first thought it was some seasonal pityriasis.  This was on her torso.    Additional rash prior to her poison ivy.  Her poison ivy rash is improving.      Irregular periods:  Every 21 days or so.  Additional hormone testing was done.         Video Start Time: 08:27        Review of Systems   Constitutional, HEENT, cardiovascular, pulmonary, systems are negative, except as otherwise noted.      Objective    Vitals - Patient Reported  Pain Score: Mild Pain (3)  Pain Loc: Abdomen      Vitals:  No vitals were obtained today due to virtual visit.    Physical Exam     GENERAL: Healthy, alert and no distress  EYES: Eyes grossly normal to inspection.  No discharge or erythema, or obvious scleral/conjunctival abnormalities.  RESP: No audible wheeze, cough, or visible cyanosis.  No visible retractions or increased work of breathing.    SKIN: Visible skin clear. No significant rash, abnormal pigmentation or lesions.  NEURO: Cranial nerves grossly intact.  Mentation and speech appropriate for age.  PSYCH: Mentation appears normal, affect normal/bright, judgement and insight intact, normal speech and appearance well-groomed.                ICD-10-CM    1. 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 Contact dermatitis due to poison ivy  L23.7    3. Nasal turbinate hypertrophy  J34.3    4. Prediabetes  R73.03    5. Excessive bleeding in premenopausal period  N92.4    6. Multiple joint pain  M25.50      1.  With her enteropathy, lymphocytes noted on biopsy, we discussed proceeding with gastroenterology consult and additional autoimmune testing.  Findings could be due to small bowel overgrowth, autoimmune, none protein enteropathy, nonsteroidals, acid suppression chronically.  She may need additional, more distal, small bowel evaluation done such as with  capsule endoscopy.  2.  Patient is completing course of treatment for poison ivy.  3.  Goal the prediabetes is weight loss, decrease carbohydrate intake, regular physical activity, sleep improvement.  4.  With irregularities in menstrual bleeding, she could consider birth control pills, IUD, ablation.  5.  Her multiple joint pain, skin symptoms, GI symptoms may be related.      Video-Visit Details    Type of service:  Video Visit    Video End Time:9:14 AM    Originating Location (pt. Location): Home    Distant Location (provider location):  [facility] AND \[facility]\""     Platform used for Video Visit: Nguyen

## 2020-10-01 ENCOUNTER — MYC MEDICAL ADVICE (OUTPATIENT)
Dept: FAMILY MEDICINE | Facility: OTHER | Age: 45
End: 2020-10-01

## 2020-10-01 ENCOUNTER — MYC MEDICAL ADVICE (OUTPATIENT)
Dept: SURGERY | Facility: OTHER | Age: 45
End: 2020-10-01

## 2020-10-02 DIAGNOSIS — K63.9 ENTEROPATHY: Primary | ICD-10-CM

## 2020-10-02 DIAGNOSIS — K63.9 ENTEROPATHY: ICD-10-CM

## 2020-10-02 LAB — VIT B12 SERPL-MCNC: 331 PG/ML (ref 180–914)

## 2020-10-02 PROCEDURE — 83516 IMMUNOASSAY NONANTIBODY: CPT | Mod: ZL | Performed by: FAMILY MEDICINE

## 2020-10-02 PROCEDURE — 82941 ASSAY OF GASTRIN: CPT | Mod: ZL | Performed by: FAMILY MEDICINE

## 2020-10-02 PROCEDURE — 86255 FLUORESCENT ANTIBODY SCREEN: CPT | Mod: ZL | Performed by: FAMILY MEDICINE

## 2020-10-02 PROCEDURE — 82607 VITAMIN B-12: CPT | Mod: ZL | Performed by: FAMILY MEDICINE

## 2020-10-02 PROCEDURE — 36415 COLL VENOUS BLD VENIPUNCTURE: CPT | Mod: ZL | Performed by: FAMILY MEDICINE

## 2020-10-02 PROCEDURE — 86038 ANTINUCLEAR ANTIBODIES: CPT | Mod: ZL | Performed by: FAMILY MEDICINE

## 2020-10-05 LAB
ANA SER QL IF: NEGATIVE
ANCA AB PATTERN SER IF-IMP: NORMAL
C-ANCA TITR SER IF: NORMAL {TITER}
MITOCHONDRIA M2 IGG SER-ACNC: 2 U/ML
SMA IGG SER-ACNC: 11 UNITS (ref 0–19)

## 2020-10-06 LAB
GASTRIN SERPL-MCNC: 329 PG/ML (ref 0–100)
SOLUBLE LIVER IGG SER IA-ACNC: 2 U (ref 0–24.9)

## 2020-10-06 NOTE — TELEPHONE ENCOUNTER
Spoke with patient this am. She will let us know if she has any further questions. Gertrudis Ortega MD on 10/6/2020 at 12:47 PM

## 2020-10-06 NOTE — TELEPHONE ENCOUNTER
Dr Ortega - I gave Gauri some recommendations last week.  I think she's looking for your input too.  Serene Perez MD

## 2020-11-09 ENCOUNTER — TRANSFERRED RECORDS (OUTPATIENT)
Dept: HEALTH INFORMATION MANAGEMENT | Facility: OTHER | Age: 45
End: 2020-11-09

## 2020-11-11 ENCOUNTER — VIRTUAL VISIT (OUTPATIENT)
Dept: FAMILY MEDICINE | Facility: OTHER | Age: 45
End: 2020-11-11
Attending: FAMILY MEDICINE
Payer: COMMERCIAL

## 2020-11-11 DIAGNOSIS — K63.9 ENTEROPATHY: Primary | ICD-10-CM

## 2020-11-11 DIAGNOSIS — F41.8 DEPRESSION WITH ANXIETY: ICD-10-CM

## 2020-11-11 DIAGNOSIS — R73.03 PREDIABETES: ICD-10-CM

## 2020-11-11 DIAGNOSIS — E04.1 THYROID NODULE: ICD-10-CM

## 2020-11-11 DIAGNOSIS — E16.4 ELEVATED GASTRIN LEVEL: ICD-10-CM

## 2020-11-11 PROCEDURE — 99214 OFFICE O/P EST MOD 30 MIN: CPT | Mod: 95 | Performed by: FAMILY MEDICINE

## 2020-11-11 NOTE — PROGRESS NOTES
"Start time 0836       SUBJECTIVE:   CC:  Gauri Hollis is a 45 year old female who presents to clinic today for the following health issues:  Video visit follow up for follow up of labs from this fall.    HPI  Gauri Hollis is a 45 year old female who presents for follow up of labs from this fall.  This fall had EGD, colonoscopy and labs with biopsy.  Findings of elevated gastrin, on biopsy there was concern for villi changes.  Had GI consult on Monday - labs and findings reviewed.  Recommended that IBS treatment be started.  Recommend recheck of gastrin when fasting and off of anti-histamine.    Working her way off of NSAID too.      Pre-diabetes:    Lab Results   Component Value Date    A1C 5.8 09/23/2020   No history of gestational diabetes.      Will need follow up of thyroid ultrasound/nodule.         Allergies   Allergen Reactions     Birch Trees      Cats Other (See Comments)     congestion     Chlorhexidine Itching     No rash.  Also felt \"irritable\" after using wipes.     Dogs      Mold      Current Outpatient Medications   Medication     Bacillus Coagulans-Inulin (ALIGN PREBIOTIC-PROBIOTIC) 5-1.25 MG-GM CHEW     cetirizine (ZYRTEC) 10 MG tablet     escitalopram (LEXAPRO) 10 MG tablet     sucralfate (CARAFATE) 1 GM tablet     triamcinolone (KENALOG) 0.5 % cream     No current facility-administered medications for this visit.       Past Medical History:   Diagnosis Date     Anxiety disorder 11/08/2017     Bronchitis 02/14/2012    Acute      Chronic lumbosacral pain 03/03/2015     Dislocation of ulnohumeral joint 1993    left; age 18     Epicondylitis, lateral (tennis elbow)-left 06/11/2018     Family history of malignant neoplasm of breast 07/12/2013     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    Resolved 6/7/13     Hypergastrinemia      Lesions of both ulnar nerves 08/28/2017     Medial epicondylitis of right elbow 08/28/2017     Other enthesopathies, not elsewhere classified 08/28/2017     Other " immediate postpartum hemorrhage 2011     Prediabetes 2020    a1c 5.8     Pregnancy      - PPH, retained placenta, transfusion     Vitamin D deficiency 2015      Past Surgical History:   Procedure Laterality Date     COLONOSCOPY N/A 2020    hyperplastic, follow up 10 years     DILATION AND CURETTAGE  10/2011    for retained placenta     DILATION AND CURETTAGE  2015    for missed AB     DILATION AND CURETTAGE  2017    SAB     ENDOSCOPIC SINUS SURGERY N/A 2018    Procedure: ENDOSCOPIC SINUS SURGERY;  Surgeon: Dora Robins MD;  Location: [facility]     ESOPHAGOSCOPY, GASTROSCOPY, DUODENOSCOPY (EGD), COMBINED N/A 2020    Procedure: ESOPHAGOGASTRODUODENOSCOPY, WITH BIOPSY;  Surgeon: Gertrudis Ortega MD;  Location: [facility]     EXTRACTION(S) DENTAL      [place] Teeth     TURBINOPLASTY Bilateral 2018    Procedure: TURBINATE REDUCTION;  Surgeon: Dora Robins MD;  Location: [facility] OR     Family History   Problem Relation Age of Onset     Breast Cancer Mother 53        Cancer-breast     Diabetes Type 2  Mother         Diabetes type II     Heart Disease Father 42        Heart Disease,MI; history of rheumatic fever and asd     Diabetes Type 2  Father         Diabetes type II     Thyroid Disease Sister         Thyroid Disease,hypothyroidism     Depression Brother         Depression       Review of Systems  - feeling more anxious in general, feels that this plays a role in her overall health, affects getting to sleep    Has weekly counseling with Coni Valero    PHQ-2 Score:     PHQ-2 (  [employer]) 2020   Q1: Little interest or pleasure in doing things 0 0   Q2: Feeling down, depressed or hopeless 0 0   PHQ-2 Score 0 0           PHQ-9 SCORE 10/11/2017 2018 2019   PHQ-9 Total Score 8 9 0         OBJECTIVE:     There were no vitals taken for this visit.  There is no height or weight on file to calculate BMI.  Physical Exam  Vitals signs and nursing note  reviewed.   Constitutional:       Appearance: Normal appearance.   Neurological:      Mental Status: She is alert.   Psychiatric:         Mood and Affect: Mood normal.         Behavior: Behavior normal.         Thought Content: Thought content normal.          No results found for any visits on 11/11/20.      ASSESSMENT/PLAN:       ICD-10-CM    1. 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 Elevated gastrin level  E16.4 Gastrin   3. Prediabetes  R73.03 Hemoglobin A1c   4. Depression with anxiety  F41.8    5. Thyroid nodule  E04.1 US Thyroid            PLAN:  1.  Future lab - gastrin level when fasting and off of antihistamine.  Continue to work on diet findings and triggers.  Discussed visceral sensitivity.    2.  She will be starting on fiber and IB-Guard  3.  Recheck A1C in 3-6 months.  Exercise, diet, wt management and sleep dicussed.    4.  Thyroid ultrasound is ordered.        Video end time: 9:11 AM        GAIL SUE MD  [facility]    This note was created using voice recognition software and was screened for errors in transcription.

## 2020-11-16 ENCOUNTER — HEALTH MAINTENANCE LETTER (OUTPATIENT)
Age: 45
End: 2020-11-16

## 2020-11-19 ENCOUNTER — HOSPITAL ENCOUNTER (OUTPATIENT)
Dept: ULTRASOUND IMAGING | Facility: OTHER | Age: 45
Discharge: HOME | End: 2020-11-19
Attending: FAMILY MEDICINE | Admitting: FAMILY MEDICINE
Payer: COMMERCIAL

## 2020-11-19 DIAGNOSIS — E04.1 THYROID NODULE: ICD-10-CM

## 2020-11-19 PROCEDURE — 76536 US EXAM OF HEAD AND NECK: CPT

## 2020-12-16 ENCOUNTER — HOSPITAL ENCOUNTER (OUTPATIENT)
Dept: MAMMOGRAPHY | Facility: OTHER | Age: 45
End: 2020-12-16
Attending: FAMILY MEDICINE
Payer: COMMERCIAL

## 2020-12-16 ENCOUNTER — VIRTUAL VISIT (OUTPATIENT)
Dept: FAMILY MEDICINE | Facility: OTHER | Age: 45
End: 2020-12-16
Attending: FAMILY MEDICINE
Payer: COMMERCIAL

## 2020-12-16 DIAGNOSIS — E16.4 ELEVATED GASTRIN LEVEL: ICD-10-CM

## 2020-12-16 DIAGNOSIS — K63.9 ENTEROPATHY: Primary | ICD-10-CM

## 2020-12-16 DIAGNOSIS — F41.8 DEPRESSION WITH ANXIETY: ICD-10-CM

## 2020-12-16 DIAGNOSIS — R73.03 PREDIABETES: ICD-10-CM

## 2020-12-16 DIAGNOSIS — Z12.31 VISIT FOR SCREENING MAMMOGRAM: ICD-10-CM

## 2020-12-16 PROCEDURE — 77063 BREAST TOMOSYNTHESIS BI: CPT

## 2020-12-16 PROCEDURE — 99214 OFFICE O/P EST MOD 30 MIN: CPT | Mod: 95 | Performed by: FAMILY MEDICINE

## 2020-12-16 RX ORDER — ESCITALOPRAM OXALATE 10 MG/1
15 TABLET ORAL DAILY
Qty: 135 TABLET | Refills: 3 | Status: SHIPPED | OUTPATIENT
Start: 2020-12-16 | End: 2021-12-31

## 2020-12-16 ASSESSMENT — PAIN SCALES - GENERAL: PAINLEVEL: NO PAIN (1)

## 2020-12-16 NOTE — NURSING NOTE
Patient is following up on lab work, and GI issues.     Med rec complete.  Elizabeth Malave LPN.................. 12/16/2020 8:44 AM

## 2020-12-16 NOTE — PROGRESS NOTES
"Gauri Hollis is a 45 year old female who is being evaluated via a billable video visit.      The patient has been notified of following:     \"This video visit will be conducted via a call between you and your physician/provider. We have found that certain health care needs can be provided without the need for an in-person physical exam.  This service lets us provide the care you need with a video conversation.  If a prescription is necessary we can send it directly to your pharmacy.  If lab work is needed we can place an order for that and you can then stop by our lab to have the test done at a later time.    Video visits are billed at different rates depending on your insurance coverage.  Please reach out to your insurance provider with any questions.    If during the course of the call the physician/provider feels a video visit is not appropriate, you will not be charged for this service.\"    Patient has given verbal consent for Video visit? Yes  How would you like to obtain your AVS? MyChart  If you are dropped from the video visit, the video invite should be resent to: Other e-mail: [email or web address]  Will anyone else be joining your video visit? No    Subjective     Gauri Hollis is a 45 year old female who presents today via video visit for the following health issues:  Follow up of IBS/GI symptoms     HPI  Gauri Hollis is a 45 year old female presents for follow up of GI issues, IBS.  She hasn't been consistent with taking IB Guard or fiber.  Needs to have her gastrin level rechecked.    She thinks her symptoms are worse - foods are more aggravating such as coffee, chocolate.  She has had some blood with wiping - she can feel an external hemorrhoid that isn't swollen. Some irritation and itching. She's used tucks cream and wipes for cleaning.  Sometimes there's some irritation with having a BM.  Stools are on looser side, loose/chunky peanut butter. medium brown.    In the morning, symptoms are more lower " "abdomen vs later is more epigastric.      Pre-diabetes:    Lab Results   Component Value Date    A1C 5.8 09/23/2020   We had discussed sleep, diet and exercise to help manage these symptoms.    Once she can get to sleep, she can stay asleep.           Video Start Time: 9:01 AM       Allergies   Allergen Reactions     Birch Trees      Cats Other (See Comments)     congestion     Chlorhexidine Itching     No rash.  Also felt \"irritable\" after using wipes.     Dogs      Mold      Current Outpatient Medications   Medication     Bacillus Coagulans-Inulin (ALIGN PREBIOTIC-PROBIOTIC) 5-1.25 MG-GM CHEW     escitalopram (LEXAPRO) 10 MG tablet     triamcinolone (KENALOG) 0.5 % cream     No current facility-administered medications for this visit.          Review of Systems   Continues to see Coni Valero for counseling  On lexapro for anxiety symptoms   Mammogram today       Objective    Vitals - Patient Reported  Pain Score: No Pain (1)  Pain Loc: (joints/stomach)      Vitals:  No vitals were obtained today due to virtual visit.    Physical Exam     GENERAL: Healthy, alert and no distress  EYES: Eyes grossly normal to inspection.  No discharge or erythema, or obvious scleral/conjunctival abnormalities.  RESP: No audible wheeze, cough, or visible cyanosis.  No visible retractions or increased work of breathing.    SKIN: Visible skin clear. No significant rash, abnormal pigmentation or lesions.  NEURO: Cranial nerves grossly intact.  Mentation and speech appropriate for age.  PSYCH: Mentation appears normal, affect normal/bright, judgement and insight intact, normal speech and appearance well-groomed.            ICD-10-CM    1. Enteropathy  K63.9    2. Elevated gastrin level  E16.4 Gastrin   3. Prediabetes  R73.03 Hemoglobin A1c   4. Depression with anxiety  F41.8 escitalopram (LEXAPRO) 10 MG tablet     1. Dose of 3 ge's kisses with appropriate sensory awareness  2.  Mammogram today  3.  Lab orders are in for gastrin and " A1C  4.  Increase lexapro to 15mg a day   5.  SAD light therapy recommended.    Follow up in a month       Video-Visit Details    Type of service:  Video Visit    Video End Time:9:28 AM    Originating Location (pt. Location): Home    Distant Location (provider location):  [facility]     Platform used for Video Visit: Nguyen Perez MD

## 2020-12-21 ENCOUNTER — RESULTS ONLY (OUTPATIENT)
Dept: LAB | Age: 45
End: 2020-12-21

## 2020-12-21 LAB
SARS-COV-2 RNA SPEC QL NAA+PROBE: NORMAL
SPECIMEN SOURCE: NORMAL

## 2020-12-22 LAB
LABORATORY COMMENT REPORT: NORMAL
SARS-COV-2 RNA SPEC QL NAA+PROBE: NEGATIVE
SPECIMEN SOURCE: NORMAL

## 2021-04-03 ENCOUNTER — HEALTH MAINTENANCE LETTER (OUTPATIENT)
Age: 46
End: 2021-04-03

## 2021-04-07 ENCOUNTER — VIRTUAL VISIT (OUTPATIENT)
Dept: FAMILY MEDICINE | Facility: OTHER | Age: 46
End: 2021-04-07
Attending: FAMILY MEDICINE
Payer: COMMERCIAL

## 2021-04-07 DIAGNOSIS — E55.9 VITAMIN D INSUFFICIENCY: ICD-10-CM

## 2021-04-07 DIAGNOSIS — K63.9 ENTEROPATHY: ICD-10-CM

## 2021-04-07 DIAGNOSIS — R73.03 PREDIABETES: Primary | ICD-10-CM

## 2021-04-07 DIAGNOSIS — F41.8 DEPRESSION WITH ANXIETY: ICD-10-CM

## 2021-04-07 PROCEDURE — 99214 OFFICE O/P EST MOD 30 MIN: CPT | Mod: 95 | Performed by: FAMILY MEDICINE

## 2021-04-07 SDOH — HEALTH STABILITY: MENTAL HEALTH: HOW MANY STANDARD DRINKS CONTAINING ALCOHOL DO YOU HAVE ON A TYPICAL DAY?: NOT ASKED

## 2021-04-07 SDOH — HEALTH STABILITY: MENTAL HEALTH: HOW OFTEN DO YOU HAVE A DRINK CONTAINING ALCOHOL?: NOT ASKED

## 2021-04-07 SDOH — HEALTH STABILITY: MENTAL HEALTH: HOW OFTEN DO YOU HAVE 6 OR MORE DRINKS ON ONE OCCASION?: NOT ASKED

## 2021-04-07 NOTE — PROGRESS NOTES
"Gauri is a 45 year old who is being evaluated via a billable video visit.      How would you like to obtain your AVS? MyChart  If the video visit is dropped, the invitation should be resent by: Text to cell phone: [phone]  Will anyone else be joining your video visit? No    Video Start Time: 9:48 AM    Assessment & Plan       ICD-10-CM    1. Prediabetes  R73.03 Hemoglobin A1c   2. Enteropathy  K63.9 Alkaline Phosphatase   3. Depression with anxiety  F41.8    4. Vitamin D insufficiency  E55.9 Vitamin D Total GH     1.  A1C recommended  2.  Overall GI symptoms have improved, not resolved.  3.  Discussed current lexapro dosing at 15mg a day.  4.  Vitamin D recheck scheduled  5.  Alk phos recheck scheduled.        GAIL SUE MD  [facility]    Subjective   Gauri is a 45 year old who presents for the following health issues :  Nursing Notes:   Bev Tang MA  4/7/2021  9:47 AM  Addendum  Chief Complaint   Patient presents with     Diabetes     Pre-diabetes      Patient following up on pre-diabetes. She also needs lab work done per GI (Vitamin D & Alkaline Phospate)     Initial There were no vitals taken for this visit. Estimated body mass index is 31.15 kg/m  as calculated from the following:    Height as of 2/12/20: 1.676 m (5' 6\").    Weight as of 2/18/20: 87.5 kg (193 lb).  Medication Reconciliation: complete    Bev Tang MA       JAMEL Hollis is a 45 year old female presents for follow up of pre-diabetes.   Lab Results   Component Value Date    A1C 5.8 09/23/2020     Mindful eating has been helpful.    She gets triggered by work stresses and doesn't always keep it to 3 kisses.    Exercise wise things have been excellent - they've purchased a peloton and has used other offerings for activity.  5 days a week as 20-30 minutes of more intense aerobics and strength.      GI:  Better, not resolved.  Not needing to use heat at night.  Still has morning discomfort " "and some bowel changes.  Needs alk phos recheck too.       SAD:  She did use a sun lamp later this winter/spring.    Continues with lexapro as well.   She feels from COVID, the loss of planning and uncertainty of planning those future events.      Vitamin D - due for recheck.       Allergies   Allergen Reactions     Birch Trees      Cats Other (See Comments)     congestion     Chlorhexidine Itching     No rash.  Also felt \"irritable\" after using wipes.     Dogs      Mold      Current Outpatient Medications   Medication     escitalopram (LEXAPRO) 10 MG tablet     triamcinolone (KENALOG) 0.5 % cream     Bacillus Coagulans-Inulin (ALIGN PREBIOTIC-PROBIOTIC) 5-1.25 MG-GM CHEW     No current facility-administered medications for this visit.          Review of Systems   Skin on the backs of her arms cleared over the winter.    DORIAN-7 SCORE 6/14/2018 8/3/2018 7/1/2019   Total Score 14 0 0       PHQ 10/11/2017 6/14/2018 7/1/2019   PHQ-9 Total Score 8 9 0   Q9: Thoughts of better off dead/self-harm past 2 weeks Not at all Not at all Not at all             Objective    Vitals - Patient Reported  Weight (Patient Reported): 81.6 kg (180 lb)  Height (Patient Reported): 167.6 cm (5' 6\")  BMI (Based on Pt Reported Ht/Wt): 29.05  Pain Score: No Pain (0)        Physical Exam   GENERAL: Healthy, alert and no distress  EYES: Eyes grossly normal to inspection.  No discharge or erythema, or obvious scleral/conjunctival abnormalities.  RESP: No audible wheeze, cough, or visible cyanosis.  No visible retractions or increased work of breathing.    NEURO: Cranial nerves grossly intact.  Mentation and speech appropriate for age.  PSYCH: Mentation appears normal, affect normal/bright, judgement and insight intact, normal speech and appearance well-groomed.                Video-Visit Details    Type of service:  Video Visit    Video End Time:10:14 AM    Originating Location (pt. Location): Home    Distant Location (provider location):  [facility]     Platform used for Video Visit: Nguyen

## 2021-04-07 NOTE — NURSING NOTE
"Chief Complaint   Patient presents with     Diabetes     Pre-diabetes      Patient following up on pre-diabetes. She also needs lab work done per GI (Vitamin D & Alkaline Phospate)     Initial There were no vitals taken for this visit. Estimated body mass index is 31.15 kg/m  as calculated from the following:    Height as of 2/12/20: 1.676 m (5' 6\").    Weight as of 2/18/20: 87.5 kg (193 lb).  Medication Reconciliation: complete    Bev Tang MA  "

## 2021-04-29 ENCOUNTER — RESULTS ONLY (OUTPATIENT)
Dept: LAB | Age: 46
End: 2021-04-29

## 2021-04-29 LAB
LABORATORY COMMENT REPORT: NORMAL
SARS-COV-2 RNA RESP QL NAA+PROBE: NEGATIVE
SPECIMEN SOURCE: NORMAL

## 2021-05-30 ENCOUNTER — RECORDS - HEALTHEAST (OUTPATIENT)
Dept: ADMINISTRATIVE | Facility: CLINIC | Age: 46
End: 2021-05-30

## 2021-07-02 NOTE — NURSING NOTE
Patient Information     Patient Name MRN Gauri Nagy [id number] Female 1975      Nursing Note by Tereza Kendrick at 2017  2:20 PM     Author:  Tereza Kendrick Service:  (none) Author Type:  (none)     Filed:  2017  2:28 PM Encounter Date:  2017 Status:  Signed     :  Tereza Kendrick            Order placed per verbal order from Dr. Rivera.  Tereza Kendrick LPN .......2017 2:28 PM          Left Total Hip Replacement

## 2021-08-19 ENCOUNTER — LAB (OUTPATIENT)
Dept: LAB | Facility: OTHER | Age: 46
End: 2021-08-19
Attending: FAMILY MEDICINE
Payer: COMMERCIAL

## 2021-08-19 ENCOUNTER — LAB REQUISITION (OUTPATIENT)
Dept: LAB | Facility: OTHER | Age: 46
End: 2021-08-19

## 2021-08-19 DIAGNOSIS — E55.9 VITAMIN D INSUFFICIENCY: ICD-10-CM

## 2021-08-19 DIAGNOSIS — E16.4 ELEVATED GASTRIN LEVEL: ICD-10-CM

## 2021-08-19 DIAGNOSIS — R73.03 PREDIABETES: ICD-10-CM

## 2021-08-19 DIAGNOSIS — K63.9 ENTEROPATHY: ICD-10-CM

## 2021-08-19 LAB
ALP SERPL-CCNC: 94 U/L (ref 34–104)
DEPRECATED CALCIDIOL+CALCIFEROL SERPL-MC: 40 UG/L (ref 30–100)
HBA1C MFR BLD: 5.5 % (ref 4–6.2)
SARS-COV-2 RNA RESP QL NAA+PROBE: NEGATIVE

## 2021-08-19 PROCEDURE — 82306 VITAMIN D 25 HYDROXY: CPT | Mod: ZL

## 2021-08-19 PROCEDURE — 83036 HEMOGLOBIN GLYCOSYLATED A1C: CPT | Mod: ZL

## 2021-08-19 PROCEDURE — 84075 ASSAY ALKALINE PHOSPHATASE: CPT | Mod: ZL

## 2021-08-19 PROCEDURE — 82941 ASSAY OF GASTRIN: CPT | Mod: ZL

## 2021-08-19 PROCEDURE — U0005 INFEC AGEN DETEC AMPLI PROBE: HCPCS | Performed by: FAMILY MEDICINE

## 2021-08-19 PROCEDURE — 36415 COLL VENOUS BLD VENIPUNCTURE: CPT | Mod: ZL

## 2021-08-22 LAB — GASTRIN SERPL-MCNC: 103 PG/ML

## 2021-11-23 ENCOUNTER — LAB REQUISITION (OUTPATIENT)
Dept: LAB | Facility: OTHER | Age: 46
End: 2021-11-23

## 2021-11-23 LAB — SARS-COV-2 RNA RESP QL NAA+PROBE: NEGATIVE

## 2021-11-23 PROCEDURE — U0003 INFECTIOUS AGENT DETECTION BY NUCLEIC ACID (DNA OR RNA); SEVERE ACUTE RESPIRATORY SYNDROME CORONAVIRUS 2 (SARS-COV-2) (CORONAVIRUS DISEASE [COVID-19]), AMPLIFIED PROBE TECHNIQUE, MAKING USE OF HIGH THROUGHPUT TECHNOLOGIES AS DESCRIBED BY CMS-2020-01-R: HCPCS | Performed by: FAMILY MEDICINE

## 2021-12-06 ENCOUNTER — IMMUNIZATION (OUTPATIENT)
Dept: FAMILY MEDICINE | Facility: OTHER | Age: 46
End: 2021-12-06
Attending: FAMILY MEDICINE

## 2021-12-06 PROCEDURE — 91300 PR COVID VAC PFIZER DIL RECON 30 MCG/0.3 ML IM: CPT

## 2021-12-06 PROCEDURE — 0004A PR COVID VAC PFIZER DIL RECON 30 MCG/0.3 ML IM: CPT

## 2021-12-31 DIAGNOSIS — F41.8 DEPRESSION WITH ANXIETY: ICD-10-CM

## 2021-12-31 RX ORDER — ESCITALOPRAM OXALATE 10 MG/1
15 TABLET ORAL DAILY
Qty: 135 TABLET | Refills: 0 | Status: SHIPPED | OUTPATIENT
Start: 2021-12-31 | End: 2022-04-15

## 2021-12-31 NOTE — TELEPHONE ENCOUNTER
PCJ-patient is looking for a  Long virtual appointment before the 26th of January (that is the soonest) and is also looking for a refill on a medication did look for a different provider she is looking to discuss mental health     Please call and advise  Thank You  Araceli Palomino on 12/31/2021 at 2:31 PM

## 2021-12-31 NOTE — TELEPHONE ENCOUNTER
No soon 40 minute appointment available. Please advise.     Bev Tagn CMA on 12/31/2021 at 2:44 PM    Pending Prescriptions:                       Disp   Refills    escitalopram (LEXAPRO) 10 MG tablet       135 ta*0            Sig: Take 1.5 tablets (15 mg) by mouth daily

## 2022-01-19 ENCOUNTER — TRANSFERRED RECORDS (OUTPATIENT)
Dept: HEALTH INFORMATION MANAGEMENT | Facility: OTHER | Age: 47
End: 2022-01-19

## 2022-04-15 ENCOUNTER — MYC MEDICAL ADVICE (OUTPATIENT)
Dept: FAMILY MEDICINE | Facility: OTHER | Age: 47
End: 2022-04-15
Payer: COMMERCIAL

## 2022-04-15 DIAGNOSIS — F41.8 DEPRESSION WITH ANXIETY: ICD-10-CM

## 2022-04-15 RX ORDER — ESCITALOPRAM OXALATE 10 MG/1
15 TABLET ORAL DAILY
Qty: 135 TABLET | Refills: 0 | Status: SHIPPED | OUTPATIENT
Start: 2022-04-15 | End: 2022-04-27

## 2022-04-26 NOTE — PROGRESS NOTES
Patient's 4 score IQ is above average.  Evaluation of attention to detail, impulsivity were within normal limits.  There was some concern with length of story recall.  Additional evaluations were consistent with someone who is sad, unhappy with current life circumstances.  Prone to worry and to be ruminative.  Association with low self-esteem, association with worry about health.  Snoring was discussed with possible referral to sleep study.  Answers for HPI/ROS submitted by the patient on 4/27/2022  If you checked off any problems, how difficult have these problems made it for you to do your work, take care of things at home, or get along with other people?: Very difficult  PHQ9 TOTAL SCORE: 10  DORIAN 7 TOTAL SCORE: 8  Depression/Anxiety: Depression & Anxiety  Status since last visit:: worse  Anxiety since last: : worse  Other associated symptoms of depression:: Yes  Other associated symotome: : Yes  Significant life event: : relationship concerns, job concerns  Anxious:: Yes  Current substance use:: No  How many servings of fruits and vegetables do you eat daily?: 2-3  On average, how many sweetened beverages do you drink each day (Examples: soda, juice, sweet tea, etc.  Do NOT count diet or artificially sweetened beverages)?: 1  How many minutes a day do you exercise enough to make your heart beat faster?: 30 to 60  How many days a week do you exercise enough to make your heart beat faster?: 4  How many days per week do you miss taking your medication?: 0

## 2022-04-27 ENCOUNTER — OFFICE VISIT (OUTPATIENT)
Dept: FAMILY MEDICINE | Facility: OTHER | Age: 47
End: 2022-04-27
Attending: FAMILY MEDICINE
Payer: COMMERCIAL

## 2022-04-27 VITALS
HEART RATE: 70 BPM | DIASTOLIC BLOOD PRESSURE: 68 MMHG | RESPIRATION RATE: 16 BRPM | OXYGEN SATURATION: 97 % | BODY MASS INDEX: 28.99 KG/M2 | TEMPERATURE: 97.1 F | SYSTOLIC BLOOD PRESSURE: 106 MMHG | WEIGHT: 179.6 LBS

## 2022-04-27 DIAGNOSIS — F41.8 DEPRESSION WITH ANXIETY: Primary | ICD-10-CM

## 2022-04-27 DIAGNOSIS — F32.81 PMDD (PREMENSTRUAL DYSPHORIC DISORDER): ICD-10-CM

## 2022-04-27 PROCEDURE — 99215 OFFICE O/P EST HI 40 MIN: CPT | Performed by: FAMILY MEDICINE

## 2022-04-27 RX ORDER — ESCITALOPRAM OXALATE 10 MG/1
10 TABLET ORAL DAILY
Qty: 90 TABLET | Refills: 0 | Status: SHIPPED | OUTPATIENT
Start: 2022-04-27 | End: 2022-05-27

## 2022-04-27 RX ORDER — BUPROPION HYDROCHLORIDE 150 MG/1
150 TABLET ORAL EVERY MORNING
Qty: 30 TABLET | Refills: 11 | Status: SHIPPED | OUTPATIENT
Start: 2022-04-27 | End: 2022-05-27

## 2022-04-27 ASSESSMENT — ANXIETY QUESTIONNAIRES
6. BECOMING EASILY ANNOYED OR IRRITABLE: SEVERAL DAYS
1. FEELING NERVOUS, ANXIOUS, OR ON EDGE: NEARLY EVERY DAY
GAD7 TOTAL SCORE: 8
7. FEELING AFRAID AS IF SOMETHING AWFUL MIGHT HAPPEN: SEVERAL DAYS
4. TROUBLE RELAXING: SEVERAL DAYS
5. BEING SO RESTLESS THAT IT IS HARD TO SIT STILL: NOT AT ALL
7. FEELING AFRAID AS IF SOMETHING AWFUL MIGHT HAPPEN: SEVERAL DAYS
3. WORRYING TOO MUCH ABOUT DIFFERENT THINGS: SEVERAL DAYS
2. NOT BEING ABLE TO STOP OR CONTROL WORRYING: SEVERAL DAYS
GAD7 TOTAL SCORE: 8

## 2022-04-27 ASSESSMENT — PAIN SCALES - GENERAL: PAINLEVEL: MILD PAIN (2)

## 2022-04-27 ASSESSMENT — PATIENT HEALTH QUESTIONNAIRE - PHQ9
10. IF YOU CHECKED OFF ANY PROBLEMS, HOW DIFFICULT HAVE THESE PROBLEMS MADE IT FOR YOU TO DO YOUR WORK, TAKE CARE OF THINGS AT HOME, OR GET ALONG WITH OTHER PEOPLE: VERY DIFFICULT
SUM OF ALL RESPONSES TO PHQ QUESTIONS 1-9: 10
SUM OF ALL RESPONSES TO PHQ QUESTIONS 1-9: 10

## 2022-04-27 NOTE — NURSING NOTE
"Chief Complaint   Patient presents with     Medication Change     Neuropsychologist recommended changing from Celexa to Wellbutrin      Ear Pressure     Patient is here for follow up from evaluation from Neuropsychologist. They recommended changing Celexa to Wellbutrin. She would also like ears checked as she has pressure and mild pain.     Initial /68   Pulse 70   Temp 97.1  F (36.2  C) (Tympanic)   Resp 16   Wt 81.5 kg (179 lb 9.6 oz)   SpO2 97%   Breastfeeding No   BMI 28.99 kg/m   Estimated body mass index is 28.99 kg/m  as calculated from the following:    Height as of 2/12/20: 1.676 m (5' 6\").    Weight as of this encounter: 81.5 kg (179 lb 9.6 oz).  Medication Reconciliation: complete    Bev Tang MA       FOOD SECURITY SCREENING QUESTIONS:    The next two questions are to help us understand your food security.  If you are feeling you need any assistance in this area, we have resources available to support you today.    Hunger Vital Signs:  Within the past 12 months we worried whether our food would run out before we got money to buy more. Never  Within the past 12 months the food we bought just didn't last and we didn't have money to get more. Never  Bev Tang MA,LPN on 4/27/2022 at 8:46 AM      "

## 2022-04-27 NOTE — PROGRESS NOTES
Assessment & Plan       ICD-10-CM    1. Depression with anxiety  F41.8 buPROPion (WELLBUTRIN XL) 150 MG 24 hr tablet     escitalopram (LEXAPRO) 10 MG tablet   2. PMDD (premenstrual dysphoric disorder)  F32.81      1.  Discussed recommended medication changes.    Reviewed evaluation from neuropsych - see other note in today's chart for summary.  Will start Wellbutrin xl 150mg each AM.  Potential side effects discussed.  Decrease lexapro to 10mg a day - discussed SSRI medication for PMDD in particular.  2.  Continue mental health counseling and support  3.  Discussed self care with sleep, nutrition, exercise especially with current significant life changes.    Follow up in a month.      41 minutes spent on the date of the encounter doing chart review, history and exam, documentation and further activities per the note       Depression Screening Follow Up    PHQ 4/27/2022   PHQ-9 Total Score 10   Q9: Thoughts of better off dead/self-harm past 2 weeks Several days   F/U: Thoughts of suicide or self-harm Yes   F/U: Self harm-plan No   F/U: Self-harm action No   F/U: Safety concerns No               Follow Up  Follow Up Actions Taken - see above         Return in about 4 weeks (around 5/25/2022) for Follow up.    GAIL SUE MD  [facility] is a 46 year old who presents for the following health issues  - follow up after neuropsych testing.  These notes are in her chart and reviewed x6 pages.  See notes in additional note from today.  Patient has had time to review these two.  Now is back at work after having a leave.  Is in process of  from her .  Everyone is still living in the same house at this time.  She is working with two counselors.  States twice has had suicidal thoughts/plans of walking to [employer] and jumping into the cold water.     History of Present Illness       Mental Health Follow-up:  Patient presents to follow-up on  Depression & Anxiety.Patient's depression since last visit has been:  Worse  The patient is having other symptoms associated with depression.  Patient's anxiety since last visit has been:  Worse  The patient is having other symptoms associated with anxiety.  Any significant life events: relationship concerns and job concerns  Patient is feeling anxious or having panic attacks.  Patient has no concerns about alcohol or drug use.       Today's PHQ-9         PHQ-9 Total Score: 10  PHQ-9 Q9 Thoughts of better off dead/self-harm past 2 weeks :   (P) Several days  Thoughts of suicide or self harm: (P) Yes  Self-harm Plan:   (P) No  Self-harm Action:     (P) No  Safety concerns for self or others: (P) No    How difficult have these problems made it for you to do your work, take care of things at home, or get along with other people: Very difficult    Today's DORIAN-7 Score: 8    She eats 2-3 servings of fruits and vegetables daily.She consumes 1 sweetened beverage(s) daily.She exercises with enough effort to increase her heart rate 30 to 60 minutes per day.  She exercises with enough effort to increase her heart rate 4 days per week.   She is taking medications regularly.       Side effects - worried about needing to take medication for the rest of her life; worried about medication making ehr feel irritable or suicidal.      Sleep - on the side of sleeping more.  Uses prayer at the end of the day to help with getting to sleep.  Will listen to a meditation, listen to music, using candle.    PMS week - this will bring her down a lot more, harder to get back.  Periods are super scant/not much flow.  She gets breast tenderness,     Current Outpatient Medications   Medication     buPROPion (WELLBUTRIN XL) 150 MG 24 hr tablet     escitalopram (LEXAPRO) 10 MG tablet     triamcinolone (KENALOG) 0.5 % cream     No current facility-administered medications for this visit.        Allergies   Allergen Reactions     Birch Trees      Cats  "Other (See Comments)     congestion     Chlorhexidine Itching     No rash.  Also felt \"irritable\" after using wipes.     Dogs      Mold      Past Medical History:   Diagnosis Date     Anxiety disorder 2017     Bronchitis 2012    Acute      Chronic lumbosacral pain 2015     Dislocation of ulnohumeral joint 1993    left; age 18     Epicondylitis, lateral (tennis elbow)-left 2018     Family history of malignant neoplasm of breast 2013     History of dysthymic disorder 2010    Resolved 13     Hypergastrinemia      Lesions of both ulnar nerves 2017     Medial epicondylitis of right elbow 2017     Other enthesopathies, not elsewhere classified 2017     Other immediate postpartum hemorrhage 2011     Prediabetes     a1c 5.8     Pregnancy      - PPH, retained placenta, transfusion     Vitamin D deficiency 2015             Review of Systems   Some joint symptoms   History of pre-diabetes  Lab Results   Component Value Date    A1C 5.5 2021    A1C 5.8 2020           Objective    /68   Pulse 70   Temp 97.1  F (36.2  C) (Tympanic)   Resp 16   Wt 81.5 kg (179 lb 9.6 oz)   SpO2 97%   Breastfeeding No   BMI 28.99 kg/m    Body mass index is 28.99 kg/m .  Physical Exam   GENERAL: healthy, alert and no distress  Tearful  - appropriate                " no

## 2022-04-28 ASSESSMENT — ANXIETY QUESTIONNAIRES: GAD7 TOTAL SCORE: 8

## 2022-04-28 ASSESSMENT — PATIENT HEALTH QUESTIONNAIRE - PHQ9: SUM OF ALL RESPONSES TO PHQ QUESTIONS 1-9: 10

## 2022-05-27 ENCOUNTER — OFFICE VISIT (OUTPATIENT)
Dept: FAMILY MEDICINE | Facility: OTHER | Age: 47
End: 2022-05-27
Attending: FAMILY MEDICINE
Payer: COMMERCIAL

## 2022-05-27 VITALS
OXYGEN SATURATION: 98 % | WEIGHT: 180.4 LBS | TEMPERATURE: 97.8 F | HEART RATE: 68 BPM | BODY MASS INDEX: 28.99 KG/M2 | HEIGHT: 66 IN | SYSTOLIC BLOOD PRESSURE: 124 MMHG | DIASTOLIC BLOOD PRESSURE: 68 MMHG | RESPIRATION RATE: 16 BRPM

## 2022-05-27 DIAGNOSIS — F41.8 DEPRESSION WITH ANXIETY: ICD-10-CM

## 2022-05-27 PROCEDURE — 99214 OFFICE O/P EST MOD 30 MIN: CPT | Performed by: FAMILY MEDICINE

## 2022-05-27 RX ORDER — BUPROPION HYDROCHLORIDE 150 MG/1
150 TABLET ORAL EVERY MORNING
Qty: 90 TABLET | Refills: 11 | Status: SHIPPED | OUTPATIENT
Start: 2022-05-27 | End: 2022-10-12

## 2022-05-27 RX ORDER — ESCITALOPRAM OXALATE 10 MG/1
15 TABLET ORAL DAILY
Qty: 135 TABLET | Refills: 3 | Status: SHIPPED | OUTPATIENT
Start: 2022-05-27 | End: 2022-07-01

## 2022-05-27 ASSESSMENT — ANXIETY QUESTIONNAIRES
5. BEING SO RESTLESS THAT IT IS HARD TO SIT STILL: SEVERAL DAYS
7. FEELING AFRAID AS IF SOMETHING AWFUL MIGHT HAPPEN: SEVERAL DAYS
6. BECOMING EASILY ANNOYED OR IRRITABLE: SEVERAL DAYS
4. TROUBLE RELAXING: SEVERAL DAYS
GAD7 TOTAL SCORE: 12
2. NOT BEING ABLE TO STOP OR CONTROL WORRYING: NEARLY EVERY DAY
GAD7 TOTAL SCORE: 12
8. IF YOU CHECKED OFF ANY PROBLEMS, HOW DIFFICULT HAVE THESE MADE IT FOR YOU TO DO YOUR WORK, TAKE CARE OF THINGS AT HOME, OR GET ALONG WITH OTHER PEOPLE?: SOMEWHAT DIFFICULT
3. WORRYING TOO MUCH ABOUT DIFFERENT THINGS: MORE THAN HALF THE DAYS
1. FEELING NERVOUS, ANXIOUS, OR ON EDGE: NEARLY EVERY DAY
GAD7 TOTAL SCORE: 12
7. FEELING AFRAID AS IF SOMETHING AWFUL MIGHT HAPPEN: SEVERAL DAYS

## 2022-05-27 ASSESSMENT — PATIENT HEALTH QUESTIONNAIRE - PHQ9
SUM OF ALL RESPONSES TO PHQ QUESTIONS 1-9: 8
10. IF YOU CHECKED OFF ANY PROBLEMS, HOW DIFFICULT HAVE THESE PROBLEMS MADE IT FOR YOU TO DO YOUR WORK, TAKE CARE OF THINGS AT HOME, OR GET ALONG WITH OTHER PEOPLE: VERY DIFFICULT
SUM OF ALL RESPONSES TO PHQ QUESTIONS 1-9: 8

## 2022-05-27 ASSESSMENT — PAIN SCALES - GENERAL: PAINLEVEL: NO PAIN (0)

## 2022-05-27 NOTE — PROGRESS NOTES
"  Assessment & Plan       ICD-10-CM    1. Depression with anxiety  F41.8 escitalopram (LEXAPRO) 10 MG tablet     buPROPion (WELLBUTRIN XL) 150 MG 24 hr tablet       1.  Goal set for treatment - for medication, this I to eliminate the distracting physical symptoms of anxiety.  In addition to medication, she will continue online counseling, continue to cut down on alcohol, work on sleep, work on exercise.  Change lexapro to 15mg a day, up from 10mg.    Continue same dose of Wellbutrin.        34 minutes spent on the date of the encounter doing chart review, history and exam, documentation and further activities per the note       BMI:   Estimated body mass index is 29.12 kg/m  as calculated from the following:    Height as of this encounter: 1.676 m (5' 6\").    Weight as of this encounter: 81.8 kg (180 lb 6.4 oz).         Return in about 4 weeks (around 6/24/2022) for Follow up.    GAIL SUE MD  [facility]    Nursing Notes:   Bev Tang MA  5/27/2022  8:57 AM  Signed  Chief Complaint   Patient presents with     Recheck Medication     Wellbutrin      Patient is here for follow up on Wellbutrin. Patient denies side effects but states she has had a burning sensation in chest.     Initial /68   Pulse 68   Temp 97.8  F (36.6  C) (Tympanic)   Resp 16   Ht 1.676 m (5' 6\")   Wt 81.8 kg (180 lb 6.4 oz)   SpO2 98%   Breastfeeding No   BMI 29.12 kg/m   Estimated body mass index is 29.12 kg/m  as calculated from the following:    Height as of this encounter: 1.676 m (5' 6\").    Weight as of this encounter: 81.8 kg (180 lb 6.4 oz).  Medication Reconciliation: complete    Bev Tang MA       FOOD SECURITY SCREENING QUESTIONS:    The next two questions are to help us understand your food security.  If you are feeling you need any assistance in this area, we have resources available to support you today.    Hunger Vital Signs:  Within the past 12 months we worried whether " our food would run out before we got money to buy more. Never  Within the past 12 months the food we bought just didn't last and we didn't have money to get more. Never  Bev Tang MA,LPN on 5/27/2022 at 8:55 AM         James Astorga is a 46 year old who presents for the following health issues  - medication follow up.  She continues to feel some anxiety, more in her chest.  Distracting physical symptoms.  Getting tasks done is more the limiter with getting to bed/sleep.      Started online therapy - weekly plus thru the week   Stated therapy goals: 1.  Communication  2.  Validate her own feelings, less fear of conflict  3.  Feel better about her work      History of Present Illness       Mental Health Follow-up:  Patient presents to follow-up on Depression & Anxiety.Patient's depression since last visit has been:  Medium  The patient is having other symptoms associated with depression.  Patient's anxiety since last visit has been:  Worse  The patient is having other symptoms associated with anxiety.  Any significant life events: relationship concerns, job concerns and health concerns  Patient is feeling anxious or having panic attacks.  Patient has no concerns about alcohol or drug use.    She eats 4 or more servings of fruits and vegetables daily.She consumes 1 sweetened beverage(s) daily.She exercises with enough effort to increase her heart rate 20 to 29 minutes per day.  She exercises with enough effort to increase her heart rate 4 days per week.   She is taking medications regularly.    Today's PHQ-9         PHQ-9 Total Score: 8    PHQ-9 Q9 Thoughts of better off dead/self-harm past 2 weeks :   Not at all    How difficult have these problems made it for you to do your work, take care of things at home, or get along with other people: Very difficult  Today's DORIAN-7 Score: 12         Past Medical History:   Diagnosis Date     Anxiety disorder 11/08/2017     Bronchitis 02/14/2012    Acute      Chronic  "lumbosacral pain 2015     Dislocation of ulnohumeral joint 1993    left; age 18     Epicondylitis, lateral (tennis elbow)-left 2018     Family history of malignant neoplasm of breast 2013     History of dysthymic disorder 2010    Resolved 13     Hypergastrinemia      Lesions of both ulnar nerves 2017     Medial epicondylitis of right elbow 2017     Other enthesopathies, not elsewhere classified 2017     Other immediate postpartum hemorrhage 2011     Prediabetes 2020    a1c 5.8     Pregnancy      - PPH, retained placenta, transfusion     Vitamin D deficiency 2015     Current Outpatient Medications   Medication     buPROPion (WELLBUTRIN XL) 150 MG 24 hr tablet     escitalopram (LEXAPRO) 10 MG tablet     triamcinolone (KENALOG) 0.5 % cream     No current facility-administered medications for this visit.       Review of Systems         Objective    /68   Pulse 68   Temp 97.8  F (36.6  C) (Tympanic)   Resp 16   Ht 1.676 m (5' 6\")   Wt 81.8 kg (180 lb 6.4 oz)   SpO2 98%   Breastfeeding No   BMI 29.12 kg/m    Body mass index is 29.12 kg/m .  Physical Exam   GENERAL: healthy, alert and no distress              "

## 2022-05-27 NOTE — NURSING NOTE
"Chief Complaint   Patient presents with     Recheck Medication     Wellbutrin      Patient is here for follow up on Wellbutrin. Patient denies side effects but states she has had a burning sensation in chest.     Initial /68   Pulse 68   Temp 97.8  F (36.6  C) (Tympanic)   Resp 16   Ht 1.676 m (5' 6\")   Wt 81.8 kg (180 lb 6.4 oz)   SpO2 98%   Breastfeeding No   BMI 29.12 kg/m   Estimated body mass index is 29.12 kg/m  as calculated from the following:    Height as of this encounter: 1.676 m (5' 6\").    Weight as of this encounter: 81.8 kg (180 lb 6.4 oz).  Medication Reconciliation: complete    Bev Tang MA       FOOD SECURITY SCREENING QUESTIONS:    The next two questions are to help us understand your food security.  If you are feeling you need any assistance in this area, we have resources available to support you today.    Hunger Vital Signs:  Within the past 12 months we worried whether our food would run out before we got money to buy more. Never  Within the past 12 months the food we bought just didn't last and we didn't have money to get more. Never  Bev Tang MA,LPN on 5/27/2022 at 8:55 AM      "

## 2022-07-01 ENCOUNTER — VIRTUAL VISIT (OUTPATIENT)
Dept: FAMILY MEDICINE | Facility: OTHER | Age: 47
End: 2022-07-01
Attending: FAMILY MEDICINE
Payer: COMMERCIAL

## 2022-07-01 DIAGNOSIS — F41.8 DEPRESSION WITH ANXIETY: ICD-10-CM

## 2022-07-01 PROCEDURE — 99213 OFFICE O/P EST LOW 20 MIN: CPT | Mod: 95 | Performed by: FAMILY MEDICINE

## 2022-07-01 RX ORDER — ESCITALOPRAM OXALATE 20 MG/1
20 TABLET ORAL DAILY
Qty: 90 TABLET | Refills: 3 | Status: SHIPPED | OUTPATIENT
Start: 2022-07-01 | End: 2022-12-22

## 2022-07-01 ASSESSMENT — ANXIETY QUESTIONNAIRES
7. FEELING AFRAID AS IF SOMETHING AWFUL MIGHT HAPPEN: SEVERAL DAYS
IF YOU CHECKED OFF ANY PROBLEMS ON THIS QUESTIONNAIRE, HOW DIFFICULT HAVE THESE PROBLEMS MADE IT FOR YOU TO DO YOUR WORK, TAKE CARE OF THINGS AT HOME, OR GET ALONG WITH OTHER PEOPLE: SOMEWHAT DIFFICULT
2. NOT BEING ABLE TO STOP OR CONTROL WORRYING: MORE THAN HALF THE DAYS
GAD7 TOTAL SCORE: 9
1. FEELING NERVOUS, ANXIOUS, OR ON EDGE: NEARLY EVERY DAY
GAD7 TOTAL SCORE: 9
5. BEING SO RESTLESS THAT IT IS HARD TO SIT STILL: NOT AT ALL
3. WORRYING TOO MUCH ABOUT DIFFERENT THINGS: SEVERAL DAYS
6. BECOMING EASILY ANNOYED OR IRRITABLE: SEVERAL DAYS

## 2022-07-01 ASSESSMENT — PATIENT HEALTH QUESTIONNAIRE - PHQ9
5. POOR APPETITE OR OVEREATING: SEVERAL DAYS
SUM OF ALL RESPONSES TO PHQ QUESTIONS 1-9: 5

## 2022-07-01 NOTE — NURSING NOTE
"Chief Complaint   Patient presents with     Recheck Medication     Patient following up on depression and anxiety     Initial There were no vitals taken for this visit. Estimated body mass index is 29.12 kg/m  as calculated from the following:    Height as of 5/27/22: 1.676 m (5' 6\").    Weight as of 5/27/22: 81.8 kg (180 lb 6.4 oz).  Medication Reconciliation: complete    Bev Tang MA       FOOD SECURITY SCREENING QUESTIONS:    The next two questions are to help us understand your food security.  If you are feeling you need any assistance in this area, we have resources available to support you today.    Hunger Vital Signs:  Within the past 12 months we worried whether our food would run out before we got money to buy more. Never  Within the past 12 months the food we bought just didn't last and we didn't have money to get more. Never  Bev Tang MA,LPN on 7/1/2022 at 8:14 AM      "

## 2022-07-01 NOTE — PROGRESS NOTES
"Gauri is a 46 year old who is being evaluated via a billable video visit.      How would you like to obtain your AVS? MyChart  If the video visit is dropped, the invitation should be resent by: Other e-mail: [email or web address]  Will anyone else be joining your video visit? No        Assessment & Plan       ICD-10-CM    1. Depression with anxiety  F41.8 escitalopram (LEXAPRO) 20 MG tablet       1.  Change in medication dose - lexapro to 20mg/day.  Continue with counseling services.  Brief counseling today regarding self care, exercise, nutrition, CBT techniques.            BMI:   Estimated body mass index is 29.12 kg/m  as calculated from the following:    Height as of 5/27/22: 1.676 m (5' 6\").    Weight as of 5/27/22: 81.8 kg (180 lb 6.4 oz).           Return in about 4 weeks (around 7/29/2022).    GAIL SUE MD  [facility]    Subjective   Gauri is a 46 year old, presenting for the following health issues:  Recheck Medication    She is still feeling the sensation of chest anxiety.  It is better, but still there often.  It is fairly constant, not panicky, but just there all the time.  It is maybe 20% better.  Relationship issues   She can sometimes distract herself from the feeling.  Breathing techniques helps.    Doesn't feel like her HR is higher.    Denies any medication side effects.        Current Outpatient Medications   Medication     buPROPion (WELLBUTRIN XL) 150 MG 24 hr tablet     escitalopram (LEXAPRO) 20 MG tablet     triamcinolone (KENALOG) 0.5 % cream     No current facility-administered medications for this visit.        Allergies   Allergen Reactions     Birch Trees      Cats Other (See Comments)     congestion     Chlorhexidine Itching     No rash.  Also felt \"irritable\" after using wipes.     Dogs      Mold            Review of Systems         Objective    Vitals - Patient Reported  Weight (Patient Reported): 81.2 kg (179 lb)  Height (Patient Reported): " "167.6 cm (5' 6\")  BMI (Based on Pt Reported Ht/Wt): 28.89  Pain Score: No Pain (0)        Physical Exam   GENERAL: Healthy, alert and no distress  EYES: Eyes grossly normal to inspection.  No discharge or erythema, or obvious scleral/conjunctival abnormalities.  RESP: No audible wheeze, cough, or visible cyanosis.  No visible retractions or increased work of breathing.    SKIN: Visible skin clear. No significant rash, abnormal pigmentation or lesions.  NEURO: Cranial nerves grossly intact.  Mentation and speech appropriate for age.  PSYCH: Mentation appears normal, affect normal/bright, judgement and insight intact, normal speech and appearance well-groomed.                Video-Visit Details    Video Start Time: 8:23 AM    Type of service:  Video Visit    Video End Time:8:50 AM    Originating Location (pt. Location): Home    Distant Location (provider location):  [facility] [place]     Platform used for Video Visit: Nguyen    .  ..  "

## 2022-08-26 ENCOUNTER — TELEPHONE (OUTPATIENT)
Dept: SURGERY | Facility: OTHER | Age: 47
End: 2022-08-26

## 2022-08-26 ENCOUNTER — HOSPITAL ENCOUNTER (OUTPATIENT)
Dept: MAMMOGRAPHY | Facility: OTHER | Age: 47
Discharge: HOME | End: 2022-08-26
Attending: FAMILY MEDICINE | Admitting: FAMILY MEDICINE
Payer: COMMERCIAL

## 2022-08-26 DIAGNOSIS — Z12.31 VISIT FOR SCREENING MAMMOGRAM: ICD-10-CM

## 2022-08-26 PROCEDURE — 77067 SCR MAMMO BI INCL CAD: CPT

## 2022-08-26 NOTE — TELEPHONE ENCOUNTER
Patient is wondering if she should get genetic testing for breast cancer prevalence before her upcoming appt with Dr. Ortega. Patient would like a call back. [phone]    Thanks,   Deysi Vu on 8/26/2022 at 11:33 AM

## 2022-08-29 NOTE — TELEPHONE ENCOUNTER
Called patient back and let her know that Dr. Ortega was out of office this week, but that I could send her the message and see if she thought that she should get the genetic testing done prior to her appointment on the 15th.  Please advise.  Tereza Kendrick LPN .......8/29/2022 9:07 AM

## 2022-09-06 NOTE — TELEPHONE ENCOUNTER
Ok to keep appointment with me genetics is scheduled way out-we'll discuss in the office. Thanks! Gertrudis Ortega MD on 9/6/2022 at 4:53 PM

## 2022-09-06 NOTE — TELEPHONE ENCOUNTER
Called patient with the response from Dr. Ortega.  Patient okay with seeing Dr. Ortega and then planning the genetics testing.  Tereza Kendrick LPN .......9/6/2022 5:10 PM

## 2022-09-13 ENCOUNTER — HOSPITAL ENCOUNTER (OUTPATIENT)
Dept: ULTRASOUND IMAGING | Facility: OTHER | Age: 47
Discharge: HOME | End: 2022-09-13
Attending: FAMILY MEDICINE | Admitting: FAMILY MEDICINE
Payer: COMMERCIAL

## 2022-09-13 DIAGNOSIS — R92.8 ABNORMAL MAMMOGRAM: ICD-10-CM

## 2022-09-13 PROCEDURE — 76642 ULTRASOUND BREAST LIMITED: CPT | Mod: RT

## 2022-09-15 ENCOUNTER — OFFICE VISIT (OUTPATIENT)
Dept: SURGERY | Facility: OTHER | Age: 47
End: 2022-09-15
Attending: SURGERY
Payer: COMMERCIAL

## 2022-09-15 VITALS
RESPIRATION RATE: 16 BRPM | TEMPERATURE: 98.8 F | BODY MASS INDEX: 28.89 KG/M2 | SYSTOLIC BLOOD PRESSURE: 110 MMHG | HEART RATE: 82 BPM | DIASTOLIC BLOOD PRESSURE: 70 MMHG | OXYGEN SATURATION: 97 % | WEIGHT: 179 LBS

## 2022-09-15 DIAGNOSIS — Z91.89 AT HIGH RISK FOR BREAST CANCER: ICD-10-CM

## 2022-09-15 DIAGNOSIS — R92.8 ABNORMAL MAMMOGRAM OF RIGHT BREAST: Primary | ICD-10-CM

## 2022-09-15 PROCEDURE — 99203 OFFICE O/P NEW LOW 30 MIN: CPT | Performed by: SURGERY

## 2022-09-15 ASSESSMENT — PAIN SCALES - GENERAL: PAINLEVEL: NO PAIN (0)

## 2022-09-15 NOTE — NURSING NOTE
"Chief Complaint   Patient presents with     RECHECK     Follow up - high risk breast       Initial /70 (BP Location: Right arm, Patient Position: Sitting, Cuff Size: Adult Regular)   Pulse 82   Temp 98.8  F (37.1  C) (Tympanic)   Resp 16   Wt 81.2 kg (179 lb)   SpO2 97%   BMI 28.89 kg/m   Estimated body mass index is 28.89 kg/m  as calculated from the following:    Height as of 5/27/22: 1.676 m (5' 6\").    Weight as of this encounter: 81.2 kg (179 lb).  Medication Reconciliation: complete    Tereza Kendrick LPN    "

## 2022-09-15 NOTE — PROGRESS NOTES
Primary Care Physician: GAIL SUE MD      I was requested to see this patient in consultation by GAIL SUE MD for evaluation regarding high risk for breast cancer. A copy of this note will be sent to Marguerite Greenfield NP.    HPI:   The patient is a 46 year old female  with a recent screening mammogram showing previous biopsy site unchanged and a new asymmetry in the upper outer right breast. This area was evaluated with US and a suspected lymph node with normal architecture was identified. She hasn't noted any new lumps in her breasts or arm pits. She does have a tender lumpy area in the right breast that is not new and hasn't shown any concerning findings on mammogram or US. She has not had breast pain. She has no nipple drainage bilaterally. She has not noted any new skin lesions on the breasts. She hasn't had a recent breast biopsy-last was in 2018-benign. She has family history of breast cancer : in her mother. She has no personal history of breast cancer or other cancer.      CONSULTATION ASSESSMENT AND PLAN/RECOMMENDATIONS:   I discussed with the patient that family history can increase her lifetime risk for breast cancer. We discussed current NCCN guidelines for high risk screening and surveillance in patients with a greater than 20 % lifetime risk of breast cancer. This screening recommendation is for yearly mammogram and yearly breast MRI-I recommend alternating those studies every 6 months.  We discussed the option of a genetic evaluation for further information about her breast cancer risk and the risks she may have for other cancers. The patient expressed understanding and denies further questions at this time. She would like to proceed with genetic counseling referral. We will watch for that visit and any results. She will meet with us after the results for further discussion of high risk management. Will order screening MRI, The patient will get a call to schedule  that. The patient will call with questions or concerns.     REVIEW OF SYSTEMS  GENERAL: No fevers or chills. Denies fatigue, recent weight loss.  HEENT: No sinus drainage. No changes with vision or hearing. No difficulty swallowing.   LYMPHATICS:  No swollen nodes in axilla, neck orgroin.  CARDIOVASCULAR: Denies chest pain, palpitations and dyspnea on exertion.  PULMONARY: No increased shortness of breath or cough. No increase in sputum production.  GI: Denies melena, bright red blood in stools. No hematemesis. No constipation or diarrhea.  : No dysuria or hematuria.  SKIN: No recent rashes or ulcers.   HEMATOLOGY:  No history of easy bruising or bleeding.  ENDOCRINE:  No history of diabetes or thyroid problems.  NEUROLOGY:  No history of seizures or headaches. No motor or sensory changes.  BREASTS: As above.    Past Medical History:   Diagnosis Date     Anxiety disorder 2017     Bronchitis 2012    Acute      Chronic lumbosacral pain 2015     Dislocation of ulnohumeral joint 1993    left; age 18     Epicondylitis, lateral (tennis elbow)-left 2018     Family history of malignant neoplasm of breast 2013     History of dysthymic disorder 2010    Resolved 13     Hypergastrinemia      Lesions of both ulnar nerves 2017     Medial epicondylitis of right elbow 2017     Other enthesopathies, not elsewhere classified 2017     Other immediate postpartum hemorrhage 2011     Prediabetes 2020    a1c 5.8     Pregnancy      - PPH, retained placenta, transfusion     Vitamin D deficiency 2015     Past Surgical History:   Procedure Laterality Date     COLONOSCOPY N/A 2020    hyperplastic, follow up 10 years     DILATION AND CURETTAGE  10/2011    for retained placenta     DILATION AND CURETTAGE  2015    for missed AB     DILATION AND CURETTAGE  2017    SAB     ENDOSCOPIC SINUS SURGERY N/A 2018    Procedure: ENDOSCOPIC SINUS SURGERY;   "Surgeon: Dora Robins MD;  Location: [facility]     ESOPHAGOSCOPY, GASTROSCOPY, DUODENOSCOPY (EGD), COMBINED N/A 8/26/2020    Procedure: ESOPHAGOGASTRODUODENOSCOPY, WITH BIOPSY;  Surgeon: Gertrudis Ortega MD;  Location:  OR     EXTRACTION(S) DENTAL      [place] Teeth     TURBINOPLASTY Bilateral 12/19/2018    Procedure: TURBINATE REDUCTION;  Surgeon: Dora Robins MD;  Location: [facility] OR     Current Outpatient Medications   Medication     buPROPion (WELLBUTRIN XL) 150 MG 24 hr tablet     escitalopram (LEXAPRO) 20 MG tablet     triamcinolone (KENALOG) 0.5 % cream     No current facility-administered medications for this visit.     Allergies   Allergen Reactions     Birch Trees      Cats Other (See Comments)     congestion     Chlorhexidine Itching     No rash.  Also felt \"irritable\" after using wipes.     Dogs      Mold      Family History   Problem Relation Age of Onset     Breast Cancer Mother 53        Cancer-breast     Diabetes Type 2  Mother         Diabetes type II     Heart Disease Father 42        Heart Disease,MI; history of rheumatic fever and asd     Diabetes Type 2  Father         Diabetes type II     Thyroid Disease Sister         Thyroid Disease,hypothyroidism     Depression Brother         Depression     Social History     Socioeconomic History     Marital status:      Spouse name: Zan     Number of children: 1     Years of education: 18+     Highest education level: Professional school degree (e.g., MD, DDS, DVM, TRAM)   Occupational History     Employer: [employer]    Tobacco Use     Smoking status: Never Smoker     Smokeless tobacco: Never Used   Vaping Use     Vaping Use: Never used   Substance and Sexual Activity     Alcohol use: Yes     Comment: Alcoholic Drinks/day: 3 per week     Drug use: No     Sexual activity: Yes     Partners: Male     Birth control/protection: Rhythm     Comment: Birth control method: NFP   Social History Narrative    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 Pleasant and cooperative with exam and interview.   HEENT: Head-normocephalic. Eyes-no scleral icterus, pupils equal, round, and reactive to light. Nose-no nasal drainage. No lesions. Mouth-oral mucosapink and moist, no lesions.  NECK: Supple. No thyroid nodules. Trachea midline.  LYMPHATICS:  No cervical, axillary or supraclavicular adenopathy.  BREASTS: The breasts were examined bilaterally. No asymmetry noted. Bilateral areas of dense breat tissue noted on palpation. No nipple discharge or inversion noted. No skin changes noted. No palpable masses noted. Mild tenderness on right central breast, no left tenderness on exam.  CV: Regular rate and rhythm, no murmurs. No peripheral edema.  LUNGS:  No respiratory distress. Clear bilaterally to auscultation.  SKIN: Pink, warm and dry. No jaundice. No rash.  NEURO:  Cranialnerves II-XII grossly intact. Alert and oriented.  PSYCH: Appropriate mood and affect.    IMAGING/LAB  I personally reviewed patient's recent mammogram and US images and reports.

## 2022-09-21 NOTE — PATIENT INSTRUCTIONS
You will get a call to schedule a video visit with Genetics. You will get a call to schedule breast MRI.   I will see you after the MRI to discuss results/recommendations. We will keep an eye out for your genetics note and any testing results and see you to discuss those as needed. Please call with any changes or questions!

## 2022-10-01 ENCOUNTER — TRANSCRIBE ORDERS (OUTPATIENT)
Dept: OTHER | Age: 47
End: 2022-10-01

## 2022-10-01 DIAGNOSIS — Z80.3 FAMILY HISTORY OF MALIGNANT NEOPLASM OF BREAST: Primary | ICD-10-CM

## 2022-10-05 ENCOUNTER — HOSPITAL ENCOUNTER (OUTPATIENT)
Dept: MRI IMAGING | Facility: OTHER | Age: 47
Discharge: HOME | End: 2022-10-05
Attending: SURGERY | Admitting: SURGERY
Payer: COMMERCIAL

## 2022-10-05 DIAGNOSIS — Z91.89 AT HIGH RISK FOR BREAST CANCER: ICD-10-CM

## 2022-10-05 DIAGNOSIS — R92.8 ABNORMAL MAMMOGRAM OF RIGHT BREAST: ICD-10-CM

## 2022-10-05 PROCEDURE — A9575 INJ GADOTERATE MEGLUMI 0.1ML: HCPCS | Performed by: SURGERY

## 2022-10-05 PROCEDURE — 255N000002 HC RX 255 OP 636: Performed by: SURGERY

## 2022-10-05 PROCEDURE — 77049 MRI BREAST C-+ W/CAD BI: CPT

## 2022-10-05 RX ADMIN — GADOTERATE MEGLUMINE 17 ML: 376.9 INJECTION INTRAVENOUS at 11:51

## 2022-10-12 ENCOUNTER — OFFICE VISIT (OUTPATIENT)
Dept: FAMILY MEDICINE | Facility: OTHER | Age: 47
End: 2022-10-12
Attending: FAMILY MEDICINE
Payer: COMMERCIAL

## 2022-10-12 ENCOUNTER — HOSPITAL ENCOUNTER (OUTPATIENT)
Dept: ULTRASOUND IMAGING | Facility: OTHER | Age: 47
Discharge: HOME | End: 2022-10-12
Attending: SURGERY
Payer: COMMERCIAL

## 2022-10-12 VITALS
BODY MASS INDEX: 29.57 KG/M2 | WEIGHT: 183.2 LBS | DIASTOLIC BLOOD PRESSURE: 80 MMHG | TEMPERATURE: 97.8 F | SYSTOLIC BLOOD PRESSURE: 112 MMHG | OXYGEN SATURATION: 96 % | HEART RATE: 81 BPM | RESPIRATION RATE: 14 BRPM

## 2022-10-12 DIAGNOSIS — R10.32 GROIN PAIN, LEFT: ICD-10-CM

## 2022-10-12 DIAGNOSIS — R10.2 PELVIC PAIN IN FEMALE: ICD-10-CM

## 2022-10-12 DIAGNOSIS — E55.9 VITAMIN D INSUFFICIENCY: ICD-10-CM

## 2022-10-12 DIAGNOSIS — Z80.3 FAMILY HISTORY OF BREAST CANCER: ICD-10-CM

## 2022-10-12 DIAGNOSIS — F41.8 DEPRESSION WITH ANXIETY: Primary | ICD-10-CM

## 2022-10-12 DIAGNOSIS — R73.03 PREDIABETES: ICD-10-CM

## 2022-10-12 DIAGNOSIS — R92.8 ABNORMAL MRI, BREAST: ICD-10-CM

## 2022-10-12 LAB
CRP SERPL-MCNC: 2.8 MG/L
DEPRECATED CALCIDIOL+CALCIFEROL SERPL-MC: 35 UG/L (ref 30–100)
ERYTHROCYTE [DISTWIDTH] IN BLOOD BY AUTOMATED COUNT: 13.1 % (ref 10–15)
ERYTHROCYTE [SEDIMENTATION RATE] IN BLOOD BY WESTERGREN METHOD: 10 MM/HR (ref 0–20)
HBA1C MFR BLD: 5.5 % (ref 4–6.2)
HCT VFR BLD AUTO: 42.6 % (ref 35–47)
HGB BLD-MCNC: 14.1 G/DL (ref 11.7–15.7)
MCH RBC QN AUTO: 30.1 PG (ref 26.5–33)
MCHC RBC AUTO-ENTMCNC: 33.1 G/DL (ref 31.5–36.5)
MCV RBC AUTO: 91 FL (ref 78–100)
PLATELET # BLD AUTO: 293 10E3/UL (ref 150–450)
RBC # BLD AUTO: 4.68 10E6/UL (ref 3.8–5.2)
WBC # BLD AUTO: 7.4 10E3/UL (ref 4–11)

## 2022-10-12 PROCEDURE — 85027 COMPLETE CBC AUTOMATED: CPT | Mod: ZL | Performed by: FAMILY MEDICINE

## 2022-10-12 PROCEDURE — 36415 COLL VENOUS BLD VENIPUNCTURE: CPT | Mod: ZL | Performed by: FAMILY MEDICINE

## 2022-10-12 PROCEDURE — 82306 VITAMIN D 25 HYDROXY: CPT | Mod: ZL | Performed by: FAMILY MEDICINE

## 2022-10-12 PROCEDURE — 76642 ULTRASOUND BREAST LIMITED: CPT | Mod: RT

## 2022-10-12 PROCEDURE — 86038 ANTINUCLEAR ANTIBODIES: CPT | Mod: ZL | Performed by: FAMILY MEDICINE

## 2022-10-12 PROCEDURE — 83036 HEMOGLOBIN GLYCOSYLATED A1C: CPT | Mod: ZL | Performed by: FAMILY MEDICINE

## 2022-10-12 PROCEDURE — 99214 OFFICE O/P EST MOD 30 MIN: CPT | Performed by: FAMILY MEDICINE

## 2022-10-12 PROCEDURE — 86140 C-REACTIVE PROTEIN: CPT | Mod: ZL | Performed by: FAMILY MEDICINE

## 2022-10-12 PROCEDURE — 85652 RBC SED RATE AUTOMATED: CPT | Mod: ZL | Performed by: FAMILY MEDICINE

## 2022-10-12 RX ORDER — BUPROPION HYDROCHLORIDE 150 MG/1
150 TABLET ORAL EVERY MORNING
Qty: 90 TABLET | Refills: 3 | Status: SHIPPED | OUTPATIENT
Start: 2022-10-12 | End: 2023-11-13

## 2022-10-12 ASSESSMENT — ANXIETY QUESTIONNAIRES
6. BECOMING EASILY ANNOYED OR IRRITABLE: SEVERAL DAYS
IF YOU CHECKED OFF ANY PROBLEMS ON THIS QUESTIONNAIRE, HOW DIFFICULT HAVE THESE PROBLEMS MADE IT FOR YOU TO DO YOUR WORK, TAKE CARE OF THINGS AT HOME, OR GET ALONG WITH OTHER PEOPLE: VERY DIFFICULT
5. BEING SO RESTLESS THAT IT IS HARD TO SIT STILL: NOT AT ALL
7. FEELING AFRAID AS IF SOMETHING AWFUL MIGHT HAPPEN: SEVERAL DAYS
4. TROUBLE RELAXING: SEVERAL DAYS
GAD7 TOTAL SCORE: 10
GAD7 TOTAL SCORE: 10
7. FEELING AFRAID AS IF SOMETHING AWFUL MIGHT HAPPEN: SEVERAL DAYS
8. IF YOU CHECKED OFF ANY PROBLEMS, HOW DIFFICULT HAVE THESE MADE IT FOR YOU TO DO YOUR WORK, TAKE CARE OF THINGS AT HOME, OR GET ALONG WITH OTHER PEOPLE?: VERY DIFFICULT
GAD7 TOTAL SCORE: 10
1. FEELING NERVOUS, ANXIOUS, OR ON EDGE: NEARLY EVERY DAY
2. NOT BEING ABLE TO STOP OR CONTROL WORRYING: MORE THAN HALF THE DAYS
3. WORRYING TOO MUCH ABOUT DIFFERENT THINGS: MORE THAN HALF THE DAYS

## 2022-10-12 ASSESSMENT — PATIENT HEALTH QUESTIONNAIRE - PHQ9
10. IF YOU CHECKED OFF ANY PROBLEMS, HOW DIFFICULT HAVE THESE PROBLEMS MADE IT FOR YOU TO DO YOUR WORK, TAKE CARE OF THINGS AT HOME, OR GET ALONG WITH OTHER PEOPLE: VERY DIFFICULT
SUM OF ALL RESPONSES TO PHQ QUESTIONS 1-9: 9
SUM OF ALL RESPONSES TO PHQ QUESTIONS 1-9: 9

## 2022-10-12 ASSESSMENT — PAIN SCALES - GENERAL: PAINLEVEL: MODERATE PAIN (5)

## 2022-10-12 ASSESSMENT — ENCOUNTER SYMPTOMS: NERVOUS/ANXIOUS: 1

## 2022-10-12 NOTE — NURSING NOTE
"Chief Complaint   Patient presents with     Joint Pain     Depression     Anxiety     Patient is here for joint pain and follow up on anxiety and depression     Initial /80   Pulse 81   Temp 97.8  F (36.6  C) (Tympanic)   Resp 14   Wt 83.1 kg (183 lb 3.2 oz)   SpO2 96%   BMI 29.57 kg/m   Estimated body mass index is 29.57 kg/m  as calculated from the following:    Height as of 5/27/22: 1.676 m (5' 6\").    Weight as of this encounter: 83.1 kg (183 lb 3.2 oz).  Medication Reconciliation: complete    Bev Tang CMA       FOOD SECURITY SCREENING QUESTIONS:    The next two questions are to help us understand your food security.  If you are feeling you need any assistance in this area, we have resources available to support you today.    Hunger Vital Signs:  Within the past 12 months we worried whether our food would run out before we got money to buy more. Never  Within the past 12 months the food we bought just didn't last and we didn't have money to get more. Never  Bev Tang CMA,LPN on 10/12/2022 at 9:05 AM      "

## 2022-10-12 NOTE — PROGRESS NOTES
"  Assessment & Plan       ICD-10-CM    1. Depression with anxiety  F41.8 buPROPion (WELLBUTRIN XL) 150 MG 24 hr tablet      2. Vitamin D insufficiency  E55.9 Vitamin D Total     Vitamin D Total      3. Pelvic pain in female  R10.2 US Pelvic Complete with Transvaginal      4. Groin pain, left  R10.32 US Pelvic Complete with Transvaginal     CBC W PLT No Diff     Sedimentation Rate (ESR)     CRP inflammation     Anti Nuclear Lolly IgG by IFA with Reflex     Anti Nuclear Lolly IgG by IFA with Reflex     CRP inflammation     Sedimentation Rate (ESR)     CBC W PLT No Diff      5. Prediabetes  R73.03 Hemoglobin A1c     Hemoglobin A1c        1.  We will continue with Wellbutrin  mg daily and Lexapro 20 mg daily.  She will continue with appropriate self-care, counseling as well.  We discussed what likely will be fluctuations in her degree of anxiety in the upcoming months.  2.  She wishes to have labs done today related to inflammation, CRP, AG, CBC and sed rate obtained today.  3.  Patient with history of prediabetes, recheck of A1c today.  4.  Differential diagnosis of her groin/left pelvic pain are discussed including GI and  in origin, also consider musculoskeletal.  With history of uterine fibroid, will check pelvic ultrasound as first step.    Ordering of each unique test  Prescription drug management         BMI:   Estimated body mass index is 29.57 kg/m  as calculated from the following:    Height as of 5/27/22: 1.676 m (5' 6\").    Weight as of this encounter: 83.1 kg (183 lb 3.2 oz).           Return in about 3 months (around 1/12/2023).    GAIL SUE MD  [facility]      Nursing Notes:   Bev Tang CMA  10/12/2022  9:16 AM  Signed  Chief Complaint   Patient presents with     Joint Pain     Depression     Anxiety     Patient is here for joint pain and follow up on anxiety and depression     Initial /80   Pulse 81   Temp 97.8  F (36.6  C) (Tympanic)   Resp 14  " " Wt 83.1 kg (183 lb 3.2 oz)   SpO2 96%   BMI 29.57 kg/m   Estimated body mass index is 29.57 kg/m  as calculated from the following:    Height as of 5/27/22: 1.676 m (5' 6\").    Weight as of this encounter: 83.1 kg (183 lb 3.2 oz).  Medication Reconciliation: complete    Bev Tang CMA       FOOD SECURITY SCREENING QUESTIONS:    The next two questions are to help us understand your food security.  If you are feeling you need any assistance in this area, we have resources available to support you today.    Hunger Vital Signs:  Within the past 12 months we worried whether our food would run out before we got money to buy more. Never  Within the past 12 months the food we bought just didn't last and we didn't have money to get more. Never  Bev Tang CMA,LPN on 10/12/2022 at 9:05 AM         James Astorga is a 47 year old, presenting for the following health issues:  Joint Pain, Depression, and Anxiety    Gauri Hollis is a 47 year old female in for follow up of depression, anxiety and joint pain.      Depression and anxiety: This summer, Wellbutrin XL was added to her Lexapro.  She feels that overall, this has been helpful.  She also is fairly consistent and working on counseling and other means to help with stress and anxiety.    Vitamin D recheck - currently on 5000 units.  History of deficiency.      Joint symptoms - LLQ/inside and deep and feels it deep to the iliopsoas.  Not with BM, not with monthly cycle.  Has known fibroids of her uterus.   With internal rotation gets more medial groin   Colonoscopy done 2020    Breast follow up - has her follow up ultrasound today.       Anxiety    History of Present Illness       Reason for visit:  Left hip or lower left abdominal px  Symptom onset:  More than a month  Symptoms include:  Sharp tearing px with movement and lump felt  Symptom intensity:  Moderate  Symptom progression:  Staying the same  Had these symptoms before:  No  What makes it worse:  " Lying on side sleeping & side stepping lunging motions  What makes it better:  Maybe a little strerching & pillow between knees &ankles    She eats 4 or more servings of fruits and vegetables daily.She consumes 1 sweetened beverage(s) daily.She exercises with enough effort to increase her heart rate 20 to 29 minutes per day.  She exercises with enough effort to increase her heart rate 3 or less days per week.   She is taking medications regularly.    Today's PHQ-9         PHQ-9 Total Score: 9    PHQ-9 Q9 Thoughts of better off dead/self-harm past 2 weeks :   Not at all    How difficult have these problems made it for you to do your work, take care of things at home, or get along with other people: Very difficult  Today's DORIAN-7 Score: 10         Current Outpatient Medications   Medication     buPROPion (WELLBUTRIN XL) 150 MG 24 hr tablet     escitalopram (LEXAPRO) 20 MG tablet     triamcinolone (KENALOG) 0.5 % cream     No current facility-administered medications for this visit.     Past Medical History:   Diagnosis Date     Anxiety disorder 2017     Bronchitis 2012    Acute      Chronic lumbosacral pain 2015     Dislocation of ulnohumeral joint 1993    left; age 18     Epicondylitis, lateral (tennis elbow)-left 2018     Family history of malignant neoplasm of breast 2013     History of dysthymic disorder 2010    Resolved 13     Hypergastrinemia      Lesions of both ulnar nerves 2017     Medial epicondylitis of right elbow 2017     Other enthesopathies, not elsewhere classified 2017     Other immediate postpartum hemorrhage 2011     Prediabetes 2020    a1c 5.8     Pregnancy      - PPH, retained placenta, transfusion     Vitamin D deficiency 2015        Review of Systems   Psychiatric/Behavioral: The patient is nervous/anxious.             Objective    /80   Pulse 81   Temp 97.8  F (36.6  C) (Tympanic)   Resp 14   Wt 83.1 kg (183  lb 3.2 oz)   SpO2 96%   BMI 29.57 kg/m    Body mass index is 29.57 kg/m .  Physical Exam   GENERAL: healthy, alert and no distress  ABDOMEN: tenderness with deep palpation llq

## 2022-10-13 LAB
ANA PAT SER IF-IMP: ABNORMAL
ANA SER QL IF: ABNORMAL
ANA TITR SER IF: ABNORMAL {TITER}

## 2022-10-19 ENCOUNTER — MYC MEDICAL ADVICE (OUTPATIENT)
Dept: FAMILY MEDICINE | Facility: OTHER | Age: 47
End: 2022-10-19

## 2022-10-19 DIAGNOSIS — K63.9 ENTEROPATHY: ICD-10-CM

## 2022-10-19 DIAGNOSIS — E55.9 VITAMIN D INSUFFICIENCY: Primary | ICD-10-CM

## 2022-11-02 ENCOUNTER — HOSPITAL ENCOUNTER (OUTPATIENT)
Dept: ULTRASOUND IMAGING | Facility: OTHER | Age: 47
Discharge: HOME | End: 2022-11-02
Attending: FAMILY MEDICINE | Admitting: FAMILY MEDICINE
Payer: COMMERCIAL

## 2022-11-02 DIAGNOSIS — R10.32 GROIN PAIN, LEFT: ICD-10-CM

## 2022-11-02 DIAGNOSIS — R10.2 PELVIC PAIN IN FEMALE: ICD-10-CM

## 2022-11-02 PROCEDURE — 76856 US EXAM PELVIC COMPLETE: CPT

## 2022-11-08 ENCOUNTER — TELEPHONE (OUTPATIENT)
Dept: FAMILY MEDICINE | Facility: OTHER | Age: 47
End: 2022-11-08

## 2022-11-08 NOTE — TELEPHONE ENCOUNTER
Left message on patient's voicemail informing her we have a flu clinic so she can call to get herself and her son on the schedule- she does not need to see Dr. Cornelius Talley in order to get flu shot. Number [phone] left for patient to call to schedule.     Bev Tang, ISSA on 11/8/2022 at 9:16 AM

## 2022-11-08 NOTE — TELEPHONE ENCOUNTER
Patient needs to get her employee flu shot before the 16th.  She also wants to get her flu shot with her son.  Is there anywhere they can be worked into the schedule?    Thank you   Helen Bunn on 11/8/2022 at 8:27 AM

## 2022-11-19 ENCOUNTER — HEALTH MAINTENANCE LETTER (OUTPATIENT)
Age: 47
End: 2022-11-19

## 2022-12-22 ENCOUNTER — TELEPHONE (OUTPATIENT)
Dept: FAMILY MEDICINE | Facility: OTHER | Age: 47
End: 2022-12-22

## 2022-12-22 DIAGNOSIS — F41.8 DEPRESSION WITH ANXIETY: ICD-10-CM

## 2022-12-22 RX ORDER — ESCITALOPRAM OXALATE 20 MG/1
20 TABLET ORAL DAILY
Qty: 90 TABLET | Refills: 3 | OUTPATIENT
Start: 2022-12-22

## 2022-12-22 RX ORDER — ESCITALOPRAM OXALATE 20 MG/1
30 TABLET ORAL DAILY
Qty: 135 TABLET | Refills: 3 | Status: SHIPPED | OUTPATIENT
Start: 2022-12-22 | End: 2023-11-13

## 2022-12-22 NOTE — TELEPHONE ENCOUNTER
"Received refill request from pharmacy for Lexapro but it did appear this was sent too early (last fill for one year 7/1/22). Eyad from pharmacy states patient noted she was taking 1.5 tablets for a total of 30 mg. Writer did call patient who did state she has been taking 30 mg since around Thanksgiving and she was \"directed to do so by primary\". Writer unable to find information on 30 mg dose and only noted 20 mg daily. Please advise if patient can take 30 mg Lexapro and send order to [facility] pharmacy as warranted. Patient does report depression/ anxiety symptoms seem to be managed at this time    Corinne R Thayer, RN on 12/22/2022 at 4:17 PM    "

## 2022-12-22 NOTE — TELEPHONE ENCOUNTER
Had to call pharmacy to verify 7/1/22 prescription was active and on file- it was so refill request denied. Also verified in patient's chart 20 mg was current dose as patient has had several strengths in past.     Corinne R Thayer, RN on 12/22/2022 at 3:51 PM     Requested Prescriptions   Pending Prescriptions Disp Refills     escitalopram (LEXAPRO) 20 MG tablet [Pharmacy Med Name: escitalopram 20 mg tablet] 90 tablet 3     Sig: Take 1 tablet (20 mg) by mouth daily       SSRIs Protocol Failed - 12/22/2022  1:41 PM        Failed - PHQ-9 score less than 5 in past 6 months     Please review last PHQ-9 score.

## 2022-12-22 NOTE — TELEPHONE ENCOUNTER
Patient notified of prescription. No further concerns    Corinne R Thayer, RN on 12/22/2022 at 4:39 PM

## 2023-03-23 ENCOUNTER — VIRTUAL VISIT (OUTPATIENT)
Dept: ONCOLOGY | Facility: CLINIC | Age: 48
End: 2023-03-23
Attending: SURGERY
Payer: COMMERCIAL

## 2023-03-23 DIAGNOSIS — Z80.49 FAMILY HISTORY OF UTERINE CANCER: ICD-10-CM

## 2023-03-23 DIAGNOSIS — Z91.89 AT HIGH RISK FOR BREAST CANCER: ICD-10-CM

## 2023-03-23 DIAGNOSIS — Z80.41 FAMILY HISTORY OF MALIGNANT NEOPLASM OF OVARY: ICD-10-CM

## 2023-03-23 DIAGNOSIS — Z80.3 FAMILY HISTORY OF MALIGNANT NEOPLASM OF BREAST: Primary | ICD-10-CM

## 2023-03-23 PROCEDURE — 96040 HC GENETIC COUNSELING, EACH 30 MINUTES: CPT | Mod: 95 | Performed by: GENETIC COUNSELOR, MS

## 2023-03-23 NOTE — Clinical Note
3/23/2023         RE: Gauri Hollis  [address] [address] [address]        Dear Colleague,    Thank you for referring your patient, Gauri Hollis, to the [facility]. Please see a copy of my visit note below.    Virtual Visit Details    Type of service:  Video Visit   Video Start Time: 9:02am  Video End Time: 10:16am  Originating Location (pt. Location): Home  Distant Location (provider location):  Off-site  Platform used for Video Visit: [facility]    3/23/2023    Referring Provider: Gertrudis Ortega MD    Present for Today's Visit: Gauri    Presenting Information:   I met with Gauri Hollis today for genetic counseling (video visit due to covid19) to discuss her family history of cancer.  She is here today to review this history, cancer screening recommendations, and available genetic testing options.    Personal History:  Gauri is a 47 year old female. She does not have any personal history of cancer.      She had her first menstrual period at age 15, her first child at age 36, and she reports that she is perimenopauseal. Gauri has her ovaries, fallopian tubes and uterus in place.  She reports past history of oral contraceptive use for about 8 years in her late teens/early 20's and that she has never been on hormone replacement therapy.      Her most recent mammogram in August 2022 required additional imaging due to new asymmetry in the right breast. Follow-up right breast ultrasound was normal. She underwent bilateral breast MRI screening October 2022 and follow-up right breast ultrasound was recommended that was ultimately benign. Her breast density was reported as scattered fibroglandular densities. She has not yet started regular mammogram screening given her age. Most recent colonoscopy in 2020 resulted in the removal in one hyperplastic colon polyp and follow-up was recommended in 10 years. She does not regularly do any other cancer screening at this time.  Gauri  reported no tobacco use, and 0-3 drinks per week for alcohol use.    Family History: Cancer family history and pertinent information reviewed below (Please see scanned pedigree for detailed family history information)    Mother passed at age 55 from breast caner diagnosed at age 52 (treatment included lumpectomy, radiation, and chemotherapy).     Maternal aunt passed from ovarian cancer (Gauri reports it's possible that it was a uterine cancer) that was diagnosed in her late 60's/70.     Maternal grandfather passed in his 60's/70's from kidney cancer diagnosed in his 60's/70's.     Paternal grandmother passed in her late 60's/70's from a uterine or ovarian cancer diagnosed in her 60's/70's.     Paternal first-cousin with a history of breast cancer in her 50's/60's.     There is no known Ashkenazi [place] ancestry on either side of her family. There is no reported consanguinity.    Discussion:    Gauri's family history of breast and gynecologic cancer is suggestive of a hereditary cancer syndrome.    We reviewed the features of sporadic, familial, and hereditary cancers. In looking at Gauri's family history, it is possible that a cancer susceptibility gene is present due to her mother's relatively early-onset breast cancer history and her maternal aunt's ovarian cancer history as well as her paternal grandmother's gynecologic cancer history and her paternal first-cousin's breast cancer history.    We discussed the natural history and genetics of hereditary cancers. A detailed handout regarding the information we discussed will be sent to Gauri via iwoca. Topics included: inheritance pattern, cancer risks, cancer screening recommendations, and also risks, benefits and limitations of testing.  We reviewed that the most common cause of hereditary breast cancer is Hereditary Breast and Ovarian Cancer (HBOC) syndrome, which is caused by mutations in the genes BRCA1 and BRCA2. Women with a mutation in either of these  genes are at increased risk for breast and ovarian cancer. There is also an increased risk for a second primary breast cancer for women. Men with a mutation in either BRCA1 or BRCA2 are at increased risk for male breast and prostate cancer. Both women and men may also be at increased risk for pancreatic cancer and melanoma.    Based on her family history, Gauri meets current National Comprehensive Cancer Network (NCCN) criteria for genetic testing of high-penetrance breast cancer susceptibility genes (including BRCA1, BRCA2, CDH1, PALB2, PTEN, and TP53).      We discussed that there are additional genes that could cause increased risk for breast, gynecologic, and related cancers. As many of these genes present with overlapping features in a family and accurate cancer risk cannot always be established based upon the pedigree analysis alone, it would be reasonable for Gauri to consider panel genetic testing to analyze multiple genes at once.    We reviewed genetic testing options given Gauri's family history including targeted and expanded options. After our discussion, Gauri opted to proceed with genetic testing via BRCA1/2 analysis with automatic reflex to the Breast and Gyn Cancers panel through [facility].   Genetic testing is available for 21 genes associated with hereditary gynecologic, breast, and related cancers: [facility]'s Breast and Gyn Cancers panel (DONALD, BARD1, BRCA1, BRCA2, BRIP1, CDH1, CHEK2, DICER1, EPCAM, MLH1, MSH2, MSH6, NF1, PALB2, PMS2, PTEN, RAD51C, RAD51D, SMARCA4, STK11, TP53).  We discussed that some of the genes in the Breast and Gyn Cancers panel are associated with specific hereditary cancer syndromes and published management guidelines: Hereditary Breast and Ovarian Cancer syndrome (BRCA1, BRCA2), Malone syndrome (MLH1, MSH2, MSH6, PMS2, EPCAM), Hereditary Diffuse Gastric Cancer (CDH1), Cowden syndrome (PTEN), Li Fraumeni syndrome (TP53), Peutz-Jeghers syndrome (STK11), and Neurofibromatosis  type 1 (NF1).    Risk-reducing salpingo-oophorectomy can be considered in women with mutations in BRIP1, RAD51C, or RAD51D. Breast and/or other cancer risk management guidelines are available for DONALD, CHEK2, PALB2, NF1.  The remaining genes (BARD1, DICER1, and SMARCA4) are associated with increased cancer risk and may allow us to make medical recommendations when mutations are identified.      Consent was obtained over the video with no witness required due to the current covid19 global pandemic.    Medical Management: For Gauri, we reviewed that the information from genetic testing may determine:    additional cancer screening for which Gauri may qualify (i.e. mammogram and breast MRI, more frequent colonoscopies, more frequent dermatologic exams, etc.),    options for risk reducing surgeries Gauri could consider (i.e. bilateral mastectomy, surgery to remove her ovaries and/or uterus, etc.),      and targeted chemotherapies for Gauri if she were to develop certain cancers in the future (i.e. immunotherapy for individuals with Malone syndrome, PARP inhibitors, etc.).     These recommendations will be discussed in detail once genetic testing is completed.     Gauri will schedule a lab only appointment at any [facility] clinic at her earliest convenience.     Plan:  1) Today Gauri elected to proceed with *** genetic testing (*** genes) through ***.  2) This information should be available in approximately *** weeks.  3) Gauri will be contacted by our scheduling department to set up a result disclosure appointment.     Jenni Mar MS, [facility] [place]  Licensed, Certified Genetic Counselor  [facility]  [email or web address]                  Again, thank you for allowing me to participate in the care of your patient.        Sincerely,        Jenni De Santiago GC

## 2023-03-23 NOTE — PATIENT INSTRUCTIONS
Assessing Cancer Risk  Cancer is a common diagnosis which impacts many families.  Individuals may develop cancer due to environmental factors (such as exposures and lifestyle), aging, genetic predisposition, or a combination of these factors.      Only about 5-10% of cancers are thought to be due to an inherited cancer susceptibility gene.    These families often have:  Several people with the same or related types of cancer  Cancers diagnosed at a young age (before age 50)  Individuals with more than one primary cancer  Multiple generations of the family affected with cancer    Comprehensive Breast and Gynecologic Cancer Panel  We each inherit two copies of every gene in our bodies: one from our mother, and one from our father. Each gene has a specific job to do.  When a gene has a mistake or  mutation  in it, it does not work like it should.     Some people may be candidates for genetic testing of more than one gene.  For these families, genetic testing using a cancer panel may be offered. These panels will test different genes at once known to increase the risk for breast, ovarian, uterine, and/or other cancers.    This handout will review common hereditary breast and gynecologic cancer syndromes. The genes that will be discussed in this handout are: DONALD, BRCA1, BRCA2, BRIP1, CDH1, CHEK2, MLH1, MSH2, MSH6, PMS2, EPCAM, PTEN, PALB2, RAD51C, RAD51D, and TP53.    The purpose of this handout is to serve as a brief summary of the breast and gynecologic cancer risk genes that have published clinical management guidelines for individuals who are found to carry a mutation. Inheriting a mutation does not mean a person will develop cancer, but it does significantly increase their risk above the general population risk.     ______________________________________________________________________________    Hereditary Breast and Ovarian Cancer Syndrome (BRCA1 and BRCA2)  A single mutation in one of the copies of BRCA1 or  BRCA2 increases the risk for breast and ovarian cancer, among others.  The risk for pancreatic cancer and melanoma may also be slightly increased in some families.  The chart below shows the chance that someone with a BRCA mutation would develop cancer in his or her lifetime1,2,3,4.       Lifetime Cancer Risks    General Population BRCA1  BRCA2   Breast  12% >60% >60%   Ovarian  1-2% 39-58% 13-29%   Prostate 12% 7-26% 19-61%   Male Breast 0.1% 0.2-1.2% 1.8-7.1%   Pancreas 1-2% Up to 5% 5-10%     A person s ethnic background is also important to consider, as individuals of Ashkenazi Hindu ancestry have a higher chance of having a BRCA gene mutation.  There are three BRCA mutations that occur more frequently in this population.      Malone Syndrome (MLH1, MSH2, MSH6, PMS2, and EPCAM)  Currently five genes are known to cause Malone Syndrome: MLH1, MSH2, MSH6, PMS2, and EPCAM.  A single mutation in one of the Malone Syndrome genes increases the risk for colon, endometrial, ovarian, and stomach cancers.  Other cancers that occur less commonly in Malone Syndrome include urinary tract, skin, and brain cancers.  The chart below shows the chance that a person with Malone syndrome would develop cancer in his or her lifetime5.      Lifetime Cancer Risks    General Population Malone Syndrome   Colon 5% 10-61%   Endometrial 3% 13-57%   Ovarian 1-2% 1-38%   Stomach <1% 1-9%   *Cancer risk varies depending on Malone syndrome gene found      Cowden Syndrome (PTEN)  Cowden syndrome is a hereditary condition that increases the risk for breast, thyroid, endometrial, colon, and kidney cancer.  Cowden syndrome is caused by a mutation in the PTEN gene.  A single mutation in one of the copies of PTEN causes Cowden syndrome and increases cancer risk.  The chart below shows the chance that someone with a PTEN mutation would develop cancer in their lifetime6,7.  Other benign features seen in some individuals with Cowden syndrome include benign  skin lesions (facial papules, keratoses, lipomas), learning disability, autism, thyroid nodules, colon polyps, and larger head size.     Lifetime Cancer Risks    General Population Cowden   Breast 12% 40-60%*   Thyroid 1% Up to 38%   Renal 1-2% Up to 35%   Endometrial 3% Up to 28%   Colon 5% Up to 9%   Melanoma 2-3% Up to 6%   *Emerging data suggests the risk for breast cancer could be greater than 60%               Li-Fraumeni Syndrome (TP53)  Li-Fraumeni Syndrome (LFS) is a cancer predisposition syndrome caused by a mutation in the TP53 gene. A single mutation in one of the copies of TP53 increases the risk for multiple cancers. Individuals with LFS are at an increased risk for developing cancer at a young age. The lifetime risk for development of a LFS-associated cancer is 50% by age 30 and 90% by age 60.   Core Cancers: Sarcomas, Breast, Brain, Lung, Leukemias/Lymphomas, Adrenocortical carcinomas  Other Cancers: Gastrointestinal, Thyroid, Skin, Genitourinary       Hereditary Diffuse Gastric Cancer (CDH1)  Currently, one gene is known to cause hereditary diffuse gastric cancer (HDGC): CDH1.  Individuals with HDGC are at increased risk for diffuse gastric cancer and lobular breast cancer. Of people diagnosed with HDGC, 30-50% have a mutation in the CDH1 gene.  This suggests there are likely other genes that may cause HDGC that have not been identified yet.      Lifetime Cancer Risks    General Population HDGC   Diffuse Gastric  <1% ~80%   Breast 12% 41-60%       Additional Genes    DONALD  DONALD is a moderate-risk breast cancer gene. Women who have a mutation in DONALD can have between a 2-4 fold increased risk for breast cancer compared to the general population8. DONALD mutations have also been associated with increased risk for pancreatic cancer between 5-10%9. Individuals who inherit two DONALD mutations have a condition called ataxia-telangiectasia (AT).  This rare autosomal recessive condition affects the nervous system  and immune system, and is associated with progressive cerebellar ataxia beginning in childhood. Individuals with ataxia-telangiectasia often have a weakened immune system and have an increased risk for childhood cancers.    PALB2  Mutations in PALB2 have been shown to increase the risk of breast cancer up to 41-60% in some families; where individuals fall within this risk range is dependent upon family [id number]. PALB2 mutations have also been associated with increased risk for pancreatic cancer between 5-10%.  Individuals who inherit two PALB2 mutations--one from their mother and one from their father--have a condition called Fanconi Anemia.  This rare autosomal recessive condition is associated with short stature, developmental delay, bone marrow failure, and increased risk for childhood cancers.    CHEK2   CHEK2 is a moderate-risk breast cancer gene.  Women who have a mutation in CHEK2 have around a 2-4 fold increased risk for breast cancer compared to the general population, and this risk may be higher depending upon family history.11,12,13 The risk of colon cancer may be twice as high as the general population risk of colon cancer of 5%. Mutations in CHEK2 have also been shown to increase the risk of other cancers, including prostate, however these cancer risks are currently not well understood.    BRIP1, RAD51C and RAD51D  Mutations in RAD51C and RAD51D have been shown to increase the risk of ovarian cancer and breast cancer 14,. Mutations in BRIP1 have been shown to increase the risk of ovarian cancer and possibly female breast cancer 15 .       Lifetime Cancer Risk    General Population        BRIP1   RAD51C  RAD51D   Breast 12% Not well defined 20-40% 20-40%   Ovarian 1-2% 5-15% 10-15% 10-20%     ______________________________________________________________  Inheritance  All of the cancer syndromes reviewed above are inherited in an autosomal dominant pattern.  This means that if a parent has a mutation,  each of their children will have a 50% chance of inheriting that same mutation. Therefore, each child --male or female-- would have a 50% chance of being at increased risk for developing cancer.    Image obtained from Genetics Home Reference, 2013     Mutations in some genes can occur de genaro, which means that a person s mutation occurred for the first time in them and was not inherited from a parent.  Now that they have the mutation, however, it can be passed on to future generations.    Genetic Testing  Genetic testing involves a blood test and will look for any harmful mutations that are associated with increased cancer risk.  If possible, it is recommended that the person(s) who has had cancer be tested before other family members.  That person will give us the most useful information about whether or not a specific gene is associated with the cancer in the family.    Results  There are three possible results of genetic testing:  Positive--a harmful mutation was identified in one or more of the genes  Negative--no mutations were identified in any of the genes tested  Variant of unknown significance--a variation in one of the genes was identified, but it is unclear how this impacts cancer risk in the family    Advantages and Disadvantages   There are advantages and disadvantages to genetic testing.    Advantages  May clarify your cancer risk  Can help you make medical decisions  May explain the cancers in your family  May give useful information to your family members (if you share your results)    Disadvantages  Possible negative emotional impact of learning about inherited cancer risk  Uncertainty in interpreting a negative test result in some situations  Possible genetic discrimination concerns (see below)    Genetic Information Nondiscrimination Act (KAYODE)  The Genetic Information Nondiscrimination Act of 2008 (KAYODE) is a federal law that protects individuals from health insurance or employment discrimination  based on a genetic test result alone (with some exceptions, including employers with fewer than 15 employees, and ).  Although rare, KAYODE  does not cover discrimination protections in terms of life insurance, long term care, or disability insurances.  Visit the [employer] [place] website to learn more.    Reducing Cancer Risk  All of the genes described in this handout have nationally recognized cancer screening guidelines that would be recommended for individuals who test positive.  In addition to increased cancer screening, surgeries may be offered or recommended to reduce cancer risk.  Recommendations are based upon an individual s genetic test result as well as their personal and family history of cancer.    Questions to Think About Regarding Genetic Testing:  What effect will the test result have on me and my relationship with my family members if I have an inherited gene mutation?  If I don t have a gene mutation?  Should I share my test results, and how will my family react to this news, which may also affect them?  Are my children ready to learn new information that may one day affect their own health?    Hereditary Cancer Resources    FORCE: Facing Our Risk of Cancer Empowered facingourrisk.org   Bright Pink bebrightpink.org   Li-Fraumeni Syndrome Association lfsassociation.org   PTEN World PTENworld.com   No stomach for cancer, Inc. nostomachforcancer.org   Stomach cancer relief network Scrnet.org   Collaborative Group of the Americas on Inherited Colorectal Cancer (CGA) cgaicc.com    Cancer Care cancercare.org   American Cancer Society (ACS) cancer.org   National Cancer [place] (NCI) cancer.gov     Please call us if you have any questions or concerns.   Cancer Risk Management Program [phone] ([phone])      References  Mali Cobian PDP, Walt S, Kelton BARONE, Yuridia MOON, Ritika GUTIERREZ, Paddy N, Beata H, Ericka O, Dez A, Radha B, Carmelo P, Socorro S,  Jay DM, Ted N, Gage E, Zack H, Tunde E, Tomasa J, Emery J, Zackery B, Angelika H, Naimaci S, Eecheryla H, Juliana H, Suni K, Marvel OP. Average risks of breast and ovarian cancer associated with BRCA1 or BRCA2 mutations detected in case series unselected for family history: a combined analysis of 222 studies. Am J Hum Marily. 2003;72:1117-30.  Roldan N, Irene JACKSON, Tg G.  BRCA1 and BRCA2 Hereditary Breast and Ovarian Cancer. Gene Reviews online. 2013.  Costa YC, Jonnie S, Josi G, Suresh S. Breast cancer risk among male BRCA1 and BRCA2 mutation carriers. J Natl Cancer Inst. 2007;99:1811-4.  Jared ABARCA, Jai I, Emil J, David E, Hernando ER, Shaheen F. Risk of breast cancer in male BRCA2 carriers. J Med Marily. 2010;47:710-1.  National Comprehensive Cancer Network. Clinical practice guidelines in oncology, colorectal cancer screening. Available online (registration required). 2015.  Jean MH, Herman J, Rafaela J, Teo LA, Orloyoana MS, Eng C. Lifetime cancer risks in individuals with germline PTEN mutations. Clin Cancer Res. 2012;18:400-7.  Becki SCHMITZ. Cowden Syndrome: A Critical Review of the Clinical Literature. J Marily . 2009:18:13-27.  Callie A, Luis Manuel D, Nilesh S, Tammy P, Chagtai T, Tierra M, Willi B, Morris H, Jennifer R, Carol K, Remi L, Jared ABARCA, Jay AGUILAR, Alexandro DF, Radha MR, The Breast Cancer Susceptibility Collaboration (UK) & Liza N. DONALD mutations that cause ataxia-telangiectasia are breast cancer susceptibility alleles. Nature Genetics. 2006;38:873-875  Arya RIBEIRO , Jesus Y, Ryann LOZA, Naldo FONTENOT, Prateek GM , Waqas ML, Johninger S, Syed AG, Syngal S, Too ML, Joesph J , Calya R, Jennifer SZ, Demetrius JR, Kwasi VE, Immanuel M, Vogelstein B, Lizzeth N, Brisa RH, Jian KW, and Colette AP. DONALD mutations in patients with hereditary pancreatic cancer. Cancer Discover. 2012;2:41-46  Erlinda MCNAIR et al. Breast-Cancer Risk in Families with  Mutations in PALB2. NEJM. 2014; 371(6):497-506.  CHEK2 Breast Cancer Case-Control Consortium. CHEK2*1100delC and susceptibility to breast cancer: A collaborative analysis involving 10,860 breast cancer cases and 9,065 controls from 10 studies. Am J Hum Marily, 74 (2004), pp. 0718-3185  Johnathon T, Daysi S, Michael K, et al. Spectrum of Mutations in BRCA1, BRCA2, CHEK2, and TP53 in Families at High Risk of Breast Cancer. NIKKI. 2006;295(12):1789-8984.   Thelma C, Yimi D, Olga A, et al. Risk of breast cancer in women with a CHEK2 mutation with and without a family history of breast cancer. J Clin Oncol. 2011;29:5253-9208.  Sarthak H, Vijaya E, Dino SJ, et al. Contribution of germline mutations in the RAD51B, RAD51C, and RAD51D genes to ovarian cancer in the population. J Clin Oncol. 2015;33(26):1257-5712. Doi:10.1200/JCO.2015.61.2408.  Hailee T, Romario DF, Daniel P, et al. Mutations in BRIP1 confer high risk of ovarian cancer. Ilene Marily. 2011;43(11):4857-4750. doi:10.1038/ng.955.

## 2023-03-23 NOTE — NURSING NOTE
Is the patient currently in the state of [place]? YES    Visit mode:VIDEO    If the visit is dropped, the patient can be reconnected by: VIDEO VISIT: Text to cell phone: [phone]    Will anyone else be joining the visit? NO      How would you like to obtain your AVS? MyChart    Are changes needed to the allergy or medication list? NO    Reason for visit: New patient      Lamar Bella [facility]

## 2023-03-23 NOTE — PROGRESS NOTES
Virtual Visit Details    Type of service:  Video Visit   Video Start Time: 9:02am  Video End Time: 10:16am  Originating Location (pt. Location): Home  Distant Location (provider location):  Off-site  Platform used for Video Visit: Nguyen    3/23/2023    Referring Provider: Gertrudis Ortega MD    Present for Today's Visit: Gauri    Presenting Information:   I met with Gauri [place] today for genetic counseling (video visit due to covid19) to discuss her family history of cancer.  She is here today to review this history, cancer screening recommendations, and available genetic testing options.    Personal History:  Gauri is a 47 year old female. She does not have any personal history of cancer.      She had her first menstrual period at age 15, her first child at age 36, and she reports that she is perimenopauseal. Gauri has her ovaries, fallopian tubes and uterus in place.  She reports past history of oral contraceptive use for about 8 years in her late teens/early 20's and that she has never been on hormone replacement therapy.      Her most recent mammogram in August 2022 required additional imaging due to new asymmetry in the right breast. Follow-up right breast ultrasound was normal. She underwent bilateral breast MRI screening October 2022 and follow-up right breast ultrasound was recommended that was ultimately benign. Her breast density was reported as scattered fibroglandular densities. She has not yet started regular mammogram screening given her age. Most recent colonoscopy in 2020 resulted in the removal in one hyperplastic colon polyp and follow-up was recommended in 10 years. She does not regularly do any other cancer screening at this time.  Gauri reported no tobacco use, and 0-3 drinks per week for alcohol use.    Family History: Cancer family history and pertinent information reviewed below (Please see scanned pedigree for detailed family history information)    Mother passed at age 55 from breast  david diagnosed at age 52 (treatment included lumpectomy, radiation, and chemotherapy).     Maternal aunt passed from ovarian cancer (Gauri reports it's possible that it was a uterine cancer) that was diagnosed in her late 60's/70.     Maternal grandfather passed in his 60's/70's from kidney cancer diagnosed in his 60's/70's.     Paternal grandmother passed in her late 60's/70's from a uterine or ovarian cancer diagnosed in her 60's/70's.     Paternal first-cousin with a history of breast cancer in her 50's/60's.     There is no known Ashkenazi Hindu ancestry on either side of her family. There is no reported consanguinity.    Discussion:    Gauri's family history of breast and gynecologic cancer is suggestive of a hereditary cancer syndrome.    We reviewed the features of sporadic, familial, and hereditary cancers. In looking at Gauri's family history, it is possible that a cancer susceptibility gene is present due to her mother's relatively early-onset breast cancer history and her maternal aunt's ovarian cancer history as well as her paternal grandmother's gynecologic cancer history and her paternal first-cousin's breast cancer history.    We discussed the natural history and genetics of hereditary cancers. A detailed handout regarding the information we discussed will be sent to Gauri via [employer]. Topics included: inheritance pattern, cancer risks, cancer screening recommendations, and also risks, benefits and limitations of testing.  We reviewed that the most common cause of hereditary breast cancer is Hereditary Breast and Ovarian Cancer (HBOC) syndrome, which is caused by mutations in the genes BRCA1 and BRCA2. Women with a mutation in either of these genes are at increased risk for breast and ovarian cancer. There is also an increased risk for a second primary breast cancer for women. Men with a mutation in either BRCA1 or BRCA2 are at increased risk for male breast and prostate cancer. Both women and  men may also be at increased risk for pancreatic cancer and melanoma.    Based on her family history, Gauri meets current National Comprehensive Cancer Network (NCCN) criteria for genetic testing of high-penetrance breast cancer susceptibility genes (including BRCA1, BRCA2, CDH1, PALB2, PTEN, and TP53).      We discussed that there are additional genes that could cause increased risk for breast, gynecologic, and related cancers. As many of these genes present with overlapping features in a family and accurate cancer risk cannot always be established based upon the pedigree analysis alone, it would be reasonable for Gauri to consider panel genetic testing to analyze multiple genes at once.    We reviewed genetic testing options given Gauri's family history including targeted and expanded options. After our discussion, Gauri opted to proceed with genetic testing via BRCA1/2 analysis with automatic reflex to the Breast and Gyn Cancers panel through [facility].   Genetic testing is available for 21 genes associated with hereditary gynecologic, breast, and related cancers: [facility]'s Breast and Gyn Cancers panel (DONALD, BARD1, BRCA1, BRCA2, BRIP1, CDH1, CHEK2, DICER1, EPCAM, MLH1, MSH2, MSH6, NF1, PALB2, PMS2, PTEN, RAD51C, RAD51D, SMARCA4, STK11, TP53).  We discussed that some of the genes in the Breast and Gyn Cancers panel are associated with specific hereditary cancer syndromes and published management guidelines: Hereditary Breast and Ovarian Cancer syndrome (BRCA1, BRCA2), Malone syndrome (MLH1, MSH2, MSH6, PMS2, EPCAM), Hereditary Diffuse Gastric Cancer (CDH1), Cowden syndrome (PTEN), Li Fraumeni syndrome (TP53), Peutz-Jeghers syndrome (STK11), and Neurofibromatosis type 1 (NF1).    Risk-reducing salpingo-oophorectomy can be considered in women with mutations in BRIP1, RAD51C, or RAD51D. Breast and/or other cancer risk management guidelines are available for DONALD, CHEK2, PALB2, NF1.  The remaining genes (BARD1,  DICER1, and SMARCA4) are associated with increased cancer risk and may allow us to make medical recommendations when mutations are identified.      Consent was obtained over the video with no witness required due to the current covid19 global pandemic.    Medical Management: For Gauri, we reviewed that the information from genetic testing may determine:    additional cancer screening for which Gauri may qualify (i.e. mammogram and breast MRI, more frequent colonoscopies, more frequent dermatologic exams, etc.),    options for risk reducing surgeries Gauri could consider (i.e. bilateral mastectomy, surgery to remove her ovaries and/or uterus, etc.),      and targeted chemotherapies for Garui if she were to develop certain cancers in the future (i.e. immunotherapy for individuals with Malone syndrome, PARP inhibitors, etc.).     These recommendations will be discussed in detail once genetic testing is completed.     Gauri will schedule a lab only appointment at any [facility] clinic at her earliest convenience.     Plan:  1) Today Gauri elected to proceed with BRCA1/2 analysis with automatic reflex to Breast and Gyn Cancers panel genetic testing (21 genes) through [facility].  2) This information should be available in approximately 3 weeks.  3) Gauri will be contacted by our scheduling department to set up a result disclosure appointment.     Jenni Mar MS, [facility] [place] [facility]  Licensed, Certified Genetic Counselor  [facility]  [email or web address]

## 2023-04-05 ENCOUNTER — LAB (OUTPATIENT)
Dept: LAB | Facility: OTHER | Age: 48
End: 2023-04-05
Attending: FAMILY MEDICINE
Payer: COMMERCIAL

## 2023-04-05 DIAGNOSIS — Z80.41 FAMILY HISTORY OF MALIGNANT NEOPLASM OF OVARY: ICD-10-CM

## 2023-04-05 DIAGNOSIS — Z80.49 FAMILY HISTORY OF UTERINE CANCER: ICD-10-CM

## 2023-04-05 DIAGNOSIS — Z80.3 FAMILY HISTORY OF MALIGNANT NEOPLASM OF BREAST: ICD-10-CM

## 2023-04-05 PROCEDURE — 36415 COLL VENOUS BLD VENIPUNCTURE: CPT | Mod: ZL

## 2023-04-17 LAB — SCANNED LAB RESULT: NORMAL

## 2023-04-24 NOTE — PROGRESS NOTES
"Virtual Visit Details    Type of service:  Video Visit   Video Start Time: 8:10am  Video End Time: 8:40am  Originating Location (pt. Location): Home  Distant Location (provider location):  Off-site  Platform used for Video Visit: Nguyen     4/25/2023    Referring Provider: Gertrudis Ortega MD    Presenting Information:  I spoke to Gauri by phone today to discuss her genetic testing results. Her blood was drawn on 4/5/2023. Breast and Gyn Cancers panel testing was ordered from [facility]. This testing was done because of Gauri's family history of breast and gynecologic cancers.    Genetic Testing Result: NEGATIVE  Gauri is negative for mutations in the DONALD, BARD1, BRCA1, BRCA2, BRIP1, CDH1, CHEK2, DICER1, EPCAM, MLH1, MSH2, MSH6, NF1, PALB2, PMS2, PTEN, RAD51C, RAD51D, SMARCA4, STK11, and TP53 genes. No mutations were found in any of the 21 genes analyzed. Please see copy of scanned test report for complete details regarding testing methodology/limitations.    A copy of the test report can be found in the Laboratory tab, dated 4/5/2023, and named \"LABORATORY MISCELLANEOUS ORDER\". The report is scanned in as a linked document.    Interpretation:  We discussed several different interpretations of this negative test result.    1. One explanation may be that there is a different gene or combination of genes and environment that are associated with the cancers in this family.  2. It is possible that her other relatives with cancer history did have a mutation in one of the above genes, and she did not inherit it.  3. There is also a small possibility that there is a mutation in one of these genes, and the testing laboratory could not find it with their current testing methods.       Screening:  Based on this negative test result, it is important for Gauri and her relatives to refer back to the family history for appropriate cancer screening.      Based on her personal and family history, Gauri has a 16% lifetime risk of " developing breast cancer based on the BEENA (v8) model. Therefore, Gauri does not meet current National Comprehensive Cancer Network (NCCN) guidelines for high risk breast screening, which is offered to women with a 20% lifetime risk or higher. However, it is still important for Gauri to continue with routine breast screening under the care of her physicians. Breast cancer screening is generally recommended to begin approximately 10 years younger than the earliest age of breast cancer diagnosis in the family, or at age 40, whichever comes first. Gauri is encouraged to discuss breast screening with her physicians.    Other population cancer screening options, such as those recommended by the American Cancer Society and the National Comprehensive Cancer Network (NCCN), are also appropriate for Gauri and her family. These screening recommendations may change if there are changes to Gauri's personal and/or family history. Final screening recommendations should be made by each individual's managing physician.      Inheritance:  We reviewed the autosomal dominant inheritance of mutations in these 21 genes. We discussed that Gauri cannot/did not pass on an identifiable mutation in these genes to her children based on this test result.  Mutations in these genes do not skip generations.      Additional Testing Considerations:  Although Gauri's genetic testing result was negative, other relatives may still carry a gene mutation associated with their personal/family history of cancer. Genetic counseling is recommended for her siblings and maternal and paternal relatives to discuss genetic testing options. If any of these relatives do pursue genetic testing, Gauri is encouraged to contact me so that we may review the impact of their test results on her.    Summary:  We do not have an explanation for Gauri's family history of cancer. While no genetic changes were identified, Gauri may still be at risk for certain  cancers due to family history, environmental factors, or other genetic causes not identified by this test.  Because of that, it is important that she continue with cancer screening based on her personal and family history as discussed above.    Genetic testing is rapidly advancing, and new cancer susceptibility genes will most likely be identified in the future.  Therefore, I encouraged Gauri to contact me annually or if there are changes in her personal or family history.  This may change how we assess her cancer risk, screening, and the testing we would offer.    Plan:  1. A copy of the test results will be mailed to Gauri.  2. She plans to follow-up with her primary care providers for routine care and cancer screening.  3. She should contact me regularly, or sooner if her family history changes.    Jenni Mar MS, [facility]  Licensed, Certified Genetic Counselor  [facility]  [email or web address]

## 2023-04-25 ENCOUNTER — VIRTUAL VISIT (OUTPATIENT)
Dept: ONCOLOGY | Facility: CLINIC | Age: 48
End: 2023-04-25
Attending: GENETIC COUNSELOR, MS
Payer: COMMERCIAL

## 2023-04-25 DIAGNOSIS — Z80.49 FAMILY HISTORY OF UTERINE CANCER: ICD-10-CM

## 2023-04-25 DIAGNOSIS — Z80.41 FAMILY HISTORY OF MALIGNANT NEOPLASM OF OVARY: ICD-10-CM

## 2023-04-25 DIAGNOSIS — Z80.3 FAMILY HISTORY OF MALIGNANT NEOPLASM OF BREAST: Primary | ICD-10-CM

## 2023-04-25 PROCEDURE — 96040 HC GENETIC COUNSELING, EACH 30 MINUTES: CPT | Mod: GT,95 | Performed by: GENETIC COUNSELOR, MS

## 2023-04-25 NOTE — PATIENT INSTRUCTIONS
Negative Genetic Test Result    Genetic Testing  Genetic testing involved a blood or saliva test which looked at the genetic information in select genes for variants associated with cancer risk.  This testing may have included analysis of a single gene due to a known variant in the family, multiple genes most associated with the cancers in a family, or an expanded panel of genes related to many types of cancers.     Results  There are several possible genetic test results, including:   Positive--a harmful mutation (also known as a  pathogenic  or  likely pathogenic  variant) was identified in a gene associated with increased cancer risk.  These risks, as well as medical management options, depend on the specific genetic variant identified.    Negative--no variants were identified in the genes analyzed   Variant of unknown significance--a variant was identified in one or more genes, though it is currently unclear how this impacts cancer risk in the family.  Genetic testing labs are working to collect evidence about these uncertain variants and may provide updates in the future.    What Does a Negative Genetic Test Result Mean?  A negative genetic test results means that no genetic changes (variants) were detected in the genes tested. While no inherited risk factors for cancer were identified, you and your family may be at risk for certain cancers due to family history, environmental factors, or other genetic causes not identified by this test.  It is also possible that your family may still be at risk of carrying a genetic risk factor which you did not inherit. Your genetic counselor can help interpret the result for you and your relatives.      It is important to note which genes were included in your test. A list of these genes can be found on your test result.    Screening Recommendations  A combination of personal and family history factors may inform cancer risk and medical management recommendations.   Population cancer screening options, such as those recommended by the American Cancer Society and the National Comprehensive Cancer Network (NCCN) are appropriate for many families at average risk for cancer.  However, earlier and/or more frequent screening may be recommended based on personal factors (lifestyle, exposures, medications, screening results), family history of cancer, and sometimes genetic factors.  These cancer risk management options should be discussed in more detail with an individual's medical providers.      Please call us if you have any questions or concerns.   Cancer Risk Management Program (Appointments: [phone])

## 2023-04-25 NOTE — NURSING NOTE
Is the patient currently in the state of [place]? YES    Visit mode:VIDEO    If the visit is dropped, the patient can be reconnected by: VIDEO VISIT: Text to cell phone: [phone]    Will anyone else be joining the visit? NO      How would you like to obtain your AVS? MyChart    Are changes needed to the allergy or medication list? NO    Reason for visit: Video Visit    MELISSA MCCORMICK

## 2023-09-26 ENCOUNTER — HOSPITAL ENCOUNTER (OUTPATIENT)
Dept: MAMMOGRAPHY | Facility: OTHER | Age: 48
Discharge: HOME | End: 2023-09-26
Attending: SURGERY | Admitting: SURGERY
Payer: COMMERCIAL

## 2023-09-26 DIAGNOSIS — Z12.31 VISIT FOR SCREENING MAMMOGRAM: ICD-10-CM

## 2023-09-26 PROCEDURE — 77067 SCR MAMMO BI INCL CAD: CPT

## 2023-11-13 ENCOUNTER — OFFICE VISIT (OUTPATIENT)
Dept: FAMILY MEDICINE | Facility: OTHER | Age: 48
End: 2023-11-13
Attending: FAMILY MEDICINE
Payer: COMMERCIAL

## 2023-11-13 VITALS
SYSTOLIC BLOOD PRESSURE: 126 MMHG | DIASTOLIC BLOOD PRESSURE: 88 MMHG | HEART RATE: 73 BPM | HEIGHT: 66 IN | WEIGHT: 183.6 LBS | OXYGEN SATURATION: 96 % | TEMPERATURE: 98.6 F | BODY MASS INDEX: 29.51 KG/M2 | RESPIRATION RATE: 18 BRPM

## 2023-11-13 DIAGNOSIS — Z23 NEED FOR IMMUNIZATION AGAINST INFLUENZA: ICD-10-CM

## 2023-11-13 DIAGNOSIS — E55.9 VITAMIN D INSUFFICIENCY: ICD-10-CM

## 2023-11-13 DIAGNOSIS — F41.8 DEPRESSION WITH ANXIETY: ICD-10-CM

## 2023-11-13 DIAGNOSIS — R73.03 PREDIABETES: ICD-10-CM

## 2023-11-13 DIAGNOSIS — Z00.00 PHYSICAL EXAM, ANNUAL: Primary | ICD-10-CM

## 2023-11-13 DIAGNOSIS — Z12.4 CERVICAL CANCER SCREENING: ICD-10-CM

## 2023-11-13 LAB — HOLD SPECIMEN: NORMAL

## 2023-11-13 PROCEDURE — 90686 IIV4 VACC NO PRSV 0.5 ML IM: CPT | Performed by: FAMILY MEDICINE

## 2023-11-13 PROCEDURE — 36415 COLL VENOUS BLD VENIPUNCTURE: CPT | Mod: ZL | Performed by: FAMILY MEDICINE

## 2023-11-13 PROCEDURE — 83036 HEMOGLOBIN GLYCOSYLATED A1C: CPT | Mod: ZL | Performed by: FAMILY MEDICINE

## 2023-11-13 PROCEDURE — G0123 SCREEN CERV/VAG THIN LAYER: HCPCS | Performed by: FAMILY MEDICINE

## 2023-11-13 PROCEDURE — 87624 HPV HI-RISK TYP POOLED RSLT: CPT | Mod: ZL | Performed by: FAMILY MEDICINE

## 2023-11-13 PROCEDURE — 99396 PREV VISIT EST AGE 40-64: CPT | Mod: 25 | Performed by: FAMILY MEDICINE

## 2023-11-13 PROCEDURE — 99214 OFFICE O/P EST MOD 30 MIN: CPT | Mod: 25 | Performed by: FAMILY MEDICINE

## 2023-11-13 PROCEDURE — 82306 VITAMIN D 25 HYDROXY: CPT | Mod: ZL | Performed by: FAMILY MEDICINE

## 2023-11-13 PROCEDURE — 90471 IMMUNIZATION ADMIN: CPT | Performed by: FAMILY MEDICINE

## 2023-11-13 RX ORDER — ESCITALOPRAM OXALATE 20 MG/1
30 TABLET ORAL DAILY
Qty: 135 TABLET | Refills: 3 | Status: SHIPPED | OUTPATIENT
Start: 2023-11-13

## 2023-11-13 RX ORDER — BUPROPION HYDROCHLORIDE 150 MG/1
150 TABLET ORAL EVERY MORNING
Qty: 90 TABLET | Refills: 3 | Status: SHIPPED | OUTPATIENT
Start: 2023-11-13

## 2023-11-13 ASSESSMENT — ENCOUNTER SYMPTOMS
PALPITATIONS: 1
HEADACHES: 1
DIARRHEA: 0
SORE THROAT: 0
FREQUENCY: 0
HEMATOCHEZIA: 0
DYSURIA: 0
EYE PAIN: 0
HEARTBURN: 0
DIZZINESS: 1
SHORTNESS OF BREATH: 0
NERVOUS/ANXIOUS: 1
NAUSEA: 0
JOINT SWELLING: 0
CHILLS: 1
HEMATURIA: 0
ABDOMINAL PAIN: 1
MYALGIAS: 1
COUGH: 0
WEAKNESS: 0
FEVER: 0
ARTHRALGIAS: 1
BREAST MASS: 0

## 2023-11-13 ASSESSMENT — ANXIETY QUESTIONNAIRES
4. TROUBLE RELAXING: MORE THAN HALF THE DAYS
IF YOU CHECKED OFF ANY PROBLEMS ON THIS QUESTIONNAIRE, HOW DIFFICULT HAVE THESE PROBLEMS MADE IT FOR YOU TO DO YOUR WORK, TAKE CARE OF THINGS AT HOME, OR GET ALONG WITH OTHER PEOPLE: VERY DIFFICULT
5. BEING SO RESTLESS THAT IT IS HARD TO SIT STILL: MORE THAN HALF THE DAYS
7. FEELING AFRAID AS IF SOMETHING AWFUL MIGHT HAPPEN: MORE THAN HALF THE DAYS
6. BECOMING EASILY ANNOYED OR IRRITABLE: MORE THAN HALF THE DAYS
GAD7 TOTAL SCORE: 17
3. WORRYING TOO MUCH ABOUT DIFFERENT THINGS: NEARLY EVERY DAY
GAD7 TOTAL SCORE: 17
1. FEELING NERVOUS, ANXIOUS, OR ON EDGE: NEARLY EVERY DAY
2. NOT BEING ABLE TO STOP OR CONTROL WORRYING: NEARLY EVERY DAY

## 2023-11-13 ASSESSMENT — PATIENT HEALTH QUESTIONNAIRE - PHQ9
SUM OF ALL RESPONSES TO PHQ QUESTIONS 1-9: 22
10. IF YOU CHECKED OFF ANY PROBLEMS, HOW DIFFICULT HAVE THESE PROBLEMS MADE IT FOR YOU TO DO YOUR WORK, TAKE CARE OF THINGS AT HOME, OR GET ALONG WITH OTHER PEOPLE: EXTREMELY DIFFICULT
SUM OF ALL RESPONSES TO PHQ QUESTIONS 1-9: 22

## 2023-11-13 ASSESSMENT — PAIN SCALES - GENERAL: PAINLEVEL: NO PAIN (0)

## 2023-11-13 NOTE — PATIENT INSTRUCTIONS
Current Medications  History Comments                          []    Name Patient Sig Dispense Refills Start End Note to Pharmacy KARAN                 Outpatient Medications        []    Take 500-1,000 mg by mouth every 6 hours as needed for mild pain -- -- -- 05/24/2019 -- --                    []    Inhale 2 puffs into the lungs every 4 hours as needed for shortness of breath / dyspnea or wheezing 1 Inhaler 11 ordered 08/03/2018 02/19/2020 -- --                    []    Take 1 tablet (875 mg) by mouth 2 times daily 20 tablet 0 ordered 02/19/2018 06/14/2018 -- --         Patient not taking. Reported on 6/14/2018           []    Take 1 tablet by mouth 2 times daily 28 tablet 0 ordered 07/22/2018 07/22/2018 -- --                    []    Take 1 tablet by mouth 2 times daily 28 tablet 0 ordered 07/22/2018 08/03/2018 -- --                    []    Spray 1 spray into both nostrils 2 times daily 1 Bottle 11 ordered 11/08/2018 02/19/2020 -- --           Note (1/2/2019): Not restarted                   []    Two tablets first day, then one tablet daily for four days. 6 tablet 0 ordered 08/03/2018 08/03/2018 -- --                    []    Two tablets first day, then one tablet daily for four days. 6 tablet 0 ordered 08/03/2018 10/04/2018 -- --                    []    --  -- 09/30/2020 04/27/2022 -- --                    []    Take as directed by colonoscopy prep instructions 2 tablet 0 ordered 02/19/2020 08/26/2020 -- --                    []    Squirt entire vial into previously made shivam med saline bottle, mix, and irrigate each nostril until entire bottle empty. Do this twice daily. 3 Box 11 ordered 10/04/2018 12/12/2018 -- --                    []    Squirt entire vial into previously made shivam med saline bottle, mix, and irrigate each nostril until entire bottle empty. Do this twice daily. 3 Box 11 ordered 12/12/2018 02/19/2020 -- --                    []    Take 1 tablet (150 mg) by mouth every morning 30 tablet 11  ordered 04/27/2022 05/27/2022 -- --                    []    Take 1 tablet (150 mg) by mouth every morning 90 tablet 11 ordered 05/27/2022 10/12/2022 -- --                        buPROPion (WELLBUTRIN XL) 150 MG 24 hr tablet Take 1 tablet (150 mg) by mouth every morning 90 tablet 3 ordered 10/12/2022 -- -- --             Edit  Cancel Reorder         []    Take 1 capsule (300 mg) by mouth 2 times daily for 7 days Take with a probiotic 14 capsule 0 ordered 12/19/2018 05/24/2019 -- --                    []    Take 10 mg by mouth daily -- -- -- 12/15/2020 -- --                    []    Take 1,000 Units by mouth daily -- -- 04/05/2016 08/25/2020 -- --                    []    As directed. -- -- 04/12/2017 06/14/2018 -- --           Note (1/26/2018): Received from: [facility]                   []    -- -- -- 04/12/2017 02/25/2019 -- --                    []    Take 10 mg by mouth daily -- -- 10/11/2017 11/23/2018 -- --                    []    Take 1 tablet (10 mg) by mouth daily 90 tablet 3 ordered 11/23/2018 02/12/2020 -- --                    []    Take 1 tablet (10 mg) by mouth daily 90 tablet 3 ordered 02/12/2020 12/16/2020 -- --                    []    Take 1.5 tablets (15 mg) by mouth daily 135 tablet 3 ordered 12/16/2020 12/31/2021 -- --                    []    Take 1.5 tablets (15 mg) by mouth daily 135 tablet 0 ordered 12/31/2021 04/15/2022 -- --                    []    Take 1.5 tablets (15 mg) by mouth daily 135 tablet 0 ordered 04/15/2022 04/27/2022 -- --                    []    Take 1 tablet (10 mg) by mouth daily 90 tablet 0 ordered 04/27/2022 05/27/2022 -- --                    []    Take 1.5 tablets (15 mg) by mouth daily 135 tablet 3 ordered 05/27/2022 07/01/2022 -- --                    []    Take 1 tablet (20 mg) by mouth daily 90 tablet 3 ordered 07/01/2022 12/22/2022 -- --                        escitalopram (LEXAPRO) 20 MG tablet Take 1.5 tablets (30 mg) by mouth  daily 135 tablet 3 ordered 2022 -- -- --             Edit  Cancel Reorder         []    Take 1 g by mouth daily -- -- 2016 -- --                    []    Inhale 1 Spray into both nostrils once daily. -- 11 ordered 10/04/2018 2019 -- --                    []    Spray 1 spray into both nostrils daily -- -- 10/11/2017 2018 -- --                    []    Take 1 capsule by mouth daily -- -- -- 2019 -- --                    []    Take 1 tablet by mouth every 6 hours as needed for severe pain 10 tablet 0 ordered 2018 -- --                    []    Take 1 tablet (250 mg) by mouth daily -- -- 2019 -- --                    []    Take 1 tablet by mouth daily -- -- 2019 -- --                    []    Take 1 tablet (20 mg) by mouth daily -- -- 2020 -- --           Note (2020): Not currently taking                   []    polyethylene glycol-electrolytes 420 g PO solution () Take 4,000 mLs by mouth once for 1 dose 4000 mL 0 ordered 2020 -- --                    []    predniSONE (DELTASONE) 20 MG tablet () Take 2 tablets (40 mg) by mouth daily for 2 days Then take one tablet for 3 days. Start prior to scheduled surgery 7 tablet 0 ordered 11/15/2018 2018 -- --                    []    Take 1 tablet after breakfast for 5 days then half a tablet after breakfast for 2 days. 6 tablet 1 ordered 2018 -- --                    []    predniSONE (DELTASONE) 20 MG tablet () Take 1 tablet (20 mg) by mouth 2 times daily for 5 days 10 tablet 1 ordered 2020 -- --                    []    Pyrantel Pamoate 144 (50 Base) MG/ML SUSP () Take 17.5 mLs by mouth once for 1 dose Repeat in 2 weeks 60 mL 1 ordered 2019 -- --                    []    Take 1 capsule by mouth daily -- -- 2017 -- --                    []    Take 1  tablet (1 g) by mouth 3 times daily as needed (sore throat) 90 tablet 1 ordered 08/26/2020 12/15/2020 -- --                    []    -- -- -- 10/25/2017 06/14/2018 -- --           Note (1/26/2018): Received from: [facility] Received Sig: Apply topically to affected area(s) 3 times daily.                   []    triamcinolone (KENALOG) 0.5 % cream Apply sparingly to affected area three times daily. 15 g 1 ordered 06/14/2018 -- -- --           Note (4/7/2021): As needed

## 2023-11-13 NOTE — PROGRESS NOTES
"   SUBJECTIVE:   CC: Gauri is an 48 year old who presents for preventive health visit.       HPI    Answers submitted by the patient for this visit:  Patient Health Questionnaire (Submitted on 11/13/2023)  If you checked off any problems, how difficult have these problems made it for you to do your work, take care of things at home, or get along with other people?: Extremely difficult  PHQ9 TOTAL SCORE: 22  DORIAN-7 (Submitted on 11/13/2023)  DORIAN 7 TOTAL SCORE: 17          PROBLEMS TO ADD ON...   +FH of breast cancer, had genetic testing done and is BRCA negative  History of ocular migraines  Vitamin D  - would like rechecked.  Working with counselor, medication reviewed and discussed timing of taking medications in the past - patient requests a list of this  Pre-diabetes history       Have you ever done Advance Care Planning? (For example, a Health Directive, POLST, or a discussion with a medical provider or your loved ones about your wishes):      Social History     Tobacco Use    Smoking status: Never     Passive exposure: Never    Smokeless tobacco: Never   Substance Use Topics    Alcohol use: Yes     Comment: Alcoholic Drinks/day: 3 per week             11/13/2023    11:38 AM   Alcohol Use   Prescreen: >3 drinks/day or >7 drinks/week? No     Reviewed orders with patient.  Reviewed health maintenance and updated orders accordingly - Yes  Current Outpatient Medications   Medication Sig Dispense Refill    buPROPion (WELLBUTRIN XL) 150 MG 24 hr tablet Take 1 tablet (150 mg) by mouth every morning 90 tablet 3    escitalopram (LEXAPRO) 20 MG tablet Take 1.5 tablets (30 mg) by mouth daily 135 tablet 3    triamcinolone (KENALOG) 0.5 % cream Apply sparingly to affected area three times daily. 15 g 1     Allergies   Allergen Reactions    Birch Trees     Cats Other (See Comments)     congestion    Chlorhexidine Itching     No rash.  Also felt \"irritable\" after using wipes.    Dogs     Mold        Breast Cancer " Screening:    [facility]HS-7:       2022    11:10 AM 2023     1:06 PM   Breast CA Risk Assessment ([facility]-7)   Did any of your first-degree relatives have breast or ovarian cancer? Yes Yes   Did any of your relatives have bilateral breast cancer? No No   Did any man in your family have breast cancer? No No   Did any woman in your family have breast and ovarian cancer? No No   Did any woman in your family have breast cancer before age 50 y? Yes Yes   Do you have 2 or more relatives with breast and/or ovarian cancer? Yes No   Do you have 2 or more relatives with breast and/or bowel cancer? No No         Pertinent mammograms are reviewed under the imaging tab.    History of abnormal Pap smear: NO - age 30-65 PAP every 5 years with negative HPV co-testing recommended      Latest Ref Rng & Units 2018     9:13 AM   PAP / HPV   PAP (Historical)  NIL    HPV 16 DNA NEG^Negative Negative    HPV 18 DNA NEG^Negative Negative    Other HR HPV NEG^Negative Negative      Reviewed and updated as needed this visit by clinical staff   Tobacco  Allergies  Meds      Soc Hx        Reviewed and updated as needed this visit by Provider                 Past Medical History:   Diagnosis Date    Anxiety disorder 2017    Bronchitis 2012    Acute     Chronic lumbosacral pain 2015    Dislocation of ulnohumeral joint 1993    left; age 18    Epicondylitis, lateral (tennis elbow)-left 2018    Family history of malignant neoplasm of breast 2013    History of dysthymic disorder 2010    Resolved 15    Hypergastrinemia     Lesions of both ulnar nerves 2017    Medial epicondylitis of right elbow 2017    Other enthesopathies, not elsewhere classified 2017    Other immediate postpartum hemorrhage 2011    Prediabetes 2020    a1c 5.8    Pregnancy      - PPH, retained placenta, transfusion    Vitamin D deficiency 2015      Past Surgical History:   Procedure Laterality Date     "COLONOSCOPY N/A 8/26/2020    hyperplastic, follow up 10 years    DILATION AND CURETTAGE  10/2011    for retained placenta    DILATION AND CURETTAGE  04/01/2015    for missed AB    DILATION AND CURETTAGE  05/2017    SAB    ENDOSCOPIC SINUS SURGERY N/A 12/19/2018    Procedure: ENDOSCOPIC SINUS SURGERY;  Surgeon: Dora Robins MD;  Location: [facility]    ESOPHAGOSCOPY, GASTROSCOPY, DUODENOSCOPY (EGD), COMBINED N/A 8/26/2020    Procedure: ESOPHAGOGASTRODUODENOSCOPY, WITH BIOPSY;  Surgeon: Gertrudis Ortega MD;  Location:  OR    EXTRACTION(S) DENTAL      [place] Teeth    TURBINOPLASTY Bilateral 12/19/2018    Procedure: TURBINATE REDUCTION;  Surgeon: Dora Robins MD;  Location: [facility] OR       Review of Systems  CONSTITUTIONAL: NEGATIVE for fever, chills, change in weight  INTEGUMENTARU/SKIN: NEGATIVE for worrisome rashes, moles or lesions  EYES: NEGATIVE for vision changes or irritation  ENT: NEGATIVE for ear, mouth and throat problems  RESP: NEGATIVE for significant cough or SOB  BREAST: NEGATIVE for masses, tenderness or discharge  CV: NEGATIVE for chest pain, palpitations or peripheral edema  GI: NEGATIVE for nausea, abdominal pain, heartburn, or change in bowel habits  : NEGATIVE for unusual urinary or vaginal symptoms. Periods are regular.  MUSCULOSKELETAL: NEGATIVE for significant arthralgias or myalgia  NEURO: NEGATIVE for weakness, dizziness or paresthesias     OBJECTIVE:   /88   Pulse 73   Temp 98.6  F (37  C) (Tympanic)   Resp 18   Ht 1.676 m (5' 6\")   Wt 83.3 kg (183 lb 9.6 oz)   LMP 10/30/2023 (Approximate)   SpO2 96%   Breastfeeding No   BMI 29.63 kg/m    Physical Exam  GENERAL: healthy, alert and no distress  EYES: Eyes grossly normal to inspection, PERRL and conjunctivae and sclerae normal  HENT: ear canals and TM's normal, nose and mouth without ulcers or lesions  NECK: no adenopathy, no asymmetry, masses, or scars and thyroid normal to palpation  RESP: lungs clear to auscultation - " no rales, rhonchi or wheezes  BREAST: normal without masses, tenderness or nipple discharge and no palpable axillary masses or adenopathy  CV: regular rate and rhythm,   :  pap smear obtained  ABDOMEN: soft, nontender, no hepatosplenomegaly, no masses and bowel sounds normal  MS: no gross musculoskeletal defects noted, no edema  SKIN: no suspicious lesions or rashes  NEURO: Normal strength and tone, mentation intact and speech normal        ASSESSMENT/PLAN:       ICD-10-CM    1. Physical exam, annual  Z00.00 Vitamin D Total     Vitamin D Total      2. Prediabetes  R73.03       3. Vitamin D insufficiency  E55.9       4. Depression with anxiety  F41.8 buPROPion (WELLBUTRIN XL) 150 MG 24 hr tablet     escitalopram (LEXAPRO) 20 MG tablet      5. Cervical cancer screening  Z12.4 Pap Screen with HPV - recommended age 30 - 65 years     HPV High Risk Types DNA Cervical      6. Need for immunization against influenza  Z23 INFLUENZA VACCINE >6 MONTHS (AFLURIA/FLUZONE)         Pap smear obtained  Breast cancer screening up to date  Continue lexapro and wellbutrin  Letter with list of past medications/date provided   Vitamin D check today  Lab Results   Component Value Date    A1C 5.5 10/12/2022    A1C 5.5 08/19/2021    A1C 5.8 09/23/2020     8.  Flu vaccine updated.  9.  Additional 35 minutes of appointment in addition to conduction of physical spent in general counseling regarding mental health.           COUNSELING:  Reviewed preventive health counseling, as reflected in patient instructions        She reports that she has never smoked. She has never been exposed to tobacco smoke. She has never used smokeless tobacco.          GAIL SUE MD  [facility]

## 2023-11-13 NOTE — NURSING NOTE
Patient is here for annual physical, follow up on genetic testing.     Patient's last menstrual period was 10/30/2023 (approximate).  Medication Reconciliation: complete    Ruby Iniguez LPN 11/13/2023 11:48 AM       Advance care directive on file? no  Advance care directive provided to patient? no       Ruby Iniguez LPN

## 2023-11-14 PROBLEM — K92.1 BLOOD IN STOOL: Status: RESOLVED | Noted: 2019-05-24 | Resolved: 2023-11-14

## 2023-11-14 PROBLEM — M77.10 EPICONDYLITIS, LATERAL (TENNIS ELBOW): Status: RESOLVED | Noted: 2018-06-11 | Resolved: 2023-11-14

## 2023-11-14 PROBLEM — R53.83 FATIGUE, UNSPECIFIED TYPE: Status: RESOLVED | Noted: 2019-05-24 | Resolved: 2023-11-14

## 2023-11-14 PROBLEM — K64.4 EXTERNAL HEMORRHOIDS: Status: RESOLVED | Noted: 2019-05-24 | Resolved: 2023-11-14

## 2023-11-14 PROBLEM — R12 HEARTBURN: Status: RESOLVED | Noted: 2019-05-24 | Resolved: 2023-11-14

## 2023-11-14 PROBLEM — R19.5 LOOSE STOOLS: Status: RESOLVED | Noted: 2019-05-24 | Resolved: 2023-11-14

## 2023-11-14 LAB
HBA1C MFR BLD: 5.5 % (ref 4–6.2)
VIT D+METAB SERPL-MCNC: 49 NG/ML (ref 20–50)

## 2023-11-17 LAB
HUMAN PAPILLOMA VIRUS 16 DNA: NEGATIVE
HUMAN PAPILLOMA VIRUS 18 DNA: NEGATIVE
HUMAN PAPILLOMA VIRUS FINAL DIAGNOSIS: NORMAL
HUMAN PAPILLOMA VIRUS OTHER HR: NEGATIVE

## 2023-11-20 LAB
BKR LAB AP GYN ADEQUACY: NORMAL
BKR LAB AP GYN INTERPRETATION: NORMAL
BKR LAB AP HPV REFLEX: NORMAL
BKR LAB AP LMP: NORMAL
BKR LAB AP PREVIOUS ABNORMAL: NORMAL
PATH REPORT.COMMENTS IMP SPEC: NORMAL
PATH REPORT.COMMENTS IMP SPEC: NORMAL
PATH REPORT.RELEVANT HX SPEC: NORMAL

## 2024-12-29 ENCOUNTER — HEALTH MAINTENANCE LETTER (OUTPATIENT)
Age: 49
End: 2024-12-29

## 2025-08-27 ENCOUNTER — PATIENT OUTREACH (OUTPATIENT)
Dept: CARE COORDINATION | Facility: CLINIC | Age: 50
End: 2025-08-27
Payer: COMMERCIAL

## (undated) DEVICE — TRACKER-INSTRUMENT ENT NAV

## (undated) DEVICE — PACK-ENT-CUSTOM

## (undated) DEVICE — COBLATION WAND-TURBINATE REDUCT. PTR

## (undated) DEVICE — CANISTER-SUCTION 2000CC

## (undated) DEVICE — SUCTION MANIFOLD NEPTUNE 2 SYS 4 PORT 0702-020-000

## (undated) DEVICE — CAUTERY PAD-POLYHESIVE II ADULT

## (undated) DEVICE — TUBING SUCTION 10'X3/16" N510

## (undated) DEVICE — ENDO FORCEP ENDOJAW BIOPSY 2.8MMX230CM FB-220U

## (undated) DEVICE — IRRIGATION-H2O 1000ML

## (undated) DEVICE — BIN-ENT BIN

## (undated) DEVICE — ENDO BITE BLOCK 60 MAXI LF 00712804

## (undated) DEVICE — HEMOCLIP QUICKCLIP PRO OLYMPUS 230CM HX-202UR.B

## (undated) DEVICE — TRACKER-PATIENT ENT NIPT

## (undated) DEVICE — TUBING-IRRIG. SYSTEM CLEARVISION

## (undated) DEVICE — TUBING-IRRIGATOR STRAIGHTSHOT FUSION

## (undated) DEVICE — POUCH-INSTRUMENT 2 COMP. 7 X 11IN

## (undated) DEVICE — SYR 50ML LL W/O NDL 309653

## (undated) DEVICE — SOL-NACL 0.9% 1000ML

## (undated) DEVICE — NDL-27G X 1 1/4" NON-SAFETY

## (undated) DEVICE — ENDO BRUSH CHANNEL MASTER CLEANING 2-4.2MM BW-412T

## (undated) DEVICE — BLADE-FUSION ROTATABLE QUADCUT 4.3MM X 13CM

## (undated) DEVICE — SOL WATER 1500ML

## (undated) DEVICE — ENDO KIT COMPLIANCE DYKENDOCMPLY

## (undated) DEVICE — RELIEVA SPINPLUS BALLOON SYSTEM

## (undated) DEVICE — SCD SLEEVE-KNEE REG.

## (undated) DEVICE — BLADE-SCALPEL #15

## (undated) DEVICE — GOWN-SURG XL LVL 3 REINFORCED

## (undated) DEVICE — INFLATION DEVICE-SINUS BALLOON CATH

## (undated) DEVICE — GLV-6.5 PROTEXIS PI CLASSIC LF/PF

## (undated) DEVICE — ADAPTER-SPECIMEN TRAP

## (undated) DEVICE — BLANKET-BAIR LOWER EXTREMITY

## (undated) DEVICE — LABEL-STERILE PREPRINTED FOR OR

## (undated) DEVICE — LIGHT HANDLE COVER

## (undated) DEVICE — SENSOR-OXISENSOR II ADULT

## (undated) DEVICE — IRRIGATION-NACL 1000ML

## (undated) DEVICE — INSTRUMENT WIPE-VISIWIPE

## (undated) DEVICE — NDL-SPINAL 25G X 3.5IN QUINCKE

## (undated) RX ORDER — SODIUM CHLORIDE 9 MG/ML
INJECTION, SOLUTION INTRAVENOUS
Status: DISPENSED
Start: 2018-12-19

## (undated) RX ORDER — LIDOCAINE HYDROCHLORIDE AND EPINEPHRINE 10; 10 MG/ML; UG/ML
INJECTION, SOLUTION INFILTRATION; PERINEURAL
Status: DISPENSED
Start: 2018-12-12

## (undated) RX ORDER — DEXAMETHASONE SODIUM PHOSPHATE 10 MG/ML
INJECTION, SOLUTION INTRAMUSCULAR; INTRAVENOUS
Status: DISPENSED
Start: 2018-12-19

## (undated) RX ORDER — LIDOCAINE HYDROCHLORIDE 10 MG/ML
INJECTION, SOLUTION INFILTRATION; PERINEURAL
Status: DISPENSED
Start: 2018-12-12

## (undated) RX ORDER — FENTANYL CITRATE 50 UG/ML
INJECTION, SOLUTION INTRAMUSCULAR; INTRAVENOUS
Status: DISPENSED
Start: 2018-12-19

## (undated) RX ORDER — LIDOCAINE HYDROCHLORIDE 20 MG/ML
INJECTION, SOLUTION EPIDURAL; INFILTRATION; INTRACAUDAL; PERINEURAL
Status: DISPENSED
Start: 2020-08-26

## (undated) RX ORDER — PROPOFOL 10 MG/ML
INJECTION, EMULSION INTRAVENOUS
Status: DISPENSED
Start: 2018-12-19

## (undated) RX ORDER — ONDANSETRON 2 MG/ML
INJECTION INTRAMUSCULAR; INTRAVENOUS
Status: DISPENSED
Start: 2018-12-19

## (undated) RX ORDER — LIDOCAINE HYDROCHLORIDE 20 MG/ML
INJECTION, SOLUTION EPIDURAL; INFILTRATION; INTRACAUDAL; PERINEURAL
Status: DISPENSED
Start: 2018-12-19

## (undated) RX ORDER — PROPOFOL 10 MG/ML
INJECTION, EMULSION INTRAVENOUS
Status: DISPENSED
Start: 2020-08-26